# Patient Record
Sex: MALE | Race: WHITE | NOT HISPANIC OR LATINO | Employment: OTHER | URBAN - NONMETROPOLITAN AREA
[De-identification: names, ages, dates, MRNs, and addresses within clinical notes are randomized per-mention and may not be internally consistent; named-entity substitution may affect disease eponyms.]

---

## 2017-04-21 ENCOUNTER — LAB REQUISITION (OUTPATIENT)
Dept: LAB | Facility: HOSPITAL | Age: 77
End: 2017-04-21

## 2017-04-21 DIAGNOSIS — Z79.01 LONG TERM CURRENT USE OF ANTICOAGULANT: ICD-10-CM

## 2017-04-21 DIAGNOSIS — D64.9 ANEMIA: ICD-10-CM

## 2017-04-21 LAB
BASOPHILS # BLD AUTO: 0.07 10*3/MM3 (ref 0–0.3)
BASOPHILS NFR BLD AUTO: 0.8 % (ref 0–2)
DEPRECATED RDW RBC AUTO: 48.5 FL (ref 37–54)
EOSINOPHIL # BLD AUTO: 0.25 10*3/MM3 (ref 0–0.7)
EOSINOPHIL NFR BLD AUTO: 3 % (ref 0–7)
ERYTHROCYTE [DISTWIDTH] IN BLOOD BY AUTOMATED COUNT: 15 % (ref 11.5–14.5)
HCT VFR BLD AUTO: 30.8 % (ref 42–52)
HGB BLD-MCNC: 9.5 G/DL (ref 14–18)
IMM GRANULOCYTES # BLD: 0.03 10*3/MM3 (ref 0–0.03)
IMM GRANULOCYTES NFR BLD: 0.4 % (ref 0–0.5)
INR PPP: 1.91 (ref 0.8–1.1)
LYMPHOCYTES # BLD AUTO: 1.77 10*3/MM3 (ref 1–3)
LYMPHOCYTES NFR BLD AUTO: 21.4 % (ref 16–46)
MCH RBC QN AUTO: 27.5 PG (ref 27–33)
MCHC RBC AUTO-ENTMCNC: 30.8 G/DL (ref 33–37)
MCV RBC AUTO: 89.3 FL (ref 80–94)
MONOCYTES # BLD AUTO: 0.68 10*3/MM3 (ref 0.1–0.9)
MONOCYTES NFR BLD AUTO: 8.2 % (ref 0–12)
NEUTROPHILS # BLD AUTO: 5.49 10*3/MM3 (ref 1.4–6.5)
NEUTROPHILS NFR BLD AUTO: 66.2 % (ref 40–75)
PLATELET # BLD AUTO: 274 10*3/MM3 (ref 130–400)
PMV BLD AUTO: 10.4 FL (ref 6–10)
PROTHROMBIN TIME: 22.1 SECONDS (ref 9.8–11.9)
RBC # BLD AUTO: 3.45 10*6/MM3 (ref 4.7–6.1)
WBC NRBC COR # BLD: 8.29 10*3/MM3 (ref 4.5–12.5)

## 2017-04-21 PROCEDURE — 85610 PROTHROMBIN TIME: CPT | Performed by: INTERNAL MEDICINE

## 2017-04-21 PROCEDURE — 85025 COMPLETE CBC W/AUTO DIFF WBC: CPT | Performed by: INTERNAL MEDICINE

## 2017-05-01 ENCOUNTER — LAB REQUISITION (OUTPATIENT)
Dept: LAB | Facility: HOSPITAL | Age: 77
End: 2017-05-01

## 2017-05-01 DIAGNOSIS — S81.802A OPEN WOUND OF LEFT LOWER LEG: ICD-10-CM

## 2017-05-01 DIAGNOSIS — N39.0 URINARY TRACT INFECTION: ICD-10-CM

## 2017-05-01 DIAGNOSIS — Z79.01 LONG TERM CURRENT USE OF ANTICOAGULANT: ICD-10-CM

## 2017-05-01 LAB
BASOPHILS # BLD AUTO: 0.07 10*3/MM3 (ref 0–0.3)
BASOPHILS NFR BLD AUTO: 0.9 % (ref 0–2)
BILIRUB UR QL STRIP: NEGATIVE
CLARITY UR: CLEAR
COLOR UR: YELLOW
DEPRECATED RDW RBC AUTO: 51 FL (ref 37–54)
EOSINOPHIL # BLD AUTO: 0.47 10*3/MM3 (ref 0–0.7)
EOSINOPHIL NFR BLD AUTO: 6.3 % (ref 0–7)
ERYTHROCYTE [DISTWIDTH] IN BLOOD BY AUTOMATED COUNT: 15.8 % (ref 11.5–14.5)
GLUCOSE UR STRIP-MCNC: NEGATIVE MG/DL
HCT VFR BLD AUTO: 33.8 % (ref 42–52)
HGB BLD-MCNC: 10.1 G/DL (ref 14–18)
HGB UR QL STRIP.AUTO: NEGATIVE
IMM GRANULOCYTES # BLD: 0.01 10*3/MM3 (ref 0–0.03)
IMM GRANULOCYTES NFR BLD: 0.1 % (ref 0–0.5)
INR PPP: 6.12 (ref 0.8–1.1)
KETONES UR QL STRIP: NEGATIVE
LEUKOCYTE ESTERASE UR QL STRIP.AUTO: NEGATIVE
LYMPHOCYTES # BLD AUTO: 2.01 10*3/MM3 (ref 1–3)
LYMPHOCYTES NFR BLD AUTO: 26.8 % (ref 16–46)
MCH RBC QN AUTO: 27 PG (ref 27–33)
MCHC RBC AUTO-ENTMCNC: 29.9 G/DL (ref 33–37)
MCV RBC AUTO: 90.4 FL (ref 80–94)
MONOCYTES # BLD AUTO: 0.64 10*3/MM3 (ref 0.1–0.9)
MONOCYTES NFR BLD AUTO: 8.5 % (ref 0–12)
NEUTROPHILS # BLD AUTO: 4.31 10*3/MM3 (ref 1.4–6.5)
NEUTROPHILS NFR BLD AUTO: 57.4 % (ref 40–75)
NITRITE UR QL STRIP: NEGATIVE
PH UR STRIP.AUTO: <=5 [PH] (ref 5–8)
PLATELET # BLD AUTO: 263 10*3/MM3 (ref 130–400)
PMV BLD AUTO: 10.5 FL (ref 6–10)
PROT UR QL STRIP: NEGATIVE
PROTHROMBIN TIME: 76.3 SECONDS (ref 9.8–11.9)
RBC # BLD AUTO: 3.74 10*6/MM3 (ref 4.7–6.1)
SP GR UR STRIP: 1.02 (ref 1–1.03)
UROBILINOGEN UR QL STRIP: NORMAL
WBC NRBC COR # BLD: 7.51 10*3/MM3 (ref 4.5–12.5)

## 2017-05-01 PROCEDURE — 85610 PROTHROMBIN TIME: CPT | Performed by: INTERNAL MEDICINE

## 2017-05-01 PROCEDURE — 81003 URINALYSIS AUTO W/O SCOPE: CPT | Performed by: INTERNAL MEDICINE

## 2017-05-01 PROCEDURE — 85025 COMPLETE CBC W/AUTO DIFF WBC: CPT | Performed by: INTERNAL MEDICINE

## 2017-05-02 ENCOUNTER — CONSULT (OUTPATIENT)
Dept: GASTROENTEROLOGY | Facility: CLINIC | Age: 77
End: 2017-05-02

## 2017-05-02 VITALS
SYSTOLIC BLOOD PRESSURE: 131 MMHG | OXYGEN SATURATION: 97 % | HEIGHT: 69 IN | DIASTOLIC BLOOD PRESSURE: 62 MMHG | HEART RATE: 79 BPM

## 2017-05-02 DIAGNOSIS — K92.1 MELENA: ICD-10-CM

## 2017-05-02 DIAGNOSIS — R13.10 DYSPHAGIA, UNSPECIFIED TYPE: Primary | ICD-10-CM

## 2017-05-02 DIAGNOSIS — Z12.11 COLON CANCER SCREENING: ICD-10-CM

## 2017-05-02 DIAGNOSIS — K59.00 CONSTIPATION, UNSPECIFIED CONSTIPATION TYPE: ICD-10-CM

## 2017-05-02 PROCEDURE — 99204 OFFICE O/P NEW MOD 45 MIN: CPT | Performed by: INTERNAL MEDICINE

## 2017-05-02 RX ORDER — MONTELUKAST SODIUM 10 MG/1
10 TABLET ORAL NIGHTLY
Status: ON HOLD | COMMUNITY
End: 2018-06-01

## 2017-05-02 RX ORDER — NAPROXEN 250 MG/1
250 TABLET ORAL 2 TIMES DAILY PRN
Status: ON HOLD | COMMUNITY
End: 2018-06-01

## 2017-05-02 RX ORDER — WARFARIN SODIUM 1 MG/1
1 TABLET ORAL
Status: ON HOLD | COMMUNITY
End: 2018-06-01

## 2017-05-02 RX ORDER — DULOXETIN HYDROCHLORIDE 30 MG/1
30 CAPSULE, DELAYED RELEASE ORAL DAILY
COMMUNITY
End: 2017-05-11

## 2017-05-02 RX ORDER — WARFARIN SODIUM 4 MG/1
4 TABLET ORAL
Status: ON HOLD | COMMUNITY
End: 2018-06-01

## 2017-05-02 RX ORDER — AMITRIPTYLINE HYDROCHLORIDE 10 MG/1
10 TABLET, FILM COATED ORAL NIGHTLY
COMMUNITY
End: 2018-09-24 | Stop reason: ALTCHOICE

## 2017-05-02 RX ORDER — PRAVASTATIN SODIUM 10 MG
10 TABLET ORAL NIGHTLY
Status: ON HOLD | COMMUNITY
End: 2020-08-14

## 2017-05-02 RX ORDER — FINASTERIDE 5 MG/1
5 TABLET, FILM COATED ORAL DAILY
Status: ON HOLD | COMMUNITY
End: 2018-06-01

## 2017-05-02 RX ORDER — OXYCODONE AND ACETAMINOPHEN 10; 325 MG/1; MG/1
1 TABLET ORAL EVERY 6 HOURS PRN
Status: ON HOLD | COMMUNITY
End: 2018-06-01

## 2017-05-02 RX ORDER — CLONIDINE HYDROCHLORIDE 0.2 MG/1
0.2 TABLET ORAL 3 TIMES DAILY PRN
COMMUNITY
End: 2020-03-10

## 2017-05-02 RX ORDER — LISINOPRIL 20 MG/1
20 TABLET ORAL 2 TIMES DAILY
Status: ON HOLD | COMMUNITY
End: 2020-08-14

## 2017-05-08 ENCOUNTER — LAB REQUISITION (OUTPATIENT)
Dept: LAB | Facility: HOSPITAL | Age: 77
End: 2017-05-08

## 2017-05-08 DIAGNOSIS — Z79.01 LONG TERM CURRENT USE OF ANTICOAGULANT: ICD-10-CM

## 2017-05-08 LAB
INR PPP: 2.06 (ref 0.8–1.1)
PROTHROMBIN TIME: 23.9 SECONDS (ref 9.8–11.9)

## 2017-05-08 PROCEDURE — 85610 PROTHROMBIN TIME: CPT | Performed by: INTERNAL MEDICINE

## 2017-05-15 ENCOUNTER — TELEPHONE (OUTPATIENT)
Dept: GASTROENTEROLOGY | Facility: CLINIC | Age: 77
End: 2017-05-15

## 2017-05-15 DIAGNOSIS — Z12.11 COLON CANCER SCREENING: ICD-10-CM

## 2017-05-15 DIAGNOSIS — R13.10 DYSPHAGIA, UNSPECIFIED TYPE: Primary | ICD-10-CM

## 2017-05-17 ENCOUNTER — LAB REQUISITION (OUTPATIENT)
Dept: LAB | Facility: HOSPITAL | Age: 77
End: 2017-05-17

## 2017-05-17 DIAGNOSIS — Z79.01 LONG TERM CURRENT USE OF ANTICOAGULANT: ICD-10-CM

## 2017-05-17 LAB
INR PPP: 1.21 (ref 0.8–1.1)
PROTHROMBIN TIME: 13.6 SECONDS (ref 9.8–11.9)

## 2017-05-17 PROCEDURE — 85610 PROTHROMBIN TIME: CPT | Performed by: INTERNAL MEDICINE

## 2017-05-24 ENCOUNTER — LAB REQUISITION (OUTPATIENT)
Dept: LAB | Facility: HOSPITAL | Age: 77
End: 2017-05-24

## 2017-05-24 DIAGNOSIS — I10 ESSENTIAL (PRIMARY) HYPERTENSION: ICD-10-CM

## 2017-05-24 DIAGNOSIS — J44.9 CHRONIC OBSTRUCTIVE PULMONARY DISEASE (HCC): ICD-10-CM

## 2017-05-24 DIAGNOSIS — Z79.01 LONG TERM CURRENT USE OF ANTICOAGULANT: ICD-10-CM

## 2017-05-24 LAB
ALBUMIN SERPL-MCNC: 4 G/DL (ref 3.4–4.8)
ALBUMIN/GLOB SERPL: 1.2 G/DL (ref 1.5–2.5)
ALP SERPL-CCNC: 99 U/L (ref 40–129)
ALT SERPL W P-5'-P-CCNC: 6 U/L (ref 10–44)
ANION GAP SERPL CALCULATED.3IONS-SCNC: 5.4 MMOL/L (ref 3.6–11.2)
AST SERPL-CCNC: 16 U/L (ref 10–34)
BASOPHILS # BLD AUTO: 0.09 10*3/MM3 (ref 0–0.3)
BASOPHILS NFR BLD AUTO: 1.4 % (ref 0–2)
BILIRUB SERPL-MCNC: 0.3 MG/DL (ref 0.2–1.8)
BUN BLD-MCNC: 16 MG/DL (ref 7–21)
BUN/CREAT SERPL: 19.3 (ref 7–25)
CALCIUM SPEC-SCNC: 9.2 MG/DL (ref 7.7–10)
CHLORIDE SERPL-SCNC: 102 MMOL/L (ref 99–112)
CO2 SERPL-SCNC: 26.6 MMOL/L (ref 24.3–31.9)
CREAT BLD-MCNC: 0.83 MG/DL (ref 0.43–1.29)
DEPRECATED RDW RBC AUTO: 48 FL (ref 37–54)
EOSINOPHIL # BLD AUTO: 0.35 10*3/MM3 (ref 0–0.7)
EOSINOPHIL NFR BLD AUTO: 5.3 % (ref 0–7)
ERYTHROCYTE [DISTWIDTH] IN BLOOD BY AUTOMATED COUNT: 15.2 % (ref 11.5–14.5)
GFR SERPL CREATININE-BSD FRML MDRD: 90 ML/MIN/1.73
GLOBULIN UR ELPH-MCNC: 3.3 GM/DL
GLUCOSE BLD-MCNC: 105 MG/DL (ref 70–110)
HCT VFR BLD AUTO: 36.6 % (ref 42–52)
HGB BLD-MCNC: 11.6 G/DL (ref 14–18)
IMM GRANULOCYTES # BLD: 0.03 10*3/MM3 (ref 0–0.03)
IMM GRANULOCYTES NFR BLD: 0.5 % (ref 0–0.5)
INR PPP: 1.8 (ref 0.8–1.1)
LYMPHOCYTES # BLD AUTO: 1.92 10*3/MM3 (ref 1–3)
LYMPHOCYTES NFR BLD AUTO: 28.9 % (ref 16–46)
MCH RBC QN AUTO: 27.5 PG (ref 27–33)
MCHC RBC AUTO-ENTMCNC: 31.7 G/DL (ref 33–37)
MCV RBC AUTO: 86.7 FL (ref 80–94)
MONOCYTES # BLD AUTO: 0.6 10*3/MM3 (ref 0.1–0.9)
MONOCYTES NFR BLD AUTO: 9 % (ref 0–12)
NEUTROPHILS # BLD AUTO: 3.65 10*3/MM3 (ref 1.4–6.5)
NEUTROPHILS NFR BLD AUTO: 54.9 % (ref 40–75)
OSMOLALITY SERPL CALC.SUM OF ELEC: 269.8 MOSM/KG (ref 273–305)
PLATELET # BLD AUTO: 251 10*3/MM3 (ref 130–400)
PMV BLD AUTO: 10.8 FL (ref 6–10)
POTASSIUM BLD-SCNC: 4.4 MMOL/L (ref 3.5–5.3)
PROT SERPL-MCNC: 7.3 G/DL (ref 6–8)
PROTHROMBIN TIME: 20.8 SECONDS (ref 9.8–11.9)
RBC # BLD AUTO: 4.22 10*6/MM3 (ref 4.7–6.1)
SODIUM BLD-SCNC: 134 MMOL/L (ref 135–153)
WBC NRBC COR # BLD: 6.64 10*3/MM3 (ref 4.5–12.5)

## 2017-05-24 PROCEDURE — 85025 COMPLETE CBC W/AUTO DIFF WBC: CPT | Performed by: INTERNAL MEDICINE

## 2017-05-24 PROCEDURE — 80053 COMPREHEN METABOLIC PANEL: CPT | Performed by: INTERNAL MEDICINE

## 2017-05-24 PROCEDURE — 85610 PROTHROMBIN TIME: CPT | Performed by: INTERNAL MEDICINE

## 2017-06-01 ENCOUNTER — LAB REQUISITION (OUTPATIENT)
Dept: LAB | Facility: HOSPITAL | Age: 77
End: 2017-06-01

## 2017-06-01 DIAGNOSIS — Z79.01 LONG TERM CURRENT USE OF ANTICOAGULANT: ICD-10-CM

## 2017-06-01 LAB
INR PPP: 2.06 (ref 0.8–1.1)
PROTHROMBIN TIME: 24 SECONDS (ref 9.8–11.9)

## 2017-06-01 PROCEDURE — 85610 PROTHROMBIN TIME: CPT | Performed by: INTERNAL MEDICINE

## 2017-06-05 ENCOUNTER — LAB REQUISITION (OUTPATIENT)
Dept: LAB | Facility: HOSPITAL | Age: 77
End: 2017-06-05

## 2017-06-05 DIAGNOSIS — Z79.01 LONG TERM CURRENT USE OF ANTICOAGULANT: ICD-10-CM

## 2017-06-05 LAB
INR PPP: 2.23 (ref 0.8–1.1)
PROTHROMBIN TIME: 26.1 SECONDS (ref 9.8–11.9)

## 2017-06-05 PROCEDURE — 85610 PROTHROMBIN TIME: CPT | Performed by: INTERNAL MEDICINE

## 2017-06-14 ENCOUNTER — LAB REQUISITION (OUTPATIENT)
Dept: LAB | Facility: HOSPITAL | Age: 77
End: 2017-06-14

## 2017-06-14 DIAGNOSIS — Z51.81 ENCOUNTER FOR THERAPEUTIC DRUG LEVEL MONITORING: ICD-10-CM

## 2017-06-14 LAB
INR PPP: 1.46 (ref 0.8–1.1)
PROTHROMBIN TIME: 16.6 SECONDS (ref 9.8–11.9)

## 2017-06-14 PROCEDURE — 85610 PROTHROMBIN TIME: CPT | Performed by: INTERNAL MEDICINE

## 2017-06-15 ENCOUNTER — ANESTHESIA EVENT (OUTPATIENT)
Dept: PERIOP | Facility: HOSPITAL | Age: 77
End: 2017-06-15

## 2017-06-15 ENCOUNTER — ANESTHESIA (OUTPATIENT)
Dept: PERIOP | Facility: HOSPITAL | Age: 77
End: 2017-06-15

## 2017-06-15 ENCOUNTER — HOSPITAL ENCOUNTER (OUTPATIENT)
Facility: HOSPITAL | Age: 77
Setting detail: HOSPITAL OUTPATIENT SURGERY
Discharge: HOME | End: 2017-06-15
Attending: INTERNAL MEDICINE | Admitting: INTERNAL MEDICINE

## 2017-06-15 VITALS
SYSTOLIC BLOOD PRESSURE: 137 MMHG | HEART RATE: 61 BPM | HEIGHT: 68 IN | OXYGEN SATURATION: 96 % | RESPIRATION RATE: 20 BRPM | DIASTOLIC BLOOD PRESSURE: 59 MMHG | WEIGHT: 180 LBS | BODY MASS INDEX: 27.28 KG/M2 | TEMPERATURE: 98.2 F

## 2017-06-15 DIAGNOSIS — R13.10 DYSPHAGIA, UNSPECIFIED TYPE: ICD-10-CM

## 2017-06-15 DIAGNOSIS — Z12.11 COLON CANCER SCREENING: ICD-10-CM

## 2017-06-15 PROCEDURE — 43248 EGD GUIDE WIRE INSERTION: CPT | Performed by: INTERNAL MEDICINE

## 2017-06-15 PROCEDURE — 25010000002 MIDAZOLAM PER 1 MG: Performed by: NURSE ANESTHETIST, CERTIFIED REGISTERED

## 2017-06-15 PROCEDURE — 25010000002 PROPOFOL 10 MG/ML EMULSION: Performed by: NURSE ANESTHETIST, CERTIFIED REGISTERED

## 2017-06-15 PROCEDURE — 43239 EGD BIOPSY SINGLE/MULTIPLE: CPT | Performed by: INTERNAL MEDICINE

## 2017-06-15 PROCEDURE — 45385 COLONOSCOPY W/LESION REMOVAL: CPT | Performed by: INTERNAL MEDICINE

## 2017-06-15 PROCEDURE — 88342 IMHCHEM/IMCYTCHM 1ST ANTB: CPT | Performed by: INTERNAL MEDICINE

## 2017-06-15 PROCEDURE — 88305 TISSUE EXAM BY PATHOLOGIST: CPT | Performed by: INTERNAL MEDICINE

## 2017-06-15 PROCEDURE — 25010000002 FENTANYL CITRATE (PF) 100 MCG/2ML SOLUTION: Performed by: NURSE ANESTHETIST, CERTIFIED REGISTERED

## 2017-06-15 RX ORDER — IPRATROPIUM BROMIDE AND ALBUTEROL SULFATE 2.5; .5 MG/3ML; MG/3ML
3 SOLUTION RESPIRATORY (INHALATION) ONCE AS NEEDED
Status: DISCONTINUED | OUTPATIENT
Start: 2017-06-15 | End: 2017-06-15 | Stop reason: HOSPADM

## 2017-06-15 RX ORDER — MIDAZOLAM HYDROCHLORIDE 1 MG/ML
INJECTION INTRAMUSCULAR; INTRAVENOUS AS NEEDED
Status: DISCONTINUED | OUTPATIENT
Start: 2017-06-15 | End: 2017-06-15 | Stop reason: SURG

## 2017-06-15 RX ORDER — FENTANYL CITRATE 50 UG/ML
INJECTION, SOLUTION INTRAMUSCULAR; INTRAVENOUS AS NEEDED
Status: DISCONTINUED | OUTPATIENT
Start: 2017-06-15 | End: 2017-06-15 | Stop reason: SURG

## 2017-06-15 RX ORDER — SODIUM CHLORIDE 0.9 % (FLUSH) 0.9 %
1-10 SYRINGE (ML) INJECTION AS NEEDED
Status: DISCONTINUED | OUTPATIENT
Start: 2017-06-15 | End: 2017-06-15 | Stop reason: HOSPADM

## 2017-06-15 RX ORDER — FENTANYL CITRATE 50 UG/ML
50 INJECTION, SOLUTION INTRAMUSCULAR; INTRAVENOUS
Status: DISCONTINUED | OUTPATIENT
Start: 2017-06-15 | End: 2017-06-15 | Stop reason: HOSPADM

## 2017-06-15 RX ORDER — MEPERIDINE HYDROCHLORIDE 25 MG/ML
12.5 INJECTION INTRAMUSCULAR; INTRAVENOUS; SUBCUTANEOUS
Status: DISCONTINUED | OUTPATIENT
Start: 2017-06-15 | End: 2017-06-15 | Stop reason: HOSPADM

## 2017-06-15 RX ORDER — OXYCODONE HYDROCHLORIDE AND ACETAMINOPHEN 5; 325 MG/1; MG/1
1 TABLET ORAL ONCE AS NEEDED
Status: DISCONTINUED | OUTPATIENT
Start: 2017-06-15 | End: 2017-06-15 | Stop reason: HOSPADM

## 2017-06-15 RX ORDER — PROPOFOL 10 MG/ML
VIAL (ML) INTRAVENOUS AS NEEDED
Status: DISCONTINUED | OUTPATIENT
Start: 2017-06-15 | End: 2017-06-15 | Stop reason: SURG

## 2017-06-15 RX ORDER — LIDOCAINE HYDROCHLORIDE 20 MG/ML
INJECTION, SOLUTION INFILTRATION; PERINEURAL AS NEEDED
Status: DISCONTINUED | OUTPATIENT
Start: 2017-06-15 | End: 2017-06-15 | Stop reason: SURG

## 2017-06-15 RX ORDER — ONDANSETRON 2 MG/ML
4 INJECTION INTRAMUSCULAR; INTRAVENOUS ONCE AS NEEDED
Status: DISCONTINUED | OUTPATIENT
Start: 2017-06-15 | End: 2017-06-15 | Stop reason: HOSPADM

## 2017-06-15 RX ORDER — SODIUM CHLORIDE, SODIUM LACTATE, POTASSIUM CHLORIDE, CALCIUM CHLORIDE 600; 310; 30; 20 MG/100ML; MG/100ML; MG/100ML; MG/100ML
125 INJECTION, SOLUTION INTRAVENOUS CONTINUOUS
Status: DISCONTINUED | OUTPATIENT
Start: 2017-06-15 | End: 2017-06-15 | Stop reason: HOSPADM

## 2017-06-15 RX ADMIN — LIDOCAINE HYDROCHLORIDE 60 MG: 20 INJECTION, SOLUTION INFILTRATION; PERINEURAL at 08:46

## 2017-06-15 RX ADMIN — PROPOFOL 10 MG: 10 INJECTION, EMULSION INTRAVENOUS at 08:52

## 2017-06-15 RX ADMIN — FENTANYL CITRATE 25 MCG: 50 INJECTION INTRAMUSCULAR; INTRAVENOUS at 08:57

## 2017-06-15 RX ADMIN — FENTANYL CITRATE 25 MCG: 50 INJECTION INTRAMUSCULAR; INTRAVENOUS at 08:46

## 2017-06-15 RX ADMIN — PROPOFOL 10 MG: 10 INJECTION, EMULSION INTRAVENOUS at 09:07

## 2017-06-15 RX ADMIN — PROPOFOL 10 MG: 10 INJECTION, EMULSION INTRAVENOUS at 09:12

## 2017-06-15 RX ADMIN — MIDAZOLAM HYDROCHLORIDE 1 MG: 1 INJECTION, SOLUTION INTRAMUSCULAR; INTRAVENOUS at 08:46

## 2017-06-15 RX ADMIN — FENTANYL CITRATE 25 MCG: 50 INJECTION INTRAMUSCULAR; INTRAVENOUS at 09:02

## 2017-06-15 RX ADMIN — SODIUM CHLORIDE, POTASSIUM CHLORIDE, SODIUM LACTATE AND CALCIUM CHLORIDE: 600; 310; 30; 20 INJECTION, SOLUTION INTRAVENOUS at 08:41

## 2017-06-15 RX ADMIN — PROPOFOL 10 MG: 10 INJECTION, EMULSION INTRAVENOUS at 08:57

## 2017-06-15 RX ADMIN — PROPOFOL 20 MG: 10 INJECTION, EMULSION INTRAVENOUS at 08:46

## 2017-06-15 RX ADMIN — PROPOFOL 10 MG: 10 INJECTION, EMULSION INTRAVENOUS at 09:02

## 2017-06-15 RX ADMIN — MIDAZOLAM HYDROCHLORIDE 1 MG: 1 INJECTION, SOLUTION INTRAMUSCULAR; INTRAVENOUS at 08:41

## 2017-06-15 RX ADMIN — FENTANYL CITRATE 25 MCG: 50 INJECTION INTRAMUSCULAR; INTRAVENOUS at 08:52

## 2017-06-15 NOTE — PLAN OF CARE
Problem: Patient Care Overview (Adult)  Goal: Plan of Care Review  Outcome: Ongoing (interventions implemented as appropriate)    06/15/17 0832   Coping/Psychosocial Response Interventions   Plan Of Care Reviewed With patient   Patient Care Overview   Progress progress toward functional goals as expected

## 2017-06-15 NOTE — PLAN OF CARE
Problem: GI Endoscopy (Adult)  Goal: Signs and Symptoms of Listed Potential Problems Will be Absent or Manageable (GI Endoscopy)  Outcome: Ongoing (interventions implemented as appropriate)    06/15/17 0763   GI Endoscopy   Problems Assessed (GI Endoscopy) all   Problems Present (GI Endoscopy) none

## 2017-06-15 NOTE — ANESTHESIA POSTPROCEDURE EVALUATION
Patient: Zak Olmstead    Procedure Summary     Date Anesthesia Start Anesthesia Stop Room / Location    06/15/17 0841 0919 [facility] / [facility]       Procedure Diagnosis Surgeon Provider    ESOPHAGOGASTRODUODENOSCOPY WITH BIOPSY  CPTCODE:28872 (N/A Esophagus); COLONOSCOPY  CPTCODE:76798 (N/A ) Colon cancer screening; Dysphagia, unspecified type  (Colon cancer screening [Z12.11]; Dysphagia, unspecified type [R13.10]) MD Mauricio Mallory III, MD          Anesthesia Type: general  Last vitals  /59 (06/15/17 0950)    Temp 98.2 °F (36.8 °C) (06/15/17 0920)    Pulse 61 (06/15/17 0950)   Resp 20 (06/15/17 0950)    SpO2 96 % (06/15/17 0950)      Post Anesthesia Care and Evaluation    Patient location during evaluation: bedside  Patient participation: complete - patient participated  Level of consciousness: awake and alert  Pain score: 1  Pain management: adequate  Airway patency: patent  Anesthetic complications: No anesthetic complications  PONV Status: none  Cardiovascular status: acceptable  Respiratory status: acceptable  Hydration status: acceptable

## 2017-06-15 NOTE — OP NOTE
06/15/17    ESOPHAGOGASTRODUODENOSCOPY WITH BIOPSY and esophageal dilation, COLONOSCOPY with polypectomy  Procedure Note    Zak Olmstead  6/15/2017    Pre-op Diagnosis: Dysphagia, colon cancer screening, no prior colonoscopy      Post-op Diagnosis:   Small hiatal hernia  Internal hemorrhoids  Colonic polyps  Diverticulosis, sigmoid        Anesthesia: Per Anesthesia service, general anesthesia      Estimated Blood Loss: Negligible      Findings: EGD was performed with careful inspection of the esophagus, stomach and duodenum to the fourth portion.  Small hiatal hernia was noted.  There was no obvious active esophagitis.  No other abnormality was seen in the UGI tract.  Biopsies were taken randomly from the esophagus.  Biopsies were taken from the gastric antrum in order to check for H. pylori.  Utilizing a guidewire, the esophagus was dilated using a 48 [place] Savary dilator.  Re-examination of the esophagus, using the endoscope, revealed no significant injury to the esophagus.    Rectal examination revealed no mass.  Colonoscopy was performed to the cecum.  Bowel preparation was fair.  The scope was retroflexed within the rectum.  All areas were examined carefully.  Internal hemorrhoids were noted.  Diverticulosis was noted in the sigmoid colon without evidence of diverticulitis.  Benign-appearing sessile polyps were found and removed, one from the rectum and one from the sigmoid colon.  Both polyps were between 5-10 millimeters in size--both polyps were removed using the cold snare.  Visualization of the cecum was difficult, but no obvious abnormality was seen in the cecum.  No other abnormality was identified.    He tolerated the procedures well and there were no complications.  He left the OR in stable and satisfactory condition.      Complications: None      Recommendations:   Findings discussed with the patient  Await biopsy findings  Repeat screening/surveillance colonoscopy in about 3 years      Lincoln  Imer Bowens III, MD     Date: 6/15/2017  Time: 9:27 AM

## 2017-06-15 NOTE — H&P
"History and physical examination  SAGE 15 2017    HPI  77-year-old white male presents for EGD in order to investigate recurrent dysphagia.  He is also scheduled for screening colonoscopy.  He has not previously undergone colonoscopy.      Review of Systems   Negative and noncontributory.    ACTIVE PROBLEMS:   Specialty Problems     None          PAST MEDICAL HISTORY:  Past Medical History:   Diagnosis Date   • Heart murmur    • Hypertension    • Mononeuritis multiplex        SURGICAL HISTORY:  Past Surgical History:   Procedure Laterality Date   • ABDOMINAL AORTIC ANEURYSM REPAIR     • APPENDECTOMY     • BACK SURGERY     • BELOW KNEE LEG AMPUTATION Left    • ESOPHAGEAL DILATATION         FAMILY HISTORY:  Family History   Problem Relation Age of Onset   • Diabetes Other    • Heart disease Other        SOCIAL HISTORY:  Social History   Substance Use Topics   • Smoking status: Current Every Day Smoker     Packs/day: 1.00     Years: 60.00     Types: Cigarettes   • Smokeless tobacco: Not on file   • Alcohol use No       CURRENT MEDICATION:    Current Facility-Administered Medications:   •  lactated ringers infusion, 125 mL/hr, Intravenous, Continuous, Mauricio Morillo MD  •  sodium chloride 0.9 % flush 1-10 mL, 1-10 mL, Intravenous, PRN, Mauricio Morillo MD     I have reviewed his list of current medications.    ALLERGIES:  Review of patient's allergies indicates no known allergies.    VISIT VITALS:  /59 (BP Location: Left arm, Patient Position: Lying)  Pulse 59  Temp 97.5 °F (36.4 °C) (Tympanic)   Resp 20  Ht 68\" (172.7 cm)  Wt 180 lb (81.6 kg)  SpO2 96%  BMI 27.37 kg/m2    PHYSICAL EXAMINATION:  Physical Exam   Alert, oriented ×3  HEENT: Normal  Neck: No mass  Chest: Clear  Heart: Regular rhythm  Abdomen: Soft, nontender, active BS  Extremities: No edema  Neuro: No focal deficit    Assessment/Plan   Dysphagia  Colon cancer screening    REC  EGD and screening colonoscopy are planned.  He may benefit from " esophageal dilation.  The procedures, benefits, risks and alternatives have been explained to the patient.         Lincoln Bowens III, MD

## 2017-06-15 NOTE — ANESTHESIA PREPROCEDURE EVALUATION
Anesthesia Evaluation     no history of anesthetic complications:  NPO Solid Status: > 8 hours  NPO Liquid Status: > 8 hours     Airway   Mallampati: II  TM distance: >3 FB  Neck ROM: full  no difficulty expected  Dental    (+) poor dentition    Pulmonary - normal exam   Cardiovascular - normal exam    (+) hypertension, valvular problems/murmurs, PVD,       Neuro/Psych- negative ROS  GI/Hepatic/Renal/Endo      Musculoskeletal     Abdominal  - normal exam   Substance History      OB/GYN          Other                                      Anesthesia Plan    ASA 3     general     intravenous induction   Anesthetic plan and risks discussed with patient.

## 2017-06-15 NOTE — PLAN OF CARE
Problem: GI Endoscopy (Adult)  Goal: Signs and Symptoms of Listed Potential Problems Will be Absent or Manageable (GI Endoscopy)  Outcome: Ongoing (interventions implemented as appropriate)    06/15/17 0832   GI Endoscopy   Problems Assessed (GI Endoscopy) all   Problems Present (GI Endoscopy) none

## 2017-06-19 LAB
LAB AP CASE REPORT: NORMAL
Lab: NORMAL
PATH REPORT.FINAL DX SPEC: NORMAL

## 2017-07-31 ENCOUNTER — LAB REQUISITION (OUTPATIENT)
Dept: LAB | Facility: HOSPITAL | Age: 77
End: 2017-07-31

## 2017-07-31 DIAGNOSIS — D64.9 ANEMIA: ICD-10-CM

## 2017-07-31 DIAGNOSIS — I10 ESSENTIAL (PRIMARY) HYPERTENSION: ICD-10-CM

## 2017-07-31 LAB
ALBUMIN SERPL-MCNC: 3.8 G/DL (ref 3.4–4.8)
ALBUMIN/GLOB SERPL: 1.5 G/DL (ref 1.5–2.5)
ALP SERPL-CCNC: 85 U/L (ref 40–129)
ALT SERPL W P-5'-P-CCNC: 13 U/L (ref 10–44)
ANION GAP SERPL CALCULATED.3IONS-SCNC: 5.1 MMOL/L (ref 3.6–11.2)
AST SERPL-CCNC: 16 U/L (ref 10–34)
BASOPHILS # BLD AUTO: 0.08 10*3/MM3 (ref 0–0.3)
BASOPHILS NFR BLD AUTO: 0.9 % (ref 0–2)
BILIRUB SERPL-MCNC: 0.1 MG/DL (ref 0.2–1.8)
BUN BLD-MCNC: 16 MG/DL (ref 7–21)
BUN/CREAT SERPL: 14.4 (ref 7–25)
CALCIUM SPEC-SCNC: 8.4 MG/DL (ref 7.7–10)
CHLORIDE SERPL-SCNC: 103 MMOL/L (ref 99–112)
CO2 SERPL-SCNC: 27.9 MMOL/L (ref 24.3–31.9)
CREAT BLD-MCNC: 1.11 MG/DL (ref 0.43–1.29)
DEPRECATED RDW RBC AUTO: 47.5 FL (ref 37–54)
EOSINOPHIL # BLD AUTO: 0.32 10*3/MM3 (ref 0–0.7)
EOSINOPHIL NFR BLD AUTO: 3.8 % (ref 0–7)
ERYTHROCYTE [DISTWIDTH] IN BLOOD BY AUTOMATED COUNT: 15.4 % (ref 11.5–14.5)
GFR SERPL CREATININE-BSD FRML MDRD: 64 ML/MIN/1.73
GLOBULIN UR ELPH-MCNC: 2.6 GM/DL
GLUCOSE BLD-MCNC: 132 MG/DL (ref 70–110)
HCT VFR BLD AUTO: 37.4 % (ref 42–52)
HGB BLD-MCNC: 11.4 G/DL (ref 14–18)
IMM GRANULOCYTES # BLD: 0.01 10*3/MM3 (ref 0–0.03)
IMM GRANULOCYTES NFR BLD: 0.1 % (ref 0–0.5)
LYMPHOCYTES # BLD AUTO: 2.44 10*3/MM3 (ref 1–3)
LYMPHOCYTES NFR BLD AUTO: 28.7 % (ref 16–46)
MCH RBC QN AUTO: 25.9 PG (ref 27–33)
MCHC RBC AUTO-ENTMCNC: 30.5 G/DL (ref 33–37)
MCV RBC AUTO: 85 FL (ref 80–94)
MONOCYTES # BLD AUTO: 0.71 10*3/MM3 (ref 0.1–0.9)
MONOCYTES NFR BLD AUTO: 8.4 % (ref 0–12)
NEUTROPHILS # BLD AUTO: 4.94 10*3/MM3 (ref 1.4–6.5)
NEUTROPHILS NFR BLD AUTO: 58.1 % (ref 40–75)
OSMOLALITY SERPL CALC.SUM OF ELEC: 275 MOSM/KG (ref 273–305)
PLATELET # BLD AUTO: 231 10*3/MM3 (ref 130–400)
PMV BLD AUTO: 11.5 FL (ref 6–10)
POTASSIUM BLD-SCNC: 4.4 MMOL/L (ref 3.5–5.3)
PROT SERPL-MCNC: 6.4 G/DL (ref 6–8)
RBC # BLD AUTO: 4.4 10*6/MM3 (ref 4.7–6.1)
SODIUM BLD-SCNC: 136 MMOL/L (ref 135–153)
WBC NRBC COR # BLD: 8.5 10*3/MM3 (ref 4.5–12.5)

## 2017-07-31 PROCEDURE — 85025 COMPLETE CBC W/AUTO DIFF WBC: CPT | Performed by: INTERNAL MEDICINE

## 2017-07-31 PROCEDURE — 80053 COMPREHEN METABOLIC PANEL: CPT | Performed by: INTERNAL MEDICINE

## 2017-08-21 ENCOUNTER — LAB REQUISITION (OUTPATIENT)
Dept: LAB | Facility: HOSPITAL | Age: 77
End: 2017-08-21

## 2017-08-21 DIAGNOSIS — N39.0 URINARY TRACT INFECTION: ICD-10-CM

## 2017-08-21 LAB
BILIRUB UR QL STRIP: NEGATIVE
CLARITY UR: CLEAR
COLOR UR: ABNORMAL
GLUCOSE UR STRIP-MCNC: NEGATIVE MG/DL
HGB UR QL STRIP.AUTO: NEGATIVE
KETONES UR QL STRIP: ABNORMAL
LEUKOCYTE ESTERASE UR QL STRIP.AUTO: NEGATIVE
NITRITE UR QL STRIP: NEGATIVE
PH UR STRIP.AUTO: <=5 [PH] (ref 5–8)
PROT UR QL STRIP: NEGATIVE
SP GR UR STRIP: >=1.03 (ref 1–1.03)
UROBILINOGEN UR QL STRIP: ABNORMAL

## 2017-08-21 PROCEDURE — 81003 URINALYSIS AUTO W/O SCOPE: CPT | Performed by: INTERNAL MEDICINE

## 2017-09-22 ENCOUNTER — LAB REQUISITION (OUTPATIENT)
Dept: LAB | Facility: HOSPITAL | Age: 77
End: 2017-09-22

## 2017-09-22 DIAGNOSIS — E61.2 MAGNESIUM DEFICIENCY: ICD-10-CM

## 2017-09-22 DIAGNOSIS — E78.5 HYPERLIPIDEMIA: ICD-10-CM

## 2017-09-22 DIAGNOSIS — E03.9 HYPOTHYROIDISM: ICD-10-CM

## 2017-09-22 DIAGNOSIS — J44.9 CHRONIC OBSTRUCTIVE PULMONARY DISEASE (HCC): ICD-10-CM

## 2017-09-22 DIAGNOSIS — D64.9 ANEMIA: ICD-10-CM

## 2017-09-22 DIAGNOSIS — R33.9 RETENTION OF URINE: ICD-10-CM

## 2017-09-22 LAB
ALBUMIN SERPL-MCNC: 4.4 G/DL (ref 3.4–4.8)
ALBUMIN UR-MCNC: 6.3 MG/L
ALBUMIN/GLOB SERPL: 1.5 G/DL (ref 1.5–2.5)
ALP SERPL-CCNC: 84 U/L (ref 40–129)
ALT SERPL W P-5'-P-CCNC: 7 U/L (ref 10–44)
ANION GAP SERPL CALCULATED.3IONS-SCNC: 6.6 MMOL/L (ref 3.6–11.2)
AST SERPL-CCNC: 17 U/L (ref 10–34)
BASOPHILS # BLD AUTO: 0.1 10*3/MM3 (ref 0–0.3)
BASOPHILS NFR BLD AUTO: 1.2 % (ref 0–2)
BILIRUB SERPL-MCNC: 0.3 MG/DL (ref 0.2–1.8)
BILIRUB UR QL STRIP: NEGATIVE
BUN BLD-MCNC: 19 MG/DL (ref 7–21)
BUN/CREAT SERPL: 18.6 (ref 7–25)
CALCIUM SPEC-SCNC: 9.4 MG/DL (ref 7.7–10)
CHLORIDE SERPL-SCNC: 104 MMOL/L (ref 99–112)
CHOLEST SERPL-MCNC: 139 MG/DL (ref 0–200)
CLARITY UR: CLEAR
CO2 SERPL-SCNC: 25.4 MMOL/L (ref 24.3–31.9)
COLOR UR: YELLOW
CREAT BLD-MCNC: 1.02 MG/DL (ref 0.43–1.29)
DEPRECATED RDW RBC AUTO: 50.7 FL (ref 37–54)
EOSINOPHIL # BLD AUTO: 0.38 10*3/MM3 (ref 0–0.7)
EOSINOPHIL NFR BLD AUTO: 4.4 % (ref 0–7)
ERYTHROCYTE [DISTWIDTH] IN BLOOD BY AUTOMATED COUNT: 17.2 % (ref 11.5–14.5)
GFR SERPL CREATININE-BSD FRML MDRD: 71 ML/MIN/1.73
GLOBULIN UR ELPH-MCNC: 3 GM/DL
GLUCOSE BLD-MCNC: 109 MG/DL (ref 70–110)
GLUCOSE UR STRIP-MCNC: NEGATIVE MG/DL
HCT VFR BLD AUTO: 41.4 % (ref 42–52)
HDLC SERPL-MCNC: 31 MG/DL (ref 60–100)
HGB BLD-MCNC: 12.9 G/DL (ref 14–18)
HGB UR QL STRIP.AUTO: NEGATIVE
IMM GRANULOCYTES # BLD: 0.01 10*3/MM3 (ref 0–0.03)
IMM GRANULOCYTES NFR BLD: 0.1 % (ref 0–0.5)
KETONES UR QL STRIP: NEGATIVE
LDLC SERPL CALC-MCNC: 85 MG/DL (ref 0–100)
LDLC/HDLC SERPL: 2.75 {RATIO}
LEUKOCYTE ESTERASE UR QL STRIP.AUTO: NEGATIVE
LYMPHOCYTES # BLD AUTO: 2.92 10*3/MM3 (ref 1–3)
LYMPHOCYTES NFR BLD AUTO: 33.6 % (ref 16–46)
MAGNESIUM SERPL-MCNC: 2.2 MG/DL (ref 1.7–2.6)
MCH RBC QN AUTO: 25.3 PG (ref 27–33)
MCHC RBC AUTO-ENTMCNC: 31.2 G/DL (ref 33–37)
MCV RBC AUTO: 81.2 FL (ref 80–94)
MONOCYTES # BLD AUTO: 0.76 10*3/MM3 (ref 0.1–0.9)
MONOCYTES NFR BLD AUTO: 8.7 % (ref 0–12)
NEUTROPHILS # BLD AUTO: 4.52 10*3/MM3 (ref 1.4–6.5)
NEUTROPHILS NFR BLD AUTO: 52 % (ref 40–75)
NITRITE UR QL STRIP: NEGATIVE
OSMOLALITY SERPL CALC.SUM OF ELEC: 274.8 MOSM/KG (ref 273–305)
PH UR STRIP.AUTO: <=5 [PH] (ref 5–8)
PLATELET # BLD AUTO: 236 10*3/MM3 (ref 130–400)
PMV BLD AUTO: 10.6 FL (ref 6–10)
POTASSIUM BLD-SCNC: 4.5 MMOL/L (ref 3.5–5.3)
PROT SERPL-MCNC: 7.4 G/DL (ref 6–8)
PROT UR QL STRIP: NEGATIVE
PSA SERPL-MCNC: 1.4 NG/ML (ref 0–4)
RBC # BLD AUTO: 5.1 10*6/MM3 (ref 4.7–6.1)
SODIUM BLD-SCNC: 136 MMOL/L (ref 135–153)
SP GR UR STRIP: 1.02 (ref 1–1.03)
TRIGL SERPL-MCNC: 114 MG/DL (ref 0–150)
TSH SERPL DL<=0.05 MIU/L-ACNC: 0.89 MIU/ML (ref 0.55–4.78)
UROBILINOGEN UR QL STRIP: NORMAL
VLDLC SERPL-MCNC: 22.8 MG/DL
WBC NRBC COR # BLD: 8.69 10*3/MM3 (ref 4.5–12.5)

## 2017-09-22 PROCEDURE — 80053 COMPREHEN METABOLIC PANEL: CPT | Performed by: INTERNAL MEDICINE

## 2017-09-22 PROCEDURE — 82043 UR ALBUMIN QUANTITATIVE: CPT | Performed by: INTERNAL MEDICINE

## 2017-09-22 PROCEDURE — 80061 LIPID PANEL: CPT | Performed by: INTERNAL MEDICINE

## 2017-09-22 PROCEDURE — 81003 URINALYSIS AUTO W/O SCOPE: CPT | Performed by: INTERNAL MEDICINE

## 2017-09-22 PROCEDURE — 84153 ASSAY OF PSA TOTAL: CPT | Performed by: INTERNAL MEDICINE

## 2017-09-22 PROCEDURE — 83735 ASSAY OF MAGNESIUM: CPT | Performed by: INTERNAL MEDICINE

## 2017-09-22 PROCEDURE — 85025 COMPLETE CBC W/AUTO DIFF WBC: CPT | Performed by: INTERNAL MEDICINE

## 2017-09-22 PROCEDURE — 84443 ASSAY THYROID STIM HORMONE: CPT | Performed by: INTERNAL MEDICINE

## 2017-11-22 ENCOUNTER — LAB REQUISITION (OUTPATIENT)
Dept: LAB | Facility: HOSPITAL | Age: 77
End: 2017-11-22

## 2017-11-22 DIAGNOSIS — I10 ESSENTIAL (PRIMARY) HYPERTENSION: ICD-10-CM

## 2017-11-22 LAB
BASOPHILS # BLD AUTO: 0.11 10*3/MM3 (ref 0–0.3)
BASOPHILS NFR BLD AUTO: 1.3 % (ref 0–2)
DEPRECATED RDW RBC AUTO: 50.8 FL (ref 37–54)
EOSINOPHIL # BLD AUTO: 0.4 10*3/MM3 (ref 0–0.7)
EOSINOPHIL NFR BLD AUTO: 4.6 % (ref 0–7)
ERYTHROCYTE [DISTWIDTH] IN BLOOD BY AUTOMATED COUNT: 16.7 % (ref 11.5–14.5)
HCT VFR BLD AUTO: 40.3 % (ref 42–52)
HGB BLD-MCNC: 12.7 G/DL (ref 14–18)
IMM GRANULOCYTES # BLD: 0.03 10*3/MM3 (ref 0–0.03)
IMM GRANULOCYTES NFR BLD: 0.3 % (ref 0–0.5)
LYMPHOCYTES # BLD AUTO: 2.41 10*3/MM3 (ref 1–3)
LYMPHOCYTES NFR BLD AUTO: 28 % (ref 16–46)
MCH RBC QN AUTO: 26.1 PG (ref 27–33)
MCHC RBC AUTO-ENTMCNC: 31.5 G/DL (ref 33–37)
MCV RBC AUTO: 82.9 FL (ref 80–94)
MONOCYTES # BLD AUTO: 0.68 10*3/MM3 (ref 0.1–0.9)
MONOCYTES NFR BLD AUTO: 7.9 % (ref 0–12)
NEUTROPHILS # BLD AUTO: 4.98 10*3/MM3 (ref 1.4–6.5)
NEUTROPHILS NFR BLD AUTO: 57.9 % (ref 40–75)
PLATELET # BLD AUTO: 202 10*3/MM3 (ref 130–400)
PMV BLD AUTO: 10.4 FL (ref 6–10)
RBC # BLD AUTO: 4.86 10*6/MM3 (ref 4.7–6.1)
WBC NRBC COR # BLD: 8.61 10*3/MM3 (ref 4.5–12.5)

## 2017-11-22 PROCEDURE — 85025 COMPLETE CBC W/AUTO DIFF WBC: CPT | Performed by: INTERNAL MEDICINE

## 2018-02-22 ENCOUNTER — LAB REQUISITION (OUTPATIENT)
Dept: LAB | Facility: HOSPITAL | Age: 78
End: 2018-02-22

## 2018-02-22 DIAGNOSIS — Z51.81 ENCOUNTER FOR THERAPEUTIC DRUG LEVEL MONITORING: ICD-10-CM

## 2018-02-22 DIAGNOSIS — R32 URINARY INCONTINENCE: ICD-10-CM

## 2018-02-22 DIAGNOSIS — Z79.01 LONG TERM CURRENT USE OF ANTICOAGULANT: ICD-10-CM

## 2018-02-22 DIAGNOSIS — I10 ESSENTIAL (PRIMARY) HYPERTENSION: ICD-10-CM

## 2018-02-22 DIAGNOSIS — J44.9 CHRONIC OBSTRUCTIVE PULMONARY DISEASE (HCC): ICD-10-CM

## 2018-02-22 LAB
ALBUMIN SERPL-MCNC: 4.1 G/DL (ref 3.4–4.8)
ALBUMIN/GLOB SERPL: 1.4 G/DL (ref 1.5–2.5)
ALP SERPL-CCNC: 88 U/L (ref 40–129)
ALT SERPL W P-5'-P-CCNC: 9 U/L (ref 10–44)
ANION GAP SERPL CALCULATED.3IONS-SCNC: 5 MMOL/L (ref 3.6–11.2)
AST SERPL-CCNC: 14 U/L (ref 10–34)
BILIRUB SERPL-MCNC: 0.4 MG/DL (ref 0.2–1.8)
BILIRUB UR QL STRIP: NEGATIVE
BNP SERPL-MCNC: 60 PG/ML (ref 0–100)
BUN BLD-MCNC: 18 MG/DL (ref 7–21)
BUN/CREAT SERPL: 17.8 (ref 7–25)
CALCIUM SPEC-SCNC: 9 MG/DL (ref 7.7–10)
CHLORIDE SERPL-SCNC: 105 MMOL/L (ref 99–112)
CLARITY UR: CLEAR
CO2 SERPL-SCNC: 27 MMOL/L (ref 24.3–31.9)
COLOR UR: YELLOW
CREAT BLD-MCNC: 1.01 MG/DL (ref 0.43–1.29)
DEPRECATED RDW RBC AUTO: 48.8 FL (ref 37–54)
ERYTHROCYTE [DISTWIDTH] IN BLOOD BY AUTOMATED COUNT: 16.5 % (ref 11.5–14.5)
GFR SERPL CREATININE-BSD FRML MDRD: 72 ML/MIN/1.73
GLOBULIN UR ELPH-MCNC: 2.9 GM/DL
GLUCOSE BLD-MCNC: 119 MG/DL (ref 70–110)
GLUCOSE UR STRIP-MCNC: NEGATIVE MG/DL
HCT VFR BLD AUTO: 42.6 % (ref 42–52)
HGB BLD-MCNC: 13.7 G/DL (ref 14–18)
HGB UR QL STRIP.AUTO: NEGATIVE
INR PPP: 1.36 (ref 0.9–1.1)
KETONES UR QL STRIP: NEGATIVE
LEUKOCYTE ESTERASE UR QL STRIP.AUTO: NEGATIVE
MCH RBC QN AUTO: 26.5 PG (ref 27–33)
MCHC RBC AUTO-ENTMCNC: 32.2 G/DL (ref 33–37)
MCV RBC AUTO: 82.4 FL (ref 80–94)
NITRITE UR QL STRIP: NEGATIVE
OSMOLALITY SERPL CALC.SUM OF ELEC: 276.9 MOSM/KG (ref 273–305)
PH UR STRIP.AUTO: 5.5 [PH] (ref 5–8)
PLATELET # BLD AUTO: 220 10*3/MM3 (ref 130–400)
PMV BLD AUTO: 10.7 FL (ref 6–10)
POTASSIUM BLD-SCNC: 4.2 MMOL/L (ref 3.5–5.3)
PROT SERPL-MCNC: 7 G/DL (ref 6–8)
PROT UR QL STRIP: NEGATIVE
PROTHROMBIN TIME: 16.9 SECONDS (ref 11–15.4)
RBC # BLD AUTO: 5.17 10*6/MM3 (ref 4.7–6.1)
SODIUM BLD-SCNC: 137 MMOL/L (ref 135–153)
SP GR UR STRIP: 1.02 (ref 1–1.03)
UROBILINOGEN UR QL STRIP: NORMAL
WBC NRBC COR # BLD: 9.57 10*3/MM3 (ref 4.5–12.5)

## 2018-02-22 PROCEDURE — G0480 DRUG TEST DEF 1-7 CLASSES: HCPCS | Performed by: INTERNAL MEDICINE

## 2018-02-22 PROCEDURE — 83880 ASSAY OF NATRIURETIC PEPTIDE: CPT | Performed by: INTERNAL MEDICINE

## 2018-02-22 PROCEDURE — 85027 COMPLETE CBC AUTOMATED: CPT | Performed by: INTERNAL MEDICINE

## 2018-02-22 PROCEDURE — 80053 COMPREHEN METABOLIC PANEL: CPT | Performed by: INTERNAL MEDICINE

## 2018-02-22 PROCEDURE — 81003 URINALYSIS AUTO W/O SCOPE: CPT | Performed by: INTERNAL MEDICINE

## 2018-02-22 PROCEDURE — 85610 PROTHROMBIN TIME: CPT | Performed by: INTERNAL MEDICINE

## 2018-02-23 LAB
AMITRIP SERPL-MCNC: NORMAL NG/ML
AMITRIP+NOR SERPL-MCNC: NORMAL NG/ML (ref 80–200)
NORTRIP SERPL-MCNC: NORMAL NG/ML

## 2018-03-21 ENCOUNTER — LAB REQUISITION (OUTPATIENT)
Dept: LAB | Facility: HOSPITAL | Age: 78
End: 2018-03-21

## 2018-03-21 DIAGNOSIS — I10 ESSENTIAL (PRIMARY) HYPERTENSION: ICD-10-CM

## 2018-03-21 DIAGNOSIS — N39.0 URINARY TRACT INFECTION: ICD-10-CM

## 2018-03-21 LAB
BILIRUB UR QL STRIP: NEGATIVE
CLARITY UR: CLEAR
COLOR UR: YELLOW
GLUCOSE UR STRIP-MCNC: NEGATIVE MG/DL
HGB UR QL STRIP.AUTO: NEGATIVE
KETONES UR QL STRIP: ABNORMAL
LEUKOCYTE ESTERASE UR QL STRIP.AUTO: NEGATIVE
NITRITE UR QL STRIP: NEGATIVE
PH UR STRIP.AUTO: 6 [PH] (ref 5–8)
PROT UR QL STRIP: NEGATIVE
SP GR UR STRIP: 1.02 (ref 1–1.03)
UROBILINOGEN UR QL STRIP: ABNORMAL

## 2018-03-21 PROCEDURE — 81003 URINALYSIS AUTO W/O SCOPE: CPT | Performed by: PHYSICAL MEDICINE & REHABILITATION

## 2018-05-03 ENCOUNTER — LAB REQUISITION (OUTPATIENT)
Dept: LAB | Facility: HOSPITAL | Age: 78
End: 2018-05-03

## 2018-05-03 DIAGNOSIS — E03.9 HYPOTHYROIDISM: ICD-10-CM

## 2018-05-03 LAB — TSH SERPL DL<=0.05 MIU/L-ACNC: 0.29 MIU/ML (ref 0.55–4.78)

## 2018-05-03 PROCEDURE — 84443 ASSAY THYROID STIM HORMONE: CPT | Performed by: OBSTETRICS & GYNECOLOGY

## 2018-05-23 ENCOUNTER — LAB REQUISITION (OUTPATIENT)
Dept: LAB | Facility: HOSPITAL | Age: 78
End: 2018-05-23

## 2018-05-23 DIAGNOSIS — E03.9 HYPOTHYROIDISM: ICD-10-CM

## 2018-05-23 LAB — TSH SERPL DL<=0.05 MIU/L-ACNC: 0.42 MIU/ML (ref 0.55–4.78)

## 2018-05-23 PROCEDURE — 84443 ASSAY THYROID STIM HORMONE: CPT | Performed by: PHYSICAL MEDICINE & REHABILITATION

## 2018-05-31 ENCOUNTER — APPOINTMENT (OUTPATIENT)
Dept: GENERAL RADIOLOGY | Facility: HOSPITAL | Age: 78
End: 2018-05-31

## 2018-05-31 ENCOUNTER — APPOINTMENT (OUTPATIENT)
Dept: CT IMAGING | Facility: HOSPITAL | Age: 78
End: 2018-05-31

## 2018-05-31 ENCOUNTER — HOSPITAL ENCOUNTER (INPATIENT)
Facility: HOSPITAL | Age: 78
LOS: 1 days | Discharge: HOME HEALTH | End: 2018-06-02
Attending: EMERGENCY MEDICINE | Admitting: INTERNAL MEDICINE

## 2018-05-31 DIAGNOSIS — I21.4 NSTEMI (NON-ST ELEVATED MYOCARDIAL INFARCTION) (HCC): Primary | ICD-10-CM

## 2018-05-31 DIAGNOSIS — I48.91 ATRIAL FIBRILLATION WITH RAPID VENTRICULAR RESPONSE (HCC): ICD-10-CM

## 2018-05-31 LAB
ALBUMIN SERPL-MCNC: 4.3 G/DL (ref 3.4–4.8)
ALBUMIN/GLOB SERPL: 1.3 G/DL (ref 1.5–2.5)
ALP SERPL-CCNC: 78 U/L (ref 40–129)
ALT SERPL W P-5'-P-CCNC: 10 U/L (ref 10–44)
ANION GAP SERPL CALCULATED.3IONS-SCNC: 7.9 MMOL/L (ref 3.6–11.2)
APTT PPP: 45.5 SECONDS (ref 23.8–36.1)
AST SERPL-CCNC: 23 U/L (ref 10–34)
BACTERIA UR QL AUTO: ABNORMAL /HPF
BASOPHILS # BLD AUTO: 0.08 10*3/MM3 (ref 0–0.3)
BASOPHILS NFR BLD AUTO: 0.6 % (ref 0–2)
BILIRUB SERPL-MCNC: 0.3 MG/DL (ref 0.2–1.8)
BILIRUB UR QL STRIP: ABNORMAL
BUN BLD-MCNC: 18 MG/DL (ref 7–21)
BUN/CREAT SERPL: 14.9 (ref 7–25)
CALCIUM SPEC-SCNC: 9 MG/DL (ref 7.7–10)
CHLORIDE SERPL-SCNC: 106 MMOL/L (ref 99–112)
CK MB SERPL-CCNC: 5.86 NG/ML (ref 0–5)
CK MB SERPL-RTO: 6.7 % (ref 0–3)
CK SERPL-CCNC: 87 U/L (ref 24–204)
CLARITY UR: CLEAR
CO2 SERPL-SCNC: 23.1 MMOL/L (ref 24.3–31.9)
COLOR UR: ABNORMAL
CREAT BLD-MCNC: 1.21 MG/DL (ref 0.43–1.29)
D DIMER PPP FEU-MCNC: 3.13 MCGFEU/ML (ref 0–0.5)
DEPRECATED RDW RBC AUTO: 50.2 FL (ref 37–54)
EOSINOPHIL # BLD AUTO: 0.14 10*3/MM3 (ref 0–0.7)
EOSINOPHIL NFR BLD AUTO: 1.1 % (ref 0–7)
ERYTHROCYTE [DISTWIDTH] IN BLOOD BY AUTOMATED COUNT: 17.3 % (ref 11.5–14.5)
GFR SERPL CREATININE-BSD FRML MDRD: 58 ML/MIN/1.73
GLOBULIN UR ELPH-MCNC: 3.2 GM/DL
GLUCOSE BLD-MCNC: 117 MG/DL (ref 70–110)
GLUCOSE UR STRIP-MCNC: NEGATIVE MG/DL
HCT VFR BLD AUTO: 49.3 % (ref 42–52)
HGB BLD-MCNC: 15.9 G/DL (ref 14–18)
HGB UR QL STRIP.AUTO: NEGATIVE
HYALINE CASTS UR QL AUTO: ABNORMAL /LPF
IMM GRANULOCYTES # BLD: 0.03 10*3/MM3 (ref 0–0.03)
IMM GRANULOCYTES NFR BLD: 0.2 % (ref 0–0.5)
INR PPP: 1.8 (ref 0.9–1.1)
KETONES UR QL STRIP: ABNORMAL
LEUKOCYTE ESTERASE UR QL STRIP.AUTO: NEGATIVE
LYMPHOCYTES # BLD AUTO: 3.22 10*3/MM3 (ref 1–3)
LYMPHOCYTES NFR BLD AUTO: 25.8 % (ref 16–46)
MCH RBC QN AUTO: 26.1 PG (ref 27–33)
MCHC RBC AUTO-ENTMCNC: 32.3 G/DL (ref 33–37)
MCV RBC AUTO: 81 FL (ref 80–94)
MONOCYTES # BLD AUTO: 1.1 10*3/MM3 (ref 0.1–0.9)
MONOCYTES NFR BLD AUTO: 8.8 % (ref 0–12)
MUCOUS THREADS URNS QL MICRO: ABNORMAL /HPF
NEUTROPHILS # BLD AUTO: 7.93 10*3/MM3 (ref 1.4–6.5)
NEUTROPHILS NFR BLD AUTO: 63.5 % (ref 40–75)
NITRITE UR QL STRIP: NEGATIVE
OSMOLALITY SERPL CALC.SUM OF ELEC: 276.7 MOSM/KG (ref 273–305)
PH UR STRIP.AUTO: <=5 [PH] (ref 5–8)
PLATELET # BLD AUTO: 254 10*3/MM3 (ref 130–400)
PMV BLD AUTO: 11.4 FL (ref 6–10)
POTASSIUM BLD-SCNC: 4.2 MMOL/L (ref 3.5–5.3)
PROT SERPL-MCNC: 7.5 G/DL (ref 6–8)
PROT UR QL STRIP: ABNORMAL
PROTHROMBIN TIME: 21.2 SECONDS (ref 11–15.4)
RBC # BLD AUTO: 6.09 10*6/MM3 (ref 4.7–6.1)
RBC # UR: ABNORMAL /HPF
REF LAB TEST METHOD: ABNORMAL
SODIUM BLD-SCNC: 137 MMOL/L (ref 135–153)
SP GR UR STRIP: 1.03 (ref 1–1.03)
SQUAMOUS #/AREA URNS HPF: ABNORMAL /HPF
TROPONIN I SERPL-MCNC: 0.67 NG/ML
UROBILINOGEN UR QL STRIP: ABNORMAL
WBC NRBC COR # BLD: 12.5 10*3/MM3 (ref 4.5–12.5)
WBC UR QL AUTO: ABNORMAL /HPF

## 2018-05-31 PROCEDURE — 93010 ELECTROCARDIOGRAM REPORT: CPT | Performed by: INTERNAL MEDICINE

## 2018-05-31 PROCEDURE — 99285 EMERGENCY DEPT VISIT HI MDM: CPT

## 2018-05-31 PROCEDURE — 85730 THROMBOPLASTIN TIME PARTIAL: CPT | Performed by: PHYSICIAN ASSISTANT

## 2018-05-31 PROCEDURE — 81001 URINALYSIS AUTO W/SCOPE: CPT | Performed by: PHYSICIAN ASSISTANT

## 2018-05-31 PROCEDURE — 93005 ELECTROCARDIOGRAM TRACING: CPT | Performed by: EMERGENCY MEDICINE

## 2018-05-31 PROCEDURE — 71275 CT ANGIOGRAPHY CHEST: CPT | Performed by: RADIOLOGY

## 2018-05-31 PROCEDURE — 71045 X-RAY EXAM CHEST 1 VIEW: CPT

## 2018-05-31 PROCEDURE — 82553 CREATINE MB FRACTION: CPT | Performed by: PHYSICIAN ASSISTANT

## 2018-05-31 PROCEDURE — 85025 COMPLETE CBC W/AUTO DIFF WBC: CPT | Performed by: PHYSICIAN ASSISTANT

## 2018-05-31 PROCEDURE — 36415 COLL VENOUS BLD VENIPUNCTURE: CPT

## 2018-05-31 PROCEDURE — 71275 CT ANGIOGRAPHY CHEST: CPT

## 2018-05-31 PROCEDURE — 85610 PROTHROMBIN TIME: CPT | Performed by: PHYSICIAN ASSISTANT

## 2018-05-31 PROCEDURE — 84484 ASSAY OF TROPONIN QUANT: CPT | Performed by: PHYSICIAN ASSISTANT

## 2018-05-31 PROCEDURE — 82550 ASSAY OF CK (CPK): CPT | Performed by: PHYSICIAN ASSISTANT

## 2018-05-31 PROCEDURE — 0 IOPAMIDOL PER 1 ML: Performed by: EMERGENCY MEDICINE

## 2018-05-31 PROCEDURE — 93005 ELECTROCARDIOGRAM TRACING: CPT | Performed by: PHYSICIAN ASSISTANT

## 2018-05-31 PROCEDURE — 80053 COMPREHEN METABOLIC PANEL: CPT | Performed by: PHYSICIAN ASSISTANT

## 2018-05-31 PROCEDURE — 85379 FIBRIN DEGRADATION QUANT: CPT | Performed by: PHYSICIAN ASSISTANT

## 2018-05-31 PROCEDURE — 71045 X-RAY EXAM CHEST 1 VIEW: CPT | Performed by: RADIOLOGY

## 2018-05-31 RX ORDER — ASPIRIN 81 MG/1
324 TABLET, CHEWABLE ORAL ONCE
Status: DISCONTINUED | OUTPATIENT
Start: 2018-05-31 | End: 2018-05-31

## 2018-05-31 RX ORDER — SODIUM CHLORIDE 0.9 % (FLUSH) 0.9 %
10 SYRINGE (ML) INJECTION AS NEEDED
Status: DISCONTINUED | OUTPATIENT
Start: 2018-05-31 | End: 2018-06-02 | Stop reason: HOSPADM

## 2018-05-31 RX ADMIN — SODIUM CHLORIDE 1000 ML: 9 INJECTION, SOLUTION INTRAVENOUS at 21:57

## 2018-05-31 RX ADMIN — DILTIAZEM HYDROCHLORIDE 5 MG/HR: 100 INJECTION, POWDER, LYOPHILIZED, FOR SOLUTION INTRAVENOUS at 21:59

## 2018-05-31 RX ADMIN — IOPAMIDOL 90 ML: 755 INJECTION, SOLUTION INTRAVENOUS at 23:43

## 2018-06-01 ENCOUNTER — APPOINTMENT (OUTPATIENT)
Dept: CARDIOLOGY | Facility: HOSPITAL | Age: 78
End: 2018-06-01
Attending: HOSPITALIST

## 2018-06-01 PROBLEM — I48.91 ATRIAL FIBRILLATION WITH RAPID VENTRICULAR RESPONSE (HCC): Status: ACTIVE | Noted: 2018-06-01

## 2018-06-01 PROBLEM — I21.4 NSTEMI (NON-ST ELEVATED MYOCARDIAL INFARCTION) (HCC): Status: ACTIVE | Noted: 2018-05-31

## 2018-06-01 LAB
ALBUMIN SERPL-MCNC: 3.9 G/DL (ref 3.4–4.8)
ALBUMIN/GLOB SERPL: 1.3 G/DL (ref 1.5–2.5)
ALP SERPL-CCNC: 69 U/L (ref 40–129)
ALT SERPL W P-5'-P-CCNC: 11 U/L (ref 10–44)
ANION GAP SERPL CALCULATED.3IONS-SCNC: 7.8 MMOL/L (ref 3.6–11.2)
APTT PPP: 46.1 SECONDS (ref 23.8–36.1)
APTT PPP: 85.2 SECONDS (ref 23.8–36.1)
AST SERPL-CCNC: 29 U/L (ref 10–34)
BASOPHILS # BLD AUTO: 0.08 10*3/MM3 (ref 0–0.3)
BASOPHILS NFR BLD AUTO: 0.7 % (ref 0–2)
BH CV ECHO MEAS - % IVS THICK: 24.1 %
BH CV ECHO MEAS - % LVPW THICK: 41.9 %
BH CV ECHO MEAS - ACS: 1.3 CM
BH CV ECHO MEAS - AI DEC SLOPE: 83.9 CM/SEC^2
BH CV ECHO MEAS - AI MAX PG: 52.9 MMHG
BH CV ECHO MEAS - AI MAX VEL: 363.8 CM/SEC
BH CV ECHO MEAS - AI P1/2T: 1270 MSEC
BH CV ECHO MEAS - AO MAX PG (FULL): 31.3 MMHG
BH CV ECHO MEAS - AO MAX PG: 35.7 MMHG
BH CV ECHO MEAS - AO MEAN PG (FULL): 24.5 MMHG
BH CV ECHO MEAS - AO MEAN PG: 26.4 MMHG
BH CV ECHO MEAS - AO ROOT AREA (BSA CORRECTED): 1.8
BH CV ECHO MEAS - AO ROOT AREA: 9 CM^2
BH CV ECHO MEAS - AO ROOT DIAM: 3.4 CM
BH CV ECHO MEAS - AO V2 MAX: 298.6 CM/SEC
BH CV ECHO MEAS - AO V2 MEAN: 248.4 CM/SEC
BH CV ECHO MEAS - AO V2 VTI: 77 CM
BH CV ECHO MEAS - AVA(I,A): 1 CM^2
BH CV ECHO MEAS - AVA(I,D): 1 CM^2
BH CV ECHO MEAS - AVA(V,A): 0.84 CM^2
BH CV ECHO MEAS - AVA(V,D): 0.84 CM^2
BH CV ECHO MEAS - BSA(HAYCOCK): 1.9 M^2
BH CV ECHO MEAS - BSA: 1.9 M^2
BH CV ECHO MEAS - BZI_BMI: 25.5 KILOGRAMS/M^2
BH CV ECHO MEAS - BZI_METRIC_HEIGHT: 172.7 CM
BH CV ECHO MEAS - BZI_METRIC_WEIGHT: 76.2 KG
BH CV ECHO MEAS - CONTRAST EF 4CH: 62.2 ML/M^2
BH CV ECHO MEAS - EDV(CUBED): 179.5 ML
BH CV ECHO MEAS - EDV(MOD-SP4): 74 ML
BH CV ECHO MEAS - EDV(TEICH): 156.3 ML
BH CV ECHO MEAS - EF(CUBED): 61.4 %
BH CV ECHO MEAS - EF(MOD-SP4): 62.2 %
BH CV ECHO MEAS - EF(TEICH): 52.3 %
BH CV ECHO MEAS - ESV(CUBED): 69.3 ML
BH CV ECHO MEAS - ESV(MOD-SP4): 28 ML
BH CV ECHO MEAS - ESV(TEICH): 74.5 ML
BH CV ECHO MEAS - FS: 27.2 %
BH CV ECHO MEAS - IVS/LVPW: 0.94
BH CV ECHO MEAS - IVSD: 1.1 CM
BH CV ECHO MEAS - IVSS: 1.3 CM
BH CV ECHO MEAS - LV DIASTOLIC VOL/BSA (35-75): 39 ML/M^2
BH CV ECHO MEAS - LV MASS(C)D: 249.3 GRAMS
BH CV ECHO MEAS - LV MASS(C)DI: 131.4 GRAMS/M^2
BH CV ECHO MEAS - LV MASS(C)S: 230 GRAMS
BH CV ECHO MEAS - LV MASS(C)SI: 121.2 GRAMS/M^2
BH CV ECHO MEAS - LV MAX PG: 4.4 MMHG
BH CV ECHO MEAS - LV MEAN PG: 1.8 MMHG
BH CV ECHO MEAS - LV SYSTOLIC VOL/BSA (12-30): 14.8 ML/M^2
BH CV ECHO MEAS - LV V1 MAX: 104.9 CM/SEC
BH CV ECHO MEAS - LV V1 MEAN: 61 CM/SEC
BH CV ECHO MEAS - LV V1 VTI: 32.2 CM
BH CV ECHO MEAS - LVIDD: 5.6 CM
BH CV ECHO MEAS - LVIDS: 4.1 CM
BH CV ECHO MEAS - LVLD AP4: 8.3 CM
BH CV ECHO MEAS - LVLS AP4: 7.2 CM
BH CV ECHO MEAS - LVOT AREA (M): 2.3 CM^2
BH CV ECHO MEAS - LVOT AREA: 2.4 CM^2
BH CV ECHO MEAS - LVOT DIAM: 1.7 CM
BH CV ECHO MEAS - LVPWD: 1.1 CM
BH CV ECHO MEAS - LVPWS: 1.6 CM
BH CV ECHO MEAS - MV A MAX VEL: 116 CM/SEC
BH CV ECHO MEAS - MV E MAX VEL: 88.2 CM/SEC
BH CV ECHO MEAS - MV E/A: 0.76
BH CV ECHO MEAS - PA ACC SLOPE: 1542 CM/SEC^2
BH CV ECHO MEAS - PA ACC TIME: 0.1 SEC
BH CV ECHO MEAS - PA PR(ACCEL): 34.6 MMHG
BH CV ECHO MEAS - RAP SYSTOLE: 10 MMHG
BH CV ECHO MEAS - RVDD: 1.5 CM
BH CV ECHO MEAS - RVSP: 31.2 MMHG
BH CV ECHO MEAS - SI(AO): 366.3 ML/M^2
BH CV ECHO MEAS - SI(CUBED): 58.1 ML/M^2
BH CV ECHO MEAS - SI(LVOT): 40.6 ML/M^2
BH CV ECHO MEAS - SI(MOD-SP4): 24.2 ML/M^2
BH CV ECHO MEAS - SI(TEICH): 43.1 ML/M^2
BH CV ECHO MEAS - SV(AO): 695.2 ML
BH CV ECHO MEAS - SV(CUBED): 110.2 ML
BH CV ECHO MEAS - SV(LVOT): 77.1 ML
BH CV ECHO MEAS - SV(MOD-SP4): 46 ML
BH CV ECHO MEAS - SV(TEICH): 81.7 ML
BH CV ECHO MEAS - TR MAX VEL: 230.4 CM/SEC
BILIRUB SERPL-MCNC: 0.3 MG/DL (ref 0.2–1.8)
BUN BLD-MCNC: 17 MG/DL (ref 7–21)
BUN/CREAT SERPL: 15.9 (ref 7–25)
CALCIUM SPEC-SCNC: 8.4 MG/DL (ref 7.7–10)
CHLORIDE SERPL-SCNC: 107 MMOL/L (ref 99–112)
CHOLEST SERPL-MCNC: 132 MG/DL (ref 0–200)
CHOLEST SERPL-MCNC: 133 MG/DL (ref 0–200)
CK MB SERPL-CCNC: 12.15 NG/ML (ref 0–5)
CK MB SERPL-CCNC: 19.92 NG/ML (ref 0–5)
CK MB SERPL-CCNC: 20.45 NG/ML (ref 0–5)
CK MB SERPL-RTO: 9.2 % (ref 0–3)
CK MB SERPL-RTO: 9.7 % (ref 0–3)
CK MB SERPL-RTO: 9.9 % (ref 0–3)
CK SERPL-CCNC: 125 U/L (ref 24–204)
CK SERPL-CCNC: 206 U/L (ref 24–204)
CK SERPL-CCNC: 216 U/L (ref 24–204)
CO2 SERPL-SCNC: 23.2 MMOL/L (ref 24.3–31.9)
CREAT BLD-MCNC: 1.07 MG/DL (ref 0.43–1.29)
DEPRECATED RDW RBC AUTO: 51.2 FL (ref 37–54)
EOSINOPHIL # BLD AUTO: 0.2 10*3/MM3 (ref 0–0.7)
EOSINOPHIL NFR BLD AUTO: 1.8 % (ref 0–7)
ERYTHROCYTE [DISTWIDTH] IN BLOOD BY AUTOMATED COUNT: 16.9 % (ref 11.5–14.5)
GFR SERPL CREATININE-BSD FRML MDRD: 67 ML/MIN/1.73
GLOBULIN UR ELPH-MCNC: 2.9 GM/DL
GLUCOSE BLD-MCNC: 98 MG/DL (ref 70–110)
HBA1C MFR BLD: 6.1 % (ref 4.5–5.7)
HCT VFR BLD AUTO: 45.9 % (ref 42–52)
HDLC SERPL-MCNC: 32 MG/DL (ref 60–100)
HDLC SERPL-MCNC: 35 MG/DL (ref 60–100)
HGB BLD-MCNC: 14.6 G/DL (ref 14–18)
IMM GRANULOCYTES # BLD: 0.01 10*3/MM3 (ref 0–0.03)
IMM GRANULOCYTES NFR BLD: 0.1 % (ref 0–0.5)
INR PPP: 2.06 (ref 0.9–1.1)
INR PPP: 2.09 (ref 0.9–1.1)
LDLC SERPL CALC-MCNC: 79 MG/DL (ref 0–100)
LDLC SERPL CALC-MCNC: 84 MG/DL (ref 0–100)
LDLC/HDLC SERPL: 2.26 {RATIO}
LDLC/HDLC SERPL: 2.63 {RATIO}
LYMPHOCYTES # BLD AUTO: 3.87 10*3/MM3 (ref 1–3)
LYMPHOCYTES NFR BLD AUTO: 34.5 % (ref 16–46)
MAGNESIUM SERPL-MCNC: 2.2 MG/DL (ref 1.7–2.6)
MAXIMAL PREDICTED HEART RATE: 142 BPM
MCH RBC QN AUTO: 26.1 PG (ref 27–33)
MCHC RBC AUTO-ENTMCNC: 31.8 G/DL (ref 33–37)
MCV RBC AUTO: 82.1 FL (ref 80–94)
MONOCYTES # BLD AUTO: 1 10*3/MM3 (ref 0.1–0.9)
MONOCYTES NFR BLD AUTO: 8.9 % (ref 0–12)
MYOGLOBIN SERPL-MCNC: 104 NG/ML (ref 0–109)
MYOGLOBIN SERPL-MCNC: 163 NG/ML (ref 0–109)
NEUTROPHILS # BLD AUTO: 6.06 10*3/MM3 (ref 1.4–6.5)
NEUTROPHILS NFR BLD AUTO: 54 % (ref 40–75)
OSMOLALITY SERPL CALC.SUM OF ELEC: 277.2 MOSM/KG (ref 273–305)
PLATELET # BLD AUTO: 193 10*3/MM3 (ref 130–400)
PMV BLD AUTO: 11 FL (ref 6–10)
POTASSIUM BLD-SCNC: 4.1 MMOL/L (ref 3.5–5.3)
PROT SERPL-MCNC: 6.8 G/DL (ref 6–8)
PROTHROMBIN TIME: 23.5 SECONDS (ref 11–15.4)
PROTHROMBIN TIME: 23.7 SECONDS (ref 11–15.4)
RBC # BLD AUTO: 5.59 10*6/MM3 (ref 4.7–6.1)
SODIUM BLD-SCNC: 138 MMOL/L (ref 135–153)
STRESS TARGET HR: 121 BPM
TRIGL SERPL-MCNC: 79 MG/DL (ref 0–150)
TRIGL SERPL-MCNC: 95 MG/DL (ref 0–150)
TROPONIN I SERPL-MCNC: 2.29 NG/ML
TROPONIN I SERPL-MCNC: 6.26 NG/ML
TROPONIN I SERPL-MCNC: 6.42 NG/ML
VLDLC SERPL-MCNC: 15.8 MG/DL
VLDLC SERPL-MCNC: 19 MG/DL
WBC NRBC COR # BLD: 11.22 10*3/MM3 (ref 4.5–12.5)

## 2018-06-01 PROCEDURE — 25010000002 HYDRALAZINE PER 20 MG: Performed by: INTERNAL MEDICINE

## 2018-06-01 PROCEDURE — 83036 HEMOGLOBIN GLYCOSYLATED A1C: CPT | Performed by: HOSPITALIST

## 2018-06-01 PROCEDURE — 80053 COMPREHEN METABOLIC PANEL: CPT | Performed by: HOSPITALIST

## 2018-06-01 PROCEDURE — 85730 THROMBOPLASTIN TIME PARTIAL: CPT | Performed by: HOSPITALIST

## 2018-06-01 PROCEDURE — 85610 PROTHROMBIN TIME: CPT | Performed by: HOSPITALIST

## 2018-06-01 PROCEDURE — 25010000002 HEPARIN (PORCINE) PER 1000 UNITS: Performed by: HOSPITALIST

## 2018-06-01 PROCEDURE — 93454 CORONARY ARTERY ANGIO S&I: CPT | Performed by: INTERNAL MEDICINE

## 2018-06-01 PROCEDURE — 75710 ARTERY X-RAYS ARM/LEG: CPT | Performed by: INTERNAL MEDICINE

## 2018-06-01 PROCEDURE — 80061 LIPID PANEL: CPT | Performed by: HOSPITALIST

## 2018-06-01 PROCEDURE — B2111ZZ FLUOROSCOPY OF MULTIPLE CORONARY ARTERIES USING LOW OSMOLAR CONTRAST: ICD-10-PCS | Performed by: INTERNAL MEDICINE

## 2018-06-01 PROCEDURE — 85610 PROTHROMBIN TIME: CPT | Performed by: INTERNAL MEDICINE

## 2018-06-01 PROCEDURE — C1894 INTRO/SHEATH, NON-LASER: HCPCS | Performed by: INTERNAL MEDICINE

## 2018-06-01 PROCEDURE — 93306 TTE W/DOPPLER COMPLETE: CPT | Performed by: INTERNAL MEDICINE

## 2018-06-01 PROCEDURE — 75716 ARTERY X-RAYS ARMS/LEGS: CPT | Performed by: INTERNAL MEDICINE

## 2018-06-01 PROCEDURE — 93005 ELECTROCARDIOGRAM TRACING: CPT | Performed by: HOSPITALIST

## 2018-06-01 PROCEDURE — 82550 ASSAY OF CK (CPK): CPT | Performed by: PHYSICIAN ASSISTANT

## 2018-06-01 PROCEDURE — 93306 TTE W/DOPPLER COMPLETE: CPT

## 2018-06-01 PROCEDURE — B4101ZZ FLUOROSCOPY OF ABDOMINAL AORTA USING LOW OSMOLAR CONTRAST: ICD-10-PCS | Performed by: INTERNAL MEDICINE

## 2018-06-01 PROCEDURE — 25010000002 MIDAZOLAM PER 1 MG: Performed by: INTERNAL MEDICINE

## 2018-06-01 PROCEDURE — 94799 UNLISTED PULMONARY SVC/PX: CPT

## 2018-06-01 PROCEDURE — B41J1ZZ FLUOROSCOPY OF OTHER LOWER ARTERIES USING LOW OSMOLAR CONTRAST: ICD-10-PCS | Performed by: INTERNAL MEDICINE

## 2018-06-01 PROCEDURE — 84484 ASSAY OF TROPONIN QUANT: CPT | Performed by: HOSPITALIST

## 2018-06-01 PROCEDURE — 99222 1ST HOSP IP/OBS MODERATE 55: CPT | Performed by: NURSE PRACTITIONER

## 2018-06-01 PROCEDURE — 85025 COMPLETE CBC W/AUTO DIFF WBC: CPT | Performed by: HOSPITALIST

## 2018-06-01 PROCEDURE — 25010000002 FENTANYL CITRATE (PF) 100 MCG/2ML SOLUTION: Performed by: INTERNAL MEDICINE

## 2018-06-01 PROCEDURE — 80061 LIPID PANEL: CPT | Performed by: PHYSICIAN ASSISTANT

## 2018-06-01 PROCEDURE — 0 IOPAMIDOL PER 1 ML: Performed by: INTERNAL MEDICINE

## 2018-06-01 PROCEDURE — 82550 ASSAY OF CK (CPK): CPT | Performed by: HOSPITALIST

## 2018-06-01 PROCEDURE — 93010 ELECTROCARDIOGRAM REPORT: CPT | Performed by: INTERNAL MEDICINE

## 2018-06-01 PROCEDURE — 84484 ASSAY OF TROPONIN QUANT: CPT | Performed by: PHYSICIAN ASSISTANT

## 2018-06-01 PROCEDURE — 82553 CREATINE MB FRACTION: CPT | Performed by: HOSPITALIST

## 2018-06-01 PROCEDURE — C1769 GUIDE WIRE: HCPCS | Performed by: INTERNAL MEDICINE

## 2018-06-01 PROCEDURE — 83874 ASSAY OF MYOGLOBIN: CPT | Performed by: HOSPITALIST

## 2018-06-01 PROCEDURE — 82553 CREATINE MB FRACTION: CPT | Performed by: PHYSICIAN ASSISTANT

## 2018-06-01 PROCEDURE — 83735 ASSAY OF MAGNESIUM: CPT | Performed by: INTERNAL MEDICINE

## 2018-06-01 PROCEDURE — 25010000002 MORPHINE PER 10 MG: Performed by: INTERNAL MEDICINE

## 2018-06-01 PROCEDURE — 99223 1ST HOSP IP/OBS HIGH 75: CPT | Performed by: HOSPITALIST

## 2018-06-01 RX ORDER — LIDOCAINE HYDROCHLORIDE 20 MG/ML
INJECTION, SOLUTION INFILTRATION; PERINEURAL AS NEEDED
Status: DISCONTINUED | OUTPATIENT
Start: 2018-06-01 | End: 2018-06-01 | Stop reason: HOSPADM

## 2018-06-01 RX ORDER — AMLODIPINE BESYLATE 5 MG/1
5 TABLET ORAL 2 TIMES DAILY
Status: CANCELLED | OUTPATIENT
Start: 2018-06-01

## 2018-06-01 RX ORDER — ASPIRIN 325 MG
325 TABLET ORAL ONCE
Status: COMPLETED | OUTPATIENT
Start: 2018-06-01 | End: 2018-06-01

## 2018-06-01 RX ORDER — NITROGLYCERIN 0.4 MG/1
0.4 TABLET SUBLINGUAL
Status: DISCONTINUED | OUTPATIENT
Start: 2018-06-01 | End: 2018-06-02 | Stop reason: HOSPADM

## 2018-06-01 RX ORDER — OXYCODONE HYDROCHLORIDE 5 MG/1
10 TABLET ORAL EVERY 6 HOURS PRN
Status: DISCONTINUED | OUTPATIENT
Start: 2018-06-01 | End: 2018-06-01 | Stop reason: SDUPTHER

## 2018-06-01 RX ORDER — ONDANSETRON 2 MG/ML
4 INJECTION INTRAMUSCULAR; INTRAVENOUS EVERY 6 HOURS PRN
Status: DISCONTINUED | OUTPATIENT
Start: 2018-06-01 | End: 2018-06-02 | Stop reason: HOSPADM

## 2018-06-01 RX ORDER — POLYETHYLENE GLYCOL 3350 17 G/17G
17 POWDER, FOR SOLUTION ORAL NIGHTLY
Status: ON HOLD | COMMUNITY
End: 2020-08-14

## 2018-06-01 RX ORDER — HEPARIN SODIUM 5000 [USP'U]/ML
60 INJECTION, SOLUTION INTRAVENOUS; SUBCUTANEOUS AS NEEDED
Status: DISCONTINUED | OUTPATIENT
Start: 2018-06-01 | End: 2018-06-02

## 2018-06-01 RX ORDER — NAPROXEN 500 MG/1
500 TABLET ORAL 2 TIMES DAILY PRN
COMMUNITY
End: 2018-06-02 | Stop reason: HOSPADM

## 2018-06-01 RX ORDER — ALBUTEROL SULFATE 2.5 MG/3ML
2.5 SOLUTION RESPIRATORY (INHALATION) EVERY 6 HOURS PRN
Status: DISCONTINUED | OUTPATIENT
Start: 2018-06-01 | End: 2018-06-02 | Stop reason: HOSPADM

## 2018-06-01 RX ORDER — AMITRIPTYLINE HYDROCHLORIDE 10 MG/1
10 TABLET, FILM COATED ORAL NIGHTLY
Status: DISCONTINUED | OUTPATIENT
Start: 2018-06-01 | End: 2018-06-02 | Stop reason: HOSPADM

## 2018-06-01 RX ORDER — AMLODIPINE BESYLATE 5 MG/1
5 TABLET ORAL 2 TIMES DAILY
Status: DISCONTINUED | OUTPATIENT
Start: 2018-06-01 | End: 2018-06-02 | Stop reason: HOSPADM

## 2018-06-01 RX ORDER — CASTOR OIL AND BALSAM, PERU 788; 87 MG/G; MG/G
OINTMENT TOPICAL EVERY 12 HOURS SCHEDULED
Status: DISCONTINUED | OUTPATIENT
Start: 2018-06-01 | End: 2018-06-02 | Stop reason: HOSPADM

## 2018-06-01 RX ORDER — OXYCODONE HYDROCHLORIDE 5 MG/1
10 TABLET ORAL EVERY 6 HOURS SCHEDULED
Status: DISCONTINUED | OUTPATIENT
Start: 2018-06-01 | End: 2018-06-01

## 2018-06-01 RX ORDER — OXYCODONE HYDROCHLORIDE 10 MG/1
10 TABLET ORAL 4 TIMES DAILY
Status: ON HOLD | COMMUNITY
End: 2020-08-24 | Stop reason: SDUPTHER

## 2018-06-01 RX ORDER — SODIUM CHLORIDE 0.9 % (FLUSH) 0.9 %
1-10 SYRINGE (ML) INJECTION AS NEEDED
Status: DISCONTINUED | OUTPATIENT
Start: 2018-06-01 | End: 2018-06-02 | Stop reason: HOSPADM

## 2018-06-01 RX ORDER — SODIUM CHLORIDE 9 MG/ML
INJECTION, SOLUTION INTRAVENOUS CONTINUOUS PRN
Status: COMPLETED | OUTPATIENT
Start: 2018-06-01 | End: 2018-06-01

## 2018-06-01 RX ORDER — SODIUM CHLORIDE 0.9 % (FLUSH) 0.9 %
5 SYRINGE (ML) INJECTION AS NEEDED
Status: DISCONTINUED | OUTPATIENT
Start: 2018-06-01 | End: 2018-06-02 | Stop reason: HOSPADM

## 2018-06-01 RX ORDER — ALBUTEROL SULFATE 2.5 MG/3ML
2.5 SOLUTION RESPIRATORY (INHALATION) EVERY 6 HOURS PRN
Status: ON HOLD | COMMUNITY
End: 2020-08-14

## 2018-06-01 RX ORDER — MIDAZOLAM HYDROCHLORIDE 1 MG/ML
INJECTION INTRAMUSCULAR; INTRAVENOUS AS NEEDED
Status: DISCONTINUED | OUTPATIENT
Start: 2018-06-01 | End: 2018-06-01 | Stop reason: HOSPADM

## 2018-06-01 RX ORDER — WARFARIN SODIUM 1 MG/1
3.5 TABLET ORAL NIGHTLY
COMMUNITY
End: 2020-02-23 | Stop reason: HOSPADM

## 2018-06-01 RX ORDER — ONDANSETRON 4 MG/1
4 TABLET, FILM COATED ORAL EVERY 6 HOURS PRN
Status: DISCONTINUED | OUTPATIENT
Start: 2018-06-01 | End: 2018-06-02 | Stop reason: HOSPADM

## 2018-06-01 RX ORDER — HYDRALAZINE HYDROCHLORIDE 20 MG/ML
INJECTION INTRAMUSCULAR; INTRAVENOUS AS NEEDED
Status: DISCONTINUED | OUTPATIENT
Start: 2018-06-01 | End: 2018-06-01 | Stop reason: HOSPADM

## 2018-06-01 RX ORDER — HYDRALAZINE HYDROCHLORIDE 10 MG/1
10 TABLET, FILM COATED ORAL 2 TIMES DAILY PRN
Status: CANCELLED | OUTPATIENT
Start: 2018-06-01

## 2018-06-01 RX ORDER — LISINOPRIL 10 MG/1
20 TABLET ORAL 2 TIMES DAILY
Status: DISCONTINUED | OUTPATIENT
Start: 2018-06-01 | End: 2018-06-02 | Stop reason: HOSPADM

## 2018-06-01 RX ORDER — NITROGLYCERIN 0.4 MG/1
TABLET SUBLINGUAL AS NEEDED
Status: DISCONTINUED | OUTPATIENT
Start: 2018-06-01 | End: 2018-06-01 | Stop reason: HOSPADM

## 2018-06-01 RX ORDER — SODIUM CHLORIDE 9 MG/ML
100 INJECTION, SOLUTION INTRAVENOUS ONCE
Status: COMPLETED | OUTPATIENT
Start: 2018-06-01 | End: 2018-06-01

## 2018-06-01 RX ORDER — WARFARIN SODIUM 1 MG/1
1 TABLET ORAL ONCE
Status: CANCELLED | OUTPATIENT
Start: 2018-06-01 | End: 2018-06-01

## 2018-06-01 RX ORDER — MORPHINE SULFATE 2 MG/ML
2 INJECTION, SOLUTION INTRAMUSCULAR; INTRAVENOUS ONCE
Status: COMPLETED | OUTPATIENT
Start: 2018-06-01 | End: 2018-06-01

## 2018-06-01 RX ORDER — HYDRALAZINE HYDROCHLORIDE 20 MG/ML
10 INJECTION INTRAMUSCULAR; INTRAVENOUS EVERY 6 HOURS PRN
Status: DISCONTINUED | OUTPATIENT
Start: 2018-06-01 | End: 2018-06-02 | Stop reason: HOSPADM

## 2018-06-01 RX ORDER — ALBUTEROL SULFATE 2.5 MG/3ML
2.5 SOLUTION RESPIRATORY (INHALATION) EVERY 6 HOURS PRN
Status: CANCELLED | OUTPATIENT
Start: 2018-06-01

## 2018-06-01 RX ORDER — ATORVASTATIN CALCIUM 10 MG/1
10 TABLET, FILM COATED ORAL NIGHTLY
Status: DISCONTINUED | OUTPATIENT
Start: 2018-06-01 | End: 2018-06-02 | Stop reason: HOSPADM

## 2018-06-01 RX ORDER — AMLODIPINE BESYLATE 5 MG/1
5 TABLET ORAL DAILY PRN
COMMUNITY
End: 2020-03-10

## 2018-06-01 RX ORDER — ATORVASTATIN CALCIUM 10 MG/1
10 TABLET, FILM COATED ORAL DAILY
Status: CANCELLED | OUTPATIENT
Start: 2018-06-01

## 2018-06-01 RX ORDER — CLOPIDOGREL BISULFATE 75 MG/1
75 TABLET ORAL DAILY
Status: DISCONTINUED | OUTPATIENT
Start: 2018-06-01 | End: 2018-06-02 | Stop reason: HOSPADM

## 2018-06-01 RX ORDER — LOSARTAN POTASSIUM 50 MG/1
50 TABLET ORAL DAILY
Status: DISCONTINUED | OUTPATIENT
Start: 2018-06-01 | End: 2018-06-02 | Stop reason: HOSPADM

## 2018-06-01 RX ORDER — ONDANSETRON 4 MG/1
4 TABLET, ORALLY DISINTEGRATING ORAL EVERY 6 HOURS PRN
Status: DISCONTINUED | OUTPATIENT
Start: 2018-06-01 | End: 2018-06-02 | Stop reason: HOSPADM

## 2018-06-01 RX ORDER — AMLODIPINE BESYLATE 5 MG/1
5 TABLET ORAL DAILY
Status: DISCONTINUED | OUTPATIENT
Start: 2018-06-01 | End: 2018-06-01 | Stop reason: SDUPTHER

## 2018-06-01 RX ORDER — SODIUM CHLORIDE 9 MG/ML
250 INJECTION, SOLUTION INTRAVENOUS ONCE AS NEEDED
Status: DISCONTINUED | OUTPATIENT
Start: 2018-06-01 | End: 2018-06-02 | Stop reason: HOSPADM

## 2018-06-01 RX ORDER — HEPARIN SODIUM 5000 [USP'U]/ML
60 INJECTION, SOLUTION INTRAVENOUS; SUBCUTANEOUS ONCE
Status: COMPLETED | OUTPATIENT
Start: 2018-06-01 | End: 2018-06-01

## 2018-06-01 RX ORDER — LOSARTAN POTASSIUM 50 MG/1
50 TABLET ORAL DAILY
Status: DISCONTINUED | OUTPATIENT
Start: 2018-06-01 | End: 2018-06-01

## 2018-06-01 RX ORDER — AMITRIPTYLINE HYDROCHLORIDE 10 MG/1
10 TABLET, FILM COATED ORAL NIGHTLY
Status: CANCELLED | OUTPATIENT
Start: 2018-06-01

## 2018-06-01 RX ORDER — FENTANYL CITRATE 50 UG/ML
INJECTION, SOLUTION INTRAMUSCULAR; INTRAVENOUS AS NEEDED
Status: DISCONTINUED | OUTPATIENT
Start: 2018-06-01 | End: 2018-06-01 | Stop reason: HOSPADM

## 2018-06-01 RX ORDER — LISINOPRIL 10 MG/1
20 TABLET ORAL 2 TIMES DAILY
Status: CANCELLED | OUTPATIENT
Start: 2018-06-01

## 2018-06-01 RX ORDER — HEPARIN SODIUM 5000 [USP'U]/ML
30 INJECTION, SOLUTION INTRAVENOUS; SUBCUTANEOUS AS NEEDED
Status: DISCONTINUED | OUTPATIENT
Start: 2018-06-01 | End: 2018-06-02

## 2018-06-01 RX ORDER — OXYCODONE HYDROCHLORIDE 5 MG/1
10 TABLET ORAL 4 TIMES DAILY
Status: CANCELLED | OUTPATIENT
Start: 2018-06-01

## 2018-06-01 RX ORDER — ASPIRIN 81 MG/1
81 TABLET ORAL DAILY
Status: DISCONTINUED | OUTPATIENT
Start: 2018-06-01 | End: 2018-06-02 | Stop reason: HOSPADM

## 2018-06-01 RX ORDER — HYDROCODONE BITARTRATE AND ACETAMINOPHEN 5; 325 MG/1; MG/1
1 TABLET ORAL EVERY 4 HOURS PRN
Status: DISCONTINUED | OUTPATIENT
Start: 2018-06-01 | End: 2018-06-02 | Stop reason: HOSPADM

## 2018-06-01 RX ORDER — POLYETHYLENE GLYCOL 3350 17 G/17G
17 POWDER, FOR SOLUTION ORAL DAILY
Status: DISCONTINUED | OUTPATIENT
Start: 2018-06-01 | End: 2018-06-02 | Stop reason: HOSPADM

## 2018-06-01 RX ORDER — METOPROLOL TARTRATE 50 MG/1
50 TABLET, FILM COATED ORAL EVERY 12 HOURS SCHEDULED
Status: DISCONTINUED | OUTPATIENT
Start: 2018-06-01 | End: 2018-06-02 | Stop reason: HOSPADM

## 2018-06-01 RX ORDER — SODIUM CHLORIDE 0.9 % (FLUSH) 0.9 %
3 SYRINGE (ML) INJECTION EVERY 8 HOURS
Status: DISCONTINUED | OUTPATIENT
Start: 2018-06-01 | End: 2018-06-02 | Stop reason: HOSPADM

## 2018-06-01 RX ORDER — CLONIDINE HYDROCHLORIDE 0.2 MG/1
0.2 TABLET ORAL 3 TIMES DAILY
Status: CANCELLED | OUTPATIENT
Start: 2018-06-01

## 2018-06-01 RX ORDER — HYDRALAZINE HYDROCHLORIDE 10 MG/1
10 TABLET, FILM COATED ORAL 2 TIMES DAILY PRN
COMMUNITY
End: 2018-09-24 | Stop reason: ALTCHOICE

## 2018-06-01 RX ORDER — WARFARIN SODIUM 6 MG/1
6 TABLET ORAL
Status: ON HOLD | COMMUNITY
End: 2020-02-22

## 2018-06-01 RX ORDER — NAPROXEN 250 MG/1
500 TABLET ORAL 2 TIMES DAILY PRN
Status: CANCELLED | OUTPATIENT
Start: 2018-06-01

## 2018-06-01 RX ORDER — WARFARIN SODIUM 3 MG/1
6 TABLET ORAL
Status: CANCELLED | OUTPATIENT
Start: 2018-06-01

## 2018-06-01 RX ORDER — CLONIDINE HYDROCHLORIDE 0.1 MG/1
TABLET ORAL
Status: COMPLETED
Start: 2018-06-01 | End: 2018-06-01

## 2018-06-01 RX ORDER — POLYETHYLENE GLYCOL 3350 17 G/17G
17 POWDER, FOR SOLUTION ORAL DAILY
Status: DISCONTINUED | OUTPATIENT
Start: 2018-06-01 | End: 2018-06-01

## 2018-06-01 RX ORDER — CLONIDINE HYDROCHLORIDE 0.1 MG/1
0.2 TABLET ORAL EVERY 8 HOURS SCHEDULED
Status: DISCONTINUED | OUTPATIENT
Start: 2018-06-01 | End: 2018-06-02 | Stop reason: HOSPADM

## 2018-06-01 RX ORDER — OXYCODONE HYDROCHLORIDE 5 MG/1
10 TABLET ORAL EVERY 6 HOURS PRN
Status: DISCONTINUED | OUTPATIENT
Start: 2018-06-01 | End: 2018-06-02 | Stop reason: HOSPADM

## 2018-06-01 RX ADMIN — CASTOR OIL AND BALSAM, PERU: 788; 87 OINTMENT TOPICAL at 21:10

## 2018-06-01 RX ADMIN — CLONIDINE HYDROCHLORIDE 0.2 MG: 0.1 TABLET ORAL at 21:09

## 2018-06-01 RX ADMIN — POLYETHYLENE GLYCOL (3350) 17 G: 17 POWDER, FOR SOLUTION ORAL at 21:09

## 2018-06-01 RX ADMIN — ASPIRIN 81 MG: 81 TABLET ORAL at 10:31

## 2018-06-01 RX ADMIN — MORPHINE SULFATE 2 MG: 2 INJECTION, SOLUTION INTRAMUSCULAR; INTRAVENOUS at 18:13

## 2018-06-01 RX ADMIN — Medication 3 ML: at 10:31

## 2018-06-01 RX ADMIN — HEPARIN SODIUM 4400 UNITS: 5000 INJECTION, SOLUTION INTRAVENOUS; SUBCUTANEOUS at 04:59

## 2018-06-01 RX ADMIN — CLOPIDOGREL 75 MG: 75 TABLET, FILM COATED ORAL at 10:31

## 2018-06-01 RX ADMIN — OXYCODONE HYDROCHLORIDE 10 MG: 5 TABLET ORAL at 14:58

## 2018-06-01 RX ADMIN — METOPROLOL TARTRATE 50 MG: 50 TABLET, FILM COATED ORAL at 21:08

## 2018-06-01 RX ADMIN — ASPIRIN 325 MG: 325 TABLET ORAL at 04:59

## 2018-06-01 RX ADMIN — SODIUM CHLORIDE 100 ML/HR: 9 INJECTION, SOLUTION INTRAVENOUS at 18:12

## 2018-06-01 RX ADMIN — CASTOR OIL AND BALSAM, PERU: 788; 87 OINTMENT TOPICAL at 14:58

## 2018-06-01 RX ADMIN — ATORVASTATIN CALCIUM 10 MG: 10 TABLET, FILM COATED ORAL at 21:10

## 2018-06-01 RX ADMIN — AMLODIPINE BESYLATE 5 MG: 5 TABLET ORAL at 21:08

## 2018-06-01 RX ADMIN — OXYCODONE HYDROCHLORIDE 10 MG: 5 TABLET ORAL at 21:10

## 2018-06-01 RX ADMIN — LISINOPRIL 20 MG: 10 TABLET ORAL at 21:08

## 2018-06-01 RX ADMIN — LOSARTAN POTASSIUM 50 MG: 50 TABLET, FILM COATED ORAL at 21:10

## 2018-06-01 RX ADMIN — AMITRIPTYLINE HYDROCHLORIDE 10 MG: 10 TABLET, FILM COATED ORAL at 21:10

## 2018-06-01 RX ADMIN — HEPARIN SODIUM 12 UNITS/KG/HR: 10000 INJECTION, SOLUTION INTRAVENOUS at 04:58

## 2018-06-01 NOTE — PLAN OF CARE
Problem: Fall Risk (Adult)  Goal: Identify Related Risk Factors and Signs and Symptoms  Outcome: Ongoing (interventions implemented as appropriate)    Goal: Absence of Fall  Outcome: Ongoing (interventions implemented as appropriate)      Problem: Wound (Includes Pressure Injury) (Adult)  Goal: Signs and Symptoms of Listed Potential Problems Will be Absent, Minimized or Managed (Wound)  Outcome: Ongoing (interventions implemented as appropriate)      Problem: Cardiac Output Decreased (Adult)  Goal: Identify Related Risk Factors and Signs and Symptoms  Outcome: Ongoing (interventions implemented as appropriate)    Goal: Effective Tissue Perfusion  Outcome: Ongoing (interventions implemented as appropriate)

## 2018-06-01 NOTE — ED NOTES
Explained CT of chest with contrast with the patient related to elevated D-Dimer. States that he has had contrast dye in the past with no issues. Patient is agreeable to have exam done after all risks and benefits were explained. Contrast consents were signed and placed on the chart     Essence Watters RN  05/31/18 1320

## 2018-06-01 NOTE — PLAN OF CARE
Problem: Cardiac Output Decreased (Adult)  Goal: Effective Tissue Perfusion  Outcome: Ongoing (interventions implemented as appropriate)

## 2018-06-01 NOTE — PROGRESS NOTES
Pt seen and examined this AM with EMELYN Sanabria. I have reviewed Dr. Shaver' H&P and discussed pt's care with him personally. Pt admitted to the PCU overnight with NSTEMI and Afib with RVR. Pt quickly converted back to sinus rhythm. Chronically anticoagulated with Coumadin but pt nor his wife are sure as to why he is anticoagulated. Currently on heparin gtt in the setting of NSTEMI. Pt was given ASA and Plavix. Echo ordered. Cardiology consulted. Pt is currently CP free. Await further input from cardiology regarding possibly going to the cath lab today. Monitor on telemetry.       Ken Leon DO  06/01/18  3:47 PM

## 2018-06-01 NOTE — CONSULTS
Inpatient Cardiology Consult  Consult performed by: TOBY HOPPER  Consult ordered by: VIRGINIE HASSAN        Date of Admit: 5/31/2018  Date of Consult: 06/01/18  No ref. provider found  Zak Olmstead  1940    Consulting Physician: Dr Derik Vega    Cardiology consultation    Reason for consultation:  Chest pain, elevated troponin    Assessment:  1. NSTEMI  2. Hypertension  3. Hyperlipidemia  4. Tobacco use      Recommendations:  1. Risks and benefits of cardiac catheterization were discussed with the patient.  He agreed to proceed with catheterization.  2. Patient will be placed on guideline directed medical therapy and risk factor modification      History of Present Illness    Subjective     Chief Complaint   Patient presents with   • Chest Pain       Zak Olmstead is a 78 y.o. 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  He presented to the ED in the evening of 5/31/2018 with complaints of chest pain which he described as a sensation located primarily in the left side of his chest and radiation to his left shoulder.  Pain was associated with significant diaphoresis and generalized weakness.  He also reported palpitations and a fast heart rate.  He did take nitroglycerin after about 30 minutes of pain.  The pain resolved after the nitroglycerin but he was tachycardic per family members with heart rates in the 150s and greater.  In the ED he was found to be in atrial fibrillation with RVR.  Cardiac enzymes were positive.  Cardizem drip was started and he converted to sinus rhythm in the 70s.  EKG then found some nonspecific ST changes and no evidence of STEMI.    He reports that he has been chest pain-free since being admitted to the hospital.  He denies past medical history of  myocardial infarction, previous cardiac catheterization.  He does report that in 2008 when he was experiencing leg pain he underwent a CT of the abdomen and was found to have a abdominal aortic aneurysm.  He underwent repair of the aneurysm and at the same time he had a stent placed in his right leg.      Cardiac risk factors:hypercholesterolemia, hypertension, peripheral vascular disease and smoking      Past Medical History:   Diagnosis Date   • Heart murmur    • Hypertension    • Mononeuritis multiplex      Past Surgical History:   Procedure Laterality Date   • ABDOMINAL AORTIC ANEURYSM REPAIR     • APPENDECTOMY     • BACK SURGERY     • BELOW KNEE LEG AMPUTATION Left    • COLONOSCOPY N/A 6/15/2017    Procedure: COLONOSCOPY  CPTCODE:41947;  Surgeon: Lincoln Bowens III, MD;  Location: [facility];  Service:    • ENDOSCOPY N/A 6/15/2017    Procedure: ESOPHAGOGASTRODUODENOSCOPY WITH BIOPSY  CPTCODE:85551;  Surgeon: Lincoln Bowens III, MD;  Location: [facility];  Service:    • ESOPHAGEAL DILATATION       Family History   Problem Relation Age of Onset   • Diabetes Other    • Heart disease Other      Social History   Substance Use Topics   • Smoking status: Current Every Day Smoker     Packs/day: 1.00     Years: 60.00     Types: Cigarettes   • Smokeless tobacco: Not on file   • Alcohol use No     Prescriptions Prior to Admission   Medication Sig Dispense Refill Last Dose   • albuterol (PROVENTIL) (2.5 MG/3ML) 0.083% nebulizer solution Take 2.5 mg by nebulization Every 6 (Six) Hours As Needed for Wheezing.   Past Month at Unknown time   • amLODIPine (NORVASC) 5 MG tablet Take 5 mg by mouth 2 (Two) Times a Day.   5/31/2018 at Unknown time   • CloNIDine (CATAPRES) 0.2 MG tablet Take 0.2 mg by mouth 3 (Three) Times a Day.   5/31/2018 at Unknown time   • hydrALAZINE (APRESOLINE) 10 MG tablet Take 10 mg by mouth 2 (Two) Times a Day As Needed.   Past Month at Unknown time   • lisinopril (PRINIVIL,ZESTRIL) 20 MG tablet  Take 20 mg by mouth 2 (Two) Times a Day.   5/31/2018 at Unknown time   • naproxen (NAPROSYN) 500 MG tablet Take 500 mg by mouth 2 (Two) Times a Day As Needed for Mild Pain .   5/31/2018 at Unknown time   • oxyCODONE (ROXICODONE) 10 MG tablet Take 10 mg by mouth 4 (Four) Times a Day.   5/31/2018 at Unknown time   • polyethylene glycol (MIRALAX) packet Take 17 g by mouth Daily.   5/31/2018 at Unknown time   • pravastatin (PRAVACHOL) 10 MG tablet Take 10 mg by mouth Daily.   5/31/2018 at Unknown time   • warfarin (COUMADIN) 1 MG tablet Take 1 mg by mouth 1 (One) Time. monday 5/28/2018   • warfarin (COUMADIN) 6 MG tablet Take 6 mg by mouth Daily.   5/31/2018 at Unknown time   • amitriptyline (ELAVIL) 10 MG tablet Take 10 mg by mouth Every Night.   5/30/2018     Allergies:  Patient has no known allergies.    Review of Systems   Constitutional: Negative for fatigue.   Respiratory: Negative for shortness of breath.    Cardiovascular: Negative for chest pain, palpitations and leg swelling.   Gastrointestinal: Negative for blood in stool.   Musculoskeletal: Positive for back pain.   Neurological: Negative for dizziness, syncope, weakness and light-headedness.   Hematological: Does not bruise/bleed easily.         Objective      Vital Signs  Temp:  [96.9 °F (36.1 °C)-97.9 °F (36.6 °C)] 97.8 °F (36.6 °C)  Heart Rate:  [] 66  Resp:  [18] 18  BP: ()/(28-98) 156/66  Body mass index is 25.54 kg/m².  No intake or output data in the 24 hours ending 06/01/18 1419    Physical Exam   Constitutional: He appears well-developed and well-nourished.   HENT:   Head: Normocephalic and atraumatic.   Eyes: Pupils are equal, round, and reactive to light.   Neck: No JVD present.   Cardiovascular: Normal rate and regular rhythm.  Exam reveals no gallop and no friction rub.    Murmur (2/6  RUSB) heard.  Pulses:       Dorsalis pedis pulses are 2+ on the right side.        Posterior tibial pulses are 2+ on the right side.   L BKA  amputation   Pulmonary/Chest: Effort normal and breath sounds normal. No respiratory distress. He has no wheezes. He has no rales.   Abdominal: Soft. He exhibits no mass. There is no tenderness. No hernia.   Skin: Skin is warm and dry.   Psychiatric: He has a normal mood and affect.   Vitals reviewed.      Results Review:   I reviewed the patient's new clinical results.    Results from last 7 days  Lab Units 06/01/18  1141 06/01/18  0424 06/01/18  0024 05/31/18  2204   CK TOTAL U/L 216* 206* 125 87   TROPONIN I ng/mL 6.258* 6.423* 2.285* 0.665*   CKMB ng/mL 19.92* 20.45* 12.15* 5.86*   MYOGLOBIN ng/mL 104.0 163.0*  --   --        Results from last 7 days  Lab Units 06/01/18  0424 05/31/18  2204   WBC 10*3/mm3 11.22 12.50   HEMOGLOBIN g/dL 14.6 15.9   PLATELETS 10*3/mm3 193 254       Results from last 7 days  Lab Units 06/01/18  0424 05/31/18  2204   SODIUM mmol/L 138 137   POTASSIUM mmol/L 4.1 4.2   CHLORIDE mmol/L 107 106   CO2 mmol/L 23.2* 23.1*   BUN mg/dL 17 18   CREATININE mg/dL 1.07 1.21   CALCIUM mg/dL 8.4 9.0   GLUCOSE mg/dL 98 117*   ALT (SGPT) U/L 11 10   AST (SGOT) U/L 29 23     Lab Results   Component Value Date    INR 2.09 (H) 06/01/2018    INR 1.80 (H) 05/31/2018    INR 1.36 (H) 02/22/2018    INR 1.46 (H) 06/14/2017    INR 2.23 (H) 06/05/2017    INR 2.06 (H) 06/01/2017    INR 1.80 (H) 05/24/2017     Lab Results   Component Value Date    MG 2.2 06/01/2018    MG 2.2 09/22/2017     Lab Results   Component Value Date    TSH 0.418 (L) 05/23/2018    PSA 1.400 09/22/2017    TRIG 95 06/01/2018    HDL 35 (L) 06/01/2018    LDL 79 06/01/2018      Lab Results   Component Value Date    BNP 60.0 02/22/2018        EKG:     Imaging Results (last 72 hours)     Procedure Component Value Units Date/Time    XR Chest 1 View [[id number]] Collected:  06/01/18 0743     Updated:  06/01/18 0807    Narrative:       XR CHEST 1 VIEW-     HISTORY:  Chest pain.          COMPARISON: None available.      TECHNIQUE: Single frontal view  of the chest.     FINDINGS:     There is no focal alveolar infiltrate or effusion.  The cardiac silhouette is normal. The pulmonary vasculature is  unremarkable.  There is no evidence of an acute osseous abnormality.   There are no suspicious-appearing parenchymal soft tissue nodules.            Impression:       No evidence of active or acute cardiopulmonary disease on today's chest  radiograph.         This report was finalized on 6/1/2018 8:05 AM by Dr. Edmond Mike MD.       CT Chest Pulmonary Embolism With Contrast [[id number]] Collected:  06/01/18 0743     Updated:  06/01/18 0755    Narrative:       CT CHEST PULMONARY EMBOLISM WITH CONTRAST-     CLINICAL INDICATION: Pulmonary embolism.          COMPARISON: 05/31/2018.      PROCEDURE: Thin cut axial images were acquired through the pulmonary  vessels during the rapid infusion of IV contrast.     Additional 3-D reformatted images obtained via post-processing for  improved diagnostic accuracy and procedural planning.     Radiation dose reduction techniques were utilized per ALARA protocol.  Automated exposure control was initiated through either or [employer] or  D[employer] software packages by  protocol.           FINDINGS: Today's study demonstrates opacification of the central  pulmonary vessels.  There are no filling defects.   There is no truncation.     No evidence of a pulmonary embolus.     Otherwise there are no parenchymal soft tissue nodules or masses.     There is no mediastinal lymph node enlargement.     No pericardial or pleural effusion.     Upper abdomen shows cholelithiasis.       Impression:       1. No evidence of a pulmonary embolus.  2. Cholelithiasis.     This report was finalized on 6/1/2018 7:53 AM by Dr. Edmond Mike MD.                Thank you very much for asking us to be involved in this patient's care.  We will follow along with you.    Elly Aguayo, ALICIA   06/01/18  2:19 PM

## 2018-06-01 NOTE — ED PROVIDER NOTES
Subjective     Chest Pain   Pain location:  Substernal area  Pain quality: aching    Pain radiates to:  L arm  Pain severity:  No pain  Onset quality:  Sudden  Duration:  5 hours  Timing:  Intermittent  Progression:  Waxing and waning  Chronicity:  New  Context: at rest    Context: not breathing, not drug use, not eating, not intercourse, not lifting, not movement, not raising an arm, not stress and not trauma    Relieved by:  Nitroglycerin (Took 1 nitro at home)  Worsened by:  Nothing  Associated symptoms: diaphoresis and palpitations    Associated symptoms: no abdominal pain, no AICD problem, no altered mental status, no anorexia, no anxiety, no back pain, no claudication, no cough, no dizziness, no dysphagia, no fatigue, no fever, no headache, no heartburn, no lower extremity edema, no nausea, no near-syncope, no numbness, no orthopnea, no PND, no shortness of breath, no syncope, no vomiting and no weakness    Risk factors: coronary artery disease, high cholesterol, hypertension, male sex and smoking    Risk factors: no aortic disease, no birth control, no diabetes mellitus, no Mary-Danlos syndrome, no immobilization, no Marfan's syndrome, not obese, not pregnant, no prior DVT/PE and no surgery    Risk factors comment:  Smokes 1 ppd; on coumadin and has been 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 Negative for activity change, appetite change, chills, fatigue, fever and unexpected weight change.   HENT: Negative.  Negative for trouble swallowing.    Respiratory: Negative.  Negative for cough and shortness of breath.    Cardiovascular: Positive for chest pain and palpitations. Negative for orthopnea, claudication, leg swelling, syncope, PND and near-syncope.   Gastrointestinal: Negative.  Negative for abdominal pain, anorexia, heartburn, nausea and vomiting.   Endocrine: Negative.    Genitourinary: Negative.  Negative for  dysuria.   Musculoskeletal: Negative for back pain.   Skin: Negative.    Neurological: Negative.  Negative for dizziness, weakness, numbness and headaches.   Psychiatric/Behavioral: Negative.    All other systems reviewed and are negative.      Past Medical History:   Diagnosis Date   • Heart murmur    • Hypertension    • Mononeuritis multiplex        No Known Allergies    Past Surgical History:   Procedure Laterality Date   • ABDOMINAL AORTIC ANEURYSM REPAIR     • APPENDECTOMY     • BACK SURGERY     • BELOW KNEE LEG AMPUTATION Left    • COLONOSCOPY N/A 6/15/2017    Procedure: COLONOSCOPY  CPTCODE:23695;  Surgeon: Lincoln Bowens III, MD;  Location: [facility];  Service:    • ENDOSCOPY N/A 6/15/2017    Procedure: ESOPHAGOGASTRODUODENOSCOPY WITH BIOPSY  CPTCODE:07679;  Surgeon: Lincoln Bowens III, MD;  Location: [facility];  Service:    • ESOPHAGEAL DILATATION         Family History   Problem Relation Age of Onset   • Diabetes Other    • Heart disease Other        Social History     Social History   • Marital status:      Social History Main Topics   • Smoking status: Current Every Day Smoker     Packs/day: 1.00     Years: 60.00     Types: Cigarettes   • Alcohol use No   • Drug use: No   • Sexual activity: Defer     Other Topics Concern   • Not on file           Objective   Physical Exam   Constitutional: He is oriented to person, place, and time. He appears well-developed and well-nourished. No distress.   HENT:   Head: Normocephalic and atraumatic.   Right Ear: External ear normal.   Left Ear: External ear normal.   Nose: Nose normal.   Eyes: Conjunctivae and EOM are normal. Pupils are equal, round, and reactive to light.   Neck: Normal range of motion. Neck supple. No JVD present. No tracheal deviation present.   Cardiovascular: Normal heart sounds.  Exam reveals no gallop and no friction rub.    No murmur heard.  Irregular rate and rhythm     Pulmonary/Chest: Effort normal and breath sounds normal.  No respiratory distress. He has no wheezes. He has no rales. He exhibits no tenderness.   Abdominal: Soft. Bowel sounds are normal. He exhibits no distension and no mass. There is no tenderness. There is no rebound and no guarding. No hernia.   Musculoskeletal: Normal range of motion. He exhibits no edema or deformity.   Neurological: He is alert and oriented to person, place, and time. No cranial nerve deficit.   Skin: Skin is warm and dry. No rash noted. He is not diaphoretic. No erythema. No pallor.   Psychiatric: He has a normal mood and affect. His behavior is normal. Thought content normal.   Nursing note and vitals reviewed.      Procedures           ED Course  ED Course as of Jun 01 0722   Thu May 31, 2018   2148 A fibb with RVR with rate of 160; non-specific ST or T wave changes per Dr. Rico.  ECG 12 Lead [MM]   2243 Normal sinus rhythm with PACs, non-specific ST and T wave changes per Dr. Rico.  ECG 12 Lead [MM]   2246 No acute findings per Dr. Rico.  XR Chest 1 View [MM]   Fri Jun 01, 2018   0050 No evidence of PE; bilateral lower lobe bronchial wall thickening and bronchiolar mucous plugging per [facility] report.  CT Chest Pulmonary Embolism With Contrast [MM]   0155 Spoke with Dr. Vega. Patient doesn't need further anti-coagulation. He has asked me to contact hospitalist for admission and he will consult.   [MM]   0218 Spoke with Dr. Shaver and he has accepted the patient for admission to PCU.  [MM]      ED Course User Index  [MM] PETROS Rai                HEART Score (for prediction of 6-week risk of major adverse cardiac event) reviewed and/or performed as part of the patient evaluation and treatment planning process.  The result associated with this review/performance is: 6       MDM  Number of Diagnoses or Management Options  Atrial fibrillation with rapid ventricular response:   NSTEMI (non-ST elevated myocardial infarction):      Amount and/or Complexity of Data Reviewed  Clinical  lab tests: ordered and reviewed  Tests in the radiology section of CPT®: ordered and reviewed  Discuss the patient with other providers: yes          Final diagnoses:   NSTEMI (non-ST elevated myocardial infarction)   Atrial fibrillation with rapid ventricular response            PETROS Rai  06/01/18 0220       PETROS Rai  06/01/18 0722

## 2018-06-01 NOTE — ED NOTES
Called the EKG phone for cardiology. Dr. Vega answered the phone. TEENA MORRELL is on the line with him at this time.      Heather Symes  06/01/18 0150

## 2018-06-01 NOTE — ED NOTES
NSR noted on the monitor at this time with a rate of 73 bpm. Provider made aware and new orders are noted     Essence Watters RN  05/31/18 [id number]

## 2018-06-01 NOTE — ED NOTES
Patient left ED at this time with RN and ED via stretcher at this time to IP bed assignment on PCU. Patient leaves with [employer]s monitor on, O2 at 2 liters per nasal canula. Both IV sites are saline locked upon transport. VS stable. Patient continues to deny chest pain. PIOTR Watters RN  06/01/18 [id number]

## 2018-06-01 NOTE — PLAN OF CARE
Problem: Fall Risk (Adult)  Goal: Absence of Fall  Outcome: Ongoing (interventions implemented as appropriate)      Problem: Skin Injury Risk (Adult)  Goal: Identify Related Risk Factors and Signs and Symptoms  Outcome: Ongoing (interventions implemented as appropriate)

## 2018-06-01 NOTE — ED NOTES
Called Dr. Silvia MORRELL. Had to leave a message for him to return our call.      Heather Symes  06/01/18 0120

## 2018-06-01 NOTE — PAYOR COMM NOTE
"[facility]  NPI:[id number]    Utilization Review  Contact: Tere Cantrell RN  Phone: [phone]  Fax:[phone]    INITIATE INPATIENT AUTHORIZATION      Zak Olmstead  (78 y.o. Male)     Date of Birth Social Security Number Address Home Phone MRN    1940  [address] [facility] [phone] [id number]    Confucianist Marital Status          None        Admission Date Admission Type Admitting Provider Attending Provider Department, Room/Bed    18 Emergency Olivier Shaver MD Troxell, Christopher A, DO [facility] PROGRESS CARE, P207/1P    Discharge Date Discharge Disposition Discharge Destination                       Attending Provider:  Ken Leon DO    Allergies:  No Known Allergies    Isolation:  None   Infection:  None   Code Status:  FULL    Ht:  172.7 cm (68\")   Wt:  76.2 kg (168 lb)    Admission Cmt:  None   Principal Problem:  None                Active Insurance as of 2018     Primary Coverage     Payor Plan Insurance Group Employer/Plan Group    HUMANA MEDICARE REPLACEMENT HUMANA MEDICARE REPL [id number]     Payor Plan Address Payor Plan Phone Number Effective From Effective To    [address] [address] [phone] 2018     [facility] [id number]       Subscriber Name Subscriber Birth Date Member ID       ZAK OLMSTEAD 1940 [id number]                 Emergency Contacts      (Rel.) Home Phone Work Phone Mobile Phone    Mirna Olmstead (Spouse) [phone] -- [phone]    Low Olmstead (Son) -- -- [phone]               History & Physical      Olivier Shaver MD at 2018  4:57 AM          Hospitalist History and Physical        Patient Identification  Name: Zak Olmstead  Age/Sex: 78 y.o. male  :  1940        MRN: [id number]  Visit Number: [id number]  PCP: ALICIA Alfredo        Chief complaint chest pain    History of Present Illness:  Patient is a 78 y.o. male who " presents with complaints of chest pain which started earlier this evening. He describes the pain as pressure in sensation, located primarily on the left side of his chest with radiation to his left shoulder. The pain was associated with significant diaphoresis and generalized weakness. He experienced palpitations at this time as well, and family reported his heart rate was in the 150s at home. His symptoms lasted about 30 minutes before he took a nitroglycerin; 5 minutes, later his symptoms had completely resolved but he remained tachycardic. He presented to the ED where he was found to be in atrial fibrillation with RVR. Cardiac enzymes were positive, indicating he had suffered an NSTEMI. EKG confirmed afib with heart rate of 160. He was started on a cardizem drip and shortly thereafter he apparently converted to sinus rhythm. He is now off the cardizem drip completely. Repeat EKG showed sinus rhythm with rate in the 70s, with nonspecific ST changes but no evidence of STEMI. His troponin has continued to rise since arrival. He has been admitted to the PCU for further management of NSTEMI.     Review of Systems  Review of Systems   Constitutional: Positive for diaphoresis and fatigue. Negative for activity change, appetite change, chills, fever and unexpected weight change.   HENT: Negative for congestion, postnasal drip, rhinorrhea, sinus pain, sinus pressure and sore throat.    Eyes: Negative for photophobia, pain, discharge, redness, itching and visual disturbance.   Respiratory: Negative for shortness of breath and wheezing.    Cardiovascular: Positive for chest pain and palpitations. Negative for leg swelling.   Gastrointestinal: Negative for abdominal distention, abdominal pain, constipation and diarrhea.   Endocrine: Negative for cold intolerance, heat intolerance, polydipsia, polyphagia and polyuria.   Genitourinary: Negative for difficulty urinating, dysuria, flank pain and hematuria.   Musculoskeletal:  Positive for arthralgias and back pain. Negative for joint swelling, myalgias, neck pain and neck stiffness.   Skin: Positive for wound (small ulcer right inner buttock). Negative for color change, pallor and rash.   Allergic/Immunologic: Negative for environmental allergies, food allergies and immunocompromised state.   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 Negative for dizziness, tremors, seizures, syncope, light-headedness, numbness and headaches.   Hematological: Negative for adenopathy. Does not bruise/bleed easily.   Psychiatric/Behavioral: Negative for agitation, behavioral problems and confusion.       History  Past Medical History:   Diagnosis Date   • Heart murmur    • Hypertension    • Mononeuritis multiplex      Past Surgical History:   Procedure Laterality Date   • ABDOMINAL AORTIC ANEURYSM REPAIR     • APPENDECTOMY     • BACK SURGERY     • BELOW KNEE LEG AMPUTATION left leg Left    • COLONOSCOPY N/A 6/15/2017    Procedure: COLONOSCOPY  CPTCODE:78840;  Surgeon: Lincoln Bowens III, MD;  Location: [facility];  Service:    • ENDOSCOPY N/A 6/15/2017    Procedure: ESOPHAGOGASTRODUODENOSCOPY WITH BIOPSY  CPTCODE:24592;  Surgeon: Lincoln Bowens III, MD;  Location: [facility];  Service:    • ESOPHAGEAL DILATATION       Family History   Problem Relation Age of Onset   • Diabetes Other    • Heart disease Other      Social History   Substance Use Topics   • Smoking status: Current Every Day Smoker     Packs/day: 1.00     Years: 60.00     Types: Cigarettes   • Smokeless tobacco: Not on file   • Alcohol use No     Prescriptions Prior to Admission   Medication Sig Dispense Refill Last Dose   • amitriptyline (ELAVIL) 10 MG tablet Take 10 mg by mouth Every Night.   6/14/2017 at Unknown time   • CloNIDine (CATAPRES) 0.2 MG tablet Take 0.2 mg by mouth 2 (Two) Times a Day.   6/15/2017 at 0600   • finasteride (PROSCAR) 5 MG tablet Take 5 mg by mouth Daily.    6/14/2017 at Unknown time   • lisinopril (PRINIVIL,ZESTRIL) 20 MG tablet Take 20 mg by mouth Daily.   6/15/2017 at 0600   • montelukast (SINGULAIR) 10 MG tablet Take 10 mg by mouth Every Night.   6/14/2017 at Unknown time   • naproxen (NAPROSYN) 250 MG tablet Take 250 mg by mouth 2 (Two) Times a Day As Needed for Mild Pain (1-3).   6/14/2017 at Unknown time   • oxyCODONE-acetaminophen (PERCOCET)  MG per tablet Take 1 tablet by mouth Every 6 (Six) Hours As Needed for Moderate Pain (4-6).   6/14/2017 at Unknown time   • polyethylene glycol (GoLYTELY) 236 G solution Starting at 6 pm on day prior to procedure, drink 8 ounces every 15 minutes until all gone or stools are clear. May add flavor packet. 4000 mL 0 6/14/2017 at Unknown time   • polyethylene glycol (GoLYTELY) 236 G solution Starting at 6 pm on day prior to procedure, drink 8 ounces every 15 minutes until all gone or stools are clear. May add flavor packet. 4000 mL 0 6/14/2017 at Unknown time   • pravastatin (PRAVACHOL) 10 MG tablet Take 10 mg by mouth Daily.   6/14/2017 at Unknown time   • warfarin (COUMADIN) 1 MG tablet Take 1 mg by mouth Daily.   06/12/2017   • warfarin (COUMADIN) 4 MG tablet Take 4 mg by mouth Daily.   06/12/2017     Allergies:  Patient has no known allergies.    Objective     Vital Signs  Temp:  [96.9 °F (36.1 °C)-97.9 °F (36.6 °C)] 97.9 °F (36.6 °C)  Heart Rate:  [] 79  Resp:  [18] 18  BP: ()/(28-94) 148/66  Body mass index is 25.54 kg/m².    Physical Exam:  Physical Exam   Constitutional: He is oriented to person, place, and time. He appears well-developed and well-nourished. No distress.   HENT:   Head: Normocephalic and atraumatic.   Mouth/Throat: Oropharynx is clear and moist.   Eyes: Conjunctivae and EOM are normal. Pupils are equal, round, and reactive to light.   Neck: Normal range of motion. Neck supple. No JVD present.   Cardiovascular: Normal rate, regular rhythm and intact distal pulses.    Murmur (grade  III/VI, best heard right 2nd intercostal space) heard.  Pulmonary/Chest: Effort normal and breath sounds normal. No respiratory distress. He has no wheezes. He exhibits no tenderness.   Abdominal: Soft. Bowel sounds are normal. He exhibits no distension. There is no tenderness.   Musculoskeletal: Normal range of motion. He exhibits deformity (s/p left below the knee amputation). He exhibits no edema or tenderness.   Lymphadenopathy:     He has no cervical adenopathy.   Neurological: He is alert and oriented to person, place, and time.   No gross focal deficits but has decreased strength in lower extremities   Skin: Skin is warm and dry. Capillary refill takes less than 2 seconds. No erythema. No pallor.   Tiny ulceration right inner buttock, no drainage   Psychiatric: He has a normal mood and affect. His behavior is normal. Judgment and thought content normal.         Results Review:       Lab Results:    Results from last 7 days  Lab Units 06/01/18  0424 05/31/18  2204   WBC 10*3/mm3 11.22 12.50   HEMOGLOBIN g/dL 14.6 15.9   PLATELETS 10*3/mm3 193 254           Results from last 7 days  Lab Units 05/31/18  2204   SODIUM mmol/L 137   POTASSIUM mmol/L 4.2   CHLORIDE mmol/L 106   CO2 mmol/L 23.1*   BUN mg/dL 18   CREATININE mg/dL 1.21   CALCIUM mg/dL 9.0   GLUCOSE mg/dL 117*         No results found for: HGBA1C    Results from last 7 days  Lab Units 05/31/18  2204   BILIRUBIN mg/dL 0.3   ALK PHOS U/L 78   AST (SGOT) U/L 23   ALT (SGPT) U/L 10       Results from last 7 days  Lab Units 06/01/18  0024 05/31/18 2204   CK TOTAL U/L 125 87   TROPONIN I ng/mL 2.285* 0.665*   CK MB INDEX % 9.7* 6.7*           Results from last 7 days  Lab Units 06/01/18  0424 05/31/18 2204   INR  2.09* 1.80*           I have reviewed the patient's laboratory results.    Imaging:  Imaging Results (last 72 hours)     Procedure Component Value Units Date/Time    CT Chest Pulmonary Embolism With Contrast [[id number]] Updated:  05/31/18 8307     XR Chest 1 View [[id number]] Updated:  05/31/18 2220      CT PE Protocol: no evidence of PE    I have personally reviewed the patient's radiologic imaging.        EKG (most current at 5 am): Sinus rhythm with occasional PVCs. Possible inferior infarct (Q waves leads III, AVf) present on prior EKG earlier tonight. No overt ST changes to suggest acute ischemia appreciated.    I have personally reviewed the patient's EKG.        Assessment/Plan     Active Problems:  - NSTEMI: admitted to the PCU. Cardiology notified of patient's admission and will see him in consultation later this morning. IV heparin drip initiated as INR was slightly subtherapeutic on arrival anyhow. Ordered for ASA and plavix, statin. Patient has been pain free since receiving nitroglycerin at home. Continue to trend cardiac enzymes. ECHO ordered. NPO for now in case cardiology wishes to take patient for heart cath this morning.  - Atrial fibrillation with rapid ventricular response: likely in response to patient's NSTEMI above. Short-lived after cardizem drip initiated; now in sinus rhythm and cardizem drip turned off. [id number] score of at least 3. Already on warfarin chronically, though not exactly sure why based on history (patient states he has been on it every since his BKA several years ago).  - Hypertension: review home meds once reconciled by pharmacy. In the mean time, IV hydralazine available PRN.  - Chronic pain: resume home oxycodone (confirmed by PEYMAN).     DVT Prophylaxis: heparin drip    Estimated Length of Stay >2 midnights    I discussed the patient's findings, assessment and plan with the patient, his wife, and his RN Eva in the PCU.    * Patient is high risk due to NSTEMI, new onset afib with RVR    Olivier Shaver MD  06/01/18  4:57 AM      Electronically signed by Olivier Shaver MD at 6/1/2018  5:23 AM          Emergency Department Notes      Angely Melendrez at 5/31/2018  9:33 PM        EKG Preformed @ 3016  given to Dr. Jacky Melendrez  05/31/18 2134      Electronically signed by Angely Melendrez at 5/31/2018  9:34 PM     PETROS Rai at 5/31/2018  9:51 PM     Attestation signed by Elan Kelsey MD at 6/1/2018  7:55 AM          For this patient encounter, I reviewed the NP or PA documentation, treatment plan, and medical decision making. Elan Kelsey MD 6/1/2018 7:55 AM                  Subjective     Chest Pain   Pain location:  Substernal area  Pain quality: aching    Pain radiates to:  L arm  Pain severity:  No pain  Onset quality:  Sudden  Duration:  5 hours  Timing:  Intermittent  Progression:  Waxing and waning  Chronicity:  New  Context: at rest    Context: not breathing, not drug use, not eating, not intercourse, not lifting, not movement, not raising an arm, not stress and not trauma    Relieved by:  Nitroglycerin (Took 1 nitro at home)  Worsened by:  Nothing  Associated symptoms: diaphoresis and palpitations    Associated symptoms: no abdominal pain, no AICD problem, no altered mental status, no anorexia, no anxiety, no back pain, no claudication, no cough, no dizziness, no dysphagia, no fatigue, no fever, no headache, no heartburn, no lower extremity edema, no nausea, no near-syncope, no numbness, no orthopnea, no PND, no shortness of breath, no syncope, no vomiting and no weakness    Risk factors: coronary artery disease, high cholesterol, hypertension, male sex and smoking    Risk factors: no aortic disease, no birth control, no diabetes mellitus, no Mary-Danlos syndrome, no immobilization, no Marfan's syndrome, not obese, not pregnant, no prior DVT/PE and no surgery    Risk factors comment:  Smokes 1 ppd; on coumadin and has been 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 Negative for activity change, appetite change, chills, fatigue, fever and unexpected weight change.    HENT: Negative.  Negative for trouble swallowing.    Respiratory: Negative.  Negative for cough and shortness of breath.    Cardiovascular: Positive for chest pain and palpitations. Negative for orthopnea, claudication, leg swelling, syncope, PND and near-syncope.   Gastrointestinal: Negative.  Negative for abdominal pain, anorexia, heartburn, nausea and vomiting.   Endocrine: Negative.    Genitourinary: Negative.  Negative for dysuria.   Musculoskeletal: Negative for back pain.   Skin: Negative.    Neurological: Negative.  Negative for dizziness, weakness, numbness and headaches.   Psychiatric/Behavioral: Negative.    All other systems reviewed and are negative.      Past Medical History:   Diagnosis Date   • Heart murmur    • Hypertension    • Mononeuritis multiplex        No Known Allergies    Past Surgical History:   Procedure Laterality Date   • ABDOMINAL AORTIC ANEURYSM REPAIR     • APPENDECTOMY     • BACK SURGERY     • BELOW KNEE LEG AMPUTATION Left    • COLONOSCOPY N/A 6/15/2017    Procedure: COLONOSCOPY  CPTCODE:80938;  Surgeon: Lincoln Bowens III, MD;  Location: [facility];  Service:    • ENDOSCOPY N/A 6/15/2017    Procedure: ESOPHAGOGASTRODUODENOSCOPY WITH BIOPSY  CPTCODE:32834;  Surgeon: Lincoln Bowens III, MD;  Location: [facility];  Service:    • ESOPHAGEAL DILATATION         Family History   Problem Relation Age of Onset   • Diabetes Other    • Heart disease Other        Social History     Social History   • Marital status:      Social History Main Topics   • Smoking status: Current Every Day Smoker     Packs/day: 1.00     Years: 60.00     Types: Cigarettes   • Alcohol use No   • Drug use: No   • Sexual activity: Defer     Other Topics Concern   • Not on file           Objective   Physical Exam   Constitutional: He is oriented to person, place, and time. He appears well-developed and well-nourished. No distress.   HENT:   Head: Normocephalic and atraumatic.   Right Ear: External ear  normal.   Left Ear: External ear normal.   Nose: Nose normal.   Eyes: Conjunctivae and EOM are normal. Pupils are equal, round, and reactive to light.   Neck: Normal range of motion. Neck supple. No JVD present. No tracheal deviation present.   Cardiovascular: Normal heart sounds.  Exam reveals no gallop and no friction rub.    No murmur heard.  Irregular rate and rhythm     Pulmonary/Chest: Effort normal and breath sounds normal. No respiratory distress. He has no wheezes. He has no rales. He exhibits no tenderness.   Abdominal: Soft. Bowel sounds are normal. He exhibits no distension and no mass. There is no tenderness. There is no rebound and no guarding. No hernia.   Musculoskeletal: Normal range of motion. He exhibits no edema or deformity.   Neurological: He is alert and oriented to person, place, and time. No cranial nerve deficit.   Skin: Skin is warm and dry. No rash noted. He is not diaphoretic. No erythema. No pallor.   Psychiatric: He has a normal mood and affect. His behavior is normal. Thought content normal.   Nursing note and vitals reviewed.      Procedures          ED Course  ED Course as of Jun 01 0722   Thu May 31, 2018   2148 A fibb with RVR with rate of 160; non-specific ST or T wave changes per Dr. Rico.  ECG 12 Lead [MM]   2243 Normal sinus rhythm with PACs, non-specific ST and T wave changes per Dr. Rico.  ECG 12 Lead [MM]   2246 No acute findings per Dr. Rico.  XR Chest 1 View [MM]   Fri Jun 01, 2018   0050 No evidence of PE; bilateral lower lobe bronchial wall thickening and bronchiolar mucous plugging per [facility] report.  CT Chest Pulmonary Embolism With Contrast [MM]   0155 Spoke with Dr. Vega. Patient doesn't need further anti-coagulation. He has asked me to contact hospitalist for admission and he will consult.   [MM]   0218 Spoke with Dr. Shaver and he has accepted the patient for admission to PCU.  [MM]      ED Course User Index  [MM] PETROS Rai                HEART  Score (for prediction of 6-week risk of major adverse cardiac event) reviewed and/or performed as part of the patient evaluation and treatment planning process.  The result associated with this review/performance is: 6       MDM  Number of Diagnoses or Management Options  Atrial fibrillation with rapid ventricular response:   NSTEMI (non-ST elevated myocardial infarction):      Amount and/or Complexity of Data Reviewed  Clinical lab tests: ordered and reviewed  Tests in the radiology section of CPT®:  ordered and reviewed  Discuss the patient with other providers: yes          Final diagnoses:   NSTEMI (non-ST elevated myocardial infarction)   Atrial fibrillation with rapid ventricular response            PETROS Rai  06/01/18 0220       PETROS Rai  06/01/18 0722      Electronically signed by Elan Kelsey MD at 6/1/2018  7:55 AM     Essence Watters RN at 5/31/2018 10:38 PM        NSR noted on the monitor at this time with a rate of 73 bpm. Provider made aware and new orders are noted     Essence Watters RN  05/31/18 5144      Electronically signed by Essence Watters RN at 5/31/2018 10:54 PM     Essence Watters RN at 5/31/2018 10:51 PM        Explained CT of chest with contrast with the patient related to elevated D-Dimer. States that he has had contrast dye in the past with no issues. Patient is agreeable to have exam done after all risks and benefits were explained. Contrast consents were signed and placed on the chart     Essence Watters RN  05/31/18 5137      Electronically signed by Essence Watters RN at 5/31/2018 10:52 PM     Heather Symes at 6/1/2018  1:19 AM        Called Dr. Silvia MORRELL. Had to leave a message for him to return our call.      Heather Symes  06/01/18 0120      Electronically signed by Heather Symes at 6/1/2018  1:20 AM     Heather Symes at 6/1/2018  1:47 AM         Attempted to call Dr. Vega, no answer. Left voice mail.      Heather Symes  06/01/18 0148      Electronically signed by Heather Symes at 6/1/2018  1:48 AM     Heather Symes at 6/1/2018  1:49 AM        Called the EKG phone for cardiology. Dr. Vega answered the phone. TEENA MORRELL is on the line with him at this time.      Heather Symes  06/01/18 0150      Electronically signed by Heather Symes at 6/1/2018  1:50 AM     Essence Watters RN at 6/1/2018  2:58 AM        Patient left ED at this time with RN and ED via stretcher at this time to IP bed assignment on PCU. Patient leaves with Zolls monitor on, O2 at 2 liters per nasal canula. Both IV sites are saline locked upon transport. VS stable. Patient continues to deny chest pain. NADN.      Essence Watters RN  06/01/18 0452      Electronically signed by Essence Watters RN at 6/1/2018  4:52 AM       Hospital Medications (all)       Dose Frequency Start End    aspirin EC tablet 81 mg 81 mg Daily 6/1/2018     Sig - Route: Take 1 tablet by mouth Daily. - Oral    aspirin tablet 325 mg 325 mg Once 6/1/2018 6/1/2018    Sig - Route: Take 1 tablet by mouth 1 (One) Time. - Oral    atorvastatin (LIPITOR) tablet 10 mg 10 mg Nightly 6/1/2018     Sig - Route: Take 1 tablet by mouth Every Night. - Oral    castor oil-balsam peru (VENELEX) ointment  Every 12 Hours Scheduled 6/1/2018     Sig - Route: Apply  topically Every 12 (Twelve) Hours. - Topical    clopidogrel (PLAVIX) tablet 75 mg 75 mg Daily 6/1/2018     Sig - Route: Take 1 tablet by mouth Daily. - Oral    diltiaZEM (CARDIZEM) 100 mg/100 mL (1 mg/mL) NS infusion (ADV) 5-15 mg/hr Continuous 5/31/2018     Sig - Route: Infuse 5-15 mg/hr into a venous catheter Continuous. - Intravenous    Cosign for Ordering: Accepted by Elan Kelsey MD on 6/1/2018 12:17 AM    diltiazem (CARDIZEM) bolus from bag 1 mg/mL 10 mg 10 mg Once 5/31/2018 5/31/2018    Sig - Route: Infuse 10 mg into a  venous catheter 1 (One) Time. - Intravenous    Cosign for Ordering: Accepted by Elan Kelsey MD on 6/1/2018 12:17 AM    diltiazem (CARDIZEM) bolus from bag 1 mg/mL 15 mg 15 mg Once 5/31/2018 5/31/2018    Sig - Route: Infuse 15 mg into a venous catheter 1 (One) Time. - Intravenous    Cosign for Ordering: Accepted by Elan Kelsey MD on 6/1/2018 12:17 AM    heparin (porcine) 5000 UNIT/ML injection 2,200 Units 30 Units/kg × 72.6 kg As Needed 6/1/2018     Sig - Route: Infuse 0.44 mL into a venous catheter As Needed (Per Heparin Nomogram For PTT 45-55). - Intravenous    heparin (porcine) 5000 UNIT/ML injection 4,400 Units 60 Units/kg × 72.6 kg Once 6/1/2018 6/1/2018    Sig - Route: Infuse 0.88 mL into a venous catheter 1 (One) Time. - Intravenous    heparin (porcine) 5000 UNIT/ML injection 4,400 Units 60 Units/kg × 72.6 kg As Needed 6/1/2018     Sig - Route: Infuse 0.88 mL into a venous catheter As Needed (Per Heparin Nomogram For PTT Less Than 45). - Intravenous    heparin 89141 units/250 mL (100 units/mL) in 0.45 % NaCl infusion 12 Units/kg/hr × 72.6 kg Titrated 6/1/2018     Sig - Route: Infuse 871 Units/hr into a venous catheter Dose Adjusted By Provider As Needed. - Intravenous    hydrALAZINE (APRESOLINE) injection 10 mg 10 mg Every 6 Hours PRN 6/1/2018     Sig - Route: Infuse 0.5 mL into a venous catheter Every 6 (Six) Hours As Needed for High Blood Pressure. - Intravenous    iopamidol (ISOVUE-370) 76 % injection 100 mL 100 mL Once in Imaging 5/31/2018 5/31/2018    Sig - Route: Infuse 100 mL into a venous catheter Once. - Intravenous    nitroglycerin (NITROSTAT) SL tablet 0.4 mg 0.4 mg Every 5 Minutes PRN 6/1/2018     Sig - Route: Place 1 tablet under the tongue Every 5 (Five) Minutes As Needed for Chest Pain (if systolic BP greater than 100 mm/Hg.). - Sublingual    oxyCODONE (ROXICODONE) immediate release tablet 10 mg 10 mg Every 6 Hours PRN 6/1/2018 6/11/2018    Sig - Route: Take 2 tablets by  "mouth Every 6 (Six) Hours As Needed for Moderate Pain  (Hold for oversedation, SBP<100). - Oral    sodium chloride 0.9 % bolus 1,000 mL 1,000 mL Once 5/31/2018 5/31/2018    Sig - Route: Infuse 1,000 mL into a venous catheter 1 (One) Time. - Intravenous    Cosign for Ordering: Accepted by Elan Kelsey MD on 6/1/2018 12:17 AM    sodium chloride 0.9 % flush 1-10 mL 1-10 mL As Needed 6/1/2018     Sig - Route: Infuse 1-10 mL into a venous catheter As Needed for Line Care. - Intravenous    sodium chloride 0.9 % flush 10 mL 10 mL As Needed 5/31/2018     Sig - Route: Infuse 10 mL into a venous catheter As Needed for Line Care. - Intravenous    Cosign for Ordering: Accepted by Elan Kelsey MD on 6/1/2018 12:17 AM    Linked Group 1:  \"And\" Linked Group Details        sodium chloride 0.9 % flush 3 mL 3 mL Every 8 Hours 6/1/2018     Sig - Route: Infuse 3 mL into a venous catheter Every 8 (Eight) Hours. - Intravenous    sodium chloride 0.9 % flush 5 mL 5 mL As Needed 6/1/2018     Sig - Route: Infuse 5 mL into a venous catheter As Needed for Line Care (After Medication Administration or Blood Draw). - Intravenous    aspirin chewable tablet 324 mg (Discontinued) 324 mg Once 5/31/2018 5/31/2018    Sig - Route: Chew 4 tablets 1 (One) Time. - Oral    Cosign for Ordering: Accepted by Elan Kelsey MD on 6/1/2018 12:17 AM    Pharmacy to dose warfarin (Discontinued)  Continuous PRN 6/1/2018 6/1/2018    Sig - Route: Continuous As Needed for Consult. - Does not apply          Physician Progress Notes (all)     No notes of this type exist for this encounter.        Consult Notes (all)     No notes of this type exist for this encounter.        "

## 2018-06-01 NOTE — PROGRESS NOTES
Discharge Planning Assessment   Alli     Patient Name: Zak Olmstead  MRN: [id number]  Today's Date: 6/1/2018    Admit Date: 5/31/2018          Discharge Needs Assessment     Row Name 06/01/18 1113       Living Environment    Current Living Arrangements home/apartment/condo    Family Caregiver if Needed spouse   Pt lives at home with his spouse and plans to return home at discharge.     Quality of Family Relationships helpful;involved;supportive       Transition Planning    Patient/Family Anticipates Transition to home with family    Transportation Anticipated family or friend will provide   Pt's family to provide transportation for pt at discharge.        Discharge Needs Assessment    Equipment Currently Used at Home bath bench;wheelchair;oxygen;walker, rolling   DME provider [facility].     Equipment Needed After Discharge none    Offered/Gave Vendor List no            Discharge Plan     Row Name 06/01/18 1119       Plan    Plan Pt lives at home with spouse Pat and plans to return home at discharge. Pt utilizes [facility]. HH to be contacted at discharge. Pt does not have a POA or living will. Pt utilizes [facility] with no issus at this time. Pt's PCP is [facility]. SS will follow and assist as needed.     Patient/Family in Agreement with Plan yes     Demographic Summary     Row Name 06/01/18 1113       General Information    Arrived From home    Reason for Consult discharge planning    Preferred Language English     Used During This Interaction no        Lise Schwartz

## 2018-06-01 NOTE — H&P
Hospitalist History and Physical        Patient Identification  Name: Zak Olmstead  Age/Sex: 78 y.o. male  :  1940        MRN: [id number]  Visit Number: [id number]  PCP: ALICIA Alfredo        Chief complaint chest pain    History of Present Illness:  Patient is a 78 y.o. male who presents with complaints of chest pain which started earlier this evening. He describes the pain as pressure in sensation, located primarily on the left side of his chest with radiation to his left shoulder. The pain was associated with significant diaphoresis and generalized weakness. He experienced palpitations at this time as well, and family reported his heart rate was in the 150s at home. His symptoms lasted about 30 minutes before he took a nitroglycerin; 5 minutes, later his symptoms had completely resolved but he remained tachycardic. He presented to the ED where he was found to be in atrial fibrillation with RVR. Cardiac enzymes were positive, indicating he had suffered an NSTEMI. EKG confirmed afib with heart rate of 160. He was started on a cardizem drip and shortly thereafter he apparently converted to sinus rhythm. He is now off the cardizem drip completely. Repeat EKG showed sinus rhythm with rate in the 70s, with nonspecific ST changes but no evidence of STEMI. His troponin has continued to rise since arrival. He has been admitted to the PCU for further management of NSTEMI.     Review of Systems  Review of Systems   Constitutional: Positive for diaphoresis and fatigue. Negative for activity change, appetite change, chills, fever and unexpected weight change.   HENT: Negative for congestion, postnasal drip, rhinorrhea, sinus pain, sinus pressure and sore throat.    Eyes: Negative for photophobia, pain, discharge, redness, itching and visual disturbance.   Respiratory: Negative for shortness of breath and wheezing.    Cardiovascular: Positive for chest pain and palpitations. Negative for leg swelling.    Gastrointestinal: Negative for abdominal distention, abdominal pain, constipation and diarrhea.   Endocrine: Negative for cold intolerance, heat intolerance, polydipsia, polyphagia and polyuria.   Genitourinary: Negative for difficulty urinating, dysuria, flank pain and hematuria.   Musculoskeletal: Positive for arthralgias and back pain. Negative for joint swelling, myalgias, neck pain and neck stiffness.   Skin: Positive for wound (small ulcer right inner buttock). Negative for color change, pallor and rash.   Allergic/Immunologic: Negative for environmental allergies, food allergies and immunocompromised state.   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 Negative for dizziness, tremors, seizures, syncope, light-headedness, numbness and headaches.   Hematological: Negative for adenopathy. Does not bruise/bleed easily.   Psychiatric/Behavioral: Negative for agitation, behavioral problems and confusion.       History  Past Medical History:   Diagnosis Date   • Heart murmur    • Hypertension    • Mononeuritis multiplex      Past Surgical History:   Procedure Laterality Date   • ABDOMINAL AORTIC ANEURYSM REPAIR     • APPENDECTOMY     • BACK SURGERY     • BELOW KNEE LEG AMPUTATION left leg Left    • COLONOSCOPY N/A 6/15/2017    Procedure: COLONOSCOPY  CPTCODE:02469;  Surgeon: Lincoln Bowens III, MD;  Location: [facility];  Service:    • ENDOSCOPY N/A 6/15/2017    Procedure: ESOPHAGOGASTRODUODENOSCOPY WITH BIOPSY  CPTCODE:04948;  Surgeon: Lincoln Bowens III, MD;  Location: [facility];  Service:    • ESOPHAGEAL DILATATION       Family History   Problem Relation Age of Onset   • Diabetes Other    • Heart disease Other      Social History   Substance Use Topics   • Smoking status: Current Every Day Smoker     Packs/day: 1.00     Years: 60.00     Types: Cigarettes   • Smokeless tobacco: Not on file   • Alcohol use No     Prescriptions Prior to Admission    Medication Sig Dispense Refill Last Dose   • amitriptyline (ELAVIL) 10 MG tablet Take 10 mg by mouth Every Night.   6/14/2017 at Unknown time   • CloNIDine (CATAPRES) 0.2 MG tablet Take 0.2 mg by mouth 2 (Two) Times a Day.   6/15/2017 at 0600   • finasteride (PROSCAR) 5 MG tablet Take 5 mg by mouth Daily.   6/14/2017 at Unknown time   • lisinopril (PRINIVIL,ZESTRIL) 20 MG tablet Take 20 mg by mouth Daily.   6/15/2017 at 0600   • montelukast (SINGULAIR) 10 MG tablet Take 10 mg by mouth Every Night.   6/14/2017 at Unknown time   • naproxen (NAPROSYN) 250 MG tablet Take 250 mg by mouth 2 (Two) Times a Day As Needed for Mild Pain (1-3).   6/14/2017 at Unknown time   • oxyCODONE-acetaminophen (PERCOCET)  MG per tablet Take 1 tablet by mouth Every 6 (Six) Hours As Needed for Moderate Pain (4-6).   6/14/2017 at Unknown time   • polyethylene glycol (GoLYTELY) 236 G solution Starting at 6 pm on day prior to procedure, drink 8 ounces every 15 minutes until all gone or stools are clear. May add flavor packet. 4000 mL 0 6/14/2017 at Unknown time   • polyethylene glycol (GoLYTELY) 236 G solution Starting at 6 pm on day prior to procedure, drink 8 ounces every 15 minutes until all gone or stools are clear. May add flavor packet. 4000 mL 0 6/14/2017 at Unknown time   • pravastatin (PRAVACHOL) 10 MG tablet Take 10 mg by mouth Daily.   6/14/2017 at Unknown time   • warfarin (COUMADIN) 1 MG tablet Take 1 mg by mouth Daily.   06/12/2017   • warfarin (COUMADIN) 4 MG tablet Take 4 mg by mouth Daily.   06/12/2017     Allergies:  Patient has no known allergies.    Objective     Vital Signs  Temp:  [96.9 °F (36.1 °C)-97.9 °F (36.6 °C)] 97.9 °F (36.6 °C)  Heart Rate:  [] 79  Resp:  [18] 18  BP: ()/(28-94) 148/66  Body mass index is 25.54 kg/m².    Physical Exam:  Physical Exam   Constitutional: He is oriented to person, place, and time. He appears well-developed and well-nourished. No distress.   HENT:   Head:  Normocephalic and atraumatic.   Mouth/Throat: Oropharynx is clear and moist.   Eyes: Conjunctivae and EOM are normal. Pupils are equal, round, and reactive to light.   Neck: Normal range of motion. Neck supple. No JVD present.   Cardiovascular: Normal rate, regular rhythm and intact distal pulses.    Murmur (grade III/VI, best heard right 2nd intercostal space) heard.  Pulmonary/Chest: Effort normal and breath sounds normal. No respiratory distress. He has no wheezes. He exhibits no tenderness.   Abdominal: Soft. Bowel sounds are normal. He exhibits no distension. There is no tenderness.   Musculoskeletal: Normal range of motion. He exhibits deformity (s/p left below the knee amputation). He exhibits no edema or tenderness.   Lymphadenopathy:     He has no cervical adenopathy.   Neurological: He is alert and oriented to person, place, and time.   No gross focal deficits but has decreased strength in lower extremities   Skin: Skin is warm and dry. Capillary refill takes less than 2 seconds. No erythema. No pallor.   Tiny ulceration right inner buttock, no drainage   Psychiatric: He has a normal mood and affect. His behavior is normal. Judgment and thought content normal.         Results Review:       Lab Results:    Results from last 7 days  Lab Units 06/01/18  0424 05/31/18  2204   WBC 10*3/mm3 11.22 12.50   HEMOGLOBIN g/dL 14.6 15.9   PLATELETS 10*3/mm3 193 254           Results from last 7 days  Lab Units 05/31/18  2204   SODIUM mmol/L 137   POTASSIUM mmol/L 4.2   CHLORIDE mmol/L 106   CO2 mmol/L 23.1*   BUN mg/dL 18   CREATININE mg/dL 1.21   CALCIUM mg/dL 9.0   GLUCOSE mg/dL 117*         No results found for: HGBA1C    Results from last 7 days  Lab Units 05/31/18  2204   BILIRUBIN mg/dL 0.3   ALK PHOS U/L 78   AST (SGOT) U/L 23   ALT (SGPT) U/L 10       Results from last 7 days  Lab Units 06/01/18  0024 05/31/18  2204   CK TOTAL U/L 125 87   TROPONIN I ng/mL 2.285* 0.665*   CK MB INDEX % 9.7* 6.7*            Results from last 7 days  Lab Units 06/01/18  0424 05/31/18  2204   INR  2.09* 1.80*           I have reviewed the patient's laboratory results.    Imaging:  Imaging Results (last 72 hours)     Procedure Component Value Units Date/Time    CT Chest Pulmonary Embolism With Contrast [[id number]] Updated:  05/31/18 2342    XR Chest 1 View [[id number]] Updated:  05/31/18 2220      CT PE Protocol: no evidence of PE    I have personally reviewed the patient's radiologic imaging.        EKG (most current at 5 am): Sinus rhythm with occasional PVCs. Possible inferior infarct (Q waves leads III, AVf) present on prior EKG earlier tonight. No overt ST changes to suggest acute ischemia appreciated.    I have personally reviewed the patient's EKG.        Assessment/Plan     Active Problems:  - NSTEMI: admitted to the PCU. Cardiology notified of patient's admission and will see him in consultation later this morning. IV heparin drip initiated as INR was slightly subtherapeutic on arrival anyhow. Ordered for ASA and plavix, statin. Patient has been pain free since receiving nitroglycerin at home. Continue to trend cardiac enzymes. ECHO ordered. NPO for now in case cardiology wishes to take patient for heart cath this morning.  - Atrial fibrillation with rapid ventricular response: likely in response to patient's NSTEMI above. Short-lived after cardizem drip initiated; now in sinus rhythm and cardizem drip turned off. [id number] score of at least 3. Already on warfarin chronically, though not exactly sure why based on history (patient states he has been on it every since his BKA several years ago).  - Hypertension: review home meds once reconciled by pharmacy. In the mean time, IV hydralazine available PRN.  - Chronic pain: resume home oxycodone (confirmed by PEYMAN).     DVT Prophylaxis: heparin drip    Estimated Length of Stay >2 midnights    I discussed the patient's findings, assessment and plan with the patient, his wife,  and his RN Eva in the PCU.    * Patient is high risk due to NSTEMI, new onset afib with RVR    Olivier Shaver MD  06/01/18  4:57 AM

## 2018-06-01 NOTE — NURSING NOTE
Patient has a small full thickness ulceration to the right gluteal. Wife states this has been an ongoing issue for several months and she has been providing care for it at home.  She states that the area was larger and is looking better.  Will treat with venelex.     06/01/18 1119   Wound 06/01/18 0308 Right     Date first assessed/Time first assessed: 06/01/18 0308   Present On Admission : yes;picture taken  Side: Right  Orientation: (c)   Location: (c)   Stage, Pressure Injury: Stage 3   Base dry;other (see comments)  (red)   Red (%), Wound Tissue Color 100   Periwound intact;pink;blanchable   Periwound Temperature warm   Periwound Skin Turgor soft   Edges rolled/closed   Wound Length (cm) 1 cm   Wound Width (cm) 0.5 cm   Wound Depth (cm) 0.2 cm   Drainage Amount none

## 2018-06-02 VITALS
HEIGHT: 68 IN | TEMPERATURE: 97.5 F | WEIGHT: 167.4 LBS | OXYGEN SATURATION: 88 % | DIASTOLIC BLOOD PRESSURE: 72 MMHG | BODY MASS INDEX: 25.37 KG/M2 | RESPIRATION RATE: 13 BRPM | HEART RATE: 75 BPM | SYSTOLIC BLOOD PRESSURE: 157 MMHG

## 2018-06-02 LAB
ALBUMIN SERPL-MCNC: 3.5 G/DL (ref 3.4–4.8)
ALBUMIN/GLOB SERPL: 1.4 G/DL (ref 1.5–2.5)
ALP SERPL-CCNC: 58 U/L (ref 40–129)
ALT SERPL W P-5'-P-CCNC: 14 U/L (ref 10–44)
ANION GAP SERPL CALCULATED.3IONS-SCNC: 6.8 MMOL/L (ref 3.6–11.2)
AST SERPL-CCNC: 31 U/L (ref 10–34)
BASOPHILS # BLD AUTO: 0.1 10*3/MM3 (ref 0–0.3)
BASOPHILS NFR BLD AUTO: 1.1 % (ref 0–2)
BILIRUB SERPL-MCNC: 0.4 MG/DL (ref 0.2–1.8)
BUN BLD-MCNC: 13 MG/DL (ref 7–21)
BUN/CREAT SERPL: 15.7 (ref 7–25)
CALCIUM SPEC-SCNC: 8 MG/DL (ref 7.7–10)
CHLORIDE SERPL-SCNC: 109 MMOL/L (ref 99–112)
CO2 SERPL-SCNC: 22.2 MMOL/L (ref 24.3–31.9)
CREAT BLD-MCNC: 0.83 MG/DL (ref 0.43–1.29)
DEPRECATED RDW RBC AUTO: 49.1 FL (ref 37–54)
EOSINOPHIL # BLD AUTO: 0.15 10*3/MM3 (ref 0–0.7)
EOSINOPHIL NFR BLD AUTO: 1.6 % (ref 0–7)
ERYTHROCYTE [DISTWIDTH] IN BLOOD BY AUTOMATED COUNT: 16.7 % (ref 11.5–14.5)
GFR SERPL CREATININE-BSD FRML MDRD: 90 ML/MIN/1.73
GLOBULIN UR ELPH-MCNC: 2.5 GM/DL
GLUCOSE BLD-MCNC: 74 MG/DL (ref 70–110)
HCT VFR BLD AUTO: 41.2 % (ref 42–52)
HGB BLD-MCNC: 13.1 G/DL (ref 14–18)
IMM GRANULOCYTES # BLD: 0.02 10*3/MM3 (ref 0–0.03)
IMM GRANULOCYTES NFR BLD: 0.2 % (ref 0–0.5)
INR PPP: 2.44 (ref 0.9–1.1)
LYMPHOCYTES # BLD AUTO: 2.87 10*3/MM3 (ref 1–3)
LYMPHOCYTES NFR BLD AUTO: 31.3 % (ref 16–46)
MCH RBC QN AUTO: 25.9 PG (ref 27–33)
MCHC RBC AUTO-ENTMCNC: 31.8 G/DL (ref 33–37)
MCV RBC AUTO: 81.4 FL (ref 80–94)
MONOCYTES # BLD AUTO: 0.85 10*3/MM3 (ref 0.1–0.9)
MONOCYTES NFR BLD AUTO: 9.3 % (ref 0–12)
NEUTROPHILS # BLD AUTO: 5.19 10*3/MM3 (ref 1.4–6.5)
NEUTROPHILS NFR BLD AUTO: 56.5 % (ref 40–75)
OSMOLALITY SERPL CALC.SUM OF ELEC: 274.4 MOSM/KG (ref 273–305)
PLATELET # BLD AUTO: 171 10*3/MM3 (ref 130–400)
PMV BLD AUTO: 10.5 FL (ref 6–10)
POTASSIUM BLD-SCNC: 4.1 MMOL/L (ref 3.5–5.3)
PROT SERPL-MCNC: 6 G/DL (ref 6–8)
PROTHROMBIN TIME: 26.8 SECONDS (ref 11–15.4)
RBC # BLD AUTO: 5.06 10*6/MM3 (ref 4.7–6.1)
SODIUM BLD-SCNC: 138 MMOL/L (ref 135–153)
T4 FREE SERPL-MCNC: 1.39 NG/DL (ref 0.89–1.76)
TSH SERPL DL<=0.05 MIU/L-ACNC: 0.1 MIU/ML (ref 0.55–4.78)
WBC NRBC COR # BLD: 9.18 10*3/MM3 (ref 4.5–12.5)

## 2018-06-02 PROCEDURE — 85025 COMPLETE CBC W/AUTO DIFF WBC: CPT | Performed by: INTERNAL MEDICINE

## 2018-06-02 PROCEDURE — 84439 ASSAY OF FREE THYROXINE: CPT | Performed by: INTERNAL MEDICINE

## 2018-06-02 PROCEDURE — 94799 UNLISTED PULMONARY SVC/PX: CPT

## 2018-06-02 PROCEDURE — 84443 ASSAY THYROID STIM HORMONE: CPT | Performed by: INTERNAL MEDICINE

## 2018-06-02 PROCEDURE — 85610 PROTHROMBIN TIME: CPT | Performed by: HOSPITALIST

## 2018-06-02 PROCEDURE — 80053 COMPREHEN METABOLIC PANEL: CPT | Performed by: INTERNAL MEDICINE

## 2018-06-02 PROCEDURE — 99239 HOSP IP/OBS DSCHRG MGMT >30: CPT | Performed by: INTERNAL MEDICINE

## 2018-06-02 RX ORDER — METOPROLOL TARTRATE 50 MG/1
50 TABLET, FILM COATED ORAL EVERY 12 HOURS SCHEDULED
Qty: 60 TABLET | Refills: 0 | Status: SHIPPED | OUTPATIENT
Start: 2018-06-02 | End: 2018-06-14 | Stop reason: SDUPTHER

## 2018-06-02 RX ORDER — ASPIRIN 81 MG/1
81 TABLET ORAL DAILY
Qty: 30 TABLET | Refills: 0 | Status: SHIPPED | OUTPATIENT
Start: 2018-06-03 | End: 2018-06-14 | Stop reason: SINTOL

## 2018-06-02 RX ORDER — CLOPIDOGREL BISULFATE 75 MG/1
75 TABLET ORAL DAILY
Qty: 30 TABLET | Refills: 0 | Status: SHIPPED | OUTPATIENT
Start: 2018-06-03 | End: 2018-06-14 | Stop reason: SDUPTHER

## 2018-06-02 RX ADMIN — LISINOPRIL 20 MG: 10 TABLET ORAL at 09:38

## 2018-06-02 RX ADMIN — CLONIDINE HYDROCHLORIDE 0.2 MG: 0.1 TABLET ORAL at 06:35

## 2018-06-02 RX ADMIN — CLOPIDOGREL 75 MG: 75 TABLET, FILM COATED ORAL at 09:38

## 2018-06-02 RX ADMIN — METOPROLOL TARTRATE 50 MG: 50 TABLET, FILM COATED ORAL at 09:37

## 2018-06-02 RX ADMIN — Medication 3 ML: at 09:39

## 2018-06-02 RX ADMIN — POLYETHYLENE GLYCOL (3350) 17 G: 17 POWDER, FOR SOLUTION ORAL at 09:37

## 2018-06-02 RX ADMIN — LOSARTAN POTASSIUM 50 MG: 50 TABLET, FILM COATED ORAL at 09:38

## 2018-06-02 RX ADMIN — AMLODIPINE BESYLATE 5 MG: 5 TABLET ORAL at 09:38

## 2018-06-02 RX ADMIN — CASTOR OIL AND BALSAM, PERU: 788; 87 OINTMENT TOPICAL at 09:39

## 2018-06-02 RX ADMIN — ASPIRIN 81 MG: 81 TABLET ORAL at 09:38

## 2018-06-02 NOTE — PLAN OF CARE
Problem: Wound (Includes Pressure Injury) (Adult)  Goal: Signs and Symptoms of Listed Potential Problems Will be Absent, Minimized or Managed (Wound)  Outcome: Ongoing (interventions implemented as appropriate)      Problem: Patient Care Overview  Goal: Plan of Care Review  Outcome: Ongoing (interventions implemented as appropriate)

## 2018-06-02 NOTE — DISCHARGE INSTRUCTIONS
Atrial Fibrillation  Atrial fibrillation is a type of heartbeat that is irregular or fast (rapid). If you have this condition, your heart keeps quivering in a weird (chaotic) way. This condition can make it so your heart cannot pump blood normally. Having this condition gives a person more risk for stroke, heart failure, and other heart problems. There are different types of atrial fibrillation. Talk with your doctor to learn about the type that you have.  Follow these instructions at home:  · Take over-the-counter and prescription medicines only as told by your doctor.  · If your doctor prescribed a blood-thinning medicine, take it exactly as told. Taking too much of it can cause bleeding. If you do not take enough of it, you will not have the protection that you need against stroke and other problems.  · Do not use any tobacco products. These include cigarettes, chewing tobacco, and e-cigarettes. If you need help quitting, ask your doctor.  · If you have apnea (obstructive sleep apnea), manage it as told by your doctor.  · Do not drink alcohol.  · Do not drink beverages that have caffeine. These include coffee, soda, and tea.  · Maintain a healthy weight. Do not use diet pills unless your doctor says they are safe for you. Diet pills may make heart problems worse.  · Follow diet instructions as told by your doctor.  · Exercise regularly as told by your doctor.  · Keep all follow-up visits as told by your doctor. This is important.  Contact a doctor if:  · You notice a change in the speed, rhythm, or strength of your heartbeat.  · You are taking a blood-thinning medicine and you notice more bruising.  · You get tired more easily when you move or exercise.  Get help right away if:  · You have pain in your chest or your belly (abdomen).  · You have sweating or weakness.  · You feel sick to your stomach (nauseous).  · You notice blood in your throw up (vomit), poop (stool), or pee (urine).  · You are short of  breath.  · You suddenly have swollen feet and ankles.  · You feel dizzy.  · Your suddenly get weak or numb in your face, arms, or legs, especially if it happens on one side of your body.  · You have trouble talking, trouble understanding, or both.  · Your face or your eyelid droops on one side.  These symptoms may be an emergency. Do not wait to see if the symptoms will go away. Get medical help right away. Call your local emergency services (911 in the [place] Do not drive yourself to the hospital.  This information is not intended to replace advice given to you by your health care provider. Make sure you discuss any questions you have with your health care provider.  Document Released: 09/26/2009 Document Revised: 05/25/2017 Document Reviewed: 04/13/2016  Elsevier Interactive Patient Education © 2017 [employer]

## 2018-06-02 NOTE — DISCHARGE SUMMARY
Date of Admission: 5/31/2018    Date of Discharge:  6/2/2018    PCP: ALICIA Alfredo    Admission Diagnosis:   Please see admission H&P    Discharge Diagnosis:   NSTEMI  Afib with RVR  Peripheral vascular disease  Diastolic dysfunction  Moderate to severe aortic stenosis   Essential HTN  Hyperlipidemia  Mononeuritis multiplex  Chronic pain    Procedures Performed:  6/1/18  1. Coronary Angiogram  2. Abdominal Aortogram  3. Unilateral Left Lower extremity angiography    Final impression:    mid RCA with heavy collateral filling which appears adequate  Mild disease of the left coronary system  Severe peripheral vascular disease with patent femorofemoral bypass  Patent endograft in the abdominal aorta with single limb into the left common iliac.  Right common iliac arteries is totally occluded from the ostium to the femoral.     Consults:   Consults     Date and Time Order Name Status Description    6/1/2018 0347 Inpatient Cardiology Consult Completed     6/1/2018 0219 Hospitalist (on-call MD unless specified)              History of Present Illness:  Zak Olmstead is a 78 y.o. male who presented to [facility] ED with CC of chest pain. Please see admission H&P for complete details.     In the ED, workup revealed Afib with RVR as well as troponin elevation consistent with a NSTEMI. He was started on a Cardizem gtt and converted back to sinus rhythm shortly thereafter. The Cardizem gtt was later D/C'd as he maintained sinus rhythm in the 70's. CT chest PE protocol was negative for PE.        Hospital Course  Zak Olmstead was admitted to the PCU for further evaluation and treatment. He was started on ASA, Plavix and a statin. He is chronically anticoagulated with Coumadin for an unclear reason and his INR was slightly subtherapeutic. A heparin gtt was added at the recommendation of cardiology as they were consulted for further input.     Serial CE's were followed and continued to trend up with troponin peaking at 6.4  before beginning to trend downward. TSH was low with normal free T4. Echo revealed an EF of 66-70%, grade I diastolic dysfunction, mild AR, moderate to severe AS and mild MR. His CP resolved upon admission and he felt much improved after he converted back to sinus rhythm. He was evaluated by cardiology and taken to the cath lab on 6/2/18 where he underwent coronary angiogram, abdominal aortogram and unilateral left lower extremity angiography. 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 Cardiology recommended to to continue medical management. His heparin gtt was D/C'd.     Mr. Olmstead was seen and examined with EMELYN Sanabria on 6/2/18. He remained hemodynamically stable with stable routine AM labs. His INR was therapeutic. He was maintaining sinus rhythm on telemetry. He felt back to his baseline with no reported CP and was eager to go home. He was deemed medically stable for discharge after discussion with cardiology. He was given prescriptions for any newly prescribed medications. He was instructed to follow up with his PCP through [facility] within 1 week and cardiology in 2 weeks.       Condition on Discharge:  Stable    Vital Signs  Vitals:    06/02/18 0902   BP: 132/69   Pulse: 67   Resp: 13   Temp:    SpO2: (!) 88%       Physical Exam:  General:    Awake, alert, in no acute distress   Heart:      Normal S1 and S2. Regular rate and rhythm. Grade III/VI KARIS   Lungs:     Respirations regular, even and unlabored. Lungs clear to auscultation B/L. No wheezes, rales or rhonchi.   Abdomen:   Soft and nontender. No guarding, rebound tenderness or  organomegaly noted. Bowel sounds present  x 4.   Extremities:  Left BKA. No edema appreciated.      Discharge Disposition:   home      Discharge Medications:   Zak Olmstead   Home Medication Instructions GAVINO:[id number]    Printed on:06/02/18 1024   Medication Information                      albuterol (PROVENTIL) (2.5 MG/3ML) 0.083% nebulizer solution  Take 2.5 mg by nebulization Every 6 (Six) Hours As Needed for Wheezing.             amitriptyline (ELAVIL) 10 MG tablet  Take 10 mg by mouth Every Night.             amLODIPine (NORVASC) 5 MG tablet  Take 5 mg by mouth 2 (Two) Times a Day.             aspirin 81 MG EC tablet  Take 1 tablet by mouth Daily.             CloNIDine (CATAPRES) 0.2 MG tablet  Take 0.2 mg by mouth 3 (Three) Times a Day.             clopidogrel (PLAVIX) 75 MG tablet  Take 1 tablet by mouth Daily.             hydrALAZINE (APRESOLINE) 10 MG tablet  Take 10 mg by mouth 2 (Two) Times a Day As Needed.             lisinopril (PRINIVIL,ZESTRIL) 20 MG tablet  Take 20 mg by mouth 2 (Two) Times a Day.             metoprolol tartrate (LOPRESSOR) 50 MG tablet  Take 1 tablet by mouth Every 12 (Twelve) Hours.             oxyCODONE (ROXICODONE) 10 MG tablet  Take 10 mg by mouth 4 (Four) Times a Day.             polyethylene glycol (MIRALAX) packet  Take 17 g by mouth Daily.             pravastatin (PRAVACHOL) 10 MG tablet  Take 10 mg by mouth Daily.             warfarin (COUMADIN) 1 MG tablet  Take 1 mg by mouth 1 (One) Time. monday             warfarin (COUMADIN) 6 MG tablet  Take 6 mg by mouth Daily.                   Discharge Diet:   Dietary Orders     Start     Ordered    06/01/18 1721  Diet Regular; Cardiac  Diet Effective Now     Question Answer Comment   Diet Texture / Consistency Regular    Common Modifiers Cardiac        06/01/18 1720          Activity at Discharge:  activity as tolerated    Follow-up Appointments:  Additional Instructions for the Follow-ups that You Need to Schedule     Discharge Follow-up with PCP    As directed       Follow Up Details:  Follow up with ALICIA Alfredo with [facility] within 1 week.         Discharge Follow-up with Specified Provider: Follow up with Dr. Vega in 2 weeks.    As directed      To:  Follow up with Dr. Vega in 2 weeks.         Protime-INR     Jun 04, 2018 (Approximate)      Is Patient on anti-coag:  Yes           Follow-up Information     ALICIA Alfredo .    Specialty:  Family Medicine  Why:  Follow up with ALICIA Alfredo with [facility] within 1 week.  Contact information:  [address] [address] [phone]                       Test Results Pending at Discharge:  None     Ken Leon DO  06/02/18  10:25 AM      Time: Greater than 30 minutes spent on this discharge.

## 2018-06-03 NOTE — PAYOR COMM NOTE
"[facility]   [facility]  PHONE  [phone]  FAX  [phone]    PATIENT D/C 6/2/18    Meir Olmstead  (78 y.o. Male)     Date of Birth Social Security Number Address Home Phone MRN    1940  [address] [place] [address] [phone] [id number]    Mu-ism Marital Status          None        Admission Date Admission Type Admitting Provider Attending Provider Department, Room/Bed    5/31/18 Emergency Olivier Shaver MD  [facility] PROGRESS CARE, P207/1P    Discharge Date Discharge Disposition Discharge Destination        6/2/2018 Home or Self Care              Attending Provider:  (none)   Allergies:  No Known Allergies    Isolation:  None   Infection:  None   Code Status:  Prior    Ht:  172.7 cm (68\")   Wt:  75.9 kg (167 lb 6.4 oz)    Admission Cmt:  None   Principal Problem:  None                Active Insurance as of 5/31/2018     Primary Coverage     Payor Plan Insurance Group Employer/Plan Group    HU[employer] MEDICARE REPLACEMENT HU[employer]NA MEDICARE REPL [id number]     Payor Plan Address Payor Plan Phone Number Effective From Effective To    [address] [address] [phone] 1/1/2018     [facility] [id number]       Subscriber Name Subscriber Birth Date Member ID       MEIR OLMSTEAD 1940 [id number]                 Emergency Contacts      (Rel.) Home Phone Work Phone Mobile Phone    Mirna Olmstead (Spouse) [phone] -- [phone]    Low Olmstead (Son) -- -- [phone]              "

## 2018-06-04 NOTE — DISCHARGE PLACEMENT REQUEST
"Meir Olmstead  (78 y.o. Male)     Date of Birth Social Security Number Address Home Phone MRN    1940  [address] [facility] [address] [phone] [id number]    Zoroastrian Marital Status          None        Admission Date Admission Type Admitting Provider Attending Provider Department, Room/Bed    18 Emergency Olivier Shaver MD  [facility] PROGRESS CARE, P207/1P    Discharge Date Discharge Disposition Discharge Destination        2018 Home or Self Care              Attending Provider:  (none)   Allergies:  No Known Allergies    Isolation:  None   Infection:  None   Code Status:  Prior    Ht:  172.7 cm (68\")   Wt:  75.9 kg (167 lb 6.4 oz)    Admission Cmt:  None   Principal Problem:  None                Active Insurance as of 2018     Primary Coverage     Payor Plan Insurance Group Employer/Plan Group    HUMANA MEDICARE REPLACEMENT HUMANA MEDICARE REPL [id number]     Payor Plan Address Payor Plan Phone Number Effective From Effective To    [address] [address] [phone] 2018     [facility] [id number]       Subscriber Name Subscriber Birth Date Member ID       MEIR OLMSTEAD 1940 [id number]                 Emergency Contacts      (Rel.) Home Phone Work Phone Mobile Phone    Mirna Olmstead (Spouse) [phone] -- [phone]    Low Olmstead (Son) -- -- [phone]            Emergency Contact Information     Name Relation Home Work Mobile    Mirna Olmstead Spouse [phone]    Low Olmstead Son   [phone]          Insurance Information                HUMANA MEDICARE REPLACEMENT/HUMANA MEDICARE REPL Phone: [phone]    Subscriber: Meir Olmstead Subscriber#: [id number]    Group#: [id number] Precert#:              History & Physical      Olivier Shaver MD at 2018  4:57 AM          Hospitalist History and Physical        Patient Identification  Name: Meir Olmstead  Age/Sex: 78 y.o. male  :  1940      "   MRN: [id number]  Visit Number: [id number]  PCP: ALICIA Alfredo        Chief complaint chest pain    History of Present Illness:  Patient is a 78 y.o. male who presents with complaints of chest pain which started earlier this evening. He describes the pain as pressure in sensation, located primarily on the left side of his chest with radiation to his left shoulder. The pain was associated with significant diaphoresis and generalized weakness. He experienced palpitations at this time as well, and family reported his heart rate was in the 150s at home. His symptoms lasted about 30 minutes before he took a nitroglycerin; 5 minutes, later his symptoms had completely resolved but he remained tachycardic. He presented to the ED where he was found to be in atrial fibrillation with RVR. Cardiac enzymes were positive, indicating he had suffered an NSTEMI. EKG confirmed afib with heart rate of 160. He was started on a cardizem drip and shortly thereafter he apparently converted to sinus rhythm. He is now off the cardizem drip completely. Repeat EKG showed sinus rhythm with rate in the 70s, with nonspecific ST changes but no evidence of STEMI. His troponin has continued to rise since arrival. He has been admitted to the PCU for further management of NSTEMI.     Review of Systems  Review of Systems   Constitutional: Positive for diaphoresis and fatigue. Negative for activity change, appetite change, chills, fever and unexpected weight change.   HENT: Negative for congestion, postnasal drip, rhinorrhea, sinus pain, sinus pressure and sore throat.    Eyes: Negative for photophobia, pain, discharge, redness, itching and visual disturbance.   Respiratory: Negative for shortness of breath and wheezing.    Cardiovascular: Positive for chest pain and palpitations. Negative for leg swelling.   Gastrointestinal: Negative for abdominal distention, abdominal pain, constipation and diarrhea.   Endocrine: Negative for cold  intolerance, heat intolerance, polydipsia, polyphagia and polyuria.   Genitourinary: Negative for difficulty urinating, dysuria, flank pain and hematuria.   Musculoskeletal: Positive for arthralgias and back pain. Negative for joint swelling, myalgias, neck pain and neck stiffness.   Skin: Positive for wound (small ulcer right inner buttock). Negative for color change, pallor and rash.   Allergic/Immunologic: Negative for environmental allergies, food allergies and immunocompromised state.   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 Negative for dizziness, tremors, seizures, syncope, light-headedness, numbness and headaches.   Hematological: Negative for adenopathy. Does not bruise/bleed easily.   Psychiatric/Behavioral: Negative for agitation, behavioral problems and confusion.       History  Past Medical History:   Diagnosis Date   • Heart murmur    • Hypertension    • Mononeuritis multiplex      Past Surgical History:   Procedure Laterality Date   • ABDOMINAL AORTIC ANEURYSM REPAIR     • APPENDECTOMY     • BACK SURGERY     • BELOW KNEE LEG AMPUTATION left leg Left    • COLONOSCOPY N/A 6/15/2017    Procedure: COLONOSCOPY  CPTCODE:73459;  Surgeon: Lincoln Bowens III, MD;  Location: [facility];  Service:    • ENDOSCOPY N/A 6/15/2017    Procedure: ESOPHAGOGASTRODUODENOSCOPY WITH BIOPSY  CPTCODE:41214;  Surgeon: Lincoln Bowens III, MD;  Location: [facility];  Service:    • ESOPHAGEAL DILATATION       Family History   Problem Relation Age of Onset   • Diabetes Other    • Heart disease Other      Social History   Substance Use Topics   • Smoking status: Current Every Day Smoker     Packs/day: 1.00     Years: 60.00     Types: Cigarettes   • Smokeless tobacco: Not on file   • Alcohol use No     Prescriptions Prior to Admission   Medication Sig Dispense Refill Last Dose   • amitriptyline (ELAVIL) 10 MG tablet Take 10 mg by mouth Every Night.   6/14/2017 at  Unknown time   • CloNIDine (CATAPRES) 0.2 MG tablet Take 0.2 mg by mouth 2 (Two) Times a Day.   6/15/2017 at 0600   • finasteride (PROSCAR) 5 MG tablet Take 5 mg by mouth Daily.   6/14/2017 at Unknown time   • lisinopril (PRINIVIL,ZESTRIL) 20 MG tablet Take 20 mg by mouth Daily.   6/15/2017 at 0600   • montelukast (SINGULAIR) 10 MG tablet Take 10 mg by mouth Every Night.   6/14/2017 at Unknown time   • naproxen (NAPROSYN) 250 MG tablet Take 250 mg by mouth 2 (Two) Times a Day As Needed for Mild Pain (1-3).   6/14/2017 at Unknown time   • oxyCODONE-acetaminophen (PERCOCET)  MG per tablet Take 1 tablet by mouth Every 6 (Six) Hours As Needed for Moderate Pain (4-6).   6/14/2017 at Unknown time   • polyethylene glycol (GoLYTELY) 236 G solution Starting at 6 pm on day prior to procedure, drink 8 ounces every 15 minutes until all gone or stools are clear. May add flavor packet. 4000 mL 0 6/14/2017 at Unknown time   • polyethylene glycol (GoLYTELY) 236 G solution Starting at 6 pm on day prior to procedure, drink 8 ounces every 15 minutes until all gone or stools are clear. May add flavor packet. 4000 mL 0 6/14/2017 at Unknown time   • pravastatin (PRAVACHOL) 10 MG tablet Take 10 mg by mouth Daily.   6/14/2017 at Unknown time   • warfarin (COUMADIN) 1 MG tablet Take 1 mg by mouth Daily.   06/12/2017   • warfarin (COUMADIN) 4 MG tablet Take 4 mg by mouth Daily.   06/12/2017     Allergies:  Patient has no known allergies.    Objective     Vital Signs  Temp:  [96.9 °F (36.1 °C)-97.9 °F (36.6 °C)] 97.9 °F (36.6 °C)  Heart Rate:  [] 79  Resp:  [18] 18  BP: ()/(28-94) 148/66  Body mass index is 25.54 kg/m².    Physical Exam:  Physical Exam   Constitutional: He is oriented to person, place, and time. He appears well-developed and well-nourished. No distress.   HENT:   Head: Normocephalic and atraumatic.   Mouth/Throat: Oropharynx is clear and moist.   Eyes: Conjunctivae and EOM are normal. Pupils are equal,  round, and reactive to light.   Neck: Normal range of motion. Neck supple. No JVD present.   Cardiovascular: Normal rate, regular rhythm and intact distal pulses.    Murmur (grade III/VI, best heard right 2nd intercostal space) heard.  Pulmonary/Chest: Effort normal and breath sounds normal. No respiratory distress. He has no wheezes. He exhibits no tenderness.   Abdominal: Soft. Bowel sounds are normal. He exhibits no distension. There is no tenderness.   Musculoskeletal: Normal range of motion. He exhibits deformity (s/p left below the knee amputation). He exhibits no edema or tenderness.   Lymphadenopathy:     He has no cervical adenopathy.   Neurological: He is alert and oriented to person, place, and time.   No gross focal deficits but has decreased strength in lower extremities   Skin: Skin is warm and dry. Capillary refill takes less than 2 seconds. No erythema. No pallor.   Tiny ulceration right inner buttock, no drainage   Psychiatric: He has a normal mood and affect. His behavior is normal. Judgment and thought content normal.         Results Review:       Lab Results:    Results from last 7 days  Lab Units 06/01/18 0424 05/31/18 2204   WBC 10*3/mm3 11.22 12.50   HEMOGLOBIN g/dL 14.6 15.9   PLATELETS 10*3/mm3 193 254           Results from last 7 days  Lab Units 05/31/18 2204   SODIUM mmol/L 137   POTASSIUM mmol/L 4.2   CHLORIDE mmol/L 106   CO2 mmol/L 23.1*   BUN mg/dL 18   CREATININE mg/dL 1.21   CALCIUM mg/dL 9.0   GLUCOSE mg/dL 117*         No results found for: HGBA1C    Results from last 7 days  Lab Units 05/31/18  2204   BILIRUBIN mg/dL 0.3   ALK PHOS U/L 78   AST (SGOT) U/L 23   ALT (SGPT) U/L 10       Results from last 7 days  Lab Units 06/01/18  0024 05/31/18 2204   CK TOTAL U/L 125 87   TROPONIN I ng/mL 2.285* 0.665*   CK MB INDEX % 9.7* 6.7*           Results from last 7 days  Lab Units 06/01/18  0424 05/31/18 2204   INR  2.09* 1.80*           I have reviewed the patient's laboratory  results.    Imaging:  Imaging Results (last 72 hours)     Procedure Component Value Units Date/Time    CT Chest Pulmonary Embolism With Contrast [[id number]] Updated:  05/31/18 2342    XR Chest 1 View [[id number]] Updated:  05/31/18 2220      CT PE Protocol: no evidence of PE    I have personally reviewed the patient's radiologic imaging.        EKG (most current at 5 am): Sinus rhythm with occasional PVCs. Possible inferior infarct (Q waves leads III, AVf) present on prior EKG earlier tonight. No overt ST changes to suggest acute ischemia appreciated.    I have personally reviewed the patient's EKG.        Assessment/Plan     Active Problems:  - NSTEMI: admitted to the PCU. Cardiology notified of patient's admission and will see him in consultation later this morning. IV heparin drip initiated as INR was slightly subtherapeutic on arrival anyhow. Ordered for ASA and plavix, statin. Patient has been pain free since receiving nitroglycerin at home. Continue to trend cardiac enzymes. ECHO ordered. NPO for now in case cardiology wishes to take patient for heart cath this morning.  - Atrial fibrillation with rapid ventricular response: likely in response to patient's NSTEMI above. Short-lived after cardizem drip initiated; now in sinus rhythm and cardizem drip turned off. [id number] score of at least 3. Already on warfarin chronically, though not exactly sure why based on history (patient states he has been on it every since his BKA several years ago).  - Hypertension: review home meds once reconciled by pharmacy. In the mean time, IV hydralazine available PRN.  - Chronic pain: resume home oxycodone (confirmed by PEYMAN).     DVT Prophylaxis: heparin drip    Estimated Length of Stay >2 midnights    I discussed the patient's findings, assessment and plan with the patient, his wife, and his RN Eva in the PCU.    * Patient is high risk due to NSTEMI, new onset afib with RVR    Olivier Shaver MD  06/01/18  4:57  AM      Electronically signed by Olivier Shaver MD at 6/1/2018  5:23 AM             ICU Vital Signs    No documentation.       Intake & Output (last day)     None            Lab Results (last 24 hours)     Procedure Component Value Units Date/Time    TSH [[id number]]  (Abnormal) Collected:  06/02/18 0232    Specimen:  Blood Updated:  06/02/18 0319     TSH 0.099 (L) mIU/mL     T4, Free [[id number]]  (Normal) Collected:  06/02/18 0232    Specimen:  Blood Updated:  06/02/18 0319     Free T4 1.39 ng/dL     Comprehensive Metabolic Panel [[id number]]  (Abnormal) Collected:  06/02/18 0232    Specimen:  Blood Updated:  06/02/18 0311     Glucose 74 mg/dL      BUN 13 mg/dL      Creatinine 0.83 mg/dL      Sodium 138 mmol/L      Potassium 4.1 mmol/L      Chloride 109 mmol/L      CO2 22.2 (L) mmol/L      Calcium 8.0 mg/dL      Total Protein 6.0 g/dL      Albumin 3.50 g/dL      ALT (SGPT) 14 U/L      AST (SGOT) 31 U/L      Alkaline Phosphatase 58 U/L      Comment: Note New Reference Ranges        Total Bilirubin 0.4 mg/dL      eGFR Non African Amer 90 mL/min/1.73      Globulin 2.5 gm/dL      A/G Ratio 1.4 (L) g/dL      BUN/Creatinine Ratio 15.7     Anion Gap 6.8 mmol/L     Narrative:       The MDRD GFR formula is only valid for adults with stable renal function between ages 18 and 70.    Osmolality, Calculated [[id number]]  (Normal) Collected:  06/02/18 0232    Specimen:  Blood Updated:  06/02/18 0311     Osmolality Calc 274.4 mOsm/kg     Protime-INR [[id number]]  (Abnormal) Collected:  06/02/18 0232    Specimen:  Blood Updated:  06/02/18 0252     Protime 26.8 (H) Seconds      Comment: Note new Reference Range        INR 2.44 (H)    Narrative:       Suggested INR therapeutic range for stable oral anticoagulant therapy:    Low Intensity therapy:   1.5-2.0  Moderate Intensity therapy:   2.0-3.0  High Intensity therapy:   2.5-4.0    CBC & Differential [[id number]] Collected:  06/02/18 0232    Specimen:  Blood Updated:  06/02/18  0245    Narrative:       The following orders were created for panel order CBC & Differential.  Procedure                               Abnormality         Status                     ---------                               -----------         ------                     CBC Auto Differential[[id number]]        Abnormal            Final result                 Please view results for these tests on the individual orders.    CBC Auto Differential [[id number]]  (Abnormal) Collected:  06/02/18 0232    Specimen:  Blood Updated:  06/02/18 0245     WBC 9.18 10*3/mm3      RBC 5.06 10*6/mm3      Hemoglobin 13.1 (L) g/dL      Hematocrit 41.2 (L) %      MCV 81.4 fL      MCH 25.9 (L) pg      MCHC 31.8 (L) g/dL      RDW 16.7 (H) %      RDW-SD 49.1 fl      MPV 10.5 (H) fL      Platelets 171 10*3/mm3      Neutrophil % 56.5 %      Lymphocyte % 31.3 %      Monocyte % 9.3 %      Eosinophil % 1.6 %      Basophil % 1.1 %      Immature Grans % 0.2 %      Neutrophils, Absolute 5.19 10*3/mm3      Lymphocytes, Absolute 2.87 10*3/mm3      Monocytes, Absolute 0.85 10*3/mm3      Eosinophils, Absolute 0.15 10*3/mm3      Basophils, Absolute 0.10 10*3/mm3      Immature Grans, Absolute 0.02 10*3/mm3     Protime-INR [[id number]]  (Abnormal) Collected:  06/01/18 1449    Specimen:  Blood Updated:  06/01/18 1511     Protime 23.5 (H) Seconds      Comment: Note new Reference Range        INR 2.06 (H)    Narrative:       Suggested INR therapeutic range for stable oral anticoagulant therapy:    Low Intensity therapy:   1.5-2.0  Moderate Intensity therapy:   2.0-3.0  High Intensity therapy:   2.5-4.0    CK-MB Index [[id number]]  (Abnormal) Collected:  06/01/18 1141    Specimen:  Blood Updated:  06/01/18 1245     CK-MB Index 9.2 (H) %     Troponin [[id number]]  (Abnormal) Collected:  06/01/18 1141    Specimen:  Blood Updated:  06/01/18 1245     Troponin I 6.258 (C) ng/mL     Narrative:       Ultra Troponin I Reference Range:         <=0.039 ng/mL: Negative     0.04-0.779 ng/mL: Indeterminate Range. Suspicious of MI.  Clinical correlation required.       >=0.78  ng/mL: Consistent with myocardial injury.  Clinical correlation required.    CK-MB [[id number]]  (Abnormal) Collected:  06/01/18 1141    Specimen:  Blood Updated:  06/01/18 1244     CKMB 19.92 (C) ng/mL     Magnesium [[id number]]  (Normal) Collected:  06/01/18 1141    Specimen:  Blood Updated:  06/01/18 1244     Magnesium 2.2 mg/dL     CK [[id number]]  (Abnormal) Collected:  06/01/18 1141    Specimen:  Blood Updated:  06/01/18 1242     Creatine Kinase 216 (H) U/L     Myoglobin, Serum [[id number]]  (Normal) Collected:  06/01/18 1141    Specimen:  Blood Updated:  06/01/18 1240     Myoglobin 104.0 ng/mL     aPTT [[id number]]  (Abnormal) Collected:  06/01/18 1141    Specimen:  Blood Updated:  06/01/18 1230     PTT 85.2 (H) seconds      Comment: Note new Reference Range       Narrative:       PTT Heparin Therapeutic Range:  59 - 95 seconds        Imaging Results (last 24 hours)     ** No results found for the last 24 hours. **        Operative/Procedure Notes (most recent note)     No notes of this type exist for this encounter.           Physician Progress Notes (most recent note)      Ken Leon DO at 6/1/2018  3:47 PM        Pt seen and examined this AM with EMELYN Sanabria. I have reviewed Dr. Shaver' H&P and discussed pt's care with him personally. Pt admitted to the PCU overnight with NSTEMI and Afib with RVR. Pt quickly converted back to sinus rhythm. Chronically anticoagulated with Coumadin but pt nor his wife are sure as to why he is anticoagulated. Currently on heparin gtt in the setting of NSTEMI. Pt was given ASA and Plavix. Echo ordered. Cardiology consulted. Pt is currently CP free. Await further input from cardiology regarding possibly going to the cath lab today. Monitor on telemetry.       Ken Leon DO  06/01/18  3:47 PM                Electronically signed by Ken Leon DO at  6/1/2018  3:59 PM          Consult Notes (most recent note)      ALICIA Yarbrough at 6/1/2018  2:18 PM      Consult Orders:    1. Inpatient Cardiology Consult [[id number]] ordered by Olivier Shaver MD at 06/01/18 0227                Inpatient Cardiology Consult  Consult performed by: TOBY HOPPER  Consult ordered by: OLIVIER SHAVER        Date of Admit: 5/31/2018  Date of Consult: 06/01/18  No ref. provider found  Zak Olmstead  1940    Consulting Physician: Dr Derik Vega    Cardiology consultation    Reason for consultation:  Chest pain, elevated troponin    Assessment:  1. NSTEMI  2. Hypertension  3. Hyperlipidemia  4. Tobacco use      Recommendations:  1. Risks and benefits of cardiac catheterization were discussed with the patient.  He agreed to proceed with catheterization.  2. Patient will be placed on guideline directed medical therapy and risk factor modification      History of Present Illness    Subjective     Chief Complaint   Patient presents with   • Chest Pain       Zak Olmstead is a 78 y.o. 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  He presented to the ED in the evening of 5/31/2018 with complaints of chest pain which he described as a sensation located primarily in the left side of his chest and radiation to his left shoulder.  Pain was associated with significant diaphoresis and generalized weakness.  He also reported palpitations and a fast heart rate.  He did take nitroglycerin after about 30 minutes of pain.  The pain resolved after the nitroglycerin but he was tachycardic per family members with heart rates in the 150s and greater.  In the ED he was found to be in atrial fibrillation with RVR.  Cardiac enzymes were positive.  Cardizem drip was  started and he converted to sinus rhythm in the 70s.  EKG then found some nonspecific ST changes and no evidence of STEMI.    He reports that he has been chest pain-free since being admitted to the hospital.  He denies past medical history of myocardial infarction, previous cardiac catheterization.  He does report that in 2008 when he was experiencing leg pain he underwent a CT of the abdomen and was found to have a abdominal aortic aneurysm.  He underwent repair of the aneurysm and at the same time he had a stent placed in his right leg.      Cardiac risk factors:hypercholesterolemia, hypertension, peripheral vascular disease and smoking      Past Medical History:   Diagnosis Date   • Heart murmur    • Hypertension    • Mononeuritis multiplex      Past Surgical History:   Procedure Laterality Date   • ABDOMINAL AORTIC ANEURYSM REPAIR     • APPENDECTOMY     • BACK SURGERY     • BELOW KNEE LEG AMPUTATION Left    • COLONOSCOPY N/A 6/15/2017    Procedure: COLONOSCOPY  CPTCODE:68251;  Surgeon: Lincoln Bowens III, MD;  Location: [facility];  Service:    • ENDOSCOPY N/A 6/15/2017    Procedure: ESOPHAGOGASTRODUODENOSCOPY WITH BIOPSY  CPTCODE:31743;  Surgeon: Lincoln Bowens III, MD;  Location: [facility];  Service:    • ESOPHAGEAL DILATATION       Family History   Problem Relation Age of Onset   • Diabetes Other    • Heart disease Other      Social History   Substance Use Topics   • Smoking status: Current Every Day Smoker     Packs/day: 1.00     Years: 60.00     Types: Cigarettes   • Smokeless tobacco: Not on file   • Alcohol use No     Prescriptions Prior to Admission   Medication Sig Dispense Refill Last Dose   • albuterol (PROVENTIL) (2.5 MG/3ML) 0.083% nebulizer solution Take 2.5 mg by nebulization Every 6 (Six) Hours As Needed for Wheezing.   Past Month at Unknown time   • amLODIPine (NORVASC) 5 MG tablet Take 5 mg by mouth 2 (Two) Times a Day.   5/31/2018 at Unknown time   • CloNIDine (CATAPRES) 0.2 MG tablet  Take 0.2 mg by mouth 3 (Three) Times a Day.   5/31/2018 at Unknown time   • hydrALAZINE (APRESOLINE) 10 MG tablet Take 10 mg by mouth 2 (Two) Times a Day As Needed.   Past Month at Unknown time   • lisinopril (PRINIVIL,ZESTRIL) 20 MG tablet Take 20 mg by mouth 2 (Two) Times a Day.   5/31/2018 at Unknown time   • naproxen (NAPROSYN) 500 MG tablet Take 500 mg by mouth 2 (Two) Times a Day As Needed for Mild Pain .   5/31/2018 at Unknown time   • oxyCODONE (ROXICODONE) 10 MG tablet Take 10 mg by mouth 4 (Four) Times a Day.   5/31/2018 at Unknown time   • polyethylene glycol (MIRALAX) packet Take 17 g by mouth Daily.   5/31/2018 at Unknown time   • pravastatin (PRAVACHOL) 10 MG tablet Take 10 mg by mouth Daily.   5/31/2018 at Unknown time   • warfarin (COUMADIN) 1 MG tablet Take 1 mg by mouth 1 (One) Time. monday 5/28/2018   • warfarin (COUMADIN) 6 MG tablet Take 6 mg by mouth Daily.   5/31/2018 at Unknown time   • amitriptyline (ELAVIL) 10 MG tablet Take 10 mg by mouth Every Night.   5/30/2018     Allergies:  Patient has no known allergies.    Review of Systems   Constitutional: Negative for fatigue.   Respiratory: Negative for shortness of breath.    Cardiovascular: Negative for chest pain, palpitations and leg swelling.   Gastrointestinal: Negative for blood in stool.   Musculoskeletal: Positive for back pain.   Neurological: Negative for dizziness, syncope, weakness and light-headedness.   Hematological: Does not bruise/bleed easily.         Objective      Vital Signs  Temp:  [96.9 °F (36.1 °C)-97.9 °F (36.6 °C)] 97.8 °F (36.6 °C)  Heart Rate:  [] 66  Resp:  [18] 18  BP: ()/(28-98) 156/66  Body mass index is 25.54 kg/m².  No intake or output data in the 24 hours ending 06/01/18 1419    Physical Exam   Constitutional: He appears well-developed and well-nourished.   HENT:   Head: Normocephalic and atraumatic.   Eyes: Pupils are equal, round, and reactive to light.   Neck: No JVD present.   Cardiovascular:  Normal rate and regular rhythm.  Exam reveals no gallop and no friction rub.    Murmur (2/6  RUSB) heard.  Pulses:       Dorsalis pedis pulses are 2+ on the right side.        Posterior tibial pulses are 2+ on the right side.   L BKA amputation   Pulmonary/Chest: Effort normal and breath sounds normal. No respiratory distress. He has no wheezes. He has no rales.   Abdominal: Soft. He exhibits no mass. There is no tenderness. No hernia.   Skin: Skin is warm and dry.   Psychiatric: He has a normal mood and affect.   Vitals reviewed.      Results Review:   I reviewed the patient's new clinical results.    Results from last 7 days  Lab Units 06/01/18  1141 06/01/18  0424 06/01/18  0024 05/31/18  2204   CK TOTAL U/L 216* 206* 125 87   TROPONIN I ng/mL 6.258* 6.423* 2.285* 0.665*   CKMB ng/mL 19.92* 20.45* 12.15* 5.86*   MYOGLOBIN ng/mL 104.0 163.0*  --   --        Results from last 7 days  Lab Units 06/01/18  0424 05/31/18  2204   WBC 10*3/mm3 11.22 12.50   HEMOGLOBIN g/dL 14.6 15.9   PLATELETS 10*3/mm3 193 254       Results from last 7 days  Lab Units 06/01/18  0424 05/31/18  2204   SODIUM mmol/L 138 137   POTASSIUM mmol/L 4.1 4.2   CHLORIDE mmol/L 107 106   CO2 mmol/L 23.2* 23.1*   BUN mg/dL 17 18   CREATININE mg/dL 1.07 1.21   CALCIUM mg/dL 8.4 9.0   GLUCOSE mg/dL 98 117*   ALT (SGPT) U/L 11 10   AST (SGOT) U/L 29 23     Lab Results   Component Value Date    INR 2.09 (H) 06/01/2018    INR 1.80 (H) 05/31/2018    INR 1.36 (H) 02/22/2018    INR 1.46 (H) 06/14/2017    INR 2.23 (H) 06/05/2017    INR 2.06 (H) 06/01/2017    INR 1.80 (H) 05/24/2017     Lab Results   Component Value Date    MG 2.2 06/01/2018    MG 2.2 09/22/2017     Lab Results   Component Value Date    TSH 0.418 (L) 05/23/2018    PSA 1.400 09/22/2017    TRIG 95 06/01/2018    HDL 35 (L) 06/01/2018    LDL 79 06/01/2018      Lab Results   Component Value Date    BNP 60.0 02/22/2018        EKG:     Imaging Results (last 72 hours)     Procedure Component Value  Units Date/Time    XR Chest 1 View [[id number]] Collected:  06/01/18 0743     Updated:  06/01/18 0807    Narrative:       XR CHEST 1 VIEW-     HISTORY:  Chest pain.          COMPARISON: None available.      TECHNIQUE: Single frontal view of the chest.     FINDINGS:     There is no focal alveolar infiltrate or effusion.  The cardiac silhouette is normal. The pulmonary vasculature is  unremarkable.  There is no evidence of an acute osseous abnormality.   There are no suspicious-appearing parenchymal soft tissue nodules.            Impression:       No evidence of active or acute cardiopulmonary disease on today's chest  radiograph.         This report was finalized on 6/1/2018 8:05 AM by Dr. Edmond Mike MD.       CT Chest Pulmonary Embolism With Contrast [[id number]] Collected:  06/01/18 0743     Updated:  06/01/18 0755    Narrative:       CT CHEST PULMONARY EMBOLISM WITH CONTRAST-     CLINICAL INDICATION: Pulmonary embolism.          COMPARISON: 05/31/2018.      PROCEDURE: Thin cut axial images were acquired through the pulmonary  vessels during the rapid infusion of IV contrast.     Additional 3-D reformatted images obtained via post-processing for  improved diagnostic accuracy and procedural planning.     Radiation dose reduction techniques were utilized per ALARA protocol.  Automated exposure control was initiated through either or CareDose or  Dose[employer]ig[employer] software packages by  protocol.           FINDINGS: Today's study demonstrates opacification of the central  pulmonary vessels.  There are no filling defects.   There is no truncation.     No evidence of a pulmonary embolus.     Otherwise there are no parenchymal soft tissue nodules or masses.     There is no mediastinal lymph node enlargement.     No pericardial or pleural effusion.     Upper abdomen shows cholelithiasis.       Impression:       1. No evidence of a pulmonary embolus.  2. Cholelithiasis.     This report was finalized on 6/1/2018 7:53  AM by Dr. Edmond Mike MD.                Thank you very much for asking us to be involved in this patient's care.  We will follow along with you.    ALICIA Allen   06/01/18  2:19 PM        Electronically signed by ALICIA Yarbrough at 6/1/2018  3:10 PM         Discharge Summary     No notes of this type exist for this encounter.        Discharge Order     Start     Ordered    06/02/18 1022  Discharge patient  Once     Expected Discharge Date:  06/02/18    Discharge Disposition:  Home or Self Care    Physician of Record:  ISAAK TONG [[id number]]    Physician of Record selection review needed?:  No       Question Answer Comment   Physician of Record ISAAK TONG    Physician of Record selection review needed? No        06/02/18 1024

## 2018-06-14 ENCOUNTER — OFFICE VISIT (OUTPATIENT)
Dept: CARDIOLOGY | Facility: CLINIC | Age: 78
End: 2018-06-14

## 2018-06-14 VITALS
SYSTOLIC BLOOD PRESSURE: 116 MMHG | HEIGHT: 68 IN | HEART RATE: 51 BPM | WEIGHT: 167 LBS | DIASTOLIC BLOOD PRESSURE: 48 MMHG | OXYGEN SATURATION: 94 % | BODY MASS INDEX: 25.31 KG/M2

## 2018-06-14 DIAGNOSIS — I48.91 ATRIAL FIBRILLATION WITH RAPID VENTRICULAR RESPONSE (HCC): ICD-10-CM

## 2018-06-14 DIAGNOSIS — I21.4 NSTEMI (NON-ST ELEVATED MYOCARDIAL INFARCTION) (HCC): Primary | ICD-10-CM

## 2018-06-14 DIAGNOSIS — I73.9 PERIPHERAL VASCULAR DISEASE (HCC): ICD-10-CM

## 2018-06-14 DIAGNOSIS — Z72.0 TOBACCO USE: ICD-10-CM

## 2018-06-14 PROCEDURE — 99214 OFFICE O/P EST MOD 30 MIN: CPT | Performed by: NURSE PRACTITIONER

## 2018-06-14 RX ORDER — METOPROLOL TARTRATE 50 MG/1
50 TABLET, FILM COATED ORAL EVERY 12 HOURS SCHEDULED
Qty: 60 TABLET | Refills: 11 | Status: SHIPPED | OUTPATIENT
Start: 2018-06-14 | End: 2018-09-24 | Stop reason: ALTCHOICE

## 2018-06-14 RX ORDER — METOPROLOL TARTRATE 50 MG/1
50 TABLET, FILM COATED ORAL EVERY 12 HOURS SCHEDULED
Qty: 60 TABLET | Refills: 0 | Status: SHIPPED | OUTPATIENT
Start: 2018-06-14 | End: 2018-09-24 | Stop reason: ALTCHOICE

## 2018-06-14 RX ORDER — CLOPIDOGREL BISULFATE 75 MG/1
75 TABLET ORAL DAILY
Qty: 30 TABLET | Refills: 11 | Status: SHIPPED | OUTPATIENT
Start: 2018-06-14 | End: 2018-09-24

## 2018-06-14 RX ORDER — CLOPIDOGREL BISULFATE 75 MG/1
75 TABLET ORAL DAILY
Qty: 30 TABLET | Refills: 0 | Status: SHIPPED | OUTPATIENT
Start: 2018-06-14 | End: 2018-09-24

## 2018-06-14 NOTE — PROGRESS NOTES
Subjective     Chief Complaint: Peripheral Vascular Disease and Atrial Fibrillation    History of Present Illness   Zak Olmstead is a 78 y.o. 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  On 5/31/2018 he presented to the ED with complaints of chest pain.  He was diagnosed with an NSTEMI and underwent cardiac catheterization where he was found to have a chronic total occlusion of the mid RCA with heavy collateral filling, mild disease of the left coronary system and severe peripheral vascular disease with patent them then bypassed.  The catheterization concluded that there were no good targets for intervention.  He was placed on medical therapy.    He is accompanied by his wife and great-grandson today's exam.  He denies chest pain, chest discomfort, and palpitations.  He does report a cough which is productive and chronic for him.  He has been doing fine with his medications.  He does report that he has noticed increase in bruising.  He does deny bright red blood per rectum or black tarry stools.    He requests release to participate in home physical therapy.    Unfortunately he has continued to smoke.      Current Outpatient Prescriptions:   •  albuterol (PROVENTIL) (2.5 MG/3ML) 0.083% nebulizer solution, Take 2.5 mg by nebulization Every 6 (Six) Hours As Needed for Wheezing., Disp: , Rfl:   •  amitriptyline (ELAVIL) 10 MG tablet, Take 10 mg by mouth Every Night., Disp: , Rfl:   •  amLODIPine (NORVASC) 5 MG tablet, Take 5 mg by mouth 2 (Two) Times a Day., Disp: , Rfl:   •  CloNIDine (CATAPRES) 0.2 MG tablet, Take 0.2 mg by mouth 3 (Three) Times a Day., Disp: , Rfl:   •  clopidogrel (PLAVIX) 75 MG tablet, Take 1 tablet by mouth Daily., Disp: 30 tablet, Rfl:  "0  •  hydrALAZINE (APRESOLINE) 10 MG tablet, Take 10 mg by mouth 2 (Two) Times a Day As Needed., Disp: , Rfl:   •  lisinopril (PRINIVIL,ZESTRIL) 20 MG tablet, Take 20 mg by mouth 2 (Two) Times a Day., Disp: , Rfl:   •  metoprolol tartrate (LOPRESSOR) 50 MG tablet, Take 1 tablet by mouth Every 12 (Twelve) Hours., Disp: 60 tablet, Rfl: 0  •  oxyCODONE (ROXICODONE) 10 MG tablet, Take 10 mg by mouth 4 (Four) Times a Day., Disp: , Rfl:   •  polyethylene glycol (MIRALAX) packet, Take 17 g by mouth Daily., Disp: , Rfl:   •  pravastatin (PRAVACHOL) 10 MG tablet, Take 10 mg by mouth Daily., Disp: , Rfl:   •  warfarin (COUMADIN) 1 MG tablet, Take 1 mg by mouth 1 (One) Time. monday, Disp: , Rfl:   •  warfarin (COUMADIN) 6 MG tablet, Take 6 mg by mouth Daily., Disp: , Rfl:   •  clopidogrel (PLAVIX) 75 MG tablet, Take 1 tablet by mouth Daily., Disp: 30 tablet, Rfl: 11  •  clopidogrel (PLAVIX) 75 MG tablet, Take 1 tablet by mouth Daily., Disp: 30 tablet, Rfl: 11  •  metoprolol tartrate (LOPRESSOR) 50 MG tablet, Take 1 tablet by mouth Every 12 (Twelve) Hours., Disp: 60 tablet, Rfl: 11  •  metoprolol tartrate (LOPRESSOR) 50 MG tablet, Take 1 tablet by mouth Every 12 (Twelve) Hours., Disp: 60 tablet, Rfl: 11     The following portions of the patient's history were reviewed and updated as appropriate: allergies, current medications, past family history, past medical history, past social history, past surgical history and problem list.    Review of Systems   Constitution: Negative for weakness.   Cardiovascular: Negative for chest pain, irregular heartbeat, near-syncope, palpitations and syncope.   Respiratory: Positive for cough (productive cough, chronic). Negative for shortness of breath.    Hematologic/Lymphatic: Bruises/bleeds easily (bruising).   Gastrointestinal: Positive for hematochezia.         Objective     /48 (BP Location: Left arm, Patient Position: Sitting)   Pulse 51   Ht 172.7 cm (68\")   Wt 75.8 kg (167 lb)  "  SpO2 94%   BMI 25.39 kg/m²     Physical Exam   Constitutional: He appears well-developed and well-nourished.   HENT:   Head: Normocephalic and atraumatic.   Eyes: Pupils are equal, round, and reactive to light.   Neck: No JVD present.   Cardiovascular: Normal rate, regular rhythm and intact distal pulses.  Exam reveals no gallop and no friction rub.    Murmur heard.   Systolic murmur is present with a grade of 2/6   Pulses:       Dorsalis pedis pulses are 0 on the right side.        Posterior tibial pulses are 0 on the right side.   Left lower extremity prosthesis       Pulmonary/Chest: Effort normal and breath sounds normal. No respiratory distress. He has no wheezes. He has no rales.   Abdominal: Soft. He exhibits no mass. There is no tenderness. No hernia.   Skin: Skin is warm and dry.   Psychiatric: He has a normal mood and affect.   Vitals reviewed.      Procedures    6/1/2018  Transthoracic Echocardiogram    Interpretation Summary     · Normal left ventricular cavity size and wall thickness noted. All left ventricular wall segments contract normally.  · Estimated EF appears to be in the range of 66 - 70%.  · Left ventricular diastolic dysfunction (grade I) consistent with impaired relaxation.  · The aortic valve is abnormal in structure. There is moderate calcification of the aortic valve mainly affecting the non, left and right coronary cusp(s).Mild aortic valve regurgitation is present. Moderate to severe aortic valve stenosis is present.  · Ao max PG 35.7 mmHg Ao mean PG 26.4 mmHg Ao V2 VTI 77 cm WENDY(I,D) 1 cm^2  · The mitral valve is abnormal in structure. Mild MAC is present. Mild mitral valve regurgitation is present. No significant mitral valve stenosis is present.  · The tricuspid valve is grossly normal.  · There is no evidence of pericardial effusion         6/1/2018  Cardiac Catheterization    FINDINGS:     1. CORONARY ANGIOGRAPHY: Right dominant coronary artery system.     The left main coronary  artery bifurcated into the LAD and left circumflex coronary.  The LAD coursed in the anterior interventricular groove, gave rise to diagonal branches and reached the apex.     Left circumflex coursed in the left atrial ventricular groove and gives rise to several marginal branches.     The right coronary artery course in the right atrial ventricular groove I gave rise to several acute marginal branches.     Left main: 50% ostial stenosis is noted.  There is calcification.     LAD: Mild luminal irregularities are present   Ramus intermedius: Mild luminal disease   Left circumflex:  Mild plaque is noted   RCA: There is chronic total occlusion of the RCA in the mid segment and there is presence of bridging collaterals as well as left to right collaterals visualizing the distal RCA   PDA: No disease        Abdominal angiogram: Catheter were placed in the abdominal aorta above the endograft and manual injection was performed which revealed patency of the graft however the images suboptimal for full definition.     Left lower extremity angiogram: This is performed lab sheet and it revealed patency of the femorofemoral bypass however there is flow into the profunda which is supplying collateral filling to the distal vessel.  Left SFA is totally occluded at the ostium 100% with no sign of reconstitution up until the level of knee.  Below the knee imaging was not performed as contrast was not reaching that segment for proper imaging.     Final impression:    mid RCA with heavy collateral filling which appears adequate  Mild disease of the left coronary system  Severe peripheral vascular disease with patent femorofemoral bypass  Patent endograft in the abdominal aorta with single limb into the left common iliac.  Right common iliac arteries is totally occluded from the ostium to the femoral.              RECOMMENDATIONS:   Continue medical management  No good targets for intervention.     EBL: 50 ML  No specimens were  removed.  Plan:   Dual antiplatelet therapy.   Patient to be placed on beta blockers, lipid therapy, aspirin, and ACE inhibitor.      Derik Vega MD  06/02/18  12:12 PM    Assessment/Plan       Zak was seen today for peripheral vascular disease and atrial fibrillation.    Diagnoses and all orders for this visit:    Assessment:  1. Coronary artery disease with chronic total occlusion of the RCA with collaterals and mild disease in the left coronary system per cardiac catheterization of 6/1/2018  2. Peripheral vascular disease, status post femorofemoral and below the knee amputation of the left lower extremity for gangrenous foot.  3. Chronic atrial fibrillation, rate controlled, chronic anticoagulation on Coumadin.  INR followed per PCP      Plan:  1. I have discontinued patient's aspirin 81 mg.  He is to continue Plavix and Coumadin.  He will continue current line directed medical therapy for coronary artery disease including Plavix, metoprolol, lisinopril and pravastatin.  2. I counseled the patient for more than 3 minutes regarding tobacco use and encouraged him to quit.  Educational materials were given.  He refused medications.  3. Return to clinic in 4 weeks, sooner for any worsening or concerning symptoms.      Return in about 4 weeks (around 7/12/2018).      ALICIA Elam

## 2018-06-14 NOTE — PATIENT INSTRUCTIONS
Steps to Quit Smoking  Smoking tobacco can be harmful to your health and can affect almost every organ in your body. Smoking puts you, and those around you, at risk for developing many serious chronic diseases. Quitting smoking is difficult, but it is one of the best things that you can do for your health. It is never too late to quit.  What are the benefits of quitting smoking?  When you quit smoking, you lower your risk of developing serious diseases and conditions, such as:  · Lung cancer or lung disease, such as COPD.  · Heart disease.  · Stroke.  · Heart attack.  · Infertility.  · Osteoporosis and bone fractures.  Additionally, symptoms such as coughing, wheezing, and shortness of breath may get better when you quit. You may also find that you get sick less often because your body is stronger at fighting off colds and infections. If you are pregnant, quitting smoking can help to reduce your chances of having a baby of low birth weight.  How do I get ready to quit?  When you decide to quit smoking, create a plan to make sure that you are successful. Before you quit:  · Pick a date to quit. Set a date within the next two weeks to give you time to prepare.  · Write down the reasons why you are quitting. Keep this list in places where you will see it often, such as on your bathroom mirror or in your car or wallet.  · Identify the people, places, things, and activities that make you want to smoke (triggers) and avoid them. Make sure to take these actions:  ¨ Throw away all cigarettes at home, at work, and in your car.  ¨ Throw away smoking accessories, such as ashtrays and lighters.  ¨ Clean your car and make sure to empty the ashtray.  ¨ Clean your home, including curtains and carpets.  · Tell your family, friends, and coworkers that you are quitting. Support from your loved ones can make quitting easier.  · Talk with your health care provider about your options for quitting smoking.  · Find out what treatment  options are covered by your health insurance.  What strategies can I use to quit smoking?  Talk with your healthcare provider about different strategies to quit smoking. Some strategies include:  · Quitting smoking altogether instead of gradually lessening how much you smoke over a period of time. Research shows that quitting “cold turkey” is more successful than gradually quitting.  · Attending in-person counseling to help you build problem-solving skills. You are more likely to have success in quitting if you attend several counseling sessions. Even short sessions of 10 minutes can be effective.  · Finding resources and support systems that can help you to quit smoking and remain smoke-free after you quit. These resources are most helpful when you use them often. They can include:  ¨ Online chats with a counselor.  ¨ Telephone quitlines.  ¨ Printed self-help materials.  ¨ Support groups or group counseling.  ¨ Text messaging programs.  ¨ Mobile phone applications.  · Taking medicines to help you quit smoking. (If you are pregnant or breastfeeding, talk with your health care provider first.) Some medicines contain nicotine and some do not. Both types of medicines help with cravings, but the medicines that include nicotine help to relieve withdrawal symptoms. Your health care provider may recommend:  ¨ Nicotine patches, gum, or lozenges.  ¨ Nicotine inhalers or sprays.  ¨ Non-nicotine medicine that is taken by mouth.  Talk with your health care provider about combining strategies, such as taking medicines while you are also receiving in-person counseling. Using these two strategies together makes you more likely to succeed in quitting than if you used either strategy on its own.  If you are pregnant or breastfeeding, talk with your health care provider about finding counseling or other support strategies to quit smoking. Do not take medicine to help you quit smoking unless told to do so by your health care  provider.  What things can I do to make it easier to quit?  Quitting smoking might feel overwhelming at first, but there is a lot that you can do to make it easier. Take these important actions:  · Reach out to your family and friends and ask that they support and encourage you during this time. Call telephone quitlines, reach out to support groups, or work with a counselor for support.  · Ask people who smoke to avoid smoking around you.  · Avoid places that trigger you to smoke, such as bars, parties, or smoke-break areas at work.  · Spend time around people who do not smoke.  · Lessen stress in your life, because stress can be a smoking trigger for some people. To lessen stress, try:  ¨ Exercising regularly.  ¨ Deep-breathing exercises.  ¨ Yoga.  ¨ Meditating.  ¨ Performing a body scan. This involves closing your eyes, scanning your body from head to toe, and noticing which parts of your body are particularly tense. Purposefully relax the muscles in those areas.  · Download or purchase mobile phone or tablet apps (applications) that can help you stick to your quit plan by providing reminders, tips, and encouragement. There are many free apps, such as QuitGuide from the CDC (Centers for Disease Control and Prevention). You can find other support for quitting smoking (smoking cessation) through smokefree.gov and other websites.  How will I feel when I quit smoking?  Within the first 24 hours of quitting smoking, you may start to feel some withdrawal symptoms. These symptoms are usually most noticeable 2-3 days after quitting, but they usually do not last beyond 2-3 weeks. Changes or symptoms that you might experience include:  · Mood swings.  · Restlessness, anxiety, or irritation.  · Difficulty concentrating.  · Dizziness.  · Strong cravings for sugary foods in addition to nicotine.  · Mild weight gain.  · Constipation.  · Nausea.  · Coughing or a sore throat.  · Changes in how your medicines work in your  body.  · A depressed mood.  · Difficulty sleeping (insomnia).  After the first 2-3 weeks of quitting, you may start to notice more positive results, such as:  · Improved sense of smell and taste.  · Decreased coughing and sore throat.  · Slower heart rate.  · Lower blood pressure.  · Clearer skin.  · The ability to breathe more easily.  · Fewer sick days.  Quitting smoking is very challenging for most people. Do not get discouraged if you are not successful the first time. Some people need to make many attempts to quit before they achieve long-term success. Do your best to stick to your quit plan, and talk with your health care provider if you have any questions or concerns.  This information is not intended to replace advice given to you by your health care provider. Make sure you discuss any questions you have with your health care provider.  Document Released: 12/12/2002 Document Revised: 08/15/2017 Document Reviewed: 05/03/2016  [employer] Interactive Patient Education © 2017 [employer]

## 2018-09-24 ENCOUNTER — OFFICE VISIT (OUTPATIENT)
Dept: CARDIOLOGY | Facility: CLINIC | Age: 78
End: 2018-09-24

## 2018-09-24 VITALS
WEIGHT: 155 LBS | HEIGHT: 68 IN | HEART RATE: 71 BPM | DIASTOLIC BLOOD PRESSURE: 59 MMHG | OXYGEN SATURATION: 98 % | SYSTOLIC BLOOD PRESSURE: 117 MMHG | BODY MASS INDEX: 23.49 KG/M2

## 2018-09-24 DIAGNOSIS — I21.4 NSTEMI (NON-ST ELEVATED MYOCARDIAL INFARCTION) (HCC): ICD-10-CM

## 2018-09-24 DIAGNOSIS — I73.9 PERIPHERAL VASCULAR DISEASE (HCC): ICD-10-CM

## 2018-09-24 DIAGNOSIS — I48.91 ATRIAL FIBRILLATION WITH RAPID VENTRICULAR RESPONSE (HCC): Primary | ICD-10-CM

## 2018-09-24 PROBLEM — I34.81 MITRAL ANNULAR CALCIFICATION: Status: ACTIVE | Noted: 2018-09-24

## 2018-09-24 PROBLEM — I35.0 NONRHEUMATIC AORTIC VALVE STENOSIS: Status: ACTIVE | Noted: 2018-09-24

## 2018-09-24 PROCEDURE — 99213 OFFICE O/P EST LOW 20 MIN: CPT | Performed by: NURSE PRACTITIONER

## 2018-09-24 RX ORDER — NAPROXEN 500 MG/1
500 TABLET ORAL DAILY PRN
COMMUNITY
Start: 2018-09-07 | End: 2020-08-24 | Stop reason: HOSPADM

## 2018-09-24 RX ORDER — CLOPIDOGREL BISULFATE 75 MG/1
75 TABLET ORAL DAILY
Qty: 30 TABLET | Refills: 11 | Status: SHIPPED | OUTPATIENT
Start: 2018-09-24 | End: 2020-02-12 | Stop reason: ALTCHOICE

## 2018-09-24 RX ORDER — LANOLIN ALCOHOL/MO/W.PET/CERES
1000 CREAM (GRAM) TOPICAL DAILY
Status: ON HOLD | COMMUNITY
Start: 2018-08-27 | End: 2020-08-14

## 2018-09-24 RX ORDER — OMEGA-3S/DHA/EPA/FISH OIL/D3 300MG-1000
400 CAPSULE ORAL DAILY
Status: ON HOLD | COMMUNITY
End: 2020-08-14

## 2018-09-24 RX ORDER — FINASTERIDE 5 MG/1
5 TABLET, FILM COATED ORAL DAILY
COMMUNITY
End: 2020-02-12

## 2018-09-24 NOTE — PROGRESS NOTES
Subjective     Chief Complaint: Atrial Fibrillation; Peripheral Vascular Disease; and NSTEMI    History of Present Illness   Zak Olmstead is a 78 y.o. 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  He is here today for regular follow-up.    At today's visit Mr Olmstead reports that approximately one week ago he woke up in the middle of the night with right upper quadrant/chest pain.  He states that his PCP, Nisha VARGAS with M.D. to you ordered an ultrasound of his gallbladder.  Otherwise he does deny chest pain, palpitations and lower extremity swelling.  He denies syncope or near syncopal event.  He does report some dizziness and lightheadedness at times when he turns his head quickly.  He does report a cough which is chronic to him and his at his baseline.  He does have fatigue which again is his baseline.    At his last visit I did discontinue his ASA and instructed him to take Plavix.  His wife tells me that there were no refills on his Plavix so he has not taken any.      He has continued to smoke.        Current Outpatient Prescriptions:   •  albuterol (PROVENTIL) (2.5 MG/3ML) 0.083% nebulizer solution, Take 2.5 mg by nebulization Every 6 (Six) Hours As Needed for Wheezing., Disp: , Rfl:   •  amLODIPine (NORVASC) 5 MG tablet, Take 5 mg by mouth 2 (Two) Times a Day., Disp: , Rfl:   •  cholecalciferol (VITAMIN D3) 400 units tablet, Take 400 Units by mouth Daily., Disp: , Rfl:   •  CloNIDine (CATAPRES) 0.2 MG tablet, Take 0.2 mg by mouth 3 (Three) Times a Day., Disp: , Rfl:   •  finasteride (PROSCAR) 5 MG tablet, Take 5 mg by mouth Daily., Disp: , Rfl:   •  lisinopril (PRINIVIL,ZESTRIL) 20 MG tablet, Take 20 mg by mouth 2 (Two) Times a Day., Disp: , Rfl:   •   "naproxen (NAPROSYN) 500 MG tablet, , Disp: , Rfl:   •  oxyCODONE (ROXICODONE) 10 MG tablet, Take 10 mg by mouth 4 (Four) Times a Day., Disp: , Rfl:   •  polyethylene glycol (MIRALAX) packet, Take 17 g by mouth Daily., Disp: , Rfl:   •  pravastatin (PRAVACHOL) 10 MG tablet, Take 10 mg by mouth Daily., Disp: , Rfl:   •  vitamin B-12 (CYANOCOBALAMIN) 1000 MCG tablet, , Disp: , Rfl:   •  warfarin (COUMADIN) 1 MG tablet, Take 1 mg by mouth 1 (One) Time. monday, Disp: , Rfl:   •  warfarin (COUMADIN) 6 MG tablet, Take 6 mg by mouth Daily., Disp: , Rfl:   •  clopidogrel (PLAVIX) 75 MG tablet, Take 1 tablet by mouth Daily., Disp: 30 tablet, Rfl: 11     The following portions of the patient's history were reviewed and updated as appropriate: allergies, current medications, past family history, past medical history, past social history, past surgical history and problem list.    Review of Systems   Constitution: Negative for weakness.   Cardiovascular: Negative for chest pain, irregular heartbeat, near-syncope, palpitations and syncope.   Respiratory: Negative for shortness of breath.          Objective     /59 (BP Location: Left arm)   Pulse 71   Ht 172.7 cm (68\")   Wt 70.3 kg (155 lb)   SpO2 98%   BMI 23.57 kg/m²     Physical Exam   Constitutional: He appears well-developed and well-nourished.   HENT:   Head: Normocephalic and atraumatic.   Eyes: Pupils are equal, round, and reactive to light.   Neck: No JVD present.   Cardiovascular: Normal rate, regular rhythm and intact distal pulses.  Exam reveals no gallop and no friction rub.    Murmur heard.   Systolic murmur is present with a grade of 2/6   Pulmonary/Chest: Effort normal and breath sounds normal. No respiratory distress. He has no wheezes. He has no rales.   Abdominal: Soft. He exhibits no mass. There is no tenderness. No hernia.   Skin: Skin is warm and dry.   Psychiatric: He has a normal mood and affect.   Vitals " reviewed.      Procedures      Assessment/Plan       Zak was seen today for atrial fibrillation, peripheral vascular disease and nstemi.    Diagnoses and all orders for this visit:    Assessment:  1. Coronary artery disease with chronic total occlusion of the RCA with collaterals and mild disease of the left coronary system per cardiac catheterization of 6/1/2018  2. Peripheral vascular disease, status post femoral-femorals bypass and below the knee amputation of the left lower extremity for gangrenous foot, stent placement in the right leg  3. Chronic atrial fibrillation, rate controlled, chronic anticoagulation on Coumadin.  INR followed per PCP  4. Abdominal aortic aneurysm with repair in 2008  5. Aortic valve stenosis, moderate to severe per echocardiogram of 6/1/2018.  6. Mitral annular calcification  7. Hypertension  8. Hyperlipidemia  9. Tobacco use      Plan:  1. I have emphasized the importance of taking plavix as directed for his CAD/PVD.  2. Pt to continue warfarin, INR followed by PCP.  3. Continue pravastatin.  4. I did discuss his echocardiogram with him and spoke with him regarding the MAC and the aortic stenosis.  He declined referral.  5. Tobacco cessation counseling:  Less than 2 minutes  6. Return to clinic in 3 months with Dr Grant, sooner for any worsening or concerning symptoms.        Return in about 3 months (around 12/24/2018).    ALICIA Elam

## 2019-02-27 ENCOUNTER — TELEPHONE (OUTPATIENT)
Dept: CARDIOLOGY | Facility: CLINIC | Age: 79
End: 2019-02-27

## 2019-02-27 NOTE — TELEPHONE ENCOUNTER
CALLED AND SPOKE WITH MEIR, HE DOES NOT WANT TO RESCHEDULE, WILL CALL US IF HE FEELS HE NEEDS A CARDIOLOGIST.

## 2019-08-14 ENCOUNTER — HOSPITAL ENCOUNTER (OUTPATIENT)
Dept: RESPIRATORY THERAPY | Facility: HOSPITAL | Age: 79
Discharge: HOME | End: 2019-08-14
Admitting: PHYSICAL MEDICINE & REHABILITATION

## 2019-08-14 ENCOUNTER — TRANSCRIBE ORDERS (OUTPATIENT)
Dept: ADMINISTRATIVE | Facility: HOSPITAL | Age: 79
End: 2019-08-14

## 2019-08-14 DIAGNOSIS — G89.4 CHRONIC PAIN SYNDROME: ICD-10-CM

## 2019-08-14 DIAGNOSIS — Z79.891 ENCOUNTER FOR LONG-TERM METHADONE USE: Primary | ICD-10-CM

## 2019-08-14 DIAGNOSIS — Z79.891 ENCOUNTER FOR LONG-TERM METHADONE USE: ICD-10-CM

## 2019-08-14 PROCEDURE — 93010 ELECTROCARDIOGRAM REPORT: CPT | Performed by: INTERNAL MEDICINE

## 2019-08-14 PROCEDURE — 93005 ELECTROCARDIOGRAM TRACING: CPT | Performed by: PHYSICAL MEDICINE & REHABILITATION

## 2019-11-01 ENCOUNTER — OFFICE VISIT (OUTPATIENT)
Dept: GASTROENTEROLOGY | Facility: CLINIC | Age: 79
End: 2019-11-01

## 2019-11-01 VITALS
BODY MASS INDEX: 23.49 KG/M2 | HEART RATE: 53 BPM | DIASTOLIC BLOOD PRESSURE: 73 MMHG | HEIGHT: 68 IN | SYSTOLIC BLOOD PRESSURE: 101 MMHG | WEIGHT: 155 LBS

## 2019-11-01 DIAGNOSIS — K21.9 GASTROESOPHAGEAL REFLUX DISEASE, ESOPHAGITIS PRESENCE NOT SPECIFIED: Primary | ICD-10-CM

## 2019-11-01 DIAGNOSIS — R13.19 ESOPHAGEAL DYSPHAGIA: ICD-10-CM

## 2019-11-01 PROCEDURE — 99214 OFFICE O/P EST MOD 30 MIN: CPT | Performed by: PHYSICIAN ASSISTANT

## 2019-11-01 RX ORDER — DEXLANSOPRAZOLE 60 MG/1
60 CAPSULE, DELAYED RELEASE ORAL DAILY
Qty: 30 CAPSULE | Refills: 5 | Status: SHIPPED | OUTPATIENT
Start: 2019-11-01 | End: 2020-02-12

## 2019-11-01 NOTE — PROGRESS NOTES
: 1940    Chief Complaint   Patient presents with   • Difficulty Swallowing       Zak Olmstead is a 79 y.o. male who presents to the office today as an established patient (last seen 2017) for evaluation of Difficulty Swallowing.    History of Present Illness:  He reports that over the past several months his dysphagia has become bothersome to him. It occurs with both solids and liquids. It has been happening daily, he usually has regurgitation or is able to get it to go down. He has discomfort and points to mid-neck. He feels that it is getting stuck in that area. He has not lost any weight with this yet. He does have constipation but feels that he is able to control it with his current medication regimen, OTC medications plus Miralax. He denies any rectal bleeding. Denies nausea or overt vomiting. He does not usually have heartburn, not taking an anti-acid. Last EGD in 2017 by Dr. Bowens showed small hiatal hernia and he had esophageal dilatation at that time. He takes several blood thinners, has not had previous trouble with anesthesia.    Review of Systems   Constitutional: Positive for fatigue. Negative for chills and fever.   HENT: Positive for trouble swallowing.    Eyes: Negative.    Respiratory: Negative for cough, choking, chest tightness and shortness of breath.    Cardiovascular: Negative for chest pain.   Gastrointestinal: Positive for constipation. Negative for abdominal distention, abdominal pain, anal bleeding, blood in stool, diarrhea, nausea, rectal pain and vomiting.   Endocrine: Negative.    Genitourinary: Negative for difficulty urinating.   Musculoskeletal: Positive for back pain and neck pain.   Skin: Negative.    Allergic/Immunologic: Negative for environmental allergies and food allergies.   Neurological: Positive for light-headedness. Negative for dizziness and headaches.   Hematological: Bruises/bleeds easily.   Psychiatric/Behavioral: Negative.        Past Medical History:    Diagnosis Date   • Heart murmur    • Hypertension    • Mononeuritis multiplex        Past Surgical History:   Procedure Laterality Date   • ABDOMINAL AORTIC ANEURYSM REPAIR     • APPENDECTOMY     • BACK SURGERY     • BELOW KNEE LEG AMPUTATION Left    • CARDIAC CATHETERIZATION N/A 6/1/2018    Procedure: Left Heart Cath;  Surgeon: Derik Vega MD;  Location: [facility] CATH INVASIVE LOCATION;  Service: Cardiovascular   • COLONOSCOPY N/A 6/15/2017    Procedure: COLONOSCOPY  CPTCODE:87596;  Surgeon: Lincoln Bowens III, MD;  Location: [facility] OR;  Service:    • ENDOSCOPY N/A 6/15/2017    Procedure: ESOPHAGOGASTRODUODENOSCOPY WITH BIOPSY  CPTCODE:90430;  Surgeon: Lincoln Bowens III, MD;  Location: [facility] OR;  Service:    • ESOPHAGEAL DILATATION     • INTERVENTIONAL RADIOLOGY PROCEDURE Left 6/1/2018    Procedure: Abdominal Aortagram with Runoff;  Surgeon: Derik Vega MD;  Location: [facility] CATH INVASIVE LOCATION;  Service: Cardiovascular       Family History   Problem Relation Age of Onset   • Diabetes Other    • Heart disease Other        Social History     Socioeconomic History   • Marital status:      Spouse name: Not on file   • Number of children: Not on file   • Years of education: Not on file   • Highest education level: Not on file   Tobacco Use   • Smoking status: Current Every Day Smoker     Packs/day: 1.00     Years: 60.00     Pack years: 60.00     Types: Cigarettes   • Smokeless tobacco: Never Used   Substance and Sexual Activity   • Alcohol use: No   • Drug use: No   • Sexual activity: Defer       Current Outpatient Medications:   •  albuterol (PROVENTIL) (2.5 MG/3ML) 0.083% nebulizer solution, Take 2.5 mg by nebulization Every 6 (Six) Hours As Needed for Wheezing., Disp: , Rfl:   •  amLODIPine (NORVASC) 5 MG tablet, Take 5 mg by mouth 2 (Two) Times a Day., Disp: , Rfl:   •  cholecalciferol (VITAMIN D3) 400 units tablet, Take 400 Units by mouth Daily., Disp: , Rfl:   •  CloNIDine (CATAPRES)  "0.2 MG tablet, Take 0.2 mg by mouth 3 (Three) Times a Day., Disp: , Rfl:   •  clopidogrel (PLAVIX) 75 MG tablet, Take 1 tablet by mouth Daily., Disp: 30 tablet, Rfl: 11  •  finasteride (PROSCAR) 5 MG tablet, Take 5 mg by mouth Daily., Disp: , Rfl:   •  lisinopril (PRINIVIL,ZESTRIL) 20 MG tablet, Take 20 mg by mouth 2 (Two) Times a Day., Disp: , Rfl:   •  naproxen (NAPROSYN) 500 MG tablet, , Disp: , Rfl:   •  oxyCODONE (ROXICODONE) 10 MG tablet, Take 10 mg by mouth 4 (Four) Times a Day., Disp: , Rfl:   •  polyethylene glycol (MIRALAX) packet, Take 17 g by mouth Daily., Disp: , Rfl:   •  pravastatin (PRAVACHOL) 10 MG tablet, Take 10 mg by mouth Daily., Disp: , Rfl:   •  vitamin B-12 (CYANOCOBALAMIN) 1000 MCG tablet, , Disp: , Rfl:   •  warfarin (COUMADIN) 1 MG tablet, Take 1 mg by mouth 1 (One) Time. monday, Disp: , Rfl:   •  warfarin (COUMADIN) 6 MG tablet, Take 6 mg by mouth Daily., Disp: , Rfl:     Allergies:   Patient has no known allergies.    Vitals:  /73 (BP Location: Right arm, Patient Position: Sitting, Cuff Size: Adult)   Pulse 53   Ht 172.7 cm (68\")   Wt 70.3 kg (155 lb)   BMI 23.57 kg/m²     Physical Exam   Constitutional: He is oriented to person, place, and time. He appears well-developed and well-nourished. No distress.   HENT:   Head: Normocephalic and atraumatic.   Nose: Nose normal.   Mouth/Throat: Oropharynx is clear and moist.   Eyes: Conjunctivae are normal. Right eye exhibits no discharge. Left eye exhibits no discharge. No scleral icterus.   Neck: Normal range of motion. No JVD present.   Cardiovascular: Normal rate and regular rhythm. Exam reveals no gallop and no friction rub.   Murmur heard.  Pulmonary/Chest: Effort normal and breath sounds normal. No respiratory distress. He has no wheezes. He has no rales. He exhibits no tenderness.   Abdominal: Soft. Bowel sounds are normal. He exhibits no mass. Distention: epigastric, mild. There is tenderness.   Musculoskeletal: Normal range of " motion. He exhibits deformity (has prosthetic leg, left (BKA)). He exhibits no edema.   Neurological: He is alert and oriented to person, place, and time. Coordination normal.   Skin: Skin is warm and dry. No rash noted. He is not diaphoretic. No erythema.   Psychiatric: He has a normal mood and affect. His behavior is normal. Judgment and thought content normal.   Vitals reviewed.    Assessment:  1. Gastroesophageal reflux disease, esophagitis presence not specified    2. Esophageal dysphagia      Plan:  I have asked him to have PPI trial with Dexilant 60 mg once daily (middle of the day) for treatment of suspected contributing GERD. He is high risk for anesthesia at his age and would need to stop blood thinners for dilatation which would not be preferred. He will call with any concerns.    New Medications Ordered This Visit   Medications   • dexlansoprazole (DEXILANT) 60 MG capsule     Sig: Take 1 capsule by mouth Daily.     Dispense:  30 capsule     Refill:  5           Return in about 3 weeks (around 11/22/2019) for recheck dysphagia.      Electronically signed 11/1/2019 4:45 PM  Josee Arcos PA-C, [facility]

## 2019-12-07 NOTE — PLAN OF CARE
Problem: Fall Risk (Adult)  Goal: Absence of Fall  Outcome: Ongoing (interventions implemented as appropriate)      Problem: Wound (Includes Pressure Injury) (Adult)  Goal: Signs and Symptoms of Listed Potential Problems Will be Absent, Minimized or Managed (Wound)  Outcome: Ongoing (interventions implemented as appropriate)      Problem: Cardiac Output Decreased (Adult)  Goal: Effective Tissue Perfusion  Outcome: Ongoing (interventions implemented as appropriate)      Problem: Skin Injury Risk (Adult)  Goal: Skin Health and Integrity  Outcome: Ongoing (interventions implemented as appropriate)      Problem: Patient Care Overview  Goal: Plan of Care Review  Outcome: Ongoing (interventions implemented as appropriate)         No

## 2020-02-03 DIAGNOSIS — R06.02 SHORTNESS OF BREATH: Primary | ICD-10-CM

## 2020-02-12 ENCOUNTER — OFFICE VISIT (OUTPATIENT)
Dept: UROLOGY | Facility: CLINIC | Age: 80
End: 2020-02-12

## 2020-02-12 VITALS
WEIGHT: 150 LBS | DIASTOLIC BLOOD PRESSURE: 60 MMHG | BODY MASS INDEX: 22.73 KG/M2 | SYSTOLIC BLOOD PRESSURE: 118 MMHG | HEIGHT: 68 IN

## 2020-02-12 DIAGNOSIS — N52.9 ERECTILE DYSFUNCTION, UNSPECIFIED ERECTILE DYSFUNCTION TYPE: ICD-10-CM

## 2020-02-12 DIAGNOSIS — R33.9 INCOMPLETE BLADDER EMPTYING: ICD-10-CM

## 2020-02-12 DIAGNOSIS — N39.0 URINARY TRACT INFECTION WITH HEMATURIA, SITE UNSPECIFIED: ICD-10-CM

## 2020-02-12 DIAGNOSIS — N13.8 BPH WITH OBSTRUCTION/LOWER URINARY TRACT SYMPTOMS: Primary | ICD-10-CM

## 2020-02-12 DIAGNOSIS — R35.0 FREQUENCY OF URINATION: ICD-10-CM

## 2020-02-12 DIAGNOSIS — R31.9 URINARY TRACT INFECTION WITH HEMATURIA, SITE UNSPECIFIED: ICD-10-CM

## 2020-02-12 DIAGNOSIS — N40.1 BPH WITH OBSTRUCTION/LOWER URINARY TRACT SYMPTOMS: Primary | ICD-10-CM

## 2020-02-12 DIAGNOSIS — R30.0 DYSURIA: ICD-10-CM

## 2020-02-12 LAB
BILIRUB BLD-MCNC: NEGATIVE MG/DL
CLARITY, POC: CLEAR
COLOR UR: YELLOW
GLUCOSE UR STRIP-MCNC: NEGATIVE MG/DL
KETONES UR QL: NEGATIVE
LEUKOCYTE EST, POC: NEGATIVE
NITRITE UR-MCNC: NEGATIVE MG/ML
PH UR: 6 [PH] (ref 5–8)
PROT UR STRIP-MCNC: NEGATIVE MG/DL
RBC # UR STRIP: ABNORMAL /UL
SP GR UR: 1.03 (ref 1–1.03)
UROBILINOGEN UR QL: NORMAL

## 2020-02-12 PROCEDURE — 36415 COLL VENOUS BLD VENIPUNCTURE: CPT | Performed by: NURSE PRACTITIONER

## 2020-02-12 PROCEDURE — 51798 US URINE CAPACITY MEASURE: CPT | Performed by: NURSE PRACTITIONER

## 2020-02-12 PROCEDURE — 81003 URINALYSIS AUTO W/O SCOPE: CPT | Performed by: NURSE PRACTITIONER

## 2020-02-12 PROCEDURE — 99204 OFFICE O/P NEW MOD 45 MIN: CPT | Performed by: NURSE PRACTITIONER

## 2020-02-12 RX ORDER — FINASTERIDE 5 MG/1
5 TABLET, FILM COATED ORAL DAILY
Qty: 30 TABLET | Refills: 5 | Status: ON HOLD | OUTPATIENT
Start: 2020-02-12 | End: 2020-02-22

## 2020-02-12 RX ORDER — SILDENAFIL CITRATE 20 MG/1
20 TABLET ORAL DAILY
Qty: 24 TABLET | Refills: 6 | Status: ON HOLD | OUTPATIENT
Start: 2020-02-12 | End: 2020-02-22

## 2020-02-12 RX ORDER — PHENAZOPYRIDINE HYDROCHLORIDE 200 MG/1
200 TABLET, FILM COATED ORAL 3 TIMES DAILY PRN
Qty: 20 TABLET | Refills: 0 | Status: ON HOLD | OUTPATIENT
Start: 2020-02-12 | End: 2020-02-22

## 2020-02-12 NOTE — PROGRESS NOTES
Chief Complaint:          Chief Complaint   Patient presents with   • Urinary Incontinence     BPH  HPI:   79 y.o. male.    Very pleasant 79-year-old male presents with his wife Pat today.  Patient has numerous complaints of urinary incontinence, burning on urination, frequency, urgency, and nocturia 3-4 times.  He states he only dribbles urine sometimes, he states he does not empty, and has a very weak stream.  Most of the time he has trouble initiating a stream, he denies any episodes of gross hematuria.    Upon exam, he has significant back pain which he reports as chronic in origin.  He does not have  a history of kidney stones, he denies abdominal pain, pelvic pain, or pressure.  Patient also reports issues with Erectile Dysfunction, and would like to try some medications.  He states he is not been sexually active for over 2 years now because of his heart, but feels strong enough to resume. He does not have Erections he states.    His urine dipstick is completely negative for any infection, he has 1+ microscopic hematuria.  His IPSS score is 24, his post void residual is 0.  He reports a significant history of BPH for which he has been recently started on Flomax.  He does not have any PSA on file, but denies any family history of prostate cancer or breast cancer.     Patient is wheelchair confined secondary to mobility issues.  Has a significant history of mononeuritis multiplex, with a left prosthetic leg.  He has a history of hypertension, Atrial Fib, heart murmur, AAA, and denies having any recent surgeries or currently taking any Nitrates that might interfere with his Viagra use.      Past Medical History:        Past Medical History:   Diagnosis Date   • Heart murmur    • Hypertension    • Mononeuritis multiplex      The following portions of the patient's history were reviewed and updated as appropriate: allergies, current medications, past family history, past medical history, past social history, past  surgical history and problem list.    Current Meds:     Current Outpatient Medications   Medication Sig Dispense Refill   • albuterol (PROVENTIL) (2.5 MG/3ML) 0.083% nebulizer solution Take 2.5 mg by nebulization Every 6 (Six) Hours As Needed for Wheezing.     • amLODIPine (NORVASC) 5 MG tablet Take 5 mg by mouth 2 (Two) Times a Day.     • cholecalciferol (VITAMIN D3) 400 units tablet Take 400 Units by mouth Daily.     • CloNIDine (CATAPRES) 0.2 MG tablet Take 0.2 mg by mouth 3 (Three) Times a Day.     • lisinopril (PRINIVIL,ZESTRIL) 20 MG tablet Take 20 mg by mouth 2 (Two) Times a Day.     • naproxen (NAPROSYN) 500 MG tablet      • oxyCODONE (ROXICODONE) 10 MG tablet Take 10 mg by mouth 4 (Four) Times a Day.     • polyethylene glycol (MIRALAX) packet Take 17 g by mouth Daily.     • pravastatin (PRAVACHOL) 10 MG tablet Take 10 mg by mouth Daily.     • vitamin B-12 (CYANOCOBALAMIN) 1000 MCG tablet      • warfarin (COUMADIN) 1 MG tablet Take 1 mg by mouth 1 (One) Time. monday     • warfarin (COUMADIN) 6 MG tablet Take 6 mg by mouth Daily.     • finasteride (PROSCAR) 5 MG tablet Take 1 tablet by mouth Daily. 30 tablet 5   • phenazopyridine (PYRIDIUM) 200 MG tablet Take 1 tablet by mouth 3 (Three) Times a Day As Needed for Bladder Spasms. 20 tablet 0   • sildenafil (REVATIO) 20 MG tablet Take 1 tablet by mouth Daily for 48 doses. May take up to 5 by mouth one hour prior to intercourse on an empty stomach 24 tablet 6     No current facility-administered medications for this visit.         Allergies:      No Known Allergies     Past Surgical History:     Past Surgical History:   Procedure Laterality Date   • ABDOMINAL AORTIC ANEURYSM REPAIR     • APPENDECTOMY     • BACK SURGERY     • BELOW KNEE LEG AMPUTATION Left    • CARDIAC CATHETERIZATION N/A 6/1/2018    Procedure: Left Heart Cath;  Surgeon: Derik Vega MD;  Location: [facility] CATH INVASIVE LOC[facility];  Service: Cardiovascular   • COLONOSCOPY N/A 6/15/2017     Procedure: COLONOSCOPY  CPTCODE:55039;  Surgeon: Lincoln Bowens III, MD;  Location: [facility] OR;  Service:    • ENDOSCOPY N/A 6/15/2017    Procedure: ESOPHAGOGASTRODUODENOSCOPY WITH BIOPSY  CPTCODE:04707;  Surgeon: Lincoln Bowens III, MD;  Location: [facility] OR;  Service:    • ESOPHAGEAL DILATATION     • INTERVENTIONAL RADIOLOGY PROCEDURE Left 6/1/2018    Procedure: Abdominal Aortagram with Runoff;  Surgeon: Derik Vega MD;  Location: [facility] CATH INVASIVE LOCATION;  Service: Cardiovascular         Social History:     Social History     Socioeconomic History   • Marital status:      Spouse name: Not on file   • Number of children: Not on file   • Years of education: Not on file   • Highest education level: Not on file   Tobacco Use   • Smoking status: Current Every Day Smoker     Packs/day: 0.50     Years: 60.00     Pack years: 30.00     Types: Cigarettes   • Smokeless tobacco: Never Used   Substance and Sexual Activity   • Alcohol use: No   • Drug use: No   • Sexual activity: Not Currently       Family History:     Family History   Problem Relation Age of Onset   • Diabetes Other    • Heart disease Other        Review of Systems:    Review of Systems   Constitutional: Positive for activity change and fatigue. Negative for appetite change, chills and fever.   HENT: Negative for congestion and sinus pressure.    Eyes: Negative for blurred vision and double vision.   Respiratory: Negative for shortness of breath and wheezing.    Gastrointestinal: Negative for abdominal pain, constipation, diarrhea, nausea and vomiting.   Genitourinary: Positive for decreased urine volume, frequency, nocturia, erectile dysfunction, urgency and urinary incontinence. Negative for difficulty urinating, discharge, dysuria, flank pain, genital sores, hematuria, penile pain, penile swelling, scrotal swelling and testicular pain.   Musculoskeletal: Positive for back pain and gait problem.        Patient is wheelchair confined,  with left  prosthetic leg   Skin: Positive for dry skin. Negative for color change, pallor, rash and bruise.   Neurological: Positive for weakness. Negative for dizziness and confusion.   Psychiatric/Behavioral: Positive for stress. Negative for agitation, behavioral problems and decreased concentration.      IPSS Questionnaire (AUA-7):  Over the past month…    1)  How often have you had a sensation of not emptying your bladder completely after you finish urinating?  5 - Almost always   2)  How often have you had to urinate again less than two hours after you finished urinating? 0 - Not at all   3)  How often have you found you stopped and started again several times when you urinated?  3 - About half the time   4) How difficult have you found it to postpone urination?  5 - Almost always   5) How often have you had a weak urinary stream?  5 - Almost always   6) How often have you had to push or strain to begin urination?  2 - Less than half the time   7) How many times did you most typically get up to urinate from the time you went to bed until the time you got up in the morning?  4 - 4 times   Total score: 24  0-7 mildly symptomatic    8-19 moderately symptomatic    20-35 severely symptomatic                                              24       Physical Exam:     Physical Exam   Constitutional: He is oriented to person, place, and time. He appears well-developed and well-nourished. No distress.   HENT:   Head: Normocephalic and atraumatic.   Right Ear: External ear normal.   Left Ear: External ear normal.   Eyes: Pupils are equal, round, and reactive to light. Conjunctivae and EOM are normal. Right eye exhibits no discharge. Left eye exhibits no discharge.   Neck: Normal range of motion. Neck supple. No tracheal deviation present. No thyromegaly present.   Cardiovascular: Normal rate and regular rhythm. Exam reveals no friction rub.   No murmur heard.  Pulmonary/Chest: Effort normal and breath sounds normal. No  stridor. No respiratory distress.   Abdominal: Soft. Bowel sounds are normal. He exhibits mass. He exhibits no distension. There is no tenderness. There is no rebound and no guarding. No hernia.   Genitourinary: Rectum normal, testes normal and penis normal. Rectal exam shows guaiac negative stool. Uncircumcised. No penile tenderness. No discharge found.   Musculoskeletal: Normal range of motion. He exhibits no edema, tenderness or deformity.   Left prosthetic leg secondary to mononeuritis multiplex  Patient is wheelchair confined   Neurological: He is alert and oriented to person, place, and time. No cranial nerve deficit or sensory deficit. Coordination normal.   Skin: Skin is warm and dry. Capillary refill takes less than 2 seconds. No rash noted. No erythema. No pallor.   Psychiatric: He has a normal mood and affect. His behavior is normal. Judgment and thought content normal.       Procedure:     No notes on file      Assessment:     Encounter Diagnoses   Name Primary?   • BPH with obstruction/lower urinary tract symptoms Yes   • Dysuria    • Erectile dysfunction, unspecified erectile dysfunction type    • Frequency of urination    • Urinary tract infection with hematuria, site unspecified    • Incomplete bladder emptying      Orders Placed This Encounter   Procedures   • Urine Culture - Urine, Urine, Clean Catch   • PSA DIAGNOSTIC   • Testosterone   • Basic Metabolic Panel   • Bladder Scan   • POC Urinalysis Dipstick, Automated       Plan:   Urinary incontinence/BPH with lower urinary tract symptoms/ED: Patient presents with numerous issues. He has concerns of worsening urinary incontinence.  He has difficulty initiating his stream, he states it burns at times, then urgency at times, hesitancy, dribbles, with a very weak stream.  His IPSS score is 24, he is urine dipstick is negative for any infection.     He is very distressed by his symptoms, which he reports have become very bothersome to him.  We discussed  the pathophysiology of BPH and obstruction.  We discussed the static and dynamic effect effects of BPH as well as using 5 alpha reductase inhibitors versus alpha blockade.  We discussed the indications for transurethral surgery as well.  And/ or other therapeutic options available including all of the newer techniques.       PSA testing: We discussed rechecking a PSA blood test that stands for prostate specific antigen.  I discussed the pathophysiology of PSA testing indicating its use in the diagnosis and management of prostate cancer.  I discussed the normal range being 0-4 but more appropriately being much closer to 0-2 in a normal male.  I discussed the fact that after certain age we don't recommend PSA testing especially in view of numerous comorbidities.  That this will not be a useful test.  I discussed many of the things that can artificially raised PSA including a recent infection, urinary tract infection, recent sexual intercourse.      Erectile Dysfunction: We discussed the anatomy and physiology of the penis and the endothelium.  We discussed the various forms of erectile dysfunction including peripheral vascular occlusive disease, Postoperative, secondary to radiation treatments of the prostate, and arterial inflow. We discussed the various treatment options available including oral medication and its various forms.  We discussed the use of both generic and non-generic Viagra.  We discussed Cialis and a longer half-life of 17 hours as well as the other 2 medications. Patient would like to try.  He states he has been prescribed Viagra in the past.    Discussed sending his urine out for culture, will call with results    We discussed restarting finasteride at this time due to his urinary symptoms and his history of BPH.    We will refill his alpha blockade-Flomax,     Start him on sildenafil 20 mg daily, with a maximum dose of 100 mg a day 1 hour prior to intercourse.    Also starting him on some Pyridium  for bladder spasms and painful urination    check his PSA, testosterone levels and BMP    We will see him back in Two Weeks for follow up.      Patient's Body mass index is 22.81 kg/m². BMI is within normal parameters. No follow-up required..      Smoking Cessation Counseling:  Current every day smoker. less than 3 minutes spent counseling. Will try to cut down.  I provided patient with tobacco cessation educational material printed in the patient's After Visit Summary.     Counseling was given to patient and family for the following topics diagnostic results including: BPH with lower urinary tract symptoms, erectile dysfunction, instructions for management as follows: Continue Flomax daily, finasteride 5 mg daily, Pyridium 200 mg as needed sildenafil 20 mg daily maximum 100 mg a day , risk factor reductions including: Smoking cessation, avoiding bladder irritants such as caffeine products, alcohol, spicy foods, constipation issues, and prognosis and treatment as follows: Obtain baseline labs such as PSA, testosterone, BMP,. The interim medical history and current results were reviewed.  A treatment plan with follow-up was made for frequency of urination, dysuria, ED,  BPH with obstruction/lower urinary tract symptoms [N40.1, N13.8].         This document has been electronically signed by Griselda Cheng-Akwa, APRN February 12, 2020 9:19 PM

## 2020-02-13 LAB
BUN SERPL-MCNC: 22 MG/DL (ref 8–23)
BUN/CREAT SERPL: 22.4 (ref 7–25)
CALCIUM SERPL-MCNC: 8.8 MG/DL (ref 8.6–10.5)
CHLORIDE SERPL-SCNC: 100 MMOL/L (ref 98–107)
CO2 SERPL-SCNC: 23.3 MMOL/L (ref 22–29)
CREAT SERPL-MCNC: 0.98 MG/DL (ref 0.76–1.27)
GLUCOSE SERPL-MCNC: 109 MG/DL (ref 65–99)
POTASSIUM SERPL-SCNC: 4.8 MMOL/L (ref 3.5–5.2)
PSA SERPL-MCNC: 2.95 NG/ML (ref 0–4)
SODIUM SERPL-SCNC: 136 MMOL/L (ref 136–145)
TESTOST SERPL-MCNC: 302 NG/DL (ref 264–916)

## 2020-02-14 LAB
BACTERIA UR CULT: NORMAL
BACTERIA UR CULT: NORMAL

## 2020-02-21 ENCOUNTER — APPOINTMENT (OUTPATIENT)
Dept: GENERAL RADIOLOGY | Facility: HOSPITAL | Age: 80
End: 2020-02-21

## 2020-02-21 ENCOUNTER — HOSPITAL ENCOUNTER (INPATIENT)
Facility: HOSPITAL | Age: 80
LOS: 2 days | Discharge: HOME | End: 2020-02-23
Attending: FAMILY MEDICINE | Admitting: INTERNAL MEDICINE

## 2020-02-21 ENCOUNTER — APPOINTMENT (OUTPATIENT)
Dept: CT IMAGING | Facility: HOSPITAL | Age: 80
End: 2020-02-21

## 2020-02-21 DIAGNOSIS — I48.91 ATRIAL FIBRILLATION WITH RAPID VENTRICULAR RESPONSE (HCC): Primary | ICD-10-CM

## 2020-02-21 LAB
A-A DO2: 22.1 MMHG (ref 0–300)
ALBUMIN SERPL-MCNC: 3.31 G/DL (ref 3.5–5.2)
ALBUMIN/GLOB SERPL: 0.9 G/DL
ALP SERPL-CCNC: 91 U/L (ref 39–117)
ALT SERPL W P-5'-P-CCNC: 13 U/L (ref 1–41)
ANION GAP SERPL CALCULATED.3IONS-SCNC: 11.4 MMOL/L (ref 5–15)
APTT PPP: 54.3 SECONDS (ref 23.8–36.1)
ARTERIAL PATENCY WRIST A: ABNORMAL
AST SERPL-CCNC: 17 U/L (ref 1–40)
ATMOSPHERIC PRESS: 739 MMHG
BACTERIA UR QL AUTO: ABNORMAL /HPF
BASE EXCESS BLDA CALC-SCNC: 0.8 MMOL/L (ref 0–2)
BASOPHILS # BLD AUTO: 0.08 10*3/MM3 (ref 0–0.2)
BASOPHILS NFR BLD AUTO: 1 % (ref 0–1.5)
BDY SITE: ABNORMAL
BILIRUB SERPL-MCNC: 0.4 MG/DL (ref 0.2–1.2)
BILIRUB UR QL STRIP: ABNORMAL
BODY TEMPERATURE: 0 C
BUN BLD-MCNC: 27 MG/DL (ref 8–23)
BUN/CREAT SERPL: 22.5 (ref 7–25)
CALCIUM SPEC-SCNC: 9.1 MG/DL (ref 8.6–10.5)
CHLORIDE SERPL-SCNC: 105 MMOL/L (ref 98–107)
CLARITY UR: CLEAR
CO2 BLDA-SCNC: 26.4 MMOL/L (ref 22–33)
CO2 SERPL-SCNC: 24.6 MMOL/L (ref 22–29)
COHGB MFR BLD: 1.2 % (ref 0–5)
COLOR UR: ABNORMAL
CREAT BLD-MCNC: 1.2 MG/DL (ref 0.76–1.27)
D-LACTATE SERPL-SCNC: 1.1 MMOL/L (ref 0.5–2)
DEPRECATED RDW RBC AUTO: 45.4 FL (ref 37–54)
EOSINOPHIL # BLD AUTO: 0.2 10*3/MM3 (ref 0–0.4)
EOSINOPHIL NFR BLD AUTO: 2.5 % (ref 0.3–6.2)
ERYTHROCYTE [DISTWIDTH] IN BLOOD BY AUTOMATED COUNT: 15 % (ref 12.3–15.4)
GFR SERPL CREATININE-BSD FRML MDRD: 58 ML/MIN/1.73
GLOBULIN UR ELPH-MCNC: 3.5 GM/DL
GLUCOSE BLD-MCNC: 118 MG/DL (ref 65–99)
GLUCOSE UR STRIP-MCNC: NEGATIVE MG/DL
HCO3 BLDA-SCNC: 25.2 MMOL/L (ref 20–26)
HCT VFR BLD AUTO: 41.2 % (ref 37.5–51)
HCT VFR BLD CALC: 36.7 % (ref 38–51)
HGB BLD-MCNC: 12.1 G/DL (ref 13–17.7)
HGB BLDA-MCNC: 12 G/DL (ref 14–18)
HGB UR QL STRIP.AUTO: NEGATIVE
HOROWITZ INDEX BLD+IHG-RTO: 21 %
HYALINE CASTS UR QL AUTO: ABNORMAL /LPF
IMM GRANULOCYTES # BLD AUTO: 0.02 10*3/MM3 (ref 0–0.05)
IMM GRANULOCYTES NFR BLD AUTO: 0.3 % (ref 0–0.5)
INR PPP: 3.04 (ref 0.9–1.1)
KETONES UR QL STRIP: NEGATIVE
LEUKOCYTE ESTERASE UR QL STRIP.AUTO: ABNORMAL
LYMPHOCYTES # BLD AUTO: 2.44 10*3/MM3 (ref 0.7–3.1)
LYMPHOCYTES NFR BLD AUTO: 30.7 % (ref 19.6–45.3)
Lab: ABNORMAL
MCH RBC QN AUTO: 24.2 PG (ref 26.6–33)
MCHC RBC AUTO-ENTMCNC: 29.4 G/DL (ref 31.5–35.7)
MCV RBC AUTO: 82.6 FL (ref 79–97)
METHGB BLD QL: 0.4 % (ref 0–3)
MODALITY: ABNORMAL
MONOCYTES # BLD AUTO: 0.72 10*3/MM3 (ref 0.1–0.9)
MONOCYTES NFR BLD AUTO: 9 % (ref 5–12)
NEUTROPHILS # BLD AUTO: 4.5 10*3/MM3 (ref 1.7–7)
NEUTROPHILS NFR BLD AUTO: 56.5 % (ref 42.7–76)
NITRITE UR QL STRIP: POSITIVE
NOTE: ABNORMAL
NRBC BLD AUTO-RTO: 0 /100 WBC (ref 0–0.2)
NT-PROBNP SERPL-MCNC: ABNORMAL PG/ML (ref 5–1800)
OXYHGB MFR BLDV: 94 % (ref 94–99)
PCO2 BLDA: 38.8 MM HG (ref 35–45)
PCO2 TEMP ADJ BLD: ABNORMAL MM[HG]
PH BLDA: 7.42 PH UNITS (ref 7.35–7.45)
PH UR STRIP.AUTO: <=5 [PH] (ref 5–8)
PH, TEMP CORRECTED: ABNORMAL
PLATELET # BLD AUTO: 249 10*3/MM3 (ref 140–450)
PMV BLD AUTO: 10.9 FL (ref 6–12)
PO2 BLDA: 79.5 MM HG (ref 83–108)
PO2 TEMP ADJ BLD: ABNORMAL MM[HG]
POTASSIUM BLD-SCNC: 4.9 MMOL/L (ref 3.5–5.2)
PROT SERPL-MCNC: 6.8 G/DL (ref 6–8.5)
PROT UR QL STRIP: ABNORMAL
PROTHROMBIN TIME: 32.9 SECONDS (ref 11–15.4)
RBC # BLD AUTO: 4.99 10*6/MM3 (ref 4.14–5.8)
RBC # UR: ABNORMAL /HPF
REF LAB TEST METHOD: ABNORMAL
SAO2 % BLDCOA: 95.5 % (ref 94–99)
SODIUM BLD-SCNC: 141 MMOL/L (ref 136–145)
SP GR UR STRIP: 1.02 (ref 1–1.03)
SQUAMOUS #/AREA URNS HPF: ABNORMAL /HPF
TROPONIN T SERPL-MCNC: 0.08 NG/ML (ref 0–0.03)
TROPONIN T SERPL-MCNC: 0.09 NG/ML (ref 0–0.03)
UROBILINOGEN UR QL STRIP: ABNORMAL
VENTILATOR MODE: ABNORMAL
WBC NRBC COR # BLD: 7.96 10*3/MM3 (ref 3.4–10.8)
WBC UR QL AUTO: ABNORMAL /HPF

## 2020-02-21 PROCEDURE — 93005 ELECTROCARDIOGRAM TRACING: CPT | Performed by: FAMILY MEDICINE

## 2020-02-21 PROCEDURE — 93010 ELECTROCARDIOGRAM REPORT: CPT | Performed by: INTERNAL MEDICINE

## 2020-02-21 PROCEDURE — 36600 WITHDRAWAL OF ARTERIAL BLOOD: CPT

## 2020-02-21 PROCEDURE — 83735 ASSAY OF MAGNESIUM: CPT | Performed by: HOSPITALIST

## 2020-02-21 PROCEDURE — 85730 THROMBOPLASTIN TIME PARTIAL: CPT | Performed by: FAMILY MEDICINE

## 2020-02-21 PROCEDURE — 71045 X-RAY EXAM CHEST 1 VIEW: CPT | Performed by: RADIOLOGY

## 2020-02-21 PROCEDURE — 80053 COMPREHEN METABOLIC PANEL: CPT | Performed by: FAMILY MEDICINE

## 2020-02-21 PROCEDURE — 36415 COLL VENOUS BLD VENIPUNCTURE: CPT

## 2020-02-21 PROCEDURE — 82375 ASSAY CARBOXYHB QUANT: CPT

## 2020-02-21 PROCEDURE — 84443 ASSAY THYROID STIM HORMONE: CPT | Performed by: HOSPITALIST

## 2020-02-21 PROCEDURE — 85610 PROTHROMBIN TIME: CPT | Performed by: FAMILY MEDICINE

## 2020-02-21 PROCEDURE — 87040 BLOOD CULTURE FOR BACTERIA: CPT | Performed by: FAMILY MEDICINE

## 2020-02-21 PROCEDURE — 71045 X-RAY EXAM CHEST 1 VIEW: CPT

## 2020-02-21 PROCEDURE — 85025 COMPLETE CBC W/AUTO DIFF WBC: CPT | Performed by: FAMILY MEDICINE

## 2020-02-21 PROCEDURE — 81001 URINALYSIS AUTO W/SCOPE: CPT | Performed by: FAMILY MEDICINE

## 2020-02-21 PROCEDURE — 83605 ASSAY OF LACTIC ACID: CPT | Performed by: FAMILY MEDICINE

## 2020-02-21 PROCEDURE — 87086 URINE CULTURE/COLONY COUNT: CPT | Performed by: FAMILY MEDICINE

## 2020-02-21 PROCEDURE — 99284 EMERGENCY DEPT VISIT MOD MDM: CPT

## 2020-02-21 PROCEDURE — 82805 BLOOD GASES W/O2 SATURATION: CPT

## 2020-02-21 PROCEDURE — 83880 ASSAY OF NATRIURETIC PEPTIDE: CPT | Performed by: FAMILY MEDICINE

## 2020-02-21 PROCEDURE — 71275 CT ANGIOGRAPHY CHEST: CPT

## 2020-02-21 PROCEDURE — 83050 HGB METHEMOGLOBIN QUAN: CPT

## 2020-02-21 PROCEDURE — 84484 ASSAY OF TROPONIN QUANT: CPT | Performed by: FAMILY MEDICINE

## 2020-02-21 PROCEDURE — 0 IOPAMIDOL PER 1 ML: Performed by: FAMILY MEDICINE

## 2020-02-21 RX ORDER — MAGNESIUM SULFATE HEPTAHYDRATE 40 MG/ML
4 INJECTION, SOLUTION INTRAVENOUS AS NEEDED
Status: DISCONTINUED | OUTPATIENT
Start: 2020-02-21 | End: 2020-02-23 | Stop reason: HOSPADM

## 2020-02-21 RX ORDER — MAGNESIUM SULFATE 1 G/100ML
1 INJECTION INTRAVENOUS AS NEEDED
Status: DISCONTINUED | OUTPATIENT
Start: 2020-02-21 | End: 2020-02-23 | Stop reason: HOSPADM

## 2020-02-21 RX ORDER — MAGNESIUM SULFATE HEPTAHYDRATE 40 MG/ML
2 INJECTION, SOLUTION INTRAVENOUS AS NEEDED
Status: DISCONTINUED | OUTPATIENT
Start: 2020-02-21 | End: 2020-02-23 | Stop reason: HOSPADM

## 2020-02-21 RX ORDER — SODIUM CHLORIDE 0.9 % (FLUSH) 0.9 %
10 SYRINGE (ML) INJECTION AS NEEDED
Status: DISCONTINUED | OUTPATIENT
Start: 2020-02-21 | End: 2020-02-23 | Stop reason: HOSPADM

## 2020-02-21 RX ORDER — DILTIAZEM HYDROCHLORIDE 5 MG/ML
10 INJECTION INTRAVENOUS ONCE
Status: COMPLETED | OUTPATIENT
Start: 2020-02-21 | End: 2020-02-21

## 2020-02-21 RX ADMIN — DILTIAZEM HYDROCHLORIDE 10 MG: 5 INJECTION INTRAVENOUS at 21:01

## 2020-02-21 RX ADMIN — DILTIAZEM HYDROCHLORIDE 5 MG/HR: 100 INJECTION, POWDER, LYOPHILIZED, FOR SOLUTION INTRAVENOUS at 23:21

## 2020-02-21 RX ADMIN — IOPAMIDOL 70 ML: 755 INJECTION, SOLUTION INTRAVENOUS at 23:46

## 2020-02-22 ENCOUNTER — APPOINTMENT (OUTPATIENT)
Dept: CARDIOLOGY | Facility: HOSPITAL | Age: 80
End: 2020-02-22

## 2020-02-22 ENCOUNTER — APPOINTMENT (OUTPATIENT)
Dept: ULTRASOUND IMAGING | Facility: HOSPITAL | Age: 80
End: 2020-02-22

## 2020-02-22 LAB
ALBUMIN SERPL-MCNC: 3.3 G/DL (ref 3.5–5.2)
ALBUMIN/GLOB SERPL: 0.9 G/DL
ALP SERPL-CCNC: 88 U/L (ref 39–117)
ALT SERPL W P-5'-P-CCNC: 14 U/L (ref 1–41)
ANION GAP SERPL CALCULATED.3IONS-SCNC: 11.7 MMOL/L (ref 5–15)
ANISOCYTOSIS BLD QL: NORMAL
AST SERPL-CCNC: 17 U/L (ref 1–40)
BASOPHILS # BLD AUTO: 0.07 10*3/MM3 (ref 0–0.2)
BASOPHILS NFR BLD AUTO: 0.9 % (ref 0–1.5)
BILIRUB SERPL-MCNC: 0.5 MG/DL (ref 0.2–1.2)
BUN BLD-MCNC: 27 MG/DL (ref 8–23)
BUN/CREAT SERPL: 24.5 (ref 7–25)
CALCIUM SPEC-SCNC: 9.2 MG/DL (ref 8.6–10.5)
CHLORIDE SERPL-SCNC: 102 MMOL/L (ref 98–107)
CHOLEST SERPL-MCNC: 99 MG/DL (ref 0–200)
CO2 SERPL-SCNC: 23.3 MMOL/L (ref 22–29)
CREAT BLD-MCNC: 1.1 MG/DL (ref 0.76–1.27)
CRP SERPL-MCNC: 2.25 MG/DL (ref 0–0.5)
DEPRECATED RDW RBC AUTO: 46.4 FL (ref 37–54)
EOSINOPHIL # BLD AUTO: 0.26 10*3/MM3 (ref 0–0.4)
EOSINOPHIL NFR BLD AUTO: 3.4 % (ref 0.3–6.2)
ERYTHROCYTE [DISTWIDTH] IN BLOOD BY AUTOMATED COUNT: 15.1 % (ref 12.3–15.4)
GFR SERPL CREATININE-BSD FRML MDRD: 65 ML/MIN/1.73
GLOBULIN UR ELPH-MCNC: 3.7 GM/DL
GLUCOSE BLD-MCNC: 102 MG/DL (ref 65–99)
HBA1C MFR BLD: 6.1 % (ref 4.8–5.6)
HCT VFR BLD AUTO: 43.4 % (ref 37.5–51)
HDLC SERPL-MCNC: 26 MG/DL (ref 40–60)
HGB BLD-MCNC: 12.5 G/DL (ref 13–17.7)
HYPOCHROMIA BLD QL: NORMAL
IMM GRANULOCYTES # BLD AUTO: 0.03 10*3/MM3 (ref 0–0.05)
IMM GRANULOCYTES NFR BLD AUTO: 0.4 % (ref 0–0.5)
INR PPP: 3.12 (ref 0.9–1.1)
LDLC SERPL CALC-MCNC: 53 MG/DL (ref 0–100)
LDLC/HDLC SERPL: 2.05 {RATIO}
LYMPHOCYTES # BLD AUTO: 2.51 10*3/MM3 (ref 0.7–3.1)
LYMPHOCYTES NFR BLD AUTO: 32.4 % (ref 19.6–45.3)
MAGNESIUM SERPL-MCNC: 1.9 MG/DL (ref 1.6–2.4)
MCH RBC QN AUTO: 24.3 PG (ref 26.6–33)
MCHC RBC AUTO-ENTMCNC: 28.8 G/DL (ref 31.5–35.7)
MCV RBC AUTO: 84.3 FL (ref 79–97)
MONOCYTES # BLD AUTO: 0.63 10*3/MM3 (ref 0.1–0.9)
MONOCYTES NFR BLD AUTO: 8.1 % (ref 5–12)
NEUTROPHILS # BLD AUTO: 4.25 10*3/MM3 (ref 1.7–7)
NEUTROPHILS NFR BLD AUTO: 54.8 % (ref 42.7–76)
NRBC BLD AUTO-RTO: 0 /100 WBC (ref 0–0.2)
PLAT MORPH BLD: NORMAL
PLATELET # BLD AUTO: 226 10*3/MM3 (ref 140–450)
PMV BLD AUTO: 10.4 FL (ref 6–12)
POTASSIUM BLD-SCNC: 4.6 MMOL/L (ref 3.5–5.2)
POTASSIUM BLD-SCNC: 5.3 MMOL/L (ref 3.5–5.2)
PROT SERPL-MCNC: 7 G/DL (ref 6–8.5)
PROTHROMBIN TIME: 33.6 SECONDS (ref 11–15.4)
RBC # BLD AUTO: 5.15 10*6/MM3 (ref 4.14–5.8)
SODIUM BLD-SCNC: 137 MMOL/L (ref 136–145)
T3FREE SERPL-MCNC: 5.81 PG/ML (ref 2–4.4)
T4 FREE SERPL-MCNC: 2.4 NG/DL (ref 0.93–1.7)
TRIGL SERPL-MCNC: 98 MG/DL (ref 0–150)
TROPONIN T SERPL-MCNC: 0.06 NG/ML (ref 0–0.03)
TROPONIN T SERPL-MCNC: 0.07 NG/ML (ref 0–0.03)
TSH SERPL DL<=0.05 MIU/L-ACNC: <0.005 UIU/ML (ref 0.27–4.2)
VLDLC SERPL-MCNC: 19.6 MG/DL
WBC NRBC COR # BLD: 7.75 10*3/MM3 (ref 3.4–10.8)

## 2020-02-22 PROCEDURE — 84484 ASSAY OF TROPONIN QUANT: CPT | Performed by: HOSPITALIST

## 2020-02-22 PROCEDURE — 25010000002 MAGNESIUM SULFATE 2 GM/50ML SOLUTION: Performed by: HOSPITALIST

## 2020-02-22 PROCEDURE — 85610 PROTHROMBIN TIME: CPT | Performed by: HOSPITALIST

## 2020-02-22 PROCEDURE — 99222 1ST HOSP IP/OBS MODERATE 55: CPT | Performed by: PHYSICIAN ASSISTANT

## 2020-02-22 PROCEDURE — 93306 TTE W/DOPPLER COMPLETE: CPT

## 2020-02-22 PROCEDURE — 99223 1ST HOSP IP/OBS HIGH 75: CPT | Performed by: HOSPITALIST

## 2020-02-22 PROCEDURE — 80061 LIPID PANEL: CPT | Performed by: NURSE PRACTITIONER

## 2020-02-22 PROCEDURE — 84481 FREE ASSAY (FT-3): CPT | Performed by: HOSPITALIST

## 2020-02-22 PROCEDURE — 92610 EVALUATE SWALLOWING FUNCTION: CPT | Performed by: SPEECH-LANGUAGE PATHOLOGIST

## 2020-02-22 PROCEDURE — 93306 TTE W/DOPPLER COMPLETE: CPT | Performed by: INTERNAL MEDICINE

## 2020-02-22 PROCEDURE — 84132 ASSAY OF SERUM POTASSIUM: CPT | Performed by: INTERNAL MEDICINE

## 2020-02-22 PROCEDURE — 85025 COMPLETE CBC W/AUTO DIFF WBC: CPT | Performed by: HOSPITALIST

## 2020-02-22 PROCEDURE — 85007 BL SMEAR W/DIFF WBC COUNT: CPT | Performed by: HOSPITALIST

## 2020-02-22 PROCEDURE — 83036 HEMOGLOBIN GLYCOSYLATED A1C: CPT | Performed by: HOSPITALIST

## 2020-02-22 PROCEDURE — 86140 C-REACTIVE PROTEIN: CPT | Performed by: HOSPITALIST

## 2020-02-22 PROCEDURE — 76536 US EXAM OF HEAD AND NECK: CPT | Performed by: RADIOLOGY

## 2020-02-22 PROCEDURE — 84439 ASSAY OF FREE THYROXINE: CPT | Performed by: HOSPITALIST

## 2020-02-22 PROCEDURE — 76536 US EXAM OF HEAD AND NECK: CPT

## 2020-02-22 PROCEDURE — 80053 COMPREHEN METABOLIC PANEL: CPT | Performed by: HOSPITALIST

## 2020-02-22 RX ORDER — SODIUM CHLORIDE 0.9 % (FLUSH) 0.9 %
10 SYRINGE (ML) INJECTION EVERY 12 HOURS SCHEDULED
Status: DISCONTINUED | OUTPATIENT
Start: 2020-02-22 | End: 2020-02-23 | Stop reason: HOSPADM

## 2020-02-22 RX ORDER — TAMSULOSIN HYDROCHLORIDE 0.4 MG/1
0.4 CAPSULE ORAL DAILY
Status: DISCONTINUED | OUTPATIENT
Start: 2020-02-22 | End: 2020-02-23 | Stop reason: HOSPADM

## 2020-02-22 RX ORDER — NAPROXEN 250 MG/1
500 TABLET ORAL DAILY PRN
Status: CANCELLED | OUTPATIENT
Start: 2020-02-22

## 2020-02-22 RX ORDER — OXYCODONE HYDROCHLORIDE 5 MG/1
5 TABLET ORAL EVERY 6 HOURS PRN
Status: DISCONTINUED | OUTPATIENT
Start: 2020-02-22 | End: 2020-02-22

## 2020-02-22 RX ORDER — LANOLIN ALCOHOL/MO/W.PET/CERES
1000 CREAM (GRAM) TOPICAL DAILY
Status: DISCONTINUED | OUTPATIENT
Start: 2020-02-22 | End: 2020-02-23 | Stop reason: HOSPADM

## 2020-02-22 RX ORDER — MAGNESIUM SULFATE HEPTAHYDRATE 40 MG/ML
2 INJECTION, SOLUTION INTRAVENOUS ONCE
Status: COMPLETED | OUTPATIENT
Start: 2020-02-22 | End: 2020-02-22

## 2020-02-22 RX ORDER — AMLODIPINE BESYLATE 5 MG/1
5 TABLET ORAL DAILY PRN
Status: CANCELLED | OUTPATIENT
Start: 2020-02-22

## 2020-02-22 RX ORDER — ROSUVASTATIN CALCIUM 10 MG/1
10 TABLET, COATED ORAL NIGHTLY
Status: DISCONTINUED | OUTPATIENT
Start: 2020-02-22 | End: 2020-02-23 | Stop reason: HOSPADM

## 2020-02-22 RX ORDER — METOPROLOL SUCCINATE 25 MG/1
25 TABLET, EXTENDED RELEASE ORAL
Status: DISCONTINUED | OUTPATIENT
Start: 2020-02-22 | End: 2020-02-23 | Stop reason: HOSPADM

## 2020-02-22 RX ORDER — WARFARIN SODIUM 3 MG/1
3 TABLET ORAL
Status: COMPLETED | OUTPATIENT
Start: 2020-02-22 | End: 2020-02-22

## 2020-02-22 RX ORDER — LISINOPRIL 10 MG/1
20 TABLET ORAL 2 TIMES DAILY
Status: CANCELLED | OUTPATIENT
Start: 2020-02-22

## 2020-02-22 RX ORDER — CLONIDINE HYDROCHLORIDE 0.1 MG/1
0.2 TABLET ORAL 3 TIMES DAILY PRN
Status: CANCELLED | OUTPATIENT
Start: 2020-02-22

## 2020-02-22 RX ORDER — PRAVASTATIN SODIUM 10 MG
10 TABLET ORAL NIGHTLY
Status: DISCONTINUED | OUTPATIENT
Start: 2020-02-22 | End: 2020-02-22 | Stop reason: CLARIF

## 2020-02-22 RX ORDER — POLYETHYLENE GLYCOL 3350 17 G/17G
17 POWDER, FOR SOLUTION ORAL NIGHTLY
Status: DISCONTINUED | OUTPATIENT
Start: 2020-02-22 | End: 2020-02-23 | Stop reason: HOSPADM

## 2020-02-22 RX ORDER — OXYCODONE HYDROCHLORIDE 5 MG/1
10 TABLET ORAL EVERY 6 HOURS PRN
Status: DISCONTINUED | OUTPATIENT
Start: 2020-02-22 | End: 2020-02-23 | Stop reason: HOSPADM

## 2020-02-22 RX ORDER — AMLODIPINE BESYLATE 5 MG/1
5 TABLET ORAL
Status: DISCONTINUED | OUTPATIENT
Start: 2020-02-22 | End: 2020-02-23 | Stop reason: HOSPADM

## 2020-02-22 RX ORDER — OMEGA-3S/DHA/EPA/FISH OIL/D3 300MG-1000
400 CAPSULE ORAL DAILY
Status: DISCONTINUED | OUTPATIENT
Start: 2020-02-22 | End: 2020-02-23 | Stop reason: HOSPADM

## 2020-02-22 RX ORDER — SODIUM CHLORIDE 0.9 % (FLUSH) 0.9 %
10 SYRINGE (ML) INJECTION AS NEEDED
Status: DISCONTINUED | OUTPATIENT
Start: 2020-02-22 | End: 2020-02-23 | Stop reason: HOSPADM

## 2020-02-22 RX ORDER — FINASTERIDE 5 MG/1
5 TABLET, FILM COATED ORAL DAILY
Status: DISCONTINUED | OUTPATIENT
Start: 2020-02-22 | End: 2020-02-23 | Stop reason: HOSPADM

## 2020-02-22 RX ORDER — METHIMAZOLE 10 MG/1
5 TABLET ORAL EVERY 8 HOURS SCHEDULED
Status: DISCONTINUED | OUTPATIENT
Start: 2020-02-22 | End: 2020-02-23 | Stop reason: HOSPADM

## 2020-02-22 RX ORDER — ALBUTEROL SULFATE 2.5 MG/3ML
2.5 SOLUTION RESPIRATORY (INHALATION) EVERY 6 HOURS PRN
Status: CANCELLED | OUTPATIENT
Start: 2020-02-22

## 2020-02-22 RX ADMIN — WARFARIN 3 MG: 3 TABLET ORAL at 17:08

## 2020-02-22 RX ADMIN — POLYETHYLENE GLYCOL (3350) 17 G: 17 POWDER, FOR SOLUTION ORAL at 20:18

## 2020-02-22 RX ADMIN — TAMSULOSIN HYDROCHLORIDE 0.4 MG: 0.4 CAPSULE ORAL at 09:23

## 2020-02-22 RX ADMIN — MAGNESIUM SULFATE HEPTAHYDRATE 2 G: 40 INJECTION, SOLUTION INTRAVENOUS at 02:51

## 2020-02-22 RX ADMIN — SODIUM CHLORIDE, PRESERVATIVE FREE 10 ML: 5 INJECTION INTRAVENOUS at 09:23

## 2020-02-22 RX ADMIN — FINASTERIDE 5 MG: 5 TABLET, FILM COATED ORAL at 09:23

## 2020-02-22 RX ADMIN — METHIMAZOLE 5 MG: 10 TABLET ORAL at 15:33

## 2020-02-22 RX ADMIN — Medication 1000 MCG: at 09:23

## 2020-02-22 RX ADMIN — METOPROLOL SUCCINATE 25 MG: 25 TABLET, EXTENDED RELEASE ORAL at 11:13

## 2020-02-22 RX ADMIN — DILTIAZEM HYDROCHLORIDE 10 MG/HR: 100 INJECTION, POWDER, LYOPHILIZED, FOR SOLUTION INTRAVENOUS at 17:09

## 2020-02-22 RX ADMIN — OXYCODONE HYDROCHLORIDE 10 MG: 5 TABLET ORAL at 20:18

## 2020-02-22 RX ADMIN — NICOTINE 1 PATCH: 7 PATCH, EXTENDED RELEASE TRANSDERMAL at 09:23

## 2020-02-22 RX ADMIN — METHIMAZOLE 5 MG: 10 TABLET ORAL at 20:18

## 2020-02-22 RX ADMIN — OXYCODONE HYDROCHLORIDE 5 MG: 5 TABLET ORAL at 14:32

## 2020-02-22 RX ADMIN — SODIUM CHLORIDE, PRESERVATIVE FREE 10 ML: 5 INJECTION INTRAVENOUS at 02:51

## 2020-02-22 RX ADMIN — AMLODIPINE BESYLATE 5 MG: 5 TABLET ORAL at 09:23

## 2020-02-22 RX ADMIN — ROSUVASTATIN CALCIUM 10 MG: 10 TABLET, FILM COATED ORAL at 20:19

## 2020-02-22 RX ADMIN — CHOLECALCIFEROL TAB 10 MCG (400 UNIT) 400 UNITS: 10 TAB at 09:23

## 2020-02-23 VITALS
WEIGHT: 156.9 LBS | OXYGEN SATURATION: 98 % | DIASTOLIC BLOOD PRESSURE: 76 MMHG | SYSTOLIC BLOOD PRESSURE: 126 MMHG | HEART RATE: 65 BPM | HEIGHT: 68 IN | RESPIRATION RATE: 18 BRPM | BODY MASS INDEX: 23.78 KG/M2 | TEMPERATURE: 98.5 F

## 2020-02-23 LAB
ANION GAP SERPL CALCULATED.3IONS-SCNC: 13.6 MMOL/L (ref 5–15)
BACTERIA SPEC AEROBE CULT: NORMAL
BH CV ECHO MEAS - AI DEC SLOPE: 285 CM/SEC^2
BH CV ECHO MEAS - AI MAX PG: 67.6 MMHG
BH CV ECHO MEAS - AI MAX VEL: 411 CM/SEC
BH CV ECHO MEAS - AI P1/2T: 422.4 MSEC
BH CV ECHO MEAS - AO MAX PG (FULL): 34.6 MMHG
BH CV ECHO MEAS - AO MAX PG: 37.5 MMHG
BH CV ECHO MEAS - AO MEAN PG (FULL): 21 MMHG
BH CV ECHO MEAS - AO MEAN PG: 23 MMHG
BH CV ECHO MEAS - AO V2 MAX: 305.3 CM/SEC
BH CV ECHO MEAS - AO V2 MEAN: 224 CM/SEC
BH CV ECHO MEAS - AO V2 VTI: 64.3 CM
BH CV ECHO MEAS - AVA(I,A): 0.9 CM^2
BH CV ECHO MEAS - AVA(I,D): 0.9 CM^2
BH CV ECHO MEAS - AVA(V,A): 0.78 CM^2
BH CV ECHO MEAS - AVA(V,D): 0.78 CM^2
BH CV ECHO MEAS - BSA(HAYCOCK): 1.8 M^2
BH CV ECHO MEAS - BSA: 1.8 M^2
BH CV ECHO MEAS - BZI_BMI: 24.4 KILOGRAMS/M^2
BH CV ECHO MEAS - BZI_METRIC_HEIGHT: 170.2 CM
BH CV ECHO MEAS - BZI_METRIC_WEIGHT: 70.8 KG
BH CV ECHO MEAS - EDV(CUBED): 33.4 ML
BH CV ECHO MEAS - EDV(MOD-SP4): 36.8 ML
BH CV ECHO MEAS - EDV(TEICH): 41.6 ML
BH CV ECHO MEAS - EF(CUBED): 54.9 %
BH CV ECHO MEAS - EF(MOD-SP4): 67.1 %
BH CV ECHO MEAS - EF(TEICH): 47.9 %
BH CV ECHO MEAS - ESV(CUBED): 15.1 ML
BH CV ECHO MEAS - ESV(MOD-SP4): 12.1 ML
BH CV ECHO MEAS - ESV(TEICH): 21.7 ML
BH CV ECHO MEAS - FS: 23.3 %
BH CV ECHO MEAS - IVS/LVPW: 0.94
BH CV ECHO MEAS - IVSD: 1.2 CM
BH CV ECHO MEAS - LV DIASTOLIC VOL/BSA (35-75): 20.2 ML/M^2
BH CV ECHO MEAS - LV MASS(C)D: 131.8 GRAMS
BH CV ECHO MEAS - LV MASS(C)DI: 72.5 GRAMS/M^2
BH CV ECHO MEAS - LV MAX PG: 2.8 MMHG
BH CV ECHO MEAS - LV MEAN PG: 2 MMHG
BH CV ECHO MEAS - LV SYSTOLIC VOL/BSA (12-30): 6.7 ML/M^2
BH CV ECHO MEAS - LV V1 MAX: 84.2 CM/SEC
BH CV ECHO MEAS - LV V1 MEAN: 61.5 CM/SEC
BH CV ECHO MEAS - LV V1 VTI: 20.5 CM
BH CV ECHO MEAS - LVIDD: 3.2 CM
BH CV ECHO MEAS - LVIDS: 2.5 CM
BH CV ECHO MEAS - LVLD AP4: 7.4 CM
BH CV ECHO MEAS - LVLS AP4: 6.6 CM
BH CV ECHO MEAS - LVOT AREA (M): 2.8 CM^2
BH CV ECHO MEAS - LVOT AREA: 2.8 CM^2
BH CV ECHO MEAS - LVOT DIAM: 1.9 CM
BH CV ECHO MEAS - LVPWD: 1.3 CM
BH CV ECHO MEAS - MV E MAX VEL: 133 CM/SEC
BH CV ECHO MEAS - PA ACC TIME: 0.07 SEC
BH CV ECHO MEAS - PA PR(ACCEL): 47.5 MMHG
BH CV ECHO MEAS - RAP SYSTOLE: 10 MMHG
BH CV ECHO MEAS - RVSP: 56.7 MMHG
BH CV ECHO MEAS - SI(CUBED): 10.1 ML/M^2
BH CV ECHO MEAS - SI(LVOT): 31.9 ML/M^2
BH CV ECHO MEAS - SI(MOD-SP4): 13.6 ML/M^2
BH CV ECHO MEAS - SI(TEICH): 11 ML/M^2
BH CV ECHO MEAS - SV(CUBED): 18.3 ML
BH CV ECHO MEAS - SV(LVOT): 58.1 ML
BH CV ECHO MEAS - SV(MOD-SP4): 24.7 ML
BH CV ECHO MEAS - SV(TEICH): 19.9 ML
BH CV ECHO MEAS - TR MAX VEL: 341.5 CM/SEC
BUN BLD-MCNC: 24 MG/DL (ref 8–23)
BUN/CREAT SERPL: 21.8 (ref 7–25)
CALCIUM SPEC-SCNC: 9.3 MG/DL (ref 8.6–10.5)
CHLORIDE SERPL-SCNC: 102 MMOL/L (ref 98–107)
CO2 SERPL-SCNC: 23.4 MMOL/L (ref 22–29)
CREAT BLD-MCNC: 1.1 MG/DL (ref 0.76–1.27)
DEPRECATED RDW RBC AUTO: 45.7 FL (ref 37–54)
ERYTHROCYTE [DISTWIDTH] IN BLOOD BY AUTOMATED COUNT: 15 % (ref 12.3–15.4)
GFR SERPL CREATININE-BSD FRML MDRD: 65 ML/MIN/1.73
GLUCOSE BLD-MCNC: 99 MG/DL (ref 65–99)
HCT VFR BLD AUTO: 39.2 % (ref 37.5–51)
HGB BLD-MCNC: 11.5 G/DL (ref 13–17.7)
INR PPP: 3.37 (ref 0.9–1.1)
LV EF 2D ECHO EST: 70 %
MCH RBC QN AUTO: 24.1 PG (ref 26.6–33)
MCHC RBC AUTO-ENTMCNC: 29.3 G/DL (ref 31.5–35.7)
MCV RBC AUTO: 82.2 FL (ref 79–97)
PLATELET # BLD AUTO: 234 10*3/MM3 (ref 140–450)
PMV BLD AUTO: 10.7 FL (ref 6–12)
POTASSIUM BLD-SCNC: 4.9 MMOL/L (ref 3.5–5.2)
PROTHROMBIN TIME: 35.7 SECONDS (ref 11–15.4)
RBC # BLD AUTO: 4.77 10*6/MM3 (ref 4.14–5.8)
SODIUM BLD-SCNC: 139 MMOL/L (ref 136–145)
WBC NRBC COR # BLD: 6.73 10*3/MM3 (ref 3.4–10.8)

## 2020-02-23 PROCEDURE — 85610 PROTHROMBIN TIME: CPT | Performed by: HOSPITALIST

## 2020-02-23 PROCEDURE — 94799 UNLISTED PULMONARY SVC/PX: CPT

## 2020-02-23 PROCEDURE — 99239 HOSP IP/OBS DSCHRG MGMT >30: CPT | Performed by: INTERNAL MEDICINE

## 2020-02-23 PROCEDURE — 80048 BASIC METABOLIC PNL TOTAL CA: CPT | Performed by: INTERNAL MEDICINE

## 2020-02-23 PROCEDURE — 85027 COMPLETE CBC AUTOMATED: CPT | Performed by: INTERNAL MEDICINE

## 2020-02-23 PROCEDURE — 99232 SBSQ HOSP IP/OBS MODERATE 35: CPT | Performed by: PHYSICIAN ASSISTANT

## 2020-02-23 RX ORDER — METHIMAZOLE 5 MG/1
5 TABLET ORAL 3 TIMES DAILY
Qty: 90 TABLET | Refills: 1 | Status: SHIPPED | OUTPATIENT
Start: 2020-02-23 | End: 2020-03-12

## 2020-02-23 RX ORDER — METOPROLOL SUCCINATE 25 MG/1
25 TABLET, EXTENDED RELEASE ORAL
Qty: 30 TABLET | Refills: 1 | Status: SHIPPED | OUTPATIENT
Start: 2020-02-24 | End: 2020-04-22 | Stop reason: SDUPTHER

## 2020-02-23 RX ADMIN — METHIMAZOLE 5 MG: 10 TABLET ORAL at 03:54

## 2020-02-23 RX ADMIN — Medication 1000 MCG: at 08:02

## 2020-02-23 RX ADMIN — SODIUM CHLORIDE, PRESERVATIVE FREE 10 ML: 5 INJECTION INTRAVENOUS at 08:03

## 2020-02-23 RX ADMIN — DILTIAZEM HYDROCHLORIDE 10 MG/HR: 100 INJECTION, POWDER, LYOPHILIZED, FOR SOLUTION INTRAVENOUS at 03:54

## 2020-02-23 RX ADMIN — FINASTERIDE 5 MG: 5 TABLET, FILM COATED ORAL at 08:02

## 2020-02-23 RX ADMIN — METOPROLOL SUCCINATE 25 MG: 25 TABLET, EXTENDED RELEASE ORAL at 08:02

## 2020-02-23 RX ADMIN — AMLODIPINE BESYLATE 5 MG: 5 TABLET ORAL at 08:02

## 2020-02-23 RX ADMIN — OXYCODONE HYDROCHLORIDE 10 MG: 5 TABLET ORAL at 08:15

## 2020-02-23 RX ADMIN — NICOTINE 1 PATCH: 7 PATCH, EXTENDED RELEASE TRANSDERMAL at 08:02

## 2020-02-23 RX ADMIN — TAMSULOSIN HYDROCHLORIDE 0.4 MG: 0.4 CAPSULE ORAL at 08:02

## 2020-02-23 RX ADMIN — CHOLECALCIFEROL TAB 10 MCG (400 UNIT) 400 UNITS: 10 TAB at 08:02

## 2020-02-23 RX ADMIN — METHIMAZOLE 5 MG: 10 TABLET ORAL at 14:53

## 2020-02-24 ENCOUNTER — READMISSION MANAGEMENT (OUTPATIENT)
Dept: CALL CENTER | Facility: HOSPITAL | Age: 80
End: 2020-02-24

## 2020-02-24 NOTE — OUTREACH NOTE
Prep Survey      Responses   Facility patient discharged from?  [facility]   Is patient eligible?  Yes   Discharge diagnosis  AFIB/RVR,  hyperthyroidism,  HTN,  HLD   Does the patient have one of the following disease processes/diagnoses(primary or secondary)?  Other   Does the patient have Home health ordered?  No   Is there a DME ordered?  No   Comments regarding appointments  see AVS - to be notified   Medication alerts for this patient  STOP coumadin   Prep survey completed?  Yes          Santa Snow RN

## 2020-02-25 ENCOUNTER — READMISSION MANAGEMENT (OUTPATIENT)
Dept: CALL CENTER | Facility: HOSPITAL | Age: 80
End: 2020-02-25

## 2020-02-25 NOTE — OUTREACH NOTE
Medical Week 1 Survey      Responses   Facility patient discharged from?  [facility]   Does the patient have one of the following disease processes/diagnoses(primary or secondary)?  Other   Is there a successful TCM telephone encounter documented?  No   Week 1 attempt successful?  No   Unsuccessful attempts  Attempt 1          Yevgeniy Canas RN

## 2020-02-26 ENCOUNTER — OFFICE VISIT (OUTPATIENT)
Dept: PULMONOLOGY | Facility: CLINIC | Age: 80
End: 2020-02-26

## 2020-02-26 VITALS
SYSTOLIC BLOOD PRESSURE: 147 MMHG | BODY MASS INDEX: 22.73 KG/M2 | OXYGEN SATURATION: 99 % | WEIGHT: 150 LBS | HEART RATE: 110 BPM | TEMPERATURE: 98 F | DIASTOLIC BLOOD PRESSURE: 83 MMHG | HEIGHT: 68 IN

## 2020-02-26 DIAGNOSIS — J44.9 CHRONIC OBSTRUCTIVE PULMONARY DISEASE, UNSPECIFIED COPD TYPE (HCC): Primary | ICD-10-CM

## 2020-02-26 LAB
BACTERIA SPEC AEROBE CULT: NORMAL
BACTERIA SPEC AEROBE CULT: NORMAL

## 2020-02-26 PROCEDURE — 99202 OFFICE O/P NEW SF 15 MIN: CPT | Performed by: INTERNAL MEDICINE

## 2020-02-26 PROCEDURE — 94010 BREATHING CAPACITY TEST: CPT | Performed by: INTERNAL MEDICINE

## 2020-02-26 NOTE — PROGRESS NOTES
Subjective   Chief Complaint   Patient presents with   • COPD       Zak Olmstead is a 79 y.o. male     History of Present Ejpjcoz-3-wvik-old male referred for evaluation of COPD he came to the office with his wife into the emergency room February 28 because of tachycardia had a chest x-ray that was clear CT that ruled out PE.  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 and then back operations twice            Review of Systems continued cigarette cough and some rattling in his chest and some shortness of breath no chest pain palpitation or edema    Family History   Problem Relation Age of Onset   • Diabetes Other    • Heart disease Other        Past Medical History:   Diagnosis Date   • AAA (abdominal aortic aneurysm) (CMS/HCC)     s/p repair   • BPH (benign prostatic hyperplasia)    • COPD (chronic obstructive pulmonary disease) (CMS/[facility])    • Heart murmur    • Hypertension    • Mononeuritis multiplex    • NSTEMI (non-ST elevated myocardial infarction) (CMS/[facility]) 2018   • PVD (peripheral vascular disease) (CMS/[facility])        Past Surgical History:   Procedure Laterality Date   • ABDOMINAL AORTIC ANEURYSM REPAIR     • APPENDECTOMY     • BACK SURGERY     • BELOW KNEE LEG AMPUTATION Left    • CARDIAC CATHETERIZATION N/A 6/1/2018    Procedure: Left Heart Cath;  Surgeon: Derik Vega MD;  Location: [facility] CATH INVASIVE LOCATION;  Service: Cardiovascular   • COLONOSCOPY N/A 6/15/2017    Procedure: COLONOSCOPY  CPTCODE:81956;  Surgeon: Lincoln Bowens III, MD;  Location: [facility] OR;  Service:    • ENDOSCOPY N/A 6/15/2017    Procedure: ESOPHAGOGASTRODUODENOSCOPY WITH BIOPSY  CPTCODE:35236;  Surgeon: Lincoln Bowens III, MD;  Location: [facility] OR;  Service:    • ESOPHAGEAL DILATATION     • INTERVENTIONAL RADIOLOGY PROCEDURE Left 6/1/2018     Procedure: Abdominal Aortagram with Runoff;  Surgeon: Derik Vega MD;  Location: [facility] INVASIVE LOCATION;  Service: Cardiovascular       Social History     Socioeconomic History   • Marital status:      Spouse name: Not on file   • Number of children: Not on file   • Years of education: Not on file   • Highest education level: Not on file   Tobacco Use   • Smoking status: Current Every Day Smoker     Packs/day: 0.50     Years: 60.00     Pack years: 30.00     Types: Cigarettes   • Smokeless tobacco: Never Used   Substance and Sexual Activity   • Alcohol use: No   • Drug use: No   • Sexual activity: Not Currently        Physical Exam: Chronically ill in no acute distress scattered expiratory rhonchi heart regular no clubbing cyanosis or edema in the wheelchair and tablet below-knee amputation on the left side    PFT: FVC 46 FEV1 22% indicating severe obstructive impairment    Imaging: X-ray clear no cardiomegaly on x-ray of February 21 in the ER CT PE negative for PE    Other Labs:       ASSESSMENT COPD severe        Recommendations strongly urged him to quit smoking suggested NicoDerm patches or even chewing tobacco.  Wrote a prescription for Trelegy to use it once a day theophylline 600 mg once a day and use nebulizer at least twice a day:     Follow up: Return in 3 weeks with PFT and further adjustment of medications explained to him that if he continues to smoke medications will not help him and he should get a flu shot this year and every year

## 2020-02-27 ENCOUNTER — READMISSION MANAGEMENT (OUTPATIENT)
Dept: CALL CENTER | Facility: HOSPITAL | Age: 80
End: 2020-02-27

## 2020-02-27 NOTE — OUTREACH NOTE
Medical Week 1 Survey      Responses   Facility patient discharged from?  [facility]   Does the patient have one of the following disease processes/diagnoses(primary or secondary)?  Other   Is there a successful TCM telephone encounter documented?  No   Week 1 attempt successful?  Yes   Call start time  1329   Call end time  1341   Discharge diagnosis  AFIB/RVR,  hyperthyroidism,  HTN,  HLD   Is patient permission given to speak with other caregiver?  Yes   List who call center can speak with  Pat Ishan-spouse   Person spoke with today (if not patient) and relationship  Pat Olmstead-spouse and patient   Meds reviewed with patient/caregiver?  Yes   Is the patient having any side effects they believe may be caused by any medication additions or changes?  No   Does the patient have all medications ordered at discharge?  Yes   Is the patient taking all medications as directed (includes completed medication regime)?  Yes   Medication comments  PCP wrote for Eliquis 2/26/20. She want PT/INR level < 2 before starts it. Pat is aware of when to start Eliquis.   Comments regarding appointments  PT/INR recheck on 3/2/20   Does the patient have a primary care provider?   Yes   Does the patient have an appointment with their PCP within 7 days of discharge?  Yes   Comments regarding PCP  PCP appt 2/26/20 in the home   Has the patient kept scheduled appointments due by today?  N/A   Psychosocial issues?  No   Did the patient receive a copy of their discharge instructions?  Yes   Nursing interventions  Reviewed instructions with patient, Educated on MyChart   What is the patient's perception of their health status since discharge?  Same   Is the patient/caregiver able to teach back signs and symptoms related to disease process for when to call PCP?  Yes   Is the patient/caregiver able to teach back signs and symptoms related to disease process for when to call 911?  Yes   Is the patient/caregiver able to teach back the hierarchy of who  to call/visit for symptoms/problems? PCP, Specialist, Home health nurse, Urgent Care, ED, 911  Yes   If the patient is a current smoker, are they able to teach back resources for cessation?  Smoking cessation medications, [id number] [Smoker. Patient and wife are trying to stop.]   Week 1 call completed?  Yes          Shanna Martínez RN

## 2020-03-06 ENCOUNTER — READMISSION MANAGEMENT (OUTPATIENT)
Dept: CALL CENTER | Facility: HOSPITAL | Age: 80
End: 2020-03-06

## 2020-03-06 NOTE — OUTREACH NOTE
Medical Week 2 Survey      Responses   [facility] patient discharged from?  [facility]   Does the patient have one of the following disease processes/diagnoses(primary or secondary)?  Other   Week 2 attempt successful?  Yes   Call start time  1507   Discharge diagnosis  AFIB/RVR,  hyperthyroidism,  HTN,  HLD   Call end time  1509   Meds reviewed with patient/caregiver?  Yes   Is the patient taking all medications as directed (includes completed medication regime)?  Yes   Psychosocial issues?  No   Comments  Pt reports HR has been up to 120, MD aware. Denies CP/SOA   What is the patient's perception of their health status since discharge?  Improving   Week 2 Call Completed?  Yes          Donna Martell RN

## 2020-03-10 ENCOUNTER — OFFICE VISIT (OUTPATIENT)
Dept: CARDIOLOGY | Facility: CLINIC | Age: 80
End: 2020-03-10

## 2020-03-10 VITALS
OXYGEN SATURATION: 93 % | WEIGHT: 150 LBS | DIASTOLIC BLOOD PRESSURE: 82 MMHG | BODY MASS INDEX: 22.73 KG/M2 | HEIGHT: 68 IN | SYSTOLIC BLOOD PRESSURE: 110 MMHG | HEART RATE: 101 BPM

## 2020-03-10 DIAGNOSIS — I48.91 ATRIAL FIBRILLATION WITH RAPID VENTRICULAR RESPONSE (HCC): ICD-10-CM

## 2020-03-10 DIAGNOSIS — I21.4 NSTEMI (NON-ST ELEVATED MYOCARDIAL INFARCTION) (HCC): ICD-10-CM

## 2020-03-10 DIAGNOSIS — Z72.0 TOBACCO USE: ICD-10-CM

## 2020-03-10 DIAGNOSIS — I73.9 PERIPHERAL VASCULAR DISEASE (HCC): Primary | ICD-10-CM

## 2020-03-10 PROCEDURE — 93000 ELECTROCARDIOGRAM COMPLETE: CPT | Performed by: INTERNAL MEDICINE

## 2020-03-10 PROCEDURE — 99214 OFFICE O/P EST MOD 30 MIN: CPT | Performed by: INTERNAL MEDICINE

## 2020-03-10 RX ORDER — DILTIAZEM HYDROCHLORIDE 240 MG/1
240 CAPSULE, COATED, EXTENDED RELEASE ORAL DAILY
COMMUNITY
End: 2020-03-10 | Stop reason: SDUPTHER

## 2020-03-10 RX ORDER — APIXABAN 2.5 MG/1
TABLET, FILM COATED ORAL
COMMUNITY
Start: 2020-03-09 | End: 2020-03-10

## 2020-03-10 RX ORDER — DILTIAZEM HYDROCHLORIDE 240 MG/1
240 CAPSULE, COATED, EXTENDED RELEASE ORAL DAILY
Qty: 30 CAPSULE | Refills: 5 | Status: ON HOLD | OUTPATIENT
Start: 2020-03-10 | End: 2020-08-24 | Stop reason: SDUPTHER

## 2020-03-11 ENCOUNTER — OFFICE VISIT (OUTPATIENT)
Dept: ENDOCRINOLOGY | Facility: CLINIC | Age: 80
End: 2020-03-11

## 2020-03-11 VITALS
BODY MASS INDEX: 23.79 KG/M2 | SYSTOLIC BLOOD PRESSURE: 108 MMHG | DIASTOLIC BLOOD PRESSURE: 64 MMHG | WEIGHT: 157 LBS | OXYGEN SATURATION: 96 % | HEART RATE: 96 BPM | HEIGHT: 68 IN

## 2020-03-11 DIAGNOSIS — E05.90 HYPERTHYROIDISM: Primary | ICD-10-CM

## 2020-03-11 PROCEDURE — 99204 OFFICE O/P NEW MOD 45 MIN: CPT | Performed by: INTERNAL MEDICINE

## 2020-03-11 NOTE — PROGRESS NOTES
Chief Complaint   Patient presents with   • Establish Care   • Hyperthyroidism     referred by Dr. DINORA Monroy        New patient who is being seen in consultation regarding hyperthyroidism at the request of Freddie Monroy*     HPI   Zak Olmstead is a 79 y.o. male who presents for evaluation of hyperthyroidism.    Patient presents today for evaluation of hyperthyroidism was recently diagnosed in February 2020 during hospital admission for atrial fibrillation with RVR.  Patient denies known history of thyroid dysfunction prior to this. Patient is currently taking methimazole 5 mg 3 times daily which was started during hospitalization approximately 2 and half weeks ago.    Patient denies palpiltations, anxiety.   Patient denies changes in bowel habits.   Patient reports cold intolerance.  Patient denies recent changes in weight.  Patient did have significant weight loss following his left lower extremity amputation several years ago.  Weight recently stable.  Patient reports tremor.  Patient reports decreased energy level.    Patient denies history of previous head/neck radiation.  Patient reports history of recent iodine exposure. CT PE in February 2020  Patient denies taking OTC supplements such as biotin.  Patient has a granddaughter with thyroid cancer of unknown type, found incidentally during thyroidectomy for goiter.    Patient reports atrial fibrillation with RVR was a new diagnosis during recent hospitalization, he has never taken amiodarone.    Past Medical History:   Diagnosis Date   • AAA (abdominal aortic aneurysm) (CMS/[facility])     s/p repair   • BPH (benign prostatic hyperplasia)    • COPD (chronic obstructive pulmonary disease) (CMS/[facility])    • Heart murmur    • Hypertension    • Mononeuritis multiplex    • NSTEMI (non-ST elevated myocardial infarction) (CMS/[facility]) 2018   • PVD (peripheral vascular disease) (CMS/[facility])      Past Surgical History:   Procedure Laterality Date   • ABDOMINAL AORTIC  ANEURYSM REPAIR     • APPENDECTOMY     • BACK SURGERY     • BELOW KNEE LEG AMPUTATION Left    • CARDIAC CATHETERIZATION N/A 6/1/2018    Procedure: Left Heart Cath;  Surgeon: Derik Vega MD;  Location: [facility] CATH INVASIVE LOCATION;  Service: Cardiovascular   • COLONOSCOPY N/A 6/15/2017    Procedure: COLONOSCOPY  CPTCODE:09077;  Surgeon: Lincoln Bowens III, MD;  Location: [facility] OR;  Service:    • ENDOSCOPY N/A 6/15/2017    Procedure: ESOPHAGOGASTRODUODENOSCOPY WITH BIOPSY  CPTCODE:56286;  Surgeon: Lincoln Bowens III, MD;  Location: [facility] OR;  Service:    • ESOPHAGEAL DILATATION     • INTERVENTIONAL RADIOLOGY PROCEDURE Left 6/1/2018    Procedure: Abdominal Aortagram with Runoff;  Surgeon: Derik Vega MD;  Location: [facility] CATH INVASIVE LOCATION;  Service: Cardiovascular      Family History   Problem Relation Age of Onset   • Heart disease Mother    • Heart failure Father    • Stroke Brother    • Heart attack Brother       Social History     Socioeconomic History   • Marital status:      Spouse name: Not on file   • Number of children: Not on file   • Years of education: Not on file   • Highest education level: Not on file   Tobacco Use   • Smoking status: Current Every Day Smoker     Packs/day: 0.50     Years: 60.00     Pack years: 30.00     Types: Cigarettes   • Smokeless tobacco: Never Used   Substance and Sexual Activity   • Alcohol use: No   • Drug use: No   • Sexual activity: Not Currently      No Known Allergies   Current Outpatient Medications on File Prior to Visit   Medication Sig Dispense Refill   • albuterol (PROVENTIL) (2.5 MG/3ML) 0.083% nebulizer solution Take 2.5 mg by nebulization Every 6 (Six) Hours As Needed for Wheezing.     • apixaban (ELIQUIS) 5 MG tablet tablet Take 1 tablet by mouth Every 12 (Twelve) Hours. 60 tablet 5   • cholecalciferol (VITAMIN D3) 400 units tablet Take 400 Units by mouth Daily.     • dilTIAZem CD (CARDIZEM CD) 240 MG 24 hr capsule Take 1 capsule by  "mouth Daily. 30 capsule 5   • lisinopril (PRINIVIL,ZESTRIL) 20 MG tablet Take 20 mg by mouth 2 (Two) Times a Day.     • methIMAzole (TAPAZOLE) 5 MG tablet Take 1 tablet by mouth 3 (Three) Times a Day. 90 tablet 1   • metoprolol succinate XL (TOPROL-XL) 25 MG 24 hr tablet Take 1 tablet by mouth Daily. 30 tablet 1   • naproxen (NAPROSYN) 500 MG tablet Take 500 mg by mouth Daily As Needed for Mild Pain .     • oxyCODONE (ROXICODONE) 10 MG tablet Take 10 mg by mouth 4 (Four) Times a Day.     • polyethylene glycol (MIRALAX) packet Take 17 g by mouth Every Night.     • pravastatin (PRAVACHOL) 10 MG tablet Take 10 mg by mouth Every Night.     • vitamin B-12 (CYANOCOBALAMIN) 1000 MCG tablet Take 1,000 mcg by mouth Daily.       No current facility-administered medications on file prior to visit.         Review of Systems   Constitutional: Positive for fatigue. Negative for unexpected weight gain.   HENT: Negative for trouble swallowing and voice change.    Respiratory: Positive for cough. Negative for shortness of breath.    Cardiovascular: Negative for chest pain and palpitations.   Gastrointestinal: Negative for abdominal pain and constipation.   Endocrine: Positive for cold intolerance. Negative for heat intolerance.   Musculoskeletal: Negative for arthralgias and myalgias.   Skin: Negative for dry skin and rash.   Neurological: Positive for tremors. Negative for headache.   Psychiatric/Behavioral: Negative for depressed mood. The patient is not nervous/anxious.         Vitals:    03/11/20 1113   BP: 108/64   Pulse: 96   SpO2: 96%   Weight: 71.2 kg (157 lb)   Height: 172.7 cm (68\")   Body mass index is 23.87 kg/m².     Physical Exam   Constitutional: Vital signs are normal. He appears well-developed. He is cooperative.   Chronically ill-appearing, seated in wheelchair   HENT:   Head: Normocephalic and atraumatic.   Mouth/Throat: Oropharynx is clear and moist and mucous membranes are normal.   Eyes: Pupils are equal, " round, and reactive to light. Conjunctivae and EOM are normal.   Neck: Trachea normal. No thyromegaly present.   No palpable thyroid nodule   Cardiovascular: Normal rate. An irregularly irregular rhythm present.   No murmur heard.  Pulmonary/Chest: Effort normal and breath sounds normal. No respiratory distress.   Abdominal: Soft. Bowel sounds are normal. He exhibits no distension. There is no hepatosplenomegaly. There is no tenderness.   Lymphadenopathy:        Head (right side): No submandibular adenopathy present.        Head (left side): No submandibular adenopathy present.     He has no cervical adenopathy.   Neurological: He is alert. He has normal strength and normal reflexes.   Skin: Skin is warm, dry and intact. No rash noted.   Psychiatric: He has a normal mood and affect. His behavior is normal.        Labs/Imaging  Results for MEIR PRECIADO (MRN [id number]) as of 3/11/2020 12:05   Ref. Range 2/22/2020 01:25   Free T4 Latest Ref Range: 0.93 - 1.70 ng/dL 2.40 (H)   T3, Free Latest Ref Range: 2.00 - 4.40 pg/mL 5.81 (H)       ULTRASOUND THYROID-      CLINICAL INDICATION: Primary hyperthyroidism.     COMPARISON: None available.     FINDINGS: Sonographic imaging of the thyroid shows the right lobe of the  thyroid measuring 5.22 x 2.86 cm. Small hypoechoic probably colloid cyst  measuring 6 mm is in the right lobe of the thyroid. No other mass is  seen in the right lobe     Left lobe measures 4.79 x 2.34 cm. No solid mass. No significant  enlargement.     IMPRESSION:  Small hypoechoic nodule in the right lobe of the thyroid.      This report was finalized on 2/23/2020 9:46 AM by Dr. Edmond Mike MD.    Assessment and Plan    Meir was seen today for establish care and hyperthyroidism.    Diagnoses and all orders for this visit:    Hyperthyroidism  -Diagnosed in February 2020 patient presented with atrial fibrillation with RVR  -Patient asymptomatic  -Patient taking metoprolol and diltiazem for heart rate  control, followed by cardiology  -Currently taking methimazole 5 mg 3 times daily, discussed with patient that he could take 50 mg once daily, if desired.  -We will repeat thyroid function test including free T3 and free T4 today and adjust methimazole dosing as clinically indicated.  -Clinical history not suggestive of thyroiditis.  Discussed that we are unable to currently obtain uptake and scan given CT PE performed approximately 3 weeks ago.  We will send thyroid antibodies today.  Patient does have a strong family history of thyroid disease.  -- Potential risks of untreated hyperthyroidism were discussed with patient.  -The risks and benefits of methimazole and when to seek care were reviewed with the patient. Potential risks include, but are not limited to, hepatic dysfunction, agranulocytosis, rash, vasculitis, iatrogenic hypothyroidism.  -     T4, Free  -     T3, Free  -     Thyroid Peroxidase Antibody  -     Thyrotropin Receptor Antibody  -     Thyroid Stimulating Immunoglobulin         Return in about 3 months (around 6/11/2020) for Next scheduled follow up. The patient was instructed to contact the clinic with any interval questions or concerns.    Monika Trihn MD     Please note that portions of this document were completed using a voice recognition program. Efforts were made to edit the dictations, but occasionally words are mis-transcribed.

## 2020-03-12 LAB
T3FREE SERPL-MCNC: 6.6 PG/ML (ref 2–4.4)
T4 FREE SERPL-MCNC: 3.19 NG/DL (ref 0.93–1.7)
THYROPEROXIDASE AB SERPL-ACNC: 9 IU/ML (ref 0–34)
TSH RECEP AB SER-ACNC: 3.03 IU/L (ref 0–1.75)
TSI SER-ACNC: 0.7 IU/L (ref 0–0.55)

## 2020-03-12 RX ORDER — METHIMAZOLE 10 MG/1
20 TABLET ORAL DAILY
Qty: 60 TABLET | Refills: 2 | Status: SHIPPED | OUTPATIENT
Start: 2020-03-12 | End: 2020-04-06

## 2020-03-13 ENCOUNTER — TELEPHONE (OUTPATIENT)
Dept: ENDOCRINOLOGY | Facility: CLINIC | Age: 80
End: 2020-03-13

## 2020-03-13 ENCOUNTER — READMISSION MANAGEMENT (OUTPATIENT)
Dept: CALL CENTER | Facility: HOSPITAL | Age: 80
End: 2020-03-13

## 2020-03-13 NOTE — TELEPHONE ENCOUNTER
Informed Pat, patient's wife of patient's lab results.    Pat requested that we mailed the lab orders for the patient, in order to have these labs done where they live.    Mailing orders.    Pat states they will try to have the labs done with [facility] call, out of [place].  They come to the patient's house for home care.

## 2020-03-13 NOTE — TELEPHONE ENCOUNTER
----- Message from Monika Trinh MD sent at 3/12/2020  4:44 PM EDT -----  Please advise patient that I have reviewed his recent labs.  His free thyroid hormones are further elevated from previous check, likely due to the contrast he received during hospitalization.  His thyroid antibodies were positive, indicating a likely autoimmune etiology of his hypothyroidism.  For now, I would like him to increase methimazole dose to 20 mg once daily.  I will send in an updated prescription to the pharmacy.  I would like him to repeat labs locally in approximately 3 weeks.  I will place an order in the chart for this to be completed.

## 2020-03-13 NOTE — OUTREACH NOTE
Medical Week 3 Survey      Responses   [facility] patient discharged from?  [facility]   Does the patient have one of the following disease processes/diagnoses(primary or secondary)?  Other   Week 3 attempt successful?  Yes   Call start time  1514   Call end time  1521   Discharge diagnosis  AFIB/RVR,  hyperthyroidism,  HTN,  HLD   Person spoke with today (if not patient) and relationship  Mirna Ishan-spouse and patient   Meds reviewed with patient/caregiver?  Yes   Is the patient taking all medications as directed (includes completed medication regime)?  Yes   Medication comments  ENdocrinology increased Methimazole 20mg daily   Has the patient kept scheduled appointments due by today?  Yes   Psychosocial issues?  No   Comments  Pt c/o fatigue/weakness. Pt HR been up to 150 with /68, pt denies CP, dizzines but having some SOA MD aware per wife.    What is the patient's perception of their health status since discharge?  Same   Is the patient/caregiver able to teach back the hierarchy of who to call/visit for symptoms/problems? PCP, Specialist, Home health nurse, Urgent Care, ED, 911  Yes   Week 3 Call Completed?  Yes          Donna Martell RN

## 2020-03-19 ENCOUNTER — HOSPITAL ENCOUNTER (OUTPATIENT)
Dept: ULTRASOUND IMAGING | Facility: HOSPITAL | Age: 80
Discharge: HOME | End: 2020-03-19
Admitting: INTERNAL MEDICINE

## 2020-03-19 ENCOUNTER — HOSPITAL ENCOUNTER (OUTPATIENT)
Dept: ULTRASOUND IMAGING | Facility: HOSPITAL | Age: 80
Discharge: HOME | End: 2020-03-19

## 2020-03-19 DIAGNOSIS — I48.91 ATRIAL FIBRILLATION WITH RAPID VENTRICULAR RESPONSE (HCC): ICD-10-CM

## 2020-03-19 DIAGNOSIS — I73.9 PERIPHERAL VASCULAR DISEASE (HCC): ICD-10-CM

## 2020-03-19 DIAGNOSIS — Z72.0 TOBACCO USE: ICD-10-CM

## 2020-03-19 DIAGNOSIS — I21.4 NSTEMI (NON-ST ELEVATED MYOCARDIAL INFARCTION) (HCC): ICD-10-CM

## 2020-03-19 PROCEDURE — 93926 LOWER EXTREMITY STUDY: CPT

## 2020-03-19 PROCEDURE — 93926 LOWER EXTREMITY STUDY: CPT | Performed by: RADIOLOGY

## 2020-03-19 PROCEDURE — 93923 UPR/LXTR ART STDY 3+ LVLS: CPT

## 2020-03-19 PROCEDURE — 93923 UPR/LXTR ART STDY 3+ LVLS: CPT | Performed by: RADIOLOGY

## 2020-03-20 ENCOUNTER — READMISSION MANAGEMENT (OUTPATIENT)
Dept: CALL CENTER | Facility: HOSPITAL | Age: 80
End: 2020-03-20

## 2020-03-20 NOTE — OUTREACH NOTE
Medical Week 4 Survey      Responses   [facility] patient discharged from?  [facility]   Does the patient have one of the following disease processes/diagnoses(primary or secondary)?  Other   Week 4 attempt successful?  No          Shanna Martínez RN

## 2020-04-03 ENCOUNTER — LAB (OUTPATIENT)
Dept: LAB | Facility: HOSPITAL | Age: 80
End: 2020-04-03

## 2020-04-03 DIAGNOSIS — E05.90 HYPERTHYROIDISM: ICD-10-CM

## 2020-04-03 PROCEDURE — 84481 FREE ASSAY (FT-3): CPT

## 2020-04-03 PROCEDURE — 84439 ASSAY OF FREE THYROXINE: CPT

## 2020-04-04 LAB
T3FREE SERPL-MCNC: 2.7 PG/ML (ref 2–4.4)
T4 FREE SERPL-MCNC: 1.34 NG/DL (ref 0.82–1.77)

## 2020-04-06 ENCOUNTER — TELEPHONE (OUTPATIENT)
Dept: ENDOCRINOLOGY | Facility: CLINIC | Age: 80
End: 2020-04-06

## 2020-04-06 RX ORDER — METHIMAZOLE 10 MG/1
15 TABLET ORAL DAILY
Qty: 45 TABLET | Refills: 2 | Status: SHIPPED | OUTPATIENT
Start: 2020-04-06 | End: 2020-05-27 | Stop reason: SDUPTHER

## 2020-04-06 NOTE — TELEPHONE ENCOUNTER
----- Message from Monika Trinh MD sent at 4/6/2020  9:58 AM EDT -----  Please contact patient and schedule follow up in ~6 weeks. Okay for telehealth.

## 2020-04-06 NOTE — TELEPHONE ENCOUNTER
Informed patient of lab results, pt voiced understanding.    Offer to schedule patient's f/u appointment, pt states he will call us back to schedule.

## 2020-04-22 RX ORDER — METOPROLOL SUCCINATE 25 MG/1
25 TABLET, EXTENDED RELEASE ORAL
Qty: 30 TABLET | Refills: 3 | Status: SHIPPED | OUTPATIENT
Start: 2020-04-22 | End: 2020-08-24 | Stop reason: HOSPADM

## 2020-05-27 DIAGNOSIS — E05.90 HYPERTHYROIDISM: ICD-10-CM

## 2020-05-27 RX ORDER — METHIMAZOLE 10 MG/1
15 TABLET ORAL DAILY
Qty: 135 TABLET | Refills: 1 | Status: ON HOLD | OUTPATIENT
Start: 2020-05-27 | End: 2020-08-14

## 2020-06-02 DIAGNOSIS — N40.1 BPH WITH OBSTRUCTION/LOWER URINARY TRACT SYMPTOMS: Primary | ICD-10-CM

## 2020-06-02 DIAGNOSIS — N13.8 BPH WITH OBSTRUCTION/LOWER URINARY TRACT SYMPTOMS: Primary | ICD-10-CM

## 2020-06-02 RX ORDER — FINASTERIDE 5 MG/1
5 TABLET, FILM COATED ORAL DAILY
Qty: 90 TABLET | Refills: 3 | Status: SHIPPED | OUTPATIENT
Start: 2020-06-02

## 2020-06-02 NOTE — TELEPHONE ENCOUNTER
Called pt to verify which pharmacy he is using, got a refill request from [facility], he is using [facility], explained that he is due for an appt soon, he stated he would call when he needed us.

## 2020-06-10 ENCOUNTER — OFFICE VISIT (OUTPATIENT)
Dept: CARDIOLOGY | Facility: CLINIC | Age: 80
End: 2020-06-10

## 2020-06-10 VITALS
HEART RATE: 79 BPM | HEIGHT: 68 IN | WEIGHT: 150 LBS | DIASTOLIC BLOOD PRESSURE: 74 MMHG | BODY MASS INDEX: 22.73 KG/M2 | SYSTOLIC BLOOD PRESSURE: 120 MMHG

## 2020-06-10 DIAGNOSIS — R68.89 ABNORMAL ANKLE BRACHIAL INDEX (ABI): ICD-10-CM

## 2020-06-10 DIAGNOSIS — I35.0 NONRHEUMATIC AORTIC VALVE STENOSIS: ICD-10-CM

## 2020-06-10 DIAGNOSIS — I73.9 PERIPHERAL VASCULAR DISEASE (HCC): Primary | ICD-10-CM

## 2020-06-10 DIAGNOSIS — I48.91 ATRIAL FIBRILLATION WITH RAPID VENTRICULAR RESPONSE (HCC): ICD-10-CM

## 2020-06-10 DIAGNOSIS — I10 ESSENTIAL HYPERTENSION: ICD-10-CM

## 2020-06-10 PROCEDURE — 99214 OFFICE O/P EST MOD 30 MIN: CPT | Performed by: INTERNAL MEDICINE

## 2020-06-10 NOTE — PROGRESS NOTES
[facility] CARDIOLOGY  [address] [facility] [address]  [place] [address]  Phone: [phone]  Fax: [phone]    06/10/2020    Chief Complaint   Patient presents with   • Follow-up     3 Month    • Claudication        History:   Zak Olmstead is a 80 y.o. male presents today for a regular 3 months. He has complaints today of intermittent episodes of claudication RLE. He states that the right foot stays cold all the time. Throbs and no feeling in it. He has numbness from the knee down. He has a sedintary life style.  He his is unable to stand  Arterial US showed right femoral-femoral bypass graft is patent. Possible mild stenosis in the SFA. Diminshed velocities RLE distally.He has a past medical history significant of essential hypertension, chronic A. fib, COPD, nonrheumatic aortic valve stenosis, AAA repair in 2012 @ [facility] and PVD with left below-knee amputation.  The chart and medications were reviewed today. Problems above addressed this visit.      Past Medical History:   Diagnosis Date   • AAA (abdominal aortic aneurysm) (CMS/[facility])     s/p repair   • BPH (benign prostatic hyperplasia)    • COPD (chronic obstructive pulmonary disease) (CMS/[facility])    • Heart murmur    • Hypertension    • Mononeuritis multiplex    • NSTEMI (non-ST elevated myocardial infarction) (CMS/[facility]) 2018   • PVD (peripheral vascular disease) (CMS/[facility])        Past Surgical History:   Procedure Laterality Date   • ABDOMINAL AORTIC ANEURYSM REPAIR     • APPENDECTOMY     • BACK SURGERY     • BELOW KNEE LEG AMPUTATION Left    • CARDIAC CATHETERIZATION N/A 6/1/2018    Procedure: Left Heart Cath;  Surgeon: Derik Vega MD;  Location: [facility] CATH INVASIVE LOCATION;  Service: Cardiovascular   • COLONOSCOPY N/A 6/15/2017    Procedure: COLONOSCOPY  CPTCODE:04406;  Surgeon: Lincoln Bowens III, MD;  Location: [facility] OR;  Service:    • ENDOSCOPY N/A 6/15/2017    Procedure: ESOPHAGOGASTRODUODENOSCOPY WITH BIOPSY   CPTCODE:60204;  Surgeon: Lincoln Bowens III, MD;  Location: [facility] OR;  Service:    • ESOPHAGEAL DILATATION     • INTERVENTIONAL RADIOLOGY PROCEDURE Left 6/1/2018    Procedure: Abdominal Aortagram with Runoff;  Surgeon: Derik Vega MD;  Location: [facility] CATH INVASIVE LOCATION;  Service: Cardiovascular        Past Social History:  Social History     Socioeconomic History   • Marital status:      Spouse name: Not on file   • Number of children: Not on file   • Years of education: Not on file   • Highest education level: Not on file   Tobacco Use   • Smoking status: Current Every Day Smoker     Packs/day: 0.50     Years: 60.00     Pack years: 30.00     Types: Cigarettes   • Smokeless tobacco: Never Used   Substance and Sexual Activity   • Alcohol use: No   • Drug use: No   • Sexual activity: Not Currently       Past Family History:  Family History   Problem Relation Age of Onset   • Heart disease Mother    • Heart failure Father    • Stroke Brother    • Heart attack Brother        Review of Systems:   Please see HPI  Constitution: No chills, no rigors, no unexplained weight loss or weight gain  Eyes:  No diplopia, no blurred vision, no loss of vision, conjunctiva is pink and sclera is anicteric  ENT:  No tinnitus, no otorrhea, no epistaxis, no sore throat   Respiratory: No cough, no hemoptysis  Cardiovascular: see HPI  Gastrointestinal: No nausea, no vomiting, no hematemesis, no diarrhea or constipation, no melena  Genitourinary: No frequency of dysuria no hematuria  Integument: No pruritis and  no skin rash  Hematologic / Lymphatic: No excessive bleeding, easy bruising, fatigue, lymphadenopathy and petechiae  Musculoskeletal: No joint pain, joint stiffness, joint swelling, muscle pain, muscle weakness and neck pain  Neurological: No dizziness, headaches, light headedness, seizures and vertigo  Endocrine: No frequent urination and nocturia, temperature intolerance, weight gain, unintended and weight  loss, unintended      Current Outpatient Medications   Medication Sig Dispense Refill   • albuterol (PROVENTIL) (2.5 MG/3ML) 0.083% nebulizer solution Take 2.5 mg by nebulization Every 6 (Six) Hours As Needed for Wheezing.     • apixaban (ELIQUIS) 5 MG tablet tablet Take 1 tablet by mouth Every 12 (Twelve) Hours. 60 tablet 5   • cholecalciferol (VITAMIN D3) 400 units tablet Take 400 Units by mouth Daily.     • dilTIAZem CD (CARDIZEM CD) 240 MG 24 hr capsule Take 1 capsule by mouth Daily. 30 capsule 5   • finasteride (PROSCAR) 5 MG tablet Take 1 tablet by mouth Daily. 90 tablet 3   • lisinopril (PRINIVIL,ZESTRIL) 20 MG tablet Take 20 mg by mouth 2 (Two) Times a Day.     • methIMAzole (TAPAZOLE) 10 MG tablet Take 1.5 tablets by mouth Daily. 135 tablet 1   • metoprolol succinate XL (TOPROL-XL) 25 MG 24 hr tablet Take 1 tablet by mouth Daily. 30 tablet 3   • naproxen (NAPROSYN) 500 MG tablet Take 500 mg by mouth Daily As Needed for Mild Pain .     • oxyCODONE (ROXICODONE) 10 MG tablet Take 10 mg by mouth 4 (Four) Times a Day.     • polyethylene glycol (MIRALAX) packet Take 17 g by mouth Every Night.     • pravastatin (PRAVACHOL) 10 MG tablet Take 10 mg by mouth Every Night.     • vitamin B-12 (CYANOCOBALAMIN) 1000 MCG tablet Take 1,000 mcg by mouth Daily.       No current facility-administered medications for this visit.         No Known Allergies    Objective:  Vitals:    06/10/20 1516   BP: 120/74   Pulse: 79     Comfortable NAD  PERRL, conjunctiva clear  Neck supple, no JVD or thyromegaly appreciated  S1/S2 RRR, no m/r/g  Lungs CTA B, normal effort  Abdomen S/NT/ND (+) BS, no HSM appreciated  Extremities warm, no clubbing, cyanosis, or edema  Normal gait  No visible or palpable skin lesions  A/Ox4, mood and affect appropriate  Pulse exam:    Feet are warm bilateral  1+ edema bilateral  Capillary refill is normal  No evidence of ulceration or color change of the toes  PULSES  Right DP and PT are 1+ and Left DP and PT  are 2+    DATA:      Results for orders placed during the hospital encounter of 02/21/20   Transthoracic Echo Complete With Contrast if Necessary Per Protocol    Narrative · There is severe calcification of the aortic valve.  · Moderate aortic valve stenosis is present. Peak gradient of 37 and mean   23 mm Hg  · Estimated EF = 70%.  · Left ventricular systolic function is normal.  · Mild aortic valve regurgitation is present.  · Left ventricular wall thickness is consistent with mild concentric   hypertrophy.             Results for orders placed during the hospital encounter of 05/31/18   Abdominal Aortagram with Runoff    Narrative CARDIAC CATHETERIZATION / INTERVENTION REPORT     DATE OF PROCEDURE: 6/2/2018     INDICATION FOR PROCEDURE: NSTEMI       PROCEDURE PERFORMED:     1. Coronary Angiogram  2. Abdominal Aortogram  3. Unilateral Left Lower extremity angiography     PROCEDURE COMMENTS:    Zak Olmstead was brought to the cath lab and placed on the cardiac   catherization table in the postabsortive state. The patient was prepped   and draped according to the cath lab protocol under strict aseptic and   sterile condition. Patient's right femoral artery sight was prepped and   draped. Under fluoroscopic guidance the right femoral artery was punctured   using a micropuncture kit utilizing the modified Seldinger technique. 4   [place] sheath was introduced over a wire. After aspirating for blood it   was flushed with heparinized saline.    We initially attempted to access the right radial approach.  Needle entry   was successful with good return and wire was advanced however wire was   noted to obstruct at the mid forearm level indicating radial artery   stenosis/occlusion.  At this point she was not placed in the radial and he   had to switch to a femoral approach.    Initially right femoral access was attempted with the micropuncture needle   and a small injection on the right femoral was performed showing total    occlusion of the iliac artery and presence of patent femorofemoral bypass.    We had to proceed to left groin access with a micropuncture needle again.    This time access was obtained and wire was advanced and placement of 4   L[place]ian sheath.     We advanced Jyoti type diagnostic catheters over the wire. The  left and   right coronary arteries  were selectively engaged. Left and right coronary   angiogram were obtained in multiple orthogonal projections.  A pigtail   catheter was used to perform abdominal angiogram of the endovascular   graft.    After completion of the procedure the femoral sheath was removed in the   cath lab and hemostasis was achieved by manual compression. The patient   tolerated the procedure without complications.         FINDINGS:     1. CORONARY ANGIOGRAPHY: Right dominant coronary artery system.    The left main coronary artery bifurcated into the LAD and left circumflex   coronary.  The LAD coursed in the anterior interventricular groove, gave   rise to diagonal branches and reached the apex.    Left circumflex coursed in the left atrial ventricular groove and gives   rise to several marginal branches.    The right coronary artery course in the right atrial ventricular groove I   gave rise to several acute marginal branches.     Left main: 50% ostial stenosis is noted.  There is calcification.     LAD: Mild luminal irregularities are present   Ramus intermedius: Mild luminal disease   Left circumflex:  Mild plaque is noted   RCA: There is chronic total occlusion of the RCA in the mid segment and   there is presence of bridging collaterals as well as left to right   collaterals visualizing the distal RCA   PDA: No disease      Abdominal angiogram: Catheter were placed in the abdominal aorta above the   endograft and manual injection was performed which revealed patency of the   graft however the images suboptimal for full definition.    Left lower extremity angiogram: This is performed  lab sheet and it   revealed patency of the femorofemoral bypass however there is flow into   the profunda which is supplying collateral filling to the distal vessel.  Left SFA is totally occluded at the ostium 100% with no sign of   reconstitution up until the level of knee.  Below the knee imaging was not   performed as contrast was not reaching that segment for proper imaging.     Final impression:    mid RCA with heavy collateral filling which appears adequate  Mild disease of the left coronary system  Severe peripheral vascular disease with patent femorofemoral bypass  Patent endograft in the abdominal aorta with single limb into the left   common iliac.  Right common iliac arteries is totally occluded from the ostium to the   femoral.            RECOMMENDATIONS:   Continue medical management  No good targets for intervention.    EBL: 50 ML  No specimens were removed.  Plan:   Dual antiplatelet therapy.   Patient to be placed on beta blockers, lipid therapy, aspirin, and ACE   inhibitor.     Derik Vega MD  06/02/18  12:12 PM     Procedures       Assessment:  Pt's symptoms are likely from lumbosacral disease. He has pain, numbness, and throbbing from knee down. Appears to be originating from his back.   Atrial fibrillation with rapid ventricular response Chronically anticoagulated with Eliquis 5mg bid  Coronary artery disease S/P stenting in the past and with  in the RCA.  Abdominal aortic aneurysm status post repair  PAD s/p fem-fem bypas and left BKA  Mild to moderate aotic stenosis  PAD with no palpable pulses      Plan:  Continue with Eliquis, Lisinopril, Diltiazem, Metoprolol, and Pravastatin as prescribed  I will see him back in  months or sooner if needed.   He may proceed with back injections. May hold Eliquis 3 days prior to procedure. Restart 2 days post procedure.   Cee Palmer MD    Patient's Body mass index is 22.81 kg/m². BMI is within normal parameters. No follow-up required..          ICD-10-CM ICD-9-CM   1. Peripheral vascular disease (CMS/[facility]) I73.9 443.9   2. Abnormal ankle brachial index (MK) R68.89 796.4   3. Atrial fibrillation with rapid ventricular response (CMS/HCC) I48.91 427.31   4. Nonrheumatic aortic valve stenosis I35.0 424.1   5. Essential hypertension I10 401.9        Thank you for allowing me to participate in the care of Zak Olmstead. Feel free to contact me directly with any further questions or concerns.        Director, Cardiac Cath Lab    MARIPOSA Recio, acting as scribe for Derik Vega MD, [facility], [facility].   06/10/20  16:12     I have read and agree the documentation that has been completed regarding this visit.  By signing this record, I attest that the documentation was completed by my physical presence and is an accurate record of the encounter.  Voice recognition / transcription technology used for some documentation in this chart in attempt to mitigate substantial inefficiencies created by this electronic health record technology.  As a result, there may be some typos and.or nonsensical language introduced into the chart that either overlooked in editing/review and/or that I am unable to correct as patient care needs require me to prioritize my attention to bedside patient care rather than electronic documentation. MA

## 2020-07-21 ENCOUNTER — TELEPHONE (OUTPATIENT)
Dept: CARDIOLOGY | Facility: CLINIC | Age: 80
End: 2020-07-21

## 2020-08-07 PROCEDURE — 99282 EMERGENCY DEPT VISIT SF MDM: CPT

## 2020-08-08 ENCOUNTER — HOSPITAL ENCOUNTER (EMERGENCY)
Facility: HOSPITAL | Age: 80
Discharge: LEFT BEFORE DISCHARGE | End: 2020-08-08
Attending: FAMILY MEDICINE | Admitting: FAMILY MEDICINE

## 2020-08-08 ENCOUNTER — APPOINTMENT (OUTPATIENT)
Dept: GENERAL RADIOLOGY | Facility: HOSPITAL | Age: 80
End: 2020-08-08

## 2020-08-08 VITALS
RESPIRATION RATE: 16 BRPM | OXYGEN SATURATION: 95 % | DIASTOLIC BLOOD PRESSURE: 89 MMHG | WEIGHT: 150 LBS | TEMPERATURE: 98.4 F | HEART RATE: 104 BPM | HEIGHT: 68 IN | BODY MASS INDEX: 22.73 KG/M2 | SYSTOLIC BLOOD PRESSURE: 157 MMHG

## 2020-08-08 DIAGNOSIS — M79.605 LEFT LEG PAIN: Primary | ICD-10-CM
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
## 2020-08-08 RX ORDER — HYDROCODONE BITARTRATE AND ACETAMINOPHEN 7.5; 325 MG/1; MG/1
1 TABLET ORAL ONCE
Status: COMPLETED | OUTPATIENT
Start: 2020-08-08 | End: 2020-08-08

## 2020-08-08 RX ADMIN — HYDROCODONE BITARTRATE AND ACETAMINOPHEN 1 TABLET: 7.5; 325 TABLET ORAL at 02:21

## 2020-08-08 NOTE — ED PROVIDER NOTES
Subjective   80-year-old male presents to the emergency room with left leg pain.  Patient states he fell out of his wheelchair at home and thinks he may have twisted or broken his left leg.  Patient states this happened approximately 2 hours ago.  Patient states he can hardly move it and is concerned because he takes Eliquis daily.  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  Aggravating factors include movement.  Denies any alleviating factors.      History provided by:  Patient and spouse   used: No        Review of Systems   Constitutional: Negative.  Negative for fever.   HENT: Negative.    Respiratory: Negative.    Cardiovascular: Negative.  Negative for chest pain.   Gastrointestinal: Negative.  Negative for abdominal pain.   Endocrine: Negative.    Genitourinary: Negative.  Negative for dysuria.   Musculoskeletal:        (+) Left leg pain   Skin: Negative.    Neurological: Negative.    Psychiatric/Behavioral: Negative.    All other systems reviewed and are negative.      Past Medical History:   Diagnosis Date   • AAA (abdominal aortic aneurysm) (CMS/[facility])     s/p repair   • BPH (benign prostatic hyperplasia)    • COPD (chronic obstructive pulmonary disease) (CMS/[facility])    • Heart murmur    • Hypertension    • Mononeuritis multiplex    • NSTEMI (non-ST elevated myocardial infarction) (CMS/[facility]) 2018   • PVD (peripheral vascular disease) (CMS/[facility])        No Known Allergies    Past Surgical History:   Procedure Laterality Date   • ABDOMINAL AORTIC ANEURYSM REPAIR     • APPENDECTOMY     • BACK SURGERY     • BELOW KNEE LEG AMPUTATION Left    • CARDIAC CATHETERIZATION N/A 6/1/2018    Procedure: Left Heart Cath;  Surgeon: Derik Vega MD;  Location: [facility] INVASIVE LOCATION;  Service: Cardiovascular   • COLONOSCOPY N/A 6/15/2017    Procedure: COLONOSCOPY   CPTCODE:16149;  Surgeon: Lincoln Bowens III, MD;  Location: [facility] OR;  Service:    • ENDOSCOPY N/A 6/15/2017    Procedure: ESOPHAGOGASTRODUODENOSCOPY WITH BIOPSY  CPTCODE:30510;  Surgeon: Lincoln Bowens III, MD;  Location: [facility] OR;  Service:    • ESOPHAGEAL DILATATION     • INTERVENTIONAL RADIOLOGY PROCEDURE Left 6/1/2018    Procedure: Abdominal Aortagram with Runoff;  Surgeon: Derik Vega MD;  Location: [facility] CATH INVASIVE LOCATION;  Service: Cardiovascular       Family History   Problem Relation Age of Onset   • Heart disease Mother    • Heart failure Father    • Stroke Brother    • Heart attack Brother        Social History     Socioeconomic History   • Marital status:      Spouse name: Not on file   • Number of children: Not on file   • Years of education: Not on file   • Highest education level: Not on file   Tobacco Use   • Smoking status: Current Every Day Smoker     Packs/day: 0.50     Years: 60.00     Pack years: 30.00     Types: Cigarettes   • Smokeless tobacco: Never Used   Substance and Sexual Activity   • Alcohol use: No   • Drug use: No   • Sexual activity: Not Currently           Objective   Physical Exam   Constitutional: He is oriented to person, place, and time. He appears well-developed and well-nourished. No distress.   HENT:   Head: Normocephalic and atraumatic.   Right Ear: External ear normal.   Left Ear: External ear normal.   Nose: Nose normal.   Eyes: Pupils are equal, round, and reactive to light. Conjunctivae and EOM are normal.   Neck: Normal range of motion. Neck supple. No JVD present. No tracheal deviation present.   Cardiovascular: Normal rate, regular rhythm and normal heart sounds.   No murmur heard.  Pulmonary/Chest: Effort normal and breath sounds normal. No respiratory distress. He has no wheezes.   Abdominal: Soft. Bowel sounds are normal. There is no tenderness.   Musculoskeletal: Normal range of motion. He exhibits no edema or deformity.        Left  knee: Tenderness found.        Left upper leg: He exhibits tenderness and bony tenderness.        Legs:  Neurological: He is alert and oriented to person, place, and time. No cranial nerve deficit.   Skin: Skin is warm and dry. No rash noted. He is not diaphoretic. No erythema. No pallor.   Psychiatric: He has a normal mood and affect. His behavior is normal. Thought content normal.   Nursing note and vitals reviewed.      Procedures           ED Course  ED Course as of Aug 08 0340   Sat Aug 08, 2020   0243 Pt refused xrays, states he is ready to go home and will figure his leg pain out another day.    [TK]      ED Course User Index  [TK] Britney Granda PA-C                                           MDM  Number of Diagnoses or Management Options  Fall, initial encounter: new and requires workup  Left leg pain: new and requires workup  Risk of Complications, Morbidity, and/or Mortality  Presenting problems: moderate  Diagnostic procedures: minimal  Management options: moderate    Patient Progress  Patient progress: stable      Final diagnoses:   Left leg pain   Fall, initial encounter            Britney Granda PA-C  08/08/20 0340

## 2020-08-08 NOTE — ED NOTES
Provider at bedside at this time explaining to pt the risks of leaving AMA and the benefits of staying and receiving treatment. Pt verbalized understanding and still wishes to sign out Pro De Guzman RN  08/08/20 0252

## 2020-08-08 NOTE — ED NOTES
Jose Alfredo dolan at bedside at this time to take pt for xray and pt continues to refuse, provider made aware     Pro Herndon, RN  08/08/20 3838

## 2020-08-14 ENCOUNTER — APPOINTMENT (OUTPATIENT)
Dept: GENERAL RADIOLOGY | Facility: HOSPITAL | Age: 80
End: 2020-08-14

## 2020-08-14 ENCOUNTER — HOSPITAL ENCOUNTER (INPATIENT)
Facility: HOSPITAL | Age: 80
LOS: 1 days | Discharge: ANOTHER HOSPITAL | End: 2020-08-15
Attending: FAMILY MEDICINE | Admitting: INTERNAL MEDICINE

## 2020-08-14 DIAGNOSIS — S72.002A CLOSED FRACTURE OF LEFT HIP, INITIAL ENCOUNTER (HCC): Primary | ICD-10-CM

## 2020-08-14 PROBLEM — S72.009A HIP FRACTURE (HCC): Status: ACTIVE | Noted: 2020-08-14

## 2020-08-14 LAB
ALBUMIN SERPL-MCNC: 3.57 G/DL (ref 3.5–5.2)
ALBUMIN/GLOB SERPL: 1.2 G/DL
ALP SERPL-CCNC: 109 U/L (ref 39–117)
ALT SERPL W P-5'-P-CCNC: 25 U/L (ref 1–41)
ANION GAP SERPL CALCULATED.3IONS-SCNC: 11 MMOL/L (ref 5–15)
AST SERPL-CCNC: 30 U/L (ref 1–40)
BACTERIA UR QL AUTO: ABNORMAL /HPF
BASOPHILS # BLD AUTO: 0.05 10*3/MM3 (ref 0–0.2)
BASOPHILS NFR BLD AUTO: 0.6 % (ref 0–1.5)
BILIRUB SERPL-MCNC: 3 MG/DL (ref 0–1.2)
BILIRUB UR QL STRIP: ABNORMAL
BUN SERPL-MCNC: 23 MG/DL (ref 8–23)
BUN/CREAT SERPL: 24.7 (ref 7–25)
CALCIUM SPEC-SCNC: 8.7 MG/DL (ref 8.6–10.5)
CHLORIDE SERPL-SCNC: 104 MMOL/L (ref 98–107)
CLARITY UR: CLEAR
CO2 SERPL-SCNC: 25 MMOL/L (ref 22–29)
COLOR UR: ABNORMAL
CREAT SERPL-MCNC: 0.93 MG/DL (ref 0.76–1.27)
DEPRECATED RDW RBC AUTO: 51.5 FL (ref 37–54)
EOSINOPHIL # BLD AUTO: 0.04 10*3/MM3 (ref 0–0.4)
EOSINOPHIL NFR BLD AUTO: 0.5 % (ref 0.3–6.2)
ERYTHROCYTE [DISTWIDTH] IN BLOOD BY AUTOMATED COUNT: 21.1 % (ref 12.3–15.4)
GFR SERPL CREATININE-BSD FRML MDRD: 78 ML/MIN/1.73
GLOBULIN UR ELPH-MCNC: 2.9 GM/DL
GLUCOSE SERPL-MCNC: 131 MG/DL (ref 65–99)
GLUCOSE UR STRIP-MCNC: NEGATIVE MG/DL
HCT VFR BLD AUTO: 34.6 % (ref 37.5–51)
HGB BLD-MCNC: 9.8 G/DL (ref 13–17.7)
HGB UR QL STRIP.AUTO: NEGATIVE
HYALINE CASTS UR QL AUTO: ABNORMAL /LPF
HYPOCHROMIA BLD QL: NORMAL
IMM GRANULOCYTES # BLD AUTO: 0.03 10*3/MM3 (ref 0–0.05)
IMM GRANULOCYTES NFR BLD AUTO: 0.4 % (ref 0–0.5)
KETONES UR QL STRIP: NEGATIVE
LEUKOCYTE ESTERASE UR QL STRIP.AUTO: ABNORMAL
LYMPHOCYTES # BLD AUTO: 1.26 10*3/MM3 (ref 0.7–3.1)
LYMPHOCYTES NFR BLD AUTO: 15.8 % (ref 19.6–45.3)
MCH RBC QN AUTO: 20.1 PG (ref 26.6–33)
MCHC RBC AUTO-ENTMCNC: 28.3 G/DL (ref 31.5–35.7)
MCV RBC AUTO: 71 FL (ref 79–97)
MICROCYTES BLD QL: NORMAL
MONOCYTES # BLD AUTO: 0.81 10*3/MM3 (ref 0.1–0.9)
MONOCYTES NFR BLD AUTO: 10.2 % (ref 5–12)
NEUTROPHILS NFR BLD AUTO: 5.77 10*3/MM3 (ref 1.7–7)
NEUTROPHILS NFR BLD AUTO: 72.5 % (ref 42.7–76)
NITRITE UR QL STRIP: POSITIVE
NRBC BLD AUTO-RTO: 0.3 /100 WBC (ref 0–0.2)
OVALOCYTES BLD QL SMEAR: NORMAL
PH UR STRIP.AUTO: <=5 [PH] (ref 5–8)
PLAT MORPH BLD: NORMAL
PLATELET # BLD AUTO: 218 10*3/MM3 (ref 140–450)
PMV BLD AUTO: 10.5 FL (ref 6–12)
POTASSIUM SERPL-SCNC: 4 MMOL/L (ref 3.5–5.2)
PROT SERPL-MCNC: 6.5 G/DL (ref 6–8.5)
PROT UR QL STRIP: ABNORMAL
RBC # BLD AUTO: 4.87 10*6/MM3 (ref 4.14–5.8)
RBC # UR: ABNORMAL /HPF
REF LAB TEST METHOD: ABNORMAL
SODIUM SERPL-SCNC: 140 MMOL/L (ref 136–145)
SP GR UR STRIP: 1.03 (ref 1–1.03)
SQUAMOUS #/AREA URNS HPF: ABNORMAL /HPF
TROPONIN T SERPL-MCNC: <0.01 NG/ML (ref 0–0.03)
TSH SERPL DL<=0.05 MIU/L-ACNC: 2.17 UIU/ML (ref 0.27–4.2)
UROBILINOGEN UR QL STRIP: ABNORMAL
WBC # BLD AUTO: 7.96 10*3/MM3 (ref 3.4–10.8)
WBC UR QL AUTO: ABNORMAL /HPF

## 2020-08-14 PROCEDURE — 94799 UNLISTED PULMONARY SVC/PX: CPT

## 2020-08-14 PROCEDURE — 93005 ELECTROCARDIOGRAM TRACING: CPT | Performed by: FAMILY MEDICINE

## 2020-08-14 PROCEDURE — 25010000002 ONDANSETRON PER 1 MG: Performed by: FAMILY MEDICINE

## 2020-08-14 PROCEDURE — 71045 X-RAY EXAM CHEST 1 VIEW: CPT

## 2020-08-14 PROCEDURE — 99285 EMERGENCY DEPT VISIT HI MDM: CPT

## 2020-08-14 PROCEDURE — 81001 URINALYSIS AUTO W/SCOPE: CPT | Performed by: FAMILY MEDICINE

## 2020-08-14 PROCEDURE — 85007 BL SMEAR W/DIFF WBC COUNT: CPT | Performed by: FAMILY MEDICINE

## 2020-08-14 PROCEDURE — 80053 COMPREHEN METABOLIC PANEL: CPT | Performed by: FAMILY MEDICINE

## 2020-08-14 PROCEDURE — 84443 ASSAY THYROID STIM HORMONE: CPT | Performed by: INTERNAL MEDICINE

## 2020-08-14 PROCEDURE — 85025 COMPLETE CBC W/AUTO DIFF WBC: CPT | Performed by: FAMILY MEDICINE

## 2020-08-14 PROCEDURE — 71045 X-RAY EXAM CHEST 1 VIEW: CPT | Performed by: RADIOLOGY

## 2020-08-14 PROCEDURE — 94640 AIRWAY INHALATION TREATMENT: CPT

## 2020-08-14 PROCEDURE — 25010000002 MORPHINE PER 10 MG: Performed by: FAMILY MEDICINE

## 2020-08-14 PROCEDURE — 93010 ELECTROCARDIOGRAM REPORT: CPT | Performed by: SPECIALIST

## 2020-08-14 PROCEDURE — 84484 ASSAY OF TROPONIN QUANT: CPT | Performed by: FAMILY MEDICINE

## 2020-08-14 PROCEDURE — 73502 X-RAY EXAM HIP UNI 2-3 VIEWS: CPT | Performed by: RADIOLOGY

## 2020-08-14 PROCEDURE — 73502 X-RAY EXAM HIP UNI 2-3 VIEWS: CPT

## 2020-08-14 PROCEDURE — 99223 1ST HOSP IP/OBS HIGH 75: CPT | Performed by: INTERNAL MEDICINE

## 2020-08-14 PROCEDURE — 25010000002 MORPHINE PER 10 MG: Performed by: INTERNAL MEDICINE

## 2020-08-14 PROCEDURE — 25010000002 ENOXAPARIN PER 10 MG: Performed by: INTERNAL MEDICINE

## 2020-08-14 RX ORDER — FUROSEMIDE 20 MG/1
20 TABLET ORAL DAILY
Status: CANCELLED | OUTPATIENT
Start: 2020-08-15

## 2020-08-14 RX ORDER — SODIUM CHLORIDE 0.9 % (FLUSH) 0.9 %
10 SYRINGE (ML) INJECTION EVERY 12 HOURS SCHEDULED
Status: DISCONTINUED | OUTPATIENT
Start: 2020-08-14 | End: 2020-08-15 | Stop reason: HOSPADM

## 2020-08-14 RX ORDER — IPRATROPIUM BROMIDE AND ALBUTEROL SULFATE 2.5; .5 MG/3ML; MG/3ML
3 SOLUTION RESPIRATORY (INHALATION) EVERY 4 HOURS PRN
COMMUNITY

## 2020-08-14 RX ORDER — SODIUM CHLORIDE 0.9 % (FLUSH) 0.9 %
10 SYRINGE (ML) INJECTION AS NEEDED
Status: DISCONTINUED | OUTPATIENT
Start: 2020-08-14 | End: 2020-08-15 | Stop reason: HOSPADM

## 2020-08-14 RX ORDER — NITROGLYCERIN 0.4 MG/1
0.4 TABLET SUBLINGUAL
COMMUNITY

## 2020-08-14 RX ORDER — MORPHINE SULFATE 2 MG/ML
1 INJECTION, SOLUTION INTRAMUSCULAR; INTRAVENOUS EVERY 4 HOURS PRN
Status: DISCONTINUED | OUTPATIENT
Start: 2020-08-14 | End: 2020-08-15

## 2020-08-14 RX ORDER — DILTIAZEM HYDROCHLORIDE 5 MG/ML
5 INJECTION INTRAVENOUS ONCE
Status: COMPLETED | OUTPATIENT
Start: 2020-08-14 | End: 2020-08-14

## 2020-08-14 RX ORDER — NALOXONE HCL 0.4 MG/ML
0.4 VIAL (ML) INJECTION
Status: DISCONTINUED | OUTPATIENT
Start: 2020-08-14 | End: 2020-08-15 | Stop reason: HOSPADM

## 2020-08-14 RX ORDER — ONDANSETRON 2 MG/ML
4 INJECTION INTRAMUSCULAR; INTRAVENOUS ONCE
Status: COMPLETED | OUTPATIENT
Start: 2020-08-14 | End: 2020-08-14

## 2020-08-14 RX ORDER — OXYCODONE HYDROCHLORIDE 5 MG/1
10 TABLET ORAL EVERY 6 HOURS PRN
Status: DISCONTINUED | OUTPATIENT
Start: 2020-08-14 | End: 2020-08-15

## 2020-08-14 RX ORDER — ONDANSETRON 2 MG/ML
4 INJECTION INTRAMUSCULAR; INTRAVENOUS EVERY 6 HOURS PRN
Status: DISCONTINUED | OUTPATIENT
Start: 2020-08-14 | End: 2020-08-15 | Stop reason: HOSPADM

## 2020-08-14 RX ORDER — METHIMAZOLE 10 MG/1
15 TABLET ORAL DAILY
COMMUNITY

## 2020-08-14 RX ORDER — NITROGLYCERIN 0.4 MG/1
0.4 TABLET SUBLINGUAL
Status: DISCONTINUED | OUTPATIENT
Start: 2020-08-14 | End: 2020-08-15 | Stop reason: HOSPADM

## 2020-08-14 RX ORDER — FUROSEMIDE 20 MG/1
20 TABLET ORAL DAILY
COMMUNITY
End: 2020-08-15 | Stop reason: HOSPADM

## 2020-08-14 RX ORDER — POTASSIUM CHLORIDE 750 MG/1
10 TABLET, FILM COATED, EXTENDED RELEASE ORAL DAILY
Status: CANCELLED | OUTPATIENT
Start: 2020-08-15

## 2020-08-14 RX ORDER — NAPROXEN 250 MG/1
500 TABLET ORAL DAILY PRN
Status: CANCELLED | OUTPATIENT
Start: 2020-08-14

## 2020-08-14 RX ORDER — NITROGLYCERIN 0.4 MG/1
0.4 TABLET SUBLINGUAL
Status: CANCELLED | OUTPATIENT
Start: 2020-08-14

## 2020-08-14 RX ORDER — BUDESONIDE AND FORMOTEROL FUMARATE DIHYDRATE 160; 4.5 UG/1; UG/1
2 AEROSOL RESPIRATORY (INHALATION)
Status: DISCONTINUED | OUTPATIENT
Start: 2020-08-14 | End: 2020-08-15 | Stop reason: HOSPADM

## 2020-08-14 RX ORDER — ACETAMINOPHEN 160 MG/5ML
650 SOLUTION ORAL EVERY 4 HOURS PRN
Status: DISCONTINUED | OUTPATIENT
Start: 2020-08-14 | End: 2020-08-15

## 2020-08-14 RX ORDER — ACETAMINOPHEN 650 MG/1
650 SUPPOSITORY RECTAL EVERY 4 HOURS PRN
Status: DISCONTINUED | OUTPATIENT
Start: 2020-08-14 | End: 2020-08-15

## 2020-08-14 RX ORDER — POTASSIUM CHLORIDE 750 MG/1
10 TABLET, FILM COATED, EXTENDED RELEASE ORAL DAILY
COMMUNITY
End: 2020-08-15 | Stop reason: HOSPADM

## 2020-08-14 RX ORDER — IPRATROPIUM BROMIDE AND ALBUTEROL SULFATE 2.5; .5 MG/3ML; MG/3ML
3 SOLUTION RESPIRATORY (INHALATION)
Status: DISCONTINUED | OUTPATIENT
Start: 2020-08-14 | End: 2020-08-15 | Stop reason: HOSPADM

## 2020-08-14 RX ORDER — IPRATROPIUM BROMIDE AND ALBUTEROL SULFATE 2.5; .5 MG/3ML; MG/3ML
3 SOLUTION RESPIRATORY (INHALATION) EVERY 4 HOURS PRN
Status: CANCELLED | OUTPATIENT
Start: 2020-08-14

## 2020-08-14 RX ORDER — ACETAMINOPHEN 325 MG/1
650 TABLET ORAL EVERY 4 HOURS PRN
Status: DISCONTINUED | OUTPATIENT
Start: 2020-08-14 | End: 2020-08-15

## 2020-08-14 RX ORDER — OXYCODONE HYDROCHLORIDE 5 MG/1
10 TABLET ORAL 4 TIMES DAILY
Status: CANCELLED | OUTPATIENT
Start: 2020-08-14

## 2020-08-14 RX ADMIN — BUDESONIDE AND FORMOTEROL FUMARATE DIHYDRATE 2 PUFF: 160; 4.5 AEROSOL RESPIRATORY (INHALATION) at 20:47

## 2020-08-14 RX ADMIN — DILTIAZEM HYDROCHLORIDE 5 MG: 5 INJECTION INTRAVENOUS at 14:52

## 2020-08-14 RX ADMIN — MORPHINE SULFATE 4 MG: 4 INJECTION, SOLUTION INTRAMUSCULAR; INTRAVENOUS at 15:38

## 2020-08-14 RX ADMIN — MORPHINE SULFATE 1 MG: 2 INJECTION, SOLUTION INTRAMUSCULAR; INTRAVENOUS at 20:08

## 2020-08-14 RX ADMIN — ENOXAPARIN SODIUM 30 MG: 30 INJECTION SUBCUTANEOUS at 22:03

## 2020-08-14 RX ADMIN — SODIUM CHLORIDE, PRESERVATIVE FREE 10 ML: 5 INJECTION INTRAVENOUS at 15:39

## 2020-08-14 RX ADMIN — IPRATROPIUM BROMIDE AND ALBUTEROL SULFATE 3 ML: .5; 3 SOLUTION RESPIRATORY (INHALATION) at 20:47

## 2020-08-14 RX ADMIN — METOPROLOL TARTRATE 12.5 MG: 25 TABLET, FILM COATED ORAL at 22:03

## 2020-08-14 RX ADMIN — ONDANSETRON 4 MG: 2 INJECTION INTRAMUSCULAR; INTRAVENOUS at 15:37

## 2020-08-14 NOTE — ED NOTES
Fall bracelet placed on pt and education given he verb understanding     Zandra Dominique, RN  08/14/20 5029

## 2020-08-14 NOTE — H&P
[facility] Medicine Services  HISTORY & PHYSICAL    Patient Identification:  Name:  Zak Olmstead  Age:  80 y.o.  Sex:  male  :  1940  MRN:  [id number]   Visit Number:  [id number]  Primary Care Physician:  Ely Berumen APRN     Subjective     Chief complaint:   Chief Complaint   Patient presents with   • Hip Pain   • Fall     History of presenting illness:   Mr. Olmstead is a 80-year-old male with multiple medical problems who has a history of left BKA was out on the porch at 11:30 at night about 3 days ago who was trying to urinate on the porch missed a step and fell out of his wheelchair afterwards he was having left hip pain.  He try to manage it at home but finally decided to come to the emergency room he was found to have left intertrochanteric hip fracture.  He denies having any chest pain, shortness of breath or nausea or vomiting.  He denies any melena or hematochezia or hematemesis.  He is a smoker however has been trying to decrease his smoking.  He smoked a few cigarettes since last week.  He does use 2 L nasal cannula oxygen intermittently for COPD.   ---------------------------------------------------------------------------------------------------------------------   Review of Systems all other systems were reviewed and otherwise negative except as per HPI  ---------------------------------------------------------------------------------------------------------------------   Past Medical History:   Diagnosis Date   • AAA (abdominal aortic aneurysm) (CMS/HCC)     s/p repair   • BPH (benign prostatic hyperplasia)    • COPD (chronic obstructive pulmonary disease) (CMS/HCC)    • Heart murmur    • Hypertension    • Mononeuritis multiplex    • NSTEMI (non-ST elevated myocardial infarction) (CMS/HCC) 2018   • PVD (peripheral vascular disease) (CMS/HCC)      Past Surgical History:   Procedure Laterality Date   • ABDOMINAL AORTIC ANEURYSM REPAIR     • APPENDECTOMY     • BACK  SURGERY     • BELOW KNEE LEG AMPUTATION Left    • CARDIAC CATHETERIZATION N/A 6/1/2018    Procedure: Left Heart Cath;  Surgeon: Derik Vega MD;  Location: [facility] CATH INVASIVE LOCATION;  Service: Cardiovascular   • COLONOSCOPY N/A 6/15/2017    Procedure: COLONOSCOPY  CPTCODE:63181;  Surgeon: Lincoln Bowens III, MD;  Location: [facility] OR;  Service:    • ENDOSCOPY N/A 6/15/2017    Procedure: ESOPHAGOGASTRODUODENOSCOPY WITH BIOPSY  CPTCODE:11565;  Surgeon: Lincoln Bowens III, MD;  Location: [facility] OR;  Service:    • ESOPHAGEAL DILATATION     • INTERVENTIONAL RADIOLOGY PROCEDURE Left 6/1/2018    Procedure: Abdominal Aortagram with Runoff;  Surgeon: Derik Vega MD;  Location: [facility] CATH INVASIVE LOCATION;  Service: Cardiovascular     Family History   Problem Relation Age of Onset   • Heart disease Mother    • Heart failure Father    • Stroke Brother    • Heart attack Brother      Social History     Socioeconomic History   • Marital status:      Spouse name: Not on file   • Number of children: Not on file   • Years of education: Not on file   • Highest education level: Not on file   Tobacco Use   • Smoking status: Current Every Day Smoker     Packs/day: 0.50     Years: 60.00     Pack years: 30.00     Types: Cigarettes   • Smokeless tobacco: Never Used   Substance and Sexual Activity   • Alcohol use: No   • Drug use: No   • Sexual activity: Not Currently     ---------------------------------------------------------------------------------------------------------------------   Allergies:  Patient has no known allergies.  ---------------------------------------------------------------------------------------------------------------------   Medications below are reported home medications pulling from within the system; at this time, these medications have not been reconciled unless otherwise specified and are in the verification process for further verifcation as current home medications.    Prior to  Admission Medications     Prescriptions Last Dose Informant Patient Reported? Taking?    albuterol (PROVENTIL) (2.5 MG/3ML) 0.083% nebulizer solution  Spouse/Significant Other Yes No    Take 2.5 mg by nebulization Every 6 (Six) Hours As Needed for Wheezing.    apixaban (ELIQUIS) 5 MG tablet tablet   No No    Take 1 tablet by mouth Every 12 (Twelve) Hours.    cholecalciferol (VITAMIN D3) 400 units tablet  Spouse/Significant Other Yes No    Take 400 Units by mouth Daily.    dilTIAZem CD (CARDIZEM CD) 240 MG 24 hr capsule   No No    Take 1 capsule by mouth Daily.    finasteride (PROSCAR) 5 MG tablet   No No    Take 1 tablet by mouth Daily.    lisinopril (PRINIVIL,ZESTRIL) 20 MG tablet  Spouse/Significant Other Yes No    Take 20 mg by mouth 2 (Two) Times a Day.    methIMAzole (TAPAZOLE) 10 MG tablet   No No    Take 1.5 tablets by mouth Daily.    metoprolol succinate XL (TOPROL-XL) 25 MG 24 hr tablet   No No    Take 1 tablet by mouth Daily.    naproxen (NAPROSYN) 500 MG tablet  Spouse/Significant Other Yes No    Take 500 mg by mouth Daily As Needed for Mild Pain .    oxyCODONE (ROXICODONE) 10 MG tablet  PEYMAN Yes No    Take 10 mg by mouth 4 (Four) Times a Day.    polyethylene glycol (MIRALAX) packet  Spouse/Significant Other Yes No    Take 17 g by mouth Every Night.    pravastatin (PRAVACHOL) 10 MG tablet  Spouse/Significant Other Yes No    Take 10 mg by mouth Every Night.    vitamin B-12 (CYANOCOBALAMIN) 1000 MCG tablet  Spouse/Significant Other Yes No    Take 1,000 mcg by mouth Daily.        ---------------------------------------------------------------------------------------------------------------------    Objective     Hospital Scheduled Meds:          Current listed hospital scheduled medications may not yet reflect those currently placed in orders that are signed and held, awaiting patient's arrival to floor/unit.     ---------------------------------------------------------------------------------------------------------------------   Vital Signs:  Temp:  [98 °F (36.7 °C)] 98 °F (36.7 °C)  Heart Rate:  [] 98  Resp:  [18] 18  BP: (113-133)/(69-97) 131/85  Mean Arterial Pressure (Non-Invasive) for the past 24 hrs (Last 3 readings):   Noninvasive MAP (mmHg)   08/14/20 1803 92   08/14/20 1747 89   08/14/20 1733 81     SpO2 Percentage    08/14/20 1733 08/14/20 1747 08/14/20 1803   SpO2: 92% 94% 92%     SpO2:  [91 %-97 %] 92 %  on   ;   Device (Oxygen Therapy): room air    Body mass index is 22.81 kg/m².  Wt Readings from Last 3 Encounters:   08/14/20 68 kg (150 lb)   08/07/20 68 kg (150 lb)   06/10/20 68 kg (150 lb)     ---------------------------------------------------------------------------------------------------------------------   Physical Exam:  GEN: NAD, disheveled a bit  EYES: PERRLA, no scleral icterus  HENT: Moist oral mucosa, no ulcerations or exudates  CV: Irregular rate and rhythm.  Slightly tachycardic with heart rate in the 90s.  Loud systolic crescendo murmur noted in all heart valve areas  PULM: Increased upper airway secretions, pulmonary congestion, intermittent end expiratory wheeze.  Equal breath sounds bilaterally with good airflow  GI: Normal bowel sounds, nondistended, nontender, soft  NEURO: No facial droop, speech is normal and not slurred, no unilateral deficits of strength noted.  SKIN: Skin is pale, no lower extremity edema  PSYCH: Alert and oriented x4, no confusions or agitation  MSK; left below-knee amputation, left hip is externally rotated and flexed due to a fracture  ---------------------------------------------------------------------------------------------------------------------  EKG:    Atrial fibrillation, no ST or T wave changes       ---------------------------------------------------------------------------------------------------------------------   Results from last 7 days    Lab Units 08/14/20  1420   TROPONIN T ng/mL <0.010           Results from last 7 days   Lab Units 08/14/20  1420   WBC 10*3/mm3 7.96   HEMOGLOBIN g/dL 9.8*   HEMATOCRIT % 34.6*   MCV fL 71.0*   MCHC g/dL 28.3*   PLATELETS 10*3/mm3 218     Results from last 7 days   Lab Units 08/14/20  1420   SODIUM mmol/L 140   POTASSIUM mmol/L 4.0   CHLORIDE mmol/L 104   CO2 mmol/L 25.0   BUN mg/dL 23   CREATININE mg/dL 0.93   EGFR IF NONAFRICN AM mL/min/1.73 78   CALCIUM mg/dL 8.7   GLUCOSE mg/dL 131*   ALBUMIN g/dL 3.57   BILIRUBIN mg/dL 3.0*   ALK PHOS U/L 109   AST (SGOT) U/L 30   ALT (SGPT) U/L 25   Estimated Creatinine Clearance: 60.9 mL/min (by C-G formula based on SCr of 0.93 mg/dL).     ---------------------------------------------------------------------------------------------------------------------  Imaging Results (Last 7 Days)     Procedure Component Value Units Date/Time    XR Chest 1 View [[id number]] Collected:  08/14/20 1456     Updated:  08/14/20 1500    Narrative:       EXAMINATION: XR CHEST 1 VW-      CLINICAL INDICATION:     hip     TECHNIQUE:  XR CHEST 1 VW-      COMPARISON: 02/21/2020      FINDINGS:      Lungs are aerated.   Heart size, mediastinum, and pulmonary vascularity are unremarkable.   No pneumothorax.   No effusions.   No acute osseous findings.          Impression:       No radiographic evidence of acute cardiac or pulmonary  disease.     This report was finalized on 8/14/2020 2:58 PM by Dr. Antwon Burris MD.       XR Hip With or Without Pelvis 2 - 3 View Left [[id number]] Collected:  08/14/20 1455     Updated:  08/14/20 1459    Narrative:       EXAMINATION: XR HIP W OR WO PELVIS 2-3 VIEW LEFT-      CLINICAL INDICATION:poss fracture     COMPARISON: None      TECHNIQUE: AP pelvis, single view left hip     FINDINGS:   Acute comminuted fracture left hip intertrochanteric in nature. Varus  angulation of distal fracture fragments. No dislocation of the femoral  head. No additional fractures  identified.       Impression:       Left intertrochanteric fracture.     This report was finalized on 8/14/2020 2:56 PM by Dr. Antwon Burris MD.           I have personally reviewed the above radiology results.     Last Echocardiogram:  Results for orders placed during the hospital encounter of 02/21/20   Transthoracic Echo Complete With Contrast if Necessary Per Protocol    Narrative · There is severe calcification of the aortic valve.  · Moderate aortic valve stenosis is present. Peak gradient of 37 and mean   23 mm Hg  · Estimated EF = 70%.  · Left ventricular systolic function is normal.  · Mild aortic valve regurgitation is present.  · Left ventricular wall thickness is consistent with mild concentric   hypertrophy.        ---------------------------------------------------------------------------------------------------------------------    Assessment & Plan      - left IT hip fracture- due to fall at home from w/c. MELISSAA onthe same site.  Orthopedic surgery has been consulted and planning on repair.  However patient is on anticoagulation for A. fib and therefore will need to hold for 48 hours.  Likely timeline of surgery will be on Monday morning.    -Chronic atrial fibrillation-on metoprolol, diltiazem, apixaban.  Holding apixaban for hip surgery    -Chronic hypoxic respiratory failure due to COPD-increased rhonchi and wheeze on exam however does not appear to be in exacerbation.  He only uses albuterol nebulizer as needed at home.  Will start inhaler regimen to optimize lung function for surgery    -Hypertension-on lisinopril at home.  Can resume due to elevated blood pressure    -History of back pain due to osteoarthritis and DJD of spine-on oxycodone at home which can resume    -Coronary artery disease-resume home beta-blocker, statin, not on aspirin    -Hyperthyroidism-on methimazole.  Will repeat TSH    -BPH-continue finasteride    -B12 deficiency-continue home supplement    -Vitamin D  deficiency-continue home supplement    -Peripheral vascular disease-left lower extremity below-knee amputation    -History of AAA-status post repair    -Aortic valve stenosis-audible loud murmur.  Will obtain echocardiogram for preop work-up.     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  Wheelchair-bound    ---------------------------------------------------  DVT Prophylaxis: Lovenox       Code Status: Full       Katayoun Behbahani, MD  Hospitalist Service -- [facility]       08/14/20  18:25

## 2020-08-14 NOTE — CONSULTS
Consults    Patient Identification:  Name:  Zak Olmstead  Age:  80 y.o.  Sex:  male  :  1940  MRN:  [id number]  Visit Number:  [id number]  Primary care provider:  Ely Berumen APRN    History of presenting illness:   This is a chronically ill 80-year-old male who presented to [facility] emergency department secondary to left hip fracture.  Patient notes last Friday 7 days ago he fell off his wheelchair ramp while on his electronic wheelchair, patient noted a direct impact trauma to the left hip.  Patient did seek initial medical treatment however signed out of that facility AMA.  The patient's wife was at bedside assisting with the history.  The patient notes pain today proximal to the left intertrochanteric area.  The patient does have multiple medical comorbidities including history of atrial fibrillation, peripheral vascular disease, coronary artery disease, mononeuritis multiplex, and COPD.  Patient took his last dose of Eliquis this morning.  Orthopedic surgery was consulted secondary to left intertrochanteric fracture.  He is being admitted under hospitalist service.  Patient has a history of below-knee amputation to the left lower extremity that was performed in  secondary to gangrene left foot.        Review of Systems:     Constitution: No weight gain  Eyes:  No loss of vision  ENT:  no epistaxis  Respiratory:  no hemoptysis  Cardiovascular: no CP, positive SOB.  Gastrointestinal: No abdominal pain  Genitourinary: No hematuria  Integument: No skin rash  Hematologic / Lymphatic: No petechiae  Musculoskeletal: See HPI  Neurological: No seizures   Endocrine: No tremors  ---------------------------------------------------------------------------------------------------------------------  ---------------------------------------------------------------------------------------------------------------------   Past History:  Family History   Problem Relation Age  of Onset   • Heart disease Mother    • Heart failure Father    • Stroke Brother    • Heart attack Brother      Past Medical History:   Diagnosis Date   • AAA (abdominal aortic aneurysm) (CMS/HCC)     s/p repair   • BPH (benign prostatic hyperplasia)    • COPD (chronic obstructive pulmonary disease) (CMS/[facility])    • Heart murmur    • Hypertension    • Mononeuritis multiplex    • NSTEMI (non-ST elevated myocardial infarction) (CMS/HCC) 2018   • PVD (peripheral vascular disease) (CMS/HCC)      Past Surgical History:   Procedure Laterality Date   • ABDOMINAL AORTIC ANEURYSM REPAIR     • APPENDECTOMY     • BACK SURGERY     • BELOW KNEE LEG AMPUTATION Left    • CARDIAC CATHETERIZATION N/A 6/1/2018    Procedure: Left Heart Cath;  Surgeon: Derik Vega MD;  Location: [facility] CATH INVASIVE LOCATION;  Service: Cardiovascular   • COLONOSCOPY N/A 6/15/2017    Procedure: COLONOSCOPY  CPTCODE:44113;  Surgeon: Lincoln Bowens III, MD;  Location: [facility] OR;  Service:    • ENDOSCOPY N/A 6/15/2017    Procedure: ESOPHAGOGASTRODUODENOSCOPY WITH BIOPSY  CPTCODE:95939;  Surgeon: Lincoln Bowens III, MD;  Location: [facility] OR;  Service:    • ESOPHAGEAL DILATATION     • INTERVENTIONAL RADIOLOGY PROCEDURE Left 6/1/2018    Procedure: Abdominal Aortagram with Runoff;  Surgeon: Derik Vega MD;  Location: [facility] CATH INVASIVE LOCATION;  Service: Cardiovascular     Social History     Socioeconomic History   • Marital status:      Spouse name: Not on file   • Number of children: Not on file   • Years of education: Not on file   • Highest education level: Not on file   Tobacco Use   • Smoking status: Current Every Day Smoker     Packs/day: 0.50     Years: 60.00     Pack years: 30.00     Types: Cigarettes   • Smokeless tobacco: Never Used   Substance and Sexual Activity   • Alcohol use: No   • Drug use: No   • Sexual activity: Not Currently      ---------------------------------------------------------------------------------------------------------------------   Allergies:  Patient has no known allergies.  ---------------------------------------------------------------------------------------------------------------------   Prior to Admission Medications     Prescriptions Last Dose Informant Patient Reported? Taking?    albuterol (PROVENTIL) (2.5 MG/3ML) 0.083% nebulizer solution  Spouse/Significant Other Yes No    Take 2.5 mg by nebulization Every 6 (Six) Hours As Needed for Wheezing.    apixaban (ELIQUIS) 5 MG tablet tablet   No No    Take 1 tablet by mouth Every 12 (Twelve) Hours.    cholecalciferol (VITAMIN D3) 400 units tablet  Spouse/Significant Other Yes No    Take 400 Units by mouth Daily.    dilTIAZem CD (CARDIZEM CD) 240 MG 24 hr capsule   No No    Take 1 capsule by mouth Daily.    finasteride (PROSCAR) 5 MG tablet   No No    Take 1 tablet by mouth Daily.    lisinopril (PRINIVIL,ZESTRIL) 20 MG tablet  Spouse/Significant Other Yes No    Take 20 mg by mouth 2 (Two) Times a Day.    methIMAzole (TAPAZOLE) 10 MG tablet   No No    Take 1.5 tablets by mouth Daily.    metoprolol succinate XL (TOPROL-XL) 25 MG 24 hr tablet   No No    Take 1 tablet by mouth Daily.    naproxen (NAPROSYN) 500 MG tablet  Spouse/Significant Other Yes No    Take 500 mg by mouth Daily As Needed for Mild Pain .    oxyCODONE (ROXICODONE) 10 MG tablet  PEYMAN Yes No    Take 10 mg by mouth 4 (Four) Times a Day.    polyethylene glycol (MIRALAX) packet  Spouse/Significant Other Yes No    Take 17 g by mouth Every Night.    pravastatin (PRAVACHOL) 10 MG tablet  Spouse/Significant Other Yes No    Take 10 mg by mouth Every Night.    vitamin B-12 (CYANOCOBALAMIN) 1000 MCG tablet  Spouse/Significant Other Yes No    Take 1,000 mcg by mouth Daily.        Hospital Meds:         ---------------------------------------------------------------------------------------------------------------------   Vital Signs:  Temp:  [98 °F (36.7 °C)] 98 °F (36.7 °C)  Heart Rate:  [] 98  Resp:  [18] 18  BP: (116-133)/(75-97) 128/75      08/14/20  1332   Weight: 68 kg (150 lb)     Body mass index is 22.81 kg/m².  ---------------------------------------------------------------------------------------------------------------------   Physical exam:  Constitutional: Chronically ill male.  No respiratory distress.      HEENT:Normocephalic and atraumatic, PERRLA  Neck:  Neck supple.  Cardiovascular:  Normal rate  Pulmonary/Chest:  No respiratory distress  Abdominal:  Soft, no tenderness.   Musculoskeletal: Upon examination of the left hip there is no open wounds noted.  Patient is tender to palpation at the intertrochanteric region of the left hip.  Neurovascularly intact to the left BKA stump. no Hematoma noted.  Patient has evidence of previous left below-knee amputation.  No cyanosis to the left BKA site noted.  Small scabbing noted at the left knee.  Neurological:  Alert and oriented to person, place, and time.   Skin:  Skin is warm and dry  Psychiatric:  Normal mood and affect  Peripheral vascular: trace pedal edema  ---------------------------------------------------------------------------------------------------------------------   .  ---------------------------------------------------------------------------------------------------------------------   Results from last 7 days   Lab Units 08/14/20  1420   WBC 10*3/mm3 7.96   HEMOGLOBIN g/dL 9.8*   HEMATOCRIT % 34.6*   MCV fL 71.0*   MCHC g/dL 28.3*   PLATELETS 10*3/mm3 218         Results from last 7 days   Lab Units 08/14/20  1420   SODIUM mmol/L 140   POTASSIUM mmol/L 4.0   CHLORIDE mmol/L 104   CO2 mmol/L 25.0   BUN mg/dL 23   CREATININE mg/dL 0.93   EGFR IF NONAFRICN AM mL/min/1.73 78   CALCIUM mg/dL 8.7   GLUCOSE mg/dL 131*   ALBUMIN g/dL 3.57    BILIRUBIN mg/dL 3.0*   ALK PHOS U/L 109   AST (SGOT) U/L 30   ALT (SGPT) U/L 25   Estimated Creatinine Clearance: 60.9 mL/min (by C-G formula based on SCr of 0.93 mg/dL).  No results found for: AMMONIA  Results from last 7 days   Lab Units 08/14/20  1420   TROPONIN T ng/mL <0.010         Lab Results   Component Value Date    HGBA1C 6.10 (H) 02/22/2020     Lab Results   Component Value Date    TSH <0.005 (L) 02/21/2020    FREET4 1.34 04/03/2020     No results found for: PREGTESTUR, PREGSERUM, HCG, HCGQUANT  Pain Management Panel     There is no flowsheet data to display.        No results found for: BLOODCX  No results found for: URINECX  No results found for: WOUNDCX  No results found for: STOOLCX      ---------------------------------------------------------------------------------------------------------------------   Imaging Results (Last 7 Days)     Procedure Component Value Units Date/Time    XR Chest 1 View [[id number]] Collected:  08/14/20 1456     Updated:  08/14/20 1500    Narrative:       EXAMINATION: XR CHEST 1 VW-      CLINICAL INDICATION:     hip     TECHNIQUE:  XR CHEST 1 VW-      COMPARISON: 02/21/2020      FINDINGS:      Lungs are aerated.   Heart size, mediastinum, and pulmonary vascularity are unremarkable.   No pneumothorax.   No effusions.   No acute osseous findings.          Impression:       No radiographic evidence of acute cardiac or pulmonary  disease.     This report was finalized on 8/14/2020 2:58 PM by Dr. Antwon Burris MD.       XR Hip With or Without Pelvis 2 - 3 View Left [[id number]] Collected:  08/14/20 1455     Updated:  08/14/20 1459    Narrative:       EXAMINATION: XR HIP W OR WO PELVIS 2-3 VIEW LEFT-      CLINICAL INDICATION:poss fracture     COMPARISON: None      TECHNIQUE: AP pelvis, single view left hip     FINDINGS:   Acute comminuted fracture left hip intertrochanteric in nature. Varus  angulation of distal fracture fragments. No dislocation of the femoral  head. No  additional fractures identified.       Impression:       Left intertrochanteric fracture.     This report was finalized on 8/14/2020 2:56 PM by Dr. Antwon Burris MD.           ----------------------------------------------------------------------------------------------------------------------  Assessment:   #1 left intertrochanteric fracture          Plan:   Discussed the case with Dr. Mancuso.  He recommends patient undergo surgical intervention.  Patient will need left hip short TFNA operative fixation.  Patient has multiple medical comorbidities.  He is higher risk of surgical complications.  Patient will need to be evaluated by hospitalist service preoperatively to evaluate surgical risk and medical optimization.  Patient's last dose of Eliquis was this morning.  Patient will need Eliquis held for 48 hours prior to surgical intervention.  Ortho will follow.     Thank you for the consult.    Yevgeniy Abdalla, ALICIA  08/14/20  16:22

## 2020-08-14 NOTE — ED PROVIDER NOTES
Subjective   Patient is an 80-year-old male presents emergency department for left hip pain.  The patient states that he fell off his porch on Friday evening.  He states that he is wheelchair-bound due to history of severe peripheral neuropathy, and a left AKA.  He states that he went to the edge of his porch, to urinate off the side of his porch at 11:30 at night, and miscalculated the brain.  He fell forward off of the wheelchair, and landed on his left hip.  The patient came to an outlying ER for medical care and was told he had a hip fracture but left AMA.  It is unclear why the patient left AMA.  He went home, and his PCP advised him to come back to the ER for further evaluation.  The patient states that he has had increased pain in his left hip but he does not weight-bear or walk at baseline.  The patient is on Eliquis for A. fib.  He also has a history of COPD and wears as needed home oxygen at 2 L.  He is a chronic smoker.  He states that there is no other injuries including any head injuries or loss of consciousness.  He denies any neck pain back pain or additional complaints at this time.          Review of Systems   Constitutional: Negative for activity change, appetite change, chills, diaphoresis, fatigue and fever.   HENT: Negative for congestion, postnasal drip, rhinorrhea, sinus pressure, sinus pain and sneezing.    Eyes: Negative for discharge and itching.   Respiratory: Negative for apnea, cough, choking, chest tightness, shortness of breath and wheezing.    Cardiovascular: Negative for chest pain, palpitations and leg swelling.   Gastrointestinal: Negative for abdominal distention, abdominal pain, diarrhea, nausea and vomiting.   Genitourinary: Negative for difficulty urinating and flank pain.   Musculoskeletal: Positive for arthralgias. Negative for back pain.   Neurological: Negative for dizziness and light-headedness.   Psychiatric/Behavioral: Negative for agitation and decreased concentration.        Past Medical History:   Diagnosis Date   • AAA (abdominal aortic aneurysm) (CMS/HCC)     s/p repair   • BPH (benign prostatic hyperplasia)    • COPD (chronic obstructive pulmonary disease) (CMS/HCC)    • Heart murmur    • Hypertension    • Mononeuritis multiplex    • NSTEMI (non-ST elevated myocardial infarction) (CMS/HCC) 2018   • PVD (peripheral vascular disease) (CMS/HCC)        No Known Allergies    Past Surgical History:   Procedure Laterality Date   • ABDOMINAL AORTIC ANEURYSM REPAIR     • APPENDECTOMY     • BACK SURGERY     • BELOW KNEE LEG AMPUTATION Left    • CARDIAC CATHETERIZATION N/A 6/1/2018    Procedure: Left Heart Cath;  Surgeon: Derik Vega MD;  Location: [facility] CATH INVASIVE LOCATION;  Service: Cardiovascular   • COLONOSCOPY N/A 6/15/2017    Procedure: COLONOSCOPY  CPTCODE:16670;  Surgeon: Lincoln Bowens III, MD;  Location: [facility] OR;  Service:    • ENDOSCOPY N/A 6/15/2017    Procedure: ESOPHAGOGASTRODUODENOSCOPY WITH BIOPSY  CPTCODE:99493;  Surgeon: Lincoln Bowens III, MD;  Location: [facility] OR;  Service:    • ESOPHAGEAL DILATATION     • INTERVENTIONAL RADIOLOGY PROCEDURE Left 6/1/2018    Procedure: Abdominal Aortagram with Runoff;  Surgeon: Derik Vega MD;  Location: [facility] CATH INVASIVE LOCATION;  Service: Cardiovascular       Family History   Problem Relation Age of Onset   • Heart disease Mother    • Heart failure Father    • Stroke Brother    • Heart attack Brother        Social History     Socioeconomic History   • Marital status:      Spouse name: Not on file   • Number of children: Not on file   • Years of education: Not on file   • Highest education level: Not on file   Tobacco Use   • Smoking status: Current Every Day Smoker     Packs/day: 0.50     Years: 60.00     Pack years: 30.00     Types: Cigarettes   • Smokeless tobacco: Never Used   Substance and Sexual Activity   • Alcohol use: No   • Drug use: No   • Sexual activity: Not Currently           Objective    Physical Exam   Constitutional: He is oriented to person, place, and time. He appears well-developed and well-nourished. No distress.   Elderly and frail cigarette smoke odor   HENT:   Head: Normocephalic and atraumatic.   Right Ear: External ear normal.   Left Ear: External ear normal.   Mouth/Throat: No oropharyngeal exudate.   Eyes: Pupils are equal, round, and reactive to light. Conjunctivae and EOM are normal. Right eye exhibits no discharge. Left eye exhibits no discharge. No scleral icterus.   Neck: Normal range of motion. Neck supple. No JVD present. No tracheal deviation present. No thyromegaly present.   Cardiovascular: Normal rate, regular rhythm, normal heart sounds and intact distal pulses. Exam reveals no gallop and no friction rub.   No murmur heard.  Pulmonary/Chest: Effort normal and breath sounds normal. No stridor. No respiratory distress. He has no wheezes. He has no rales.   Abdominal: Soft. Bowel sounds are normal. He exhibits no distension and no mass. There is no tenderness. There is no guarding.   Musculoskeletal: Normal range of motion. He exhibits no edema, tenderness or deformity.   Left AKA with tenderness over left lateral hip   Neurological: He is alert and oriented to person, place, and time.   Skin: Skin is warm and dry. Capillary refill takes less than 2 seconds. No rash noted. He is not diaphoretic. No erythema. No pallor.   Psychiatric: He has a normal mood and affect. His behavior is normal.   Nursing note and vitals reviewed.      Procedures           ED Course                                           MDM  Number of Diagnoses or Management Options  Closed fracture of left hip, initial encounter (CMS/[facility]): new and requires workup     Amount and/or Complexity of Data Reviewed  Clinical lab tests: ordered and reviewed  Tests in the radiology section of CPT®: ordered and reviewed  Tests in the medicine section of CPT®: ordered and reviewed  Discuss the patient with other  providers: yes  Independent visualization of images, tracings, or specimens: yes    Risk of Complications, Morbidity, and/or Mortality  Presenting problems: high  Diagnostic procedures: high  Management options: high    Patient Progress  Patient progress: stable      Final diagnoses:   Closed fracture of left hip, initial encounter (CMS/[facility])            Monique Bah DO  08/14/20 8040

## 2020-08-15 ENCOUNTER — APPOINTMENT (OUTPATIENT)
Dept: GENERAL RADIOLOGY | Facility: HOSPITAL | Age: 80
End: 2020-08-15

## 2020-08-15 ENCOUNTER — APPOINTMENT (OUTPATIENT)
Dept: CARDIOLOGY | Facility: HOSPITAL | Age: 80
End: 2020-08-15

## 2020-08-15 ENCOUNTER — HOSPITAL ENCOUNTER (INPATIENT)
Facility: HOSPITAL | Age: 80
LOS: 9 days | Discharge: HOSPICE | End: 2020-08-24
Attending: INTERNAL MEDICINE | Admitting: INTERNAL MEDICINE

## 2020-08-15 VITALS
OXYGEN SATURATION: 94 % | TEMPERATURE: 97.7 F | RESPIRATION RATE: 18 BRPM | BODY MASS INDEX: 23.86 KG/M2 | WEIGHT: 157.4 LBS | DIASTOLIC BLOOD PRESSURE: 55 MMHG | HEART RATE: 99 BPM | HEIGHT: 68 IN | SYSTOLIC BLOOD PRESSURE: 113 MMHG

## 2020-08-15 DIAGNOSIS — G89.29 OTHER CHRONIC PAIN: ICD-10-CM

## 2020-08-15 DIAGNOSIS — Z87.81 S/P LEFT HIP FRACTURE: ICD-10-CM

## 2020-08-15 DIAGNOSIS — S72.002A CLOSED FRACTURE OF LEFT HIP, INITIAL ENCOUNTER (HCC): Primary | ICD-10-CM

## 2020-08-15 PROBLEM — N39.0 ACUTE UTI (URINARY TRACT INFECTION): Status: ACTIVE | Noted: 2020-08-15

## 2020-08-15 PROBLEM — J98.9 CHRONIC RESPIRATORY DISEASE: Status: ACTIVE | Noted: 2020-08-15

## 2020-08-15 PROBLEM — N52.9 ERECTILE DYSFUNCTION: Status: RESOLVED | Noted: 2020-02-12 | Resolved: 2020-08-15

## 2020-08-15 PROBLEM — I21.4 NSTEMI (NON-ST ELEVATED MYOCARDIAL INFARCTION) (HCC): Status: RESOLVED | Noted: 2018-05-31 | Resolved: 2020-08-15

## 2020-08-15 PROBLEM — I35.0 NONRHEUMATIC AORTIC VALVE STENOSIS: Status: RESOLVED | Noted: 2018-09-24 | Resolved: 2020-08-15

## 2020-08-15 PROBLEM — N13.8 BPH WITH OBSTRUCTION/LOWER URINARY TRACT SYMPTOMS: Status: RESOLVED | Noted: 2020-02-12 | Resolved: 2020-08-15

## 2020-08-15 PROBLEM — I34.81 MITRAL ANNULAR CALCIFICATION: Status: RESOLVED | Noted: 2018-09-24 | Resolved: 2020-08-15

## 2020-08-15 PROBLEM — I10 HTN (HYPERTENSION): Status: ACTIVE | Noted: 2020-08-15

## 2020-08-15 PROBLEM — J44.9 COPD (CHRONIC OBSTRUCTIVE PULMONARY DISEASE) (HCC): Status: ACTIVE | Noted: 2020-08-15

## 2020-08-15 PROBLEM — I48.91 ATRIAL FIBRILLATION WITH RAPID VENTRICULAR RESPONSE (HCC): Status: RESOLVED | Noted: 2018-06-01 | Resolved: 2020-08-15

## 2020-08-15 PROBLEM — I25.10 CAD (CORONARY ARTERY DISEASE): Status: ACTIVE | Noted: 2020-08-15

## 2020-08-15 PROBLEM — N40.1 BPH WITH OBSTRUCTION/LOWER URINARY TRACT SYMPTOMS: Status: RESOLVED | Noted: 2020-02-12 | Resolved: 2020-08-15

## 2020-08-15 PROBLEM — I48.20 CHRONIC ATRIAL FIBRILLATION (HCC): Status: ACTIVE | Noted: 2020-08-15

## 2020-08-15 PROBLEM — I35.0 AORTIC STENOSIS: Status: ACTIVE | Noted: 2020-08-15

## 2020-08-15 PROBLEM — R30.0 DYSURIA: Status: RESOLVED | Noted: 2020-02-12 | Resolved: 2020-08-15

## 2020-08-15 LAB
ANION GAP SERPL CALCULATED.3IONS-SCNC: 9.1 MMOL/L (ref 5–15)
BH CV ECHO MEAS - % IVS THICK: -9 %
BH CV ECHO MEAS - % LVPW THICK: 23.6 %
BH CV ECHO MEAS - AI DEC SLOPE: 168 CM/SEC^2
BH CV ECHO MEAS - AI MAX PG: 57.8 MMHG
BH CV ECHO MEAS - AI MAX VEL: 380 CM/SEC
BH CV ECHO MEAS - AI P1/2T: 662.5 MSEC
BH CV ECHO MEAS - AO MAX PG (FULL): 71.7 MMHG
BH CV ECHO MEAS - AO MAX PG: 90 MMHG
BH CV ECHO MEAS - AO MEAN PG (FULL): 36 MMHG
BH CV ECHO MEAS - AO MEAN PG: 46 MMHG
BH CV ECHO MEAS - AO ROOT AREA (BSA CORRECTED): 1.8
BH CV ECHO MEAS - AO ROOT AREA: 8.6 CM^2
BH CV ECHO MEAS - AO ROOT DIAM: 3.3 CM
BH CV ECHO MEAS - AO V2 MAX: 427.8 CM/SEC
BH CV ECHO MEAS - AO V2 MEAN: 275.3 CM/SEC
BH CV ECHO MEAS - AO V2 VTI: 76.9 CM
BH CV ECHO MEAS - AVA(I,A): 0.51 CM^2
BH CV ECHO MEAS - AVA(I,D): 0.51 CM^2
BH CV ECHO MEAS - AVA(V,A): 0.51 CM^2
BH CV ECHO MEAS - AVA(V,D): 0.51 CM^2
BH CV ECHO MEAS - BSA(HAYCOCK): 1.9 M^2
BH CV ECHO MEAS - BSA: 1.8 M^2
BH CV ECHO MEAS - BZI_BMI: 23.9 KILOGRAMS/M^2
BH CV ECHO MEAS - BZI_METRIC_HEIGHT: 172.7 CM
BH CV ECHO MEAS - BZI_METRIC_WEIGHT: 71.2 KG
BH CV ECHO MEAS - EDV(CUBED): 71.7 ML
BH CV ECHO MEAS - EDV(MOD-SP4): 76.6 ML
BH CV ECHO MEAS - EDV(TEICH): 76.6 ML
BH CV ECHO MEAS - EF(CUBED): 62.7 %
BH CV ECHO MEAS - EF(MOD-SP4): 59.9 %
BH CV ECHO MEAS - EF(TEICH): 54.7 %
BH CV ECHO MEAS - ESV(CUBED): 26.7 ML
BH CV ECHO MEAS - ESV(MOD-SP4): 30.7 ML
BH CV ECHO MEAS - ESV(TEICH): 34.7 ML
BH CV ECHO MEAS - FS: 28 %
BH CV ECHO MEAS - IVS/LVPW: 1.1
BH CV ECHO MEAS - IVSD: 1.3 CM
BH CV ECHO MEAS - IVSS: 1.2 CM
BH CV ECHO MEAS - LA DIMENSION: 4.2 CM
BH CV ECHO MEAS - LA/AO: 1.3
BH CV ECHO MEAS - LV DIASTOLIC VOL/BSA (35-75): 41.5 ML/M^2
BH CV ECHO MEAS - LV MASS(C)D: 194 GRAMS
BH CV ECHO MEAS - LV MASS(C)DI: 105.2 GRAMS/M^2
BH CV ECHO MEAS - LV MASS(C)S: 134.9 GRAMS
BH CV ECHO MEAS - LV MASS(C)SI: 73.1 GRAMS/M^2
BH CV ECHO MEAS - LV MAX PG: 2 MMHG
BH CV ECHO MEAS - LV MEAN PG: 1 MMHG
BH CV ECHO MEAS - LV SYSTOLIC VOL/BSA (12-30): 16.6 ML/M^2
BH CV ECHO MEAS - LV V1 MAX: 69.9 CM/SEC
BH CV ECHO MEAS - LV V1 MEAN: 47.2 CM/SEC
BH CV ECHO MEAS - LV V1 VTI: 12.4 CM
BH CV ECHO MEAS - LVIDD: 4.2 CM
BH CV ECHO MEAS - LVIDS: 3 CM
BH CV ECHO MEAS - LVLD AP4: 6.9 CM
BH CV ECHO MEAS - LVLS AP4: 5.2 CM
BH CV ECHO MEAS - LVOT AREA (M): 3.1 CM^2
BH CV ECHO MEAS - LVOT AREA: 3.1 CM^2
BH CV ECHO MEAS - LVOT DIAM: 2 CM
BH CV ECHO MEAS - LVPWD: 1.2 CM
BH CV ECHO MEAS - LVPWS: 1.5 CM
BH CV ECHO MEAS - PA ACC TIME: 0.1 SEC
BH CV ECHO MEAS - PA PR(ACCEL): 36.3 MMHG
BH CV ECHO MEAS - RAP SYSTOLE: 10 MMHG
BH CV ECHO MEAS - RVSP: 77 MMHG
BH CV ECHO MEAS - SI(AO): 356.5 ML/M^2
BH CV ECHO MEAS - SI(CUBED): 24.4 ML/M^2
BH CV ECHO MEAS - SI(LVOT): 21.1 ML/M^2
BH CV ECHO MEAS - SI(MOD-SP4): 24.9 ML/M^2
BH CV ECHO MEAS - SI(TEICH): 22.7 ML/M^2
BH CV ECHO MEAS - SV(AO): 657.5 ML
BH CV ECHO MEAS - SV(CUBED): 45 ML
BH CV ECHO MEAS - SV(LVOT): 39 ML
BH CV ECHO MEAS - SV(MOD-SP4): 45.9 ML
BH CV ECHO MEAS - SV(TEICH): 41.9 ML
BH CV ECHO MEAS - TR MAX VEL: 395.3 CM/SEC
BUN SERPL-MCNC: 21 MG/DL (ref 8–23)
BUN/CREAT SERPL: 22.6 (ref 7–25)
CALCIUM SPEC-SCNC: 8.1 MG/DL (ref 8.6–10.5)
CHLORIDE SERPL-SCNC: 106 MMOL/L (ref 98–107)
CO2 SERPL-SCNC: 24.9 MMOL/L (ref 22–29)
CREAT SERPL-MCNC: 0.93 MG/DL (ref 0.76–1.27)
GFR SERPL CREATININE-BSD FRML MDRD: 78 ML/MIN/1.73
GLUCOSE SERPL-MCNC: 111 MG/DL (ref 65–99)
MAGNESIUM SERPL-MCNC: 2.2 MG/DL (ref 1.6–2.4)
POTASSIUM SERPL-SCNC: 4.5 MMOL/L (ref 3.5–5.2)
SARS-COV-2 RNA RESP QL NAA+PROBE: NOT DETECTED
SODIUM SERPL-SCNC: 140 MMOL/L (ref 136–145)

## 2020-08-15 PROCEDURE — 93005 ELECTROCARDIOGRAM TRACING: CPT | Performed by: INTERNAL MEDICINE

## 2020-08-15 PROCEDURE — 94799 UNLISTED PULMONARY SVC/PX: CPT

## 2020-08-15 PROCEDURE — 87635 SARS-COV-2 COVID-19 AMP PRB: CPT | Performed by: INTERNAL MEDICINE

## 2020-08-15 PROCEDURE — 99223 1ST HOSP IP/OBS HIGH 75: CPT | Performed by: INTERNAL MEDICINE

## 2020-08-15 PROCEDURE — 73562 X-RAY EXAM OF KNEE 3: CPT | Performed by: RADIOLOGY

## 2020-08-15 PROCEDURE — 83735 ASSAY OF MAGNESIUM: CPT | Performed by: INTERNAL MEDICINE

## 2020-08-15 PROCEDURE — 25010000002 ENOXAPARIN PER 10 MG: Performed by: INTERNAL MEDICINE

## 2020-08-15 PROCEDURE — 93306 TTE W/DOPPLER COMPLETE: CPT

## 2020-08-15 PROCEDURE — 99239 HOSP IP/OBS DSCHRG MGMT >30: CPT | Performed by: INTERNAL MEDICINE

## 2020-08-15 PROCEDURE — 93010 ELECTROCARDIOGRAM REPORT: CPT | Performed by: SPECIALIST

## 2020-08-15 PROCEDURE — 99222 1ST HOSP IP/OBS MODERATE 55: CPT | Performed by: SPECIALIST

## 2020-08-15 PROCEDURE — 80048 BASIC METABOLIC PNL TOTAL CA: CPT | Performed by: INTERNAL MEDICINE

## 2020-08-15 PROCEDURE — 93005 ELECTROCARDIOGRAM TRACING: CPT | Performed by: NURSE PRACTITIONER

## 2020-08-15 PROCEDURE — 93306 TTE W/DOPPLER COMPLETE: CPT | Performed by: SPECIALIST

## 2020-08-15 PROCEDURE — 73560 X-RAY EXAM OF KNEE 1 OR 2: CPT

## 2020-08-15 PROCEDURE — 25010000002 MORPHINE PER 10 MG: Performed by: INTERNAL MEDICINE

## 2020-08-15 RX ORDER — DILTIAZEM HYDROCHLORIDE 240 MG/1
240 CAPSULE, COATED, EXTENDED RELEASE ORAL DAILY
Status: DISCONTINUED | OUTPATIENT
Start: 2020-08-16 | End: 2020-08-21

## 2020-08-15 RX ORDER — FINASTERIDE 5 MG/1
5 TABLET, FILM COATED ORAL DAILY
Status: DISCONTINUED | OUTPATIENT
Start: 2020-08-15 | End: 2020-08-15 | Stop reason: HOSPADM

## 2020-08-15 RX ORDER — METHIMAZOLE 5 MG/1
5 TABLET ORAL DAILY
Status: DISCONTINUED | OUTPATIENT
Start: 2020-08-16 | End: 2020-08-20

## 2020-08-15 RX ORDER — METOPROLOL TARTRATE 50 MG/1
50 TABLET, FILM COATED ORAL 3 TIMES DAILY
Status: DISCONTINUED | OUTPATIENT
Start: 2020-08-15 | End: 2020-08-15 | Stop reason: HOSPADM

## 2020-08-15 RX ORDER — OXYCODONE HYDROCHLORIDE 5 MG/1
10 TABLET ORAL 4 TIMES DAILY
Status: DISCONTINUED | OUTPATIENT
Start: 2020-08-15 | End: 2020-08-24 | Stop reason: HOSPADM

## 2020-08-15 RX ORDER — NICOTINE 21 MG/24HR
1 PATCH, TRANSDERMAL 24 HOURS TRANSDERMAL EVERY 24 HOURS
Status: DISCONTINUED | OUTPATIENT
Start: 2020-08-16 | End: 2020-08-24 | Stop reason: HOSPADM

## 2020-08-15 RX ORDER — METOPROLOL SUCCINATE 25 MG/1
25 TABLET, EXTENDED RELEASE ORAL
Status: DISCONTINUED | OUTPATIENT
Start: 2020-08-15 | End: 2020-08-15

## 2020-08-15 RX ORDER — ONDANSETRON 2 MG/ML
4 INJECTION INTRAMUSCULAR; INTRAVENOUS EVERY 6 HOURS PRN
Status: DISCONTINUED | OUTPATIENT
Start: 2020-08-15 | End: 2020-08-24 | Stop reason: HOSPADM

## 2020-08-15 RX ORDER — SODIUM CHLORIDE 0.9 % (FLUSH) 0.9 %
10 SYRINGE (ML) INJECTION EVERY 12 HOURS SCHEDULED
Status: DISCONTINUED | OUTPATIENT
Start: 2020-08-15 | End: 2020-08-24 | Stop reason: HOSPADM

## 2020-08-15 RX ORDER — OXYCODONE HYDROCHLORIDE 5 MG/1
5 TABLET ORAL ONCE
Status: COMPLETED | OUTPATIENT
Start: 2020-08-15 | End: 2020-08-15

## 2020-08-15 RX ORDER — METOPROLOL TARTRATE 5 MG/5ML
5 INJECTION INTRAVENOUS ONCE
Status: COMPLETED | OUTPATIENT
Start: 2020-08-15 | End: 2020-08-15

## 2020-08-15 RX ORDER — METOPROLOL SUCCINATE 25 MG/1
25 TABLET, EXTENDED RELEASE ORAL
Status: DISCONTINUED | OUTPATIENT
Start: 2020-08-16 | End: 2020-08-15

## 2020-08-15 RX ORDER — METHIMAZOLE 10 MG/1
5 TABLET ORAL DAILY
Status: DISCONTINUED | OUTPATIENT
Start: 2020-08-15 | End: 2020-08-15 | Stop reason: HOSPADM

## 2020-08-15 RX ORDER — IPRATROPIUM BROMIDE AND ALBUTEROL SULFATE 2.5; .5 MG/3ML; MG/3ML
3 SOLUTION RESPIRATORY (INHALATION) EVERY 4 HOURS PRN
Status: DISCONTINUED | OUTPATIENT
Start: 2020-08-15 | End: 2020-08-24 | Stop reason: HOSPADM

## 2020-08-15 RX ORDER — NITROGLYCERIN 0.4 MG/1
0.4 TABLET SUBLINGUAL
Status: DISCONTINUED | OUTPATIENT
Start: 2020-08-15 | End: 2020-08-24 | Stop reason: HOSPADM

## 2020-08-15 RX ORDER — MORPHINE SULFATE 2 MG/ML
2 INJECTION, SOLUTION INTRAMUSCULAR; INTRAVENOUS
Status: DISCONTINUED | OUTPATIENT
Start: 2020-08-15 | End: 2020-08-15 | Stop reason: HOSPADM

## 2020-08-15 RX ORDER — SODIUM CHLORIDE 0.9 % (FLUSH) 0.9 %
10 SYRINGE (ML) INJECTION AS NEEDED
Status: DISCONTINUED | OUTPATIENT
Start: 2020-08-15 | End: 2020-08-18

## 2020-08-15 RX ORDER — ONDANSETRON 4 MG/1
4 TABLET, FILM COATED ORAL EVERY 6 HOURS PRN
Status: DISCONTINUED | OUTPATIENT
Start: 2020-08-15 | End: 2020-08-24 | Stop reason: HOSPADM

## 2020-08-15 RX ORDER — OXYCODONE HYDROCHLORIDE AND ACETAMINOPHEN 5; 325 MG/1; MG/1
1 TABLET ORAL EVERY 8 HOURS PRN
Status: DISCONTINUED | OUTPATIENT
Start: 2020-08-15 | End: 2020-08-15 | Stop reason: HOSPADM

## 2020-08-15 RX ORDER — FINASTERIDE 5 MG/1
5 TABLET, FILM COATED ORAL DAILY
Status: DISCONTINUED | OUTPATIENT
Start: 2020-08-16 | End: 2020-08-24 | Stop reason: HOSPADM

## 2020-08-15 RX ORDER — ACETAMINOPHEN 325 MG/1
650 TABLET ORAL EVERY 8 HOURS PRN
Status: DISCONTINUED | OUTPATIENT
Start: 2020-08-15 | End: 2020-08-15 | Stop reason: HOSPADM

## 2020-08-15 RX ORDER — DILTIAZEM HYDROCHLORIDE 240 MG/1
240 CAPSULE, COATED, EXTENDED RELEASE ORAL DAILY
Status: DISCONTINUED | OUTPATIENT
Start: 2020-08-15 | End: 2020-08-15 | Stop reason: HOSPADM

## 2020-08-15 RX ADMIN — IPRATROPIUM BROMIDE AND ALBUTEROL SULFATE 3 ML: .5; 3 SOLUTION RESPIRATORY (INHALATION) at 13:04

## 2020-08-15 RX ADMIN — OXYCODONE HYDROCHLORIDE 10 MG: 5 TABLET ORAL at 23:39

## 2020-08-15 RX ADMIN — BUDESONIDE AND FORMOTEROL FUMARATE DIHYDRATE 2 PUFF: 160; 4.5 AEROSOL RESPIRATORY (INHALATION) at 18:53

## 2020-08-15 RX ADMIN — METOPROLOL TARTRATE 25 MG: 25 TABLET, FILM COATED ORAL at 23:39

## 2020-08-15 RX ADMIN — METOPROLOL TARTRATE 50 MG: 50 TABLET, FILM COATED ORAL at 11:30

## 2020-08-15 RX ADMIN — METHIMAZOLE 5 MG: 10 TABLET ORAL at 07:35

## 2020-08-15 RX ADMIN — BUDESONIDE AND FORMOTEROL FUMARATE DIHYDRATE 2 PUFF: 160; 4.5 AEROSOL RESPIRATORY (INHALATION) at 06:56

## 2020-08-15 RX ADMIN — ENOXAPARIN SODIUM 30 MG: 30 INJECTION SUBCUTANEOUS at 07:34

## 2020-08-15 RX ADMIN — METOPROLOL TARTRATE 50 MG: 50 TABLET, FILM COATED ORAL at 19:31

## 2020-08-15 RX ADMIN — METOPROLOL TARTRATE 50 MG: 50 TABLET, FILM COATED ORAL at 16:14

## 2020-08-15 RX ADMIN — IPRATROPIUM BROMIDE AND ALBUTEROL SULFATE 3 ML: .5; 3 SOLUTION RESPIRATORY (INHALATION) at 18:53

## 2020-08-15 RX ADMIN — SODIUM CHLORIDE 1000 ML: 9 INJECTION, SOLUTION INTRAVENOUS at 08:23

## 2020-08-15 RX ADMIN — SODIUM CHLORIDE, PRESERVATIVE FREE 10 ML: 5 INJECTION INTRAVENOUS at 23:40

## 2020-08-15 RX ADMIN — OXYCODONE HYDROCHLORIDE 20 MG: 15 TABLET ORAL at 19:31

## 2020-08-15 RX ADMIN — OXYCODONE 5 MG: 5 TABLET ORAL at 11:43

## 2020-08-15 RX ADMIN — OXYCODONE 10 MG: 5 TABLET ORAL at 07:41

## 2020-08-15 RX ADMIN — DILTIAZEM HYDROCHLORIDE 240 MG: 240 CAPSULE, COATED, EXTENDED RELEASE ORAL at 07:35

## 2020-08-15 RX ADMIN — OXYCODONE HYDROCHLORIDE AND ACETAMINOPHEN 1 TABLET: 5; 325 TABLET ORAL at 11:30

## 2020-08-15 RX ADMIN — METOPROLOL TARTRATE 12.5 MG: 25 TABLET, FILM COATED ORAL at 07:35

## 2020-08-15 RX ADMIN — MORPHINE SULFATE 1 MG: 2 INJECTION, SOLUTION INTRAMUSCULAR; INTRAVENOUS at 01:12

## 2020-08-15 RX ADMIN — METOPROLOL TARTRATE 5 MG: 1 INJECTION, SOLUTION INTRAVENOUS at 09:41

## 2020-08-15 RX ADMIN — METOPROLOL TARTRATE 12.5 MG: 25 TABLET, FILM COATED ORAL at 08:40

## 2020-08-15 RX ADMIN — SODIUM CHLORIDE, PRESERVATIVE FREE 10 ML: 5 INJECTION INTRAVENOUS at 07:36

## 2020-08-15 RX ADMIN — ENOXAPARIN SODIUM 30 MG: 30 INJECTION SUBCUTANEOUS at 19:31

## 2020-08-15 RX ADMIN — IPRATROPIUM BROMIDE AND ALBUTEROL SULFATE 3 ML: .5; 3 SOLUTION RESPIRATORY (INHALATION) at 00:34

## 2020-08-15 RX ADMIN — FINASTERIDE 5 MG: 5 TABLET, FILM COATED ORAL at 07:34

## 2020-08-15 RX ADMIN — MORPHINE SULFATE 1 MG: 2 INJECTION, SOLUTION INTRAMUSCULAR; INTRAVENOUS at 04:51

## 2020-08-15 RX ADMIN — IPRATROPIUM BROMIDE AND ALBUTEROL SULFATE 3 ML: .5; 3 SOLUTION RESPIRATORY (INHALATION) at 06:56

## 2020-08-15 NOTE — PLAN OF CARE
Patient to be transferred to [facility].  Report called to [facility].    See MAR for pain management.  Give pain med before transport.    No acute distress noted this shift.        Called results to [facility].  Bed assigned by Devorah Palmer [facility].

## 2020-08-15 NOTE — DISCHARGE SUMMARY
[facility] Medicine Services  DISCHARGE SUMMARY    Patient Identification:  Name:  Zak Olmstead  Age:  80 y.o.  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 Olmstead is a 80-year-old male with multiple medical problems who has a history of left BKA was out on the porch at 11:30 at night about 3 days ago who was trying to urinate on the porch missed a step and fell out of his wheelchair afterwards he was having left hip pain.  He try to manage it at home but finally decided to come to the emergency room he was found to have left intertrochanteric hip fracture.  He denies having any chest pain, shortness of breath or nausea or vomiting.  He denies any melena or hematochezia or hematemesis.  He is a smoker however has been trying to decrease his smoking.  He smoked a few cigarettes since last week.  He does use 2 L nasal cannula oxygen  intermittently for COPD.     Hospital Course     Mr. Olmstead admitted yesterday after a fall causing left hip fracture.  Orthopedic surgery was consulted and recommended operative management of fracture.  Patient has left below knee amputation due to prior complications of peripheral vascular disease.  He is wheelchair-bound however he is independent with transfers and mobility.  He has history of atrial fibrillation on apixaban.  Apixaban was held on admission, last dose was administered the morning of.  Orthopedic surgery was planning on operative management on Monday 8/17.  He was found to have increased upper airway congestion with expiratory wheeze  Without COPD exacerbation.  He is only on DUONEBS at home.  I have added Symbicort as well as DuoNeb's here to optimize pulmonary function preoperatively.  Heart rate has been intermittently elevated from 90-120s due to pain mostly.  His dose of metoprolol at home has been increased (changed to metoprolol tartrate 50 mg TID from Toprol XL 25 mg) as well as increased pain medication to control pain better (takes oxycodone 20 mg po bid at home. Added low dose oxycodone q8h PRN and morphine 2 mg q2h PRN).  He is also on diltiazem which can be titrated as needed.    during exam he was found to have a loud systolic murmur representing aortic stenosis.  On review of medical record noted an echocardiogram done was obtained in February 2020 that had mentioned mild aortic regurgitation and moderate aortic stenosis.  Echocardiogram was repeated as part of preop work-up and showed severe aortic stenosis with aortic valve area of 0.5, aortic valve mean pressure gradient of 46 and maximum pressure gradient of 90 as well as moderate mitral regurgitation and pulmonary hypertension with RVSP greater than 55.   Cardiology was then consulted due to high risk of cardiac complications intraoperatively.  They agree with increased risk and recommend cardiac evaluation by cardiothoracic  surgery or cardiology for aortic stenosis repair emergently prior to hip surgery.  orthopedic surgery and anesthesiology also felt that surgical intervention for hip fracture will be too high risk and would like further cardiac evaluations prior to pursuing surgery.  I have discussed this with patient as well as his family.  They all in agreement regarding pursuing cardiac evaluation.  Patient has been accepted and is being transferred to [facility].    Discharge Vitals/Physical Examination     Vital Signs:  Temp:  [97.4 °F (36.3 °C)-98.4 °F (36.9 °C)] 97.4 °F (36.3 °C)  Heart Rate:  [] 107  Resp:  [18-20] 18  BP: (113-168)/(57-91) 121/69  Mean Arterial Pressure (Non-Invasive) for the past 24 hrs (Last 3 readings):   Noninvasive MAP (mmHg)   08/15/20 1511 83   08/15/20 1102 91   08/15/20 0811 94     SpO2 Percentage    08/15/20 1304 08/15/20 1315 08/15/20 1511   SpO2: 95% 96% 97%     SpO2:  [91 %-99 %] 97 %  on  Flow (L/min):  [2] 2;   Device (Oxygen Therapy): nasal cannula    Body mass index is 23.93 kg/m².  Wt Readings from Last 3 Encounters:   08/15/20 71.4 kg (157 lb 6.4 oz)   08/07/20 68 kg (150 lb)   06/10/20 68 kg (150 lb)         Physical Exam:  GEN: NAD  CV: irregular, slightly tachycardic, loud systolic murmur  PULM: upper airway congestion, improves with cough, CTA today.   GI: +bs, SOFT, NDNT  PSYCH: alert and oriented x 4, no confusion, hard of hearing.     Pertinent Laboratory/Radiology Results     Pertinent Laboratory Results:  Results from last 7 days   Lab Units 08/14/20  1420   TROPONIN T ng/mL <0.010           Results from last 7 days   Lab Units 08/14/20  1420   WBC 10*3/mm3 7.96   HEMOGLOBIN g/dL 9.8*   HEMATOCRIT % 34.6*   MCV fL 71.0*   MCHC g/dL 28.3*   PLATELETS 10*3/mm3 218     Results from last 7 days   Lab Units 08/15/20  0850 08/14/20  1420   SODIUM mmol/L 140 140   POTASSIUM mmol/L 4.5 4.0   MAGNESIUM mg/dL 2.2  --    CHLORIDE mmol/L 106 104   CO2 mmol/L 24.9 25.0   BUN mg/dL 21  23   CREATININE mg/dL 0.93 0.93   EGFR IF NONAFRICN AM mL/min/1.73 78 78   CALCIUM mg/dL 8.1* 8.7   GLUCOSE mg/dL 111* 131*   ALBUMIN g/dL  --  3.57   BILIRUBIN mg/dL  --  3.0*   ALK PHOS U/L  --  109   AST (SGOT) U/L  --  30   ALT (SGPT) U/L  --  25   Estimated Creatinine Clearance: 64 mL/min (by C-G formula based on SCr of 0.93 mg/dL).  No results found for: AMMONIA    No results found for: HGBA1C, POCGLU  Lab Results   Component Value Date    HGBA1C 6.10 (H) 02/22/2020     Lab Results   Component Value Date    TSH 2.170 08/14/2020    FREET4 1.34 04/03/2020      Pertinent Radiology Results:  Imaging Results (All)     Procedure Component Value Units Date/Time    XR Chest 1 View [[id number]] Collected:  08/14/20 1456     Updated:  08/14/20 1500    Narrative:       EXAMINATION: XR CHEST 1 VW-      CLINICAL INDICATION:     hip     TECHNIQUE:  XR CHEST 1 VW-      COMPARISON: 02/21/2020      FINDINGS:      Lungs are aerated.   Heart size, mediastinum, and pulmonary vascularity are unremarkable.   No pneumothorax.   No effusions.   No acute osseous findings.          Impression:       No radiographic evidence of acute cardiac or pulmonary  disease.     This report was finalized on 8/14/2020 2:58 PM by Dr. Antwon Burris MD.       XR Hip With or Without Pelvis 2 - 3 View Left [[id number]] Collected:  08/14/20 1455     Updated:  08/14/20 1459    Narrative:       EXAMINATION: XR HIP W OR WO PELVIS 2-3 VIEW LEFT-      CLINICAL INDICATION:poss fracture     COMPARISON: None      TECHNIQUE: AP pelvis, single view left hip     FINDINGS:   Acute comminuted fracture left hip intertrochanteric in nature. Varus  angulation of distal fracture fragments. No dislocation of the femoral  head. No additional fractures identified.       Impression:       Left intertrochanteric fracture.     This report was finalized on 8/14/2020 2:56 PM by Dr. Antwon Burris MD.           Echocardiogram Findings     Left Ventricle Left ventricular systolic  function is normal. Estimated EF appears to be in the range of 56 - 60%. Normal left ventricular cavity size noted. All left ventricular wall segments contract normally. Left ventricular wall thickness is consistent with mild concentric hypertrophy. Septal wall motion is normal. Left ventricular diastolic dysfunction is noted (grade I a w/high LAP) consistent with impaired relaxation.   Right Ventricle Normal right ventricular cavity size and systolic function noted.   Left Atrium Left atrial cavity size is mild-to-moderately dilated.   Right Atrium Right atrial cavity size is mildly dilated.   Aortic Valve The aortic valve is abnormal in structure. There is severe calcification of the aortic valve mainly affecting the non, left and right coronary cusp(s).Mild aortic valve regurgitation is present. Severe aortic valve stenosis is present. Aortic valve maximum pressure gradient is 90.0 mmHg. Aortic valve mean pressure gradient is 46.0 mmHg.   Mitral Valve The mitral valve is abnormal in structure. Severe MAC is present. Moderate mitral valve regurgitation is present. No significant mitral valve stenosis is present.   Tricuspid Valve The tricuspid valve is grossly normal. No evidence of tricuspid valve stenosis is present. Moderate to severe tricuspid valve regurgitation is present. Estimated right ventricular systolic pressure from tricuspid regurgitation is markedly elevated (>55 mmHg). Calculated right ventricular systolic pressure from tricuspid regurgitation is 77 mmHg.   Pulmonic Valve The pulmonic valve is not well visualized. There is no significant pulmonic valve stenosis present. There is no pulmonic valve regurgitation present.   Greater Vessels No dilation of the aortic root is present. No dilation of the sinuses of Valsalva is present.   Pericardium There is no evidence of pericardial effusion.       Test Results Pending at Discharge:   Order Current Status    COVID-19,CEPHEID,COR/LEON/PAD IN-HOUSE(OR  EMERGENT/ADD-ON),NP SWAB IN TRANSPORT MEDIA 3-4 HR TAT - Swab, Nasopharynx Collected (08/15/20 1505)      none    Discharge Disposition/Discharge Medications/Discharge Appointments     Discharge Disposition:   Short Term Hospital (DC - External)    Condition at Discharge:  Stable     DME Prescribed at Discharge:  Home oxygen 2 L NC    Discharge Diet:  Diet Instructions     Diet: Regular      Discharge Diet:  Regular          Discharge Activity:  Activity Instructions     Other Activity Instructions      Activity Instructions: bed rest due to unstable left hip fracture          Code Status While Inpatient:  Code Status and Medical Interventions:   Ordered at: 08/14/20 5767     Code Status:    CPR     Medical Interventions (Level of Support Prior to Arrest):    Full       Discharge Medications:     Discharge Medications      Continue These Medications      Instructions Start Date   dilTIAZem  MG 24 hr capsule  Commonly known as:  CARDIZEM CD   240 mg, Oral, Daily      finasteride 5 MG tablet  Commonly known as:  PROSCAR   5 mg, Oral, Daily      ipratropium-albuterol 0.5-2.5 mg/3 ml nebulizer  Commonly known as:  DUO-NEB   3 mL, Nebulization, Every 4 Hours PRN      methIMAzole 5 MG tablet  Commonly known as:  TAPAZOLE   5 mg, Oral, Daily      metoprolol succinate XL 25 MG 24 hr tablet  Commonly known as:  TOPROL-XL   25 mg, Oral, Every 24 Hours Scheduled      naproxen 500 MG tablet  Commonly known as:  NAPROSYN   500 mg, Oral, Daily PRN      nitroglycerin 0.4 MG SL tablet  Commonly known as:  NITROSTAT   0.4 mg, Sublingual, Every 5 Minutes PRN, Take no more than 3 doses in 15 minutes.      oxyCODONE 10 MG tablet  Commonly known as:  ROXICODONE   10 mg, Oral, 4 Times Daily         Stop These Medications    apixaban 2.5 MG tablet tablet  Commonly known as:  ELIQUIS     furosemide 20 MG tablet  Commonly known as:  LASIX     potassium chloride 10 MEQ CR tablet  Commonly known as:  K-DUR            Discharge  Appointments:  Your Scheduled Appointments    Sep 15, 2020  1:45 PM EDT  Follow Up with Derik Vega MD  [facility] CARDIOLOGY (--) [address] [facility] [address]  [facility] [id number] [phone]   Arrive 15 minutes prior to appointment.             Katayoun Behbahani, MD  Hospitalist Service -- [facility]       08/15/20  15:17    Discharge Time: > 30 minutes to discuss plan of care with family and patient and coordinate care and transfer.

## 2020-08-15 NOTE — CONSULTS
Consult Note        Date of Admit: 8/14/2020  Date of Consult: 08/15/20  Monique Bah DO          Reason for consultation: Severe aortic valve stenosis, preoperative evaluation    Subjective       Subjective       History of Presenting Illness:   Zak Olmstead is a 80 y.o. male  presented with fall which occurred about 3 days ago. He experienced worsening left hip pain and presented to the ED for further evaluation. He has a history of ASCVD, AAA s/p repair, aortic valve stenosis, hypertension, peripheral vascular disease, and COPD.  Prior to left hip fracture repair, primary team ordered an echocardiogram. This revealed severe aortic stenosis, worse compared to prior echocardiogram. Thus cardiology was consulted for preoperative cardiac evaluation prior to ORIF left hip. Upon evaluation this morning, the patient denies any chest pains. He does have dyspnea which occurs after activity usually in the evenings. He continues to smoke about 2 PPD. He follows with Dr. Vega as an outpatient. He underwent cardiac catheterization in 2018 which revealed  of the RCA.     Cardiac risk factors:arteriosclerotic heart disease, hypertension, peripheral vascular disease and Sedentary life style    Last Echo:  8/15/2020  · Left ventricular wall thickness is consistent with mild concentric hypertrophy.  · Left ventricular systolic function is normal, EF 56-60%.  · Left ventricular diastolic dysfunction (grade I a) consistent with impaired relaxation.  · Left atrial cavity size is mild-to-moderately dilated.  · There is severe calcification of the aortic valve mainly affecting the non, left and right coronary cusp(s).  · Mild aortic valve regurgitation is present.  · Severe aortic valve stenosis is present.  · Aortic valve mean pressure gradient is 46.0 mmHg.  · Aortic valve maximum pressure gradient is 90.0 mmHg.  · Moderate mitral valve regurgitation is present  · Moderate to severe tricuspid valve regurgitation is  present.  · Calculated right ventricular systolic pressure from tricuspid regurgitation is 77 mmHg.      Last Cath: 6/2/2018  Final impression:    mid RCA with heavy collateral filling which appears adequate  Mild disease of the left coronary system  Severe peripheral vascular disease with patent femorofemoral bypass  Patent endograft in the abdominal aorta with single limb into the left common iliac.  Right common iliac arteries is totally occluded from the ostium to the femoral.      Past Medical History:   Diagnosis Date   • AAA (abdominal aortic aneurysm) (CMS/HCC)     s/p repair   • BPH (benign prostatic hyperplasia)    • COPD (chronic obstructive pulmonary disease) (CMS/[facility])    • Heart murmur    • Hypertension    • Mononeuritis multiplex    • NSTEMI (non-ST elevated myocardial infarction) (CMS/HCC) 2018   • PVD (peripheral vascular disease) (CMS/HCC)      Past Surgical History:   Procedure Laterality Date   • ABDOMINAL AORTIC ANEURYSM REPAIR     • APPENDECTOMY     • BACK SURGERY     • BELOW KNEE LEG AMPUTATION Left    • CARDIAC CATHETERIZATION N/A 6/1/2018    Procedure: Left Heart Cath;  Surgeon: Derik Vega MD;  Location: [facility] INVASIVE LOCATION;  Service: Cardiovascular   • COLONOSCOPY N/A 6/15/2017    Procedure: COLONOSCOPY  CPTCODE:88287;  Surgeon: Lincoln Bowens III, MD;  Location: [facility];  Service:    • ENDOSCOPY N/A 6/15/2017    Procedure: ESOPHAGOGASTRODUODENOSCOPY WITH BIOPSY  CPTCODE:90018;  Surgeon: Lincoln Bowens III, MD;  Location: [facility] OR;  Service:    • ESOPHAGEAL DILATATION     • INTERVENTIONAL RADIOLOGY PROCEDURE Left 6/1/2018    Procedure: Abdominal Aortagram with Runoff;  Surgeon: Derik Vega MD;  Location: [facility] INVASIVE LOCATION;  Service: Cardiovascular     Family History   Problem Relation Age of Onset   • Heart disease Mother    • Heart failure Father    • Stroke Brother    • Heart attack Brother      Social History     Tobacco Use   • Smoking status:  Current Every Day Smoker     Packs/day: 0.50     Years: 60.00     Pack years: 30.00     Types: Cigarettes   • Smokeless tobacco: Never Used   Substance Use Topics   • Alcohol use: No   • Drug use: No     Medications Prior to Admission   Medication Sig Dispense Refill Last Dose   • apixaban (ELIQUIS) 2.5 MG tablet tablet Take 2.5 mg by mouth 2 (Two) Times a Day.   8/14/2020 at 1100   • dilTIAZem CD (CARDIZEM CD) 240 MG 24 hr capsule Take 1 capsule by mouth Daily. 30 capsule 5 8/14/2020 at Unknown time   • finasteride (PROSCAR) 5 MG tablet Take 1 tablet by mouth Daily. 90 tablet 3 8/14/2020 at Unknown time   • furosemide (LASIX) 20 MG tablet Take 20 mg by mouth Daily.   8/14/2020 at 1100   • methIMAzole (TAPAZOLE) 5 MG tablet Take 5 mg by mouth Daily.   8/14/2020 at 1100   • metoprolol succinate XL (TOPROL-XL) 25 MG 24 hr tablet Take 1 tablet by mouth Daily. 30 tablet 3 8/14/2020 at Unknown time   • oxyCODONE (ROXICODONE) 10 MG tablet Take 10 mg by mouth 4 (Four) Times a Day.   8/14/2020 at 1100   • potassium chloride (K-DUR) 10 MEQ CR tablet Take 10 mEq by mouth Daily.   8/14/2020 at 1100   • ipratropium-albuterol (DUO-NEB) 0.5-2.5 mg/3 ml nebulizer Take 3 mL by nebulization Every 4 (Four) Hours As Needed for Wheezing.   Unknown at Unknown time   • naproxen (NAPROSYN) 500 MG tablet Take 500 mg by mouth Daily As Needed for Mild Pain .   Unknown at Unknown time   • nitroglycerin (NITROSTAT) 0.4 MG SL tablet Place 0.4 mg under the tongue Every 5 (Five) Minutes As Needed for Chest Pain. Take no more than 3 doses in 15 minutes.   Unknown at Unknown time     Allergies:  Patient has no known allergies.    Review of Systems   Constitutional: Positive for fatigue. Negative for chills and fever.   HENT: Negative for congestion, ear pain, rhinorrhea and sore throat.    Respiratory: Positive for shortness of breath. Negative for cough and wheezing.    Cardiovascular: Negative for chest pain, palpitations and leg swelling.    Gastrointestinal: Negative for abdominal pain, diarrhea, nausea and vomiting.   Genitourinary: Negative for dysuria and urgency.   Musculoskeletal: Negative for back pain and myalgias.   Skin: Negative for rash and wound.   Neurological: Negative for dizziness, light-headedness and headaches.   Psychiatric/Behavioral: Negative for sleep disturbance. The patient is not nervous/anxious.        Objective       Objective      Vital Signs  Temp:  [97.4 °F (36.3 °C)-98.4 °F (36.9 °C)] 97.4 °F (36.3 °C)  Heart Rate:  [] 120  Resp:  [18-20] 18  BP: (113-168)/(57-97) 126/57  Vital Signs (last 72 hrs)       08/12 0700  -  08/13 0659 08/13 0700  -  08/14 0659 08/14 0700  -  08/15 0659 08/15 0700  -  08/15 1108   Most Recent    Temp (°F)     97.4 -  98.4      97.4     97.4 (36.3)    Heart Rate     91 -  118    118 -  137     120    Resp     18 -  20    18 -  20     18    BP     113/74 -  168/83    126/57 -  135/76     126/57    SpO2 (%)     91 -  98    93 -  99     98        Body mass index is 23.93 kg/m².    Intake/Output Summary (Last 24 hours) at 8/15/2020 1108  Last data filed at 8/15/2020 1102  Gross per 24 hour   Intake 360 ml   Output 400 ml   Net -40 ml       Physical Exam:  Physical exam:  Constitutional:    HENT:  Head:  Normocephalic and atraumatic.    Eyes:  Conjunctivae and EOM are normal.  Pupils are equal, round, and reactive to light.  No scleral icterus.    Neck:  Neck supple.  No JVD present.    Cardiovascular: Normal rate, irregular rhythm, Grade 4/6 systolic murmur LLSB, RUSB  Pulmonary/Chest:  Vesicular breath sounds B/L  Abdominal:  Soft.  Bowel sounds are normal.  No distension and no tenderness.    Neurological:  Alert and oriented to person, place, and time. No focal deficits, normal coordination and gate.  Skin:  Skin is warm and dry. No rash noted. No pallor.   Lower extremities: No edema, Left AKA    Results review     Results Review:    I reviewed the patient's new clinical results.  Results  from last 7 days   Lab Units 20  1420   TROPONIN T ng/mL <0.010     Results from last 7 days   Lab Units 20  1420   WBC 10*3/mm3 7.96   HEMOGLOBIN g/dL 9.8*   PLATELETS 10*3/mm3 218     Results from last 7 days   Lab Units 08/15/20  0850 20  1420   SODIUM mmol/L 140 140   POTASSIUM mmol/L 4.5 4.0   CHLORIDE mmol/L 106 104   CO2 mmol/L 24.9 25.0   BUN mg/dL 21 23   CREATININE mg/dL 0.93 0.93   CALCIUM mg/dL 8.1* 8.7   GLUCOSE mg/dL 111* 131*   ALT (SGPT) U/L  --  25   AST (SGOT) U/L  --  30     Lab Results   Component Value Date    INR 3.37 (H) 2020    INR 3.12 (H) 2020    INR 3.04 (H) 2020    INR 2.44 (H) 2018    INR 2.06 (H) 2018    INR 2.09 (H) 2018    INR 1.80 (H) 2018     Lab Results   Component Value Date    MG 2.2 08/15/2020    MG 1.9 2020    MG 2.2 2018     Lab Results   Component Value Date    TSH 2.170 2020    PSA 2.950 2020    TRIG 98 2020    HDL 26 (L) 2020    LDL 53 2020      Lab Results   Component Value Date    PROBNP 10,433.0 (H) 2020       EC/15/020    ECG/EMG Results (last 24 hours)     Procedure Component Value Units Date/Time    ECG 12 Lead [[id number]] Collected:  20 1415     Updated:  20 1415    ECG 12 Lead [[id number]] Collected:  08/15/20 0816     Updated:  08/15/20 0817    Narrative:       Test Reason : a fib  Blood Pressure : **/** mmHG  Vent. Rate : 122 BPM     Atrial Rate : 125 BPM     P-R Int : 000 ms          QRS Dur : 082 ms      QT Int : 296 ms       P-R-T Axes : 000 026 061 degrees     QTc Int : 421 ms    Atrial fibrillation with rapid ventricular response  Abnormal ECG  When compared with ECG of 14-AUG-2020 14:15, (Unconfirmed)  No significant change was found    Referred By:  BEHBAHANI           Confirmed By:     Adult Transthoracic Echo Complete W/ Cont if Necessary Per Protocol [[id number]] Collected:  08/15/20 0859     Updated:  08/15/20 0929     BSA 1.8 m^2       IVSd 1.3 cm      IVSs 1.2 cm      LVIDd 4.2 cm      LVIDs 3.0 cm      LVPWd 1.2 cm      BH CV ECHO BILLY - LVPWS 1.5 cm      IVS/LVPW 1.1     FS 28.0 %      EDV(Teich) 76.6 ml      ESV(Teich) 34.7 ml      EF(Teich) 54.7 %      EDV(cubed) 71.7 ml      ESV(cubed) 26.7 ml      EF(cubed) 62.7 %      % IVS thick -9.0 %      % LVPW thick 23.6 %      LV mass(C)d 194.0 grams      LV mass(C)dI 105.2 grams/m^2      LV mass(C)s 134.9 grams      LV mass(C)sI 73.1 grams/m^2      SV(Teich) 41.9 ml      SI(Teich) 22.7 ml/m^2      SV(cubed) 45.0 ml      SI(cubed) 24.4 ml/m^2      Ao root diam 3.3 cm      Ao root area 8.6 cm^2      LA dimension 4.2 cm      LA/Ao 1.3     LVOT diam 2.0 cm      LVOT area 3.1 cm^2      LVOT area(traced) 3.1 cm^2      LVLd ap4 6.9 cm      EDV(MOD-sp4) 76.6 ml      LVLs ap4 5.2 cm      ESV(MOD-sp4) 30.7 ml      EF(MOD-sp4) 59.9 %      SV(MOD-sp4) 45.9 ml      SI(MOD-sp4) 24.9 ml/m^2      Ao root area (BSA corrected) 1.8     LV Montaño Vol (BSA corrected) 41.5 ml/m^2      LV Sys Vol (BSA corrected) 16.6 ml/m^2      Ao pk maria ines 427.8 cm/sec      Ao max PG 73.6 mmHg      Ao max PG (full) 71.7 mmHg      Ao V2 mean 275.3 cm/sec      Ao mean PG 37.0 mmHg      Ao mean PG (full) 36.0 mmHg      Ao V2 VTI 76.9 cm      WENDY(I,A) 0.51 cm^2      WENDY(I,D) 0.51 cm^2      WENDY(V,A) 0.51 cm^2      WENDY(V,D) 0.51 cm^2      AI max maria ines 380.0 cm/sec      AI max PG 57.8 mmHg      AI dec slope 168.0 cm/sec^2      AI P1/2t 662.5 msec      LV V1 max PG 2.0 mmHg      LV V1 mean PG 1.0 mmHg      LV V1 max 69.9 cm/sec      LV V1 mean 47.2 cm/sec      LV V1 VTI 12.4 cm      SV(Ao) 657.5 ml      SI(Ao) 356.5 ml/m^2      SV(LVOT) 39.0 ml      SI(LVOT) 21.1 ml/m^2      PA acc time 0.1 sec      TR max maria ines 395.3 cm/sec      RVSP(TR) 72.7 mmHg      RAP systole 10.0 mmHg      PA pr(Accel) 36.3 mmHg       CV ECHO BILLY - BZI_BMI 23.9 kilograms/m^2       CV ECHO BILLY - BSA(HAYCOCK) 1.9 m^2       CV ECHO BILLY - BZI_METRIC_WEIGHT 71.2 kg        CV ECHO BILLY - BZI_METRIC_HEIGHT 172.7 cm           Imaging Results (Last 72 Hours)     Procedure Component Value Units Date/Time    XR Chest 1 View [[id number]] Collected:  08/14/20 1456     Updated:  08/14/20 1500    Narrative:       EXAMINATION: XR CHEST 1 VW-      CLINICAL INDICATION:     hip     TECHNIQUE:  XR CHEST 1 VW-      COMPARISON: 02/21/2020      FINDINGS:      Lungs are aerated.   Heart size, mediastinum, and pulmonary vascularity are unremarkable.   No pneumothorax.   No effusions.   No acute osseous findings.          Impression:       No radiographic evidence of acute cardiac or pulmonary  disease.     This report was finalized on 8/14/2020 2:58 PM by Dr. Antwon Burris MD.       XR Hip With or Without Pelvis 2 - 3 View Left [[id number]] Collected:  08/14/20 1455     Updated:  08/14/20 1459    Narrative:       EXAMINATION: XR HIP W OR WO PELVIS 2-3 VIEW LEFT-      CLINICAL INDICATION:poss fracture     COMPARISON: None      TECHNIQUE: AP pelvis, single view left hip     FINDINGS:   Acute comminuted fracture left hip intertrochanteric in nature. Varus  angulation of distal fracture fragments. No dislocation of the femoral  head. No additional fractures identified.       Impression:       Left intertrochanteric fracture.     This report was finalized on 8/14/2020 2:56 PM by Dr. Antwon Burris MD.             Assessment      1. Severe aortic valve stenosis noted on echocardiogram 8/15/2020  2. Left hip fracture  3. Chronic atrial fibrillation, on Eliquis for anticoagulation  4. ASCVD noted on cardiac catheterization 6/2018, revealing  of the RCA  5. COPD  6. Essential Hypertension  7. Hypothyroidism  8. History of AAA s/p repair in the remote past  9. Peripheral vascular disease s/p left below knee amputation      Recommendations     1. She has critical aortic stenosis with high gradients as well as coronary artery disease, peripheral vascular disease, and severe portal hypertension this will put  him as a high preoperative risk for hip surgery if this is decided to go through anticoagulation should be held 1 to 2 days prior to the surgery  2. Regarding his aortic stenosis this should be addressed after surgery he is been seen by Dr. Vega who is his cardiologist further recommendation will depend about Dr. Vega`s recommendation  3. His atrial fibrillation is rate controlled we will continue current management  4. Blood pressure is acceptable continue current management  5. He was strongly advised to quit smoking      I have discussed my impression and recommendations with the patient and family.    Thank you very much for asking us to be involved in this patient's care.  We will follow along with you.      ALICIA Galindo, Adelso Soto MD, FACC, performed the services described in this documentation as documented by the above-named individual under my supervision and made necessary changes in the note is both accurate and complete  08/15/20  11:08

## 2020-08-15 NOTE — PLAN OF CARE
Pt resting on and off throughout the night. Pt complains frequently of pain in left extremity. PRN pain medication utilized. No other complaints at this time. Will continue to follow plan of care and monitor.

## 2020-08-15 NOTE — PROGRESS NOTES
Inpatient Progress Note  Zak Olmstead  Date: 08/15/20  MRN: [id number]      Subjective:   Orthopedic surgerie been following the patient secondary to a left hip intertrochanteric fracture.  The patient was noted to have severe aortic stenosis.  Hospitalist service contacted orthopedic surgery and they are recommending that the patient be transferred today to a higher acuity facility for evaluation of the severe aortic stenosis secondary to this more than likely needing surgical repair prior to the left hip surgical intervention being performed.  The patient continues to have left hip pain as well as some  pain to the distal femur at the BKA site.        Objective:    Vitals:    08/15/20 0657 08/15/20 0708 08/15/20 0811 08/15/20 1102   BP: 124/91  135/76 126/57   BP Location: Left arm  Left arm Right arm   Patient Position: Lying  Lying Lying   Pulse: 118 118 (!) 137 120   Resp: 18 20 18 18   Temp: 97.7 °F (36.5 °C)   97.4 °F (36.3 °C)   TempSrc: Oral   Oral   SpO2: 95% 93% 99% 98%   Weight:       Height:              Physical Exam:  Constitutional:    No respiratory distress.        Musculoskeletal: Upon examination of the left hip there is no large effusion noted, no skin discoloration, or open wounds noted. The patient has tenderness to palpation at the distal femur proximal to the femoral condyle.  The patient is neurovascularly intact at the left BKA site.  The patient has no large hematoma noted.  He is tender to palpation at the intertrochanteric region of the left hip.  Small scabbing noted at the left knee.      Labs:    Results from last 7 days   Lab Units 08/14/20  1420   WBC 10*3/mm3 7.96   HEMOGLOBIN g/dL 9.8*   HEMATOCRIT % 34.6*   MCV fL 71.0*   MCHC g/dL 28.3*   PLATELETS 10*3/mm3 218         Results from last 7 days   Lab Units 08/15/20  0850 08/14/20  1420   SODIUM mmol/L 140 140   POTASSIUM mmol/L 4.5 4.0   MAGNESIUM mg/dL 2.2  --    CHLORIDE mmol/L 106 104   CO2 mmol/L 24.9 25.0   BUN mg/dL 21  23   CREATININE mg/dL 0.93 0.93   EGFR IF NONAFRICN AM mL/min/1.73 78 78   CALCIUM mg/dL 8.1* 8.7   GLUCOSE mg/dL 111* 131*   ALBUMIN g/dL  --  3.57   BILIRUBIN mg/dL  --  3.0*   ALK PHOS U/L  --  109   AST (SGOT) U/L  --  30   ALT (SGPT) U/L  --  25   Estimated Creatinine Clearance: 64 mL/min (by C-G formula based on SCr of 0.93 mg/dL).  No results found for: AMMONIA  Results from last 7 days   Lab Units 08/14/20  1420   TROPONIN T ng/mL <0.010         No results found for: HGBA1C  Lab Results   Component Value Date    TSH 2.170 08/14/2020    FREET4 1.34 04/03/2020         Pain Management Panel     There is no flowsheet data to display.          No results found for: BLOODCX  No results found for: URINECX  No results found for: WOUNDCX  No results found for: STOOLCX              Radiology:  Imaging Results (Last 72 Hours)     Procedure Component Value Units Date/Time    XR Chest 1 View [[id number]] Collected:  08/14/20 1456     Updated:  08/14/20 1500    Narrative:       EXAMINATION: XR CHEST 1 VW-      CLINICAL INDICATION:     hip     TECHNIQUE:  XR CHEST 1 VW-      COMPARISON: 02/21/2020      FINDINGS:      Lungs are aerated.   Heart size, mediastinum, and pulmonary vascularity are unremarkable.   No pneumothorax.   No effusions.   No acute osseous findings.          Impression:       No radiographic evidence of acute cardiac or pulmonary  disease.     This report was finalized on 8/14/2020 2:58 PM by Dr. Antwon Burris MD.       XR Hip With or Without Pelvis 2 - 3 View Left [[id number]] Collected:  08/14/20 1455     Updated:  08/14/20 1459    Narrative:       EXAMINATION: XR HIP W OR WO PELVIS 2-3 VIEW LEFT-      CLINICAL INDICATION:poss fracture     COMPARISON: None      TECHNIQUE: AP pelvis, single view left hip     FINDINGS:   Acute comminuted fracture left hip intertrochanteric in nature. Varus  angulation of distal fracture fragments. No dislocation of the femoral  head. No additional fractures identified.        Impression:       Left intertrochanteric fracture.     This report was finalized on 8/14/2020 2:56 PM by Dr. Antwon Burris MD.               Assessment:   1.  Left intertrochanteric fracture          Plan:   Hospitalist is recommending transfer to higher level acuity facility.  Patient has severe aortic stenosis.  He is high risk of surgical complications with severe aortic stenosis.  Transfer will be attempted to be made today.  In the meantime while waiting for transfer we will order x-rays of the left knee to evaluate and rule out fracture at the distal femur as well.  This is unlikely, but will need x-ray to rule out.  Discussed this with radiology that he does have a intertrochanteric fracture.  Needs to move left stump site as little as possible for patient comfort.  Radiology notes that very little motion will be required to obtain the knee views.  Patient is pending transfer to higher level acuity facility today.        Yevgeniy Abdalla, APRN August 15, 2020 12:53

## 2020-08-15 NOTE — NURSING NOTE
ACC REVIEW REPORT: [facility]        PATIENT NAME: Zak Olmstead    PATIENT ID: [id number]        COVID-19 ACC SCREENING       DOES THE PATIENT HAVE A FEVER GREATER THAN OR EQUAL .4: no    IS THE PATIENT EXPERIENCING SHORTNESS OF BREATH: no    DOES THE PATIENT HAVE A COUGH: no    DOES THE PATIENT HAVE ANY OF THE FOLLOWING RISK FACTORS:    EXPOSURE TO SUSPECTED OR KNOWN COVID-19: unknown    RECENT TRAVEL HISTORY TO ENDEMIC AREA (DOMESTIC/LOCAL): no    IS THE PATIENT A HEALTHCARE WORKER: no    HAS THE PATIENT BEEN TESTED FOR COVID-19: yes    DATE TESTED: today    LAB TESTING SENT TO: done at [facility] - cephiad results negative          BED: pending    BED TYPE: telemetry    BED GIVEN TO: Joe    TIME BED GIVEN: 1710    YOB: 1940    AGE: 80    GENDER: M    PREVIOUS ADMIT TO [facility]:     PREVIOUS ADMISSION DATE:     PATIENT CLASS:     TODAY'S DATE: 8/15/2020    TRANSFER DATE: 8-15-20    ETA: after 1900    TRANSFERRING FACILITY: [facility]    TRANSFERRING FACILITY PHONE # : [phone]    TRANSFERRING MD: Behbahani    DATE/TIME REQUEST RECEIVED: 8-15-20@[facility] RN: Miladys Guillen    REPORT FROM: Joe on [facility]    TIME REPORT TAKEN: 1510    DIAGNOSIS: left intertrochanteric fx, severe aortic stenosis    REASON FOR TRANSFER TO [facility]: higher level of care    TRANSPORTATION: EMS    CLINICAL REASON FOR TRANSFER TO [facility]: 80 y.o. Who was sitting on his porch in his motorized w/c - accidentally fell off the porch along with the w/c and he fractured his hip; he has left BKA as well (2012)      CLINICAL INFORMATION    HEIGHT:     WEIGHT: 68kg    ALLERGIES: NKA    GAINES: no    INFECTIOUS DISEASE:     ISOLATION:     LAST VITAL SIGNS:  TIME: 11:02  TEMP: 97.4  PULSE: 88  B/P: 126/57  RESP: 18    LAB INFORMATION: bun 23, Cr 0.93, glucose 131, total bilirubin 3, WBC 7.96, H&H: 9.8/34.6   Ua: 2+ bilirubin, 1+ protein, 1+ bacteria, tr LE, + nitrite    CULTURE INFORMATION: no cxs done    MEDS/IV FLUIDS: #20 left ac -  SL  Pt received one liter NS bolus  cardizem  IVP metoprolol x1 this morning  Metoprolol oral dose increased  Morphine prn - none taken today  Scheduled roxicodone 20mg bid  5mg oxycodone q8 prn      CARDIAC SYSTEM:    CHEST PAIN: no    RHYTHM: afib    Is patient taking or has patient been given any drugs that could increase bleeding? yes  (Plavix, Brilinta, Effient, Eliquis, Xarelto, Warfarin, Integrilin, Angiomax)    DRUG: eliquis                         DOSE/FREQUENCY: last dose taken 8/14               lovenox                                                              30mg q12 - given at 0800 today    CARDIAC ENZYMES: troponin @1420 on 8/14: <0.010    CARDIAC NOTES: pt had episode of afib RVR this morning 's-170's - he was asymptomatic  ECHO done showing severe AS  Pt needs cardiac clearance/evaluation for possible TAVR before orthopedic surgery can be considered      RESPIRATORY SYSTEM:    LUNG SOUNDS:  Some congestion    OXYGEN: 2 liters    O2 SAT: 96%    RADIOLOGY RESULTS: CXR neg    RESPIRATORY STATUS: no soa    CNS/MUSCULOSKELETAL    ALERT AND ORIENTED:  yes    CNS/MUSCULOSKELETAL NOTES: pt gets around in motorized w/c; he has been in bed today      GI//GY      ABDOMINAL PAIN: none reported    VOMITING: no    DIARRHEA: no    NAUSEA: no    GI//GY NOTES: vooiding per urinal - has had about 500cc dark UO today    PAST MEDICAL HISTORY: COPD - home oxygen, AAA repair, HTN, PVT, NSTEMI, appy, back surgery, esophageal diltation, mononeuritis multiplex, left AKA, peripheral neuropathy    OTHER SYMPTOM NOTES: bruising on hip; no lacerations from the fall    ADDITIONAL NOTES: no abx ordered          Sadia Guillen RN  8/15/2020  15:30

## 2020-08-15 NOTE — NURSING NOTE
Notified [facility] that EMS had left with patient en route. Nurse states room had changed to 371S. Also called patient son Low to let him know he had left the facility and was on his way to [facility] with new room update. Son will let rest of family know.

## 2020-08-16 ENCOUNTER — ANESTHESIA EVENT (OUTPATIENT)
Dept: TELEMETRY | Facility: HOSPITAL | Age: 80
End: 2020-08-16

## 2020-08-16 ENCOUNTER — APPOINTMENT (OUTPATIENT)
Dept: GENERAL RADIOLOGY | Facility: HOSPITAL | Age: 80
End: 2020-08-16

## 2020-08-16 ENCOUNTER — ANESTHESIA (OUTPATIENT)
Dept: TELEMETRY | Facility: HOSPITAL | Age: 80
End: 2020-08-16

## 2020-08-16 LAB
ABO GROUP BLD: NORMAL
ABO GROUP BLD: NORMAL
ALBUMIN SERPL-MCNC: 3.3 G/DL (ref 3.5–5.2)
ALBUMIN/GLOB SERPL: 1.2 G/DL
ALP SERPL-CCNC: 102 U/L (ref 39–117)
ALT SERPL W P-5'-P-CCNC: 28 U/L (ref 1–41)
ANION GAP SERPL CALCULATED.3IONS-SCNC: 9 MMOL/L (ref 5–15)
APTT PPP: 43.4 SECONDS (ref 24–37)
AST SERPL-CCNC: 24 U/L (ref 1–40)
BASOPHILS # BLD AUTO: 0.04 10*3/MM3 (ref 0–0.2)
BASOPHILS NFR BLD AUTO: 0.5 % (ref 0–1.5)
BILIRUB SERPL-MCNC: 3.1 MG/DL (ref 0–1.2)
BLD GP AB SCN SERPL QL: NEGATIVE
BUN SERPL-MCNC: 21 MG/DL (ref 8–23)
BUN/CREAT SERPL: 20.6 (ref 7–25)
CALCIUM SPEC-SCNC: 8.3 MG/DL (ref 8.6–10.5)
CHLORIDE SERPL-SCNC: 103 MMOL/L (ref 98–107)
CHOLEST SERPL-MCNC: 111 MG/DL (ref 0–200)
CO2 SERPL-SCNC: 26 MMOL/L (ref 22–29)
CREAT SERPL-MCNC: 1.02 MG/DL (ref 0.76–1.27)
DEPRECATED RDW RBC AUTO: 53.2 FL (ref 37–54)
EOSINOPHIL # BLD AUTO: 0.15 10*3/MM3 (ref 0–0.4)
EOSINOPHIL NFR BLD AUTO: 1.7 % (ref 0.3–6.2)
ERYTHROCYTE [DISTWIDTH] IN BLOOD BY AUTOMATED COUNT: 20.9 % (ref 12.3–15.4)
GFR SERPL CREATININE-BSD FRML MDRD: 70 ML/MIN/1.73
GLOBULIN UR ELPH-MCNC: 2.8 GM/DL
GLUCOSE SERPL-MCNC: 108 MG/DL (ref 65–99)
HCT VFR BLD AUTO: 31.7 % (ref 37.5–51)
HDLC SERPL-MCNC: 20 MG/DL (ref 40–60)
HGB BLD-MCNC: 8.9 G/DL (ref 13–17.7)
IMM GRANULOCYTES # BLD AUTO: 0.03 10*3/MM3 (ref 0–0.05)
IMM GRANULOCYTES NFR BLD AUTO: 0.3 % (ref 0–0.5)
INR PPP: 1.38 (ref 0.85–1.16)
LDLC SERPL CALC-MCNC: 71 MG/DL (ref 0–100)
LDLC/HDLC SERPL: 3.53 {RATIO}
LYMPHOCYTES # BLD AUTO: 1.59 10*3/MM3 (ref 0.7–3.1)
LYMPHOCYTES NFR BLD AUTO: 18.5 % (ref 19.6–45.3)
MAGNESIUM SERPL-MCNC: 2.5 MG/DL (ref 1.6–2.4)
MCH RBC QN AUTO: 20.5 PG (ref 26.6–33)
MCHC RBC AUTO-ENTMCNC: 28.1 G/DL (ref 31.5–35.7)
MCV RBC AUTO: 72.9 FL (ref 79–97)
MONOCYTES # BLD AUTO: 0.67 10*3/MM3 (ref 0.1–0.9)
MONOCYTES NFR BLD AUTO: 7.8 % (ref 5–12)
NEUTROPHILS NFR BLD AUTO: 6.12 10*3/MM3 (ref 1.7–7)
NEUTROPHILS NFR BLD AUTO: 71.2 % (ref 42.7–76)
NRBC BLD AUTO-RTO: 0.5 /100 WBC (ref 0–0.2)
NT-PROBNP SERPL-MCNC: 8015 PG/ML (ref 0–1800)
OVALOCYTES BLD QL SMEAR: NORMAL
PLAT MORPH BLD: NORMAL
PLATELET # BLD AUTO: 204 10*3/MM3 (ref 140–450)
PMV BLD AUTO: 10.7 FL (ref 6–12)
POTASSIUM SERPL-SCNC: 3.8 MMOL/L (ref 3.5–5.2)
PROT SERPL-MCNC: 6.1 G/DL (ref 6–8.5)
PROTHROMBIN TIME: 16.7 SECONDS (ref 11.5–14)
RBC # BLD AUTO: 4.35 10*6/MM3 (ref 4.14–5.8)
RH BLD: POSITIVE
RH BLD: POSITIVE
SODIUM SERPL-SCNC: 138 MMOL/L (ref 136–145)
T&S EXPIRATION DATE: NORMAL
TRIGL SERPL-MCNC: 102 MG/DL (ref 0–150)
VLDLC SERPL-MCNC: 20.4 MG/DL
WBC # BLD AUTO: 8.6 10*3/MM3 (ref 3.4–10.8)
WBC MORPH BLD: NORMAL

## 2020-08-16 PROCEDURE — 87040 BLOOD CULTURE FOR BACTERIA: CPT | Performed by: NURSE PRACTITIONER

## 2020-08-16 PROCEDURE — 85730 THROMBOPLASTIN TIME PARTIAL: CPT | Performed by: INTERNAL MEDICINE

## 2020-08-16 PROCEDURE — 86900 BLOOD TYPING SEROLOGIC ABO: CPT | Performed by: NURSE PRACTITIONER

## 2020-08-16 PROCEDURE — 85610 PROTHROMBIN TIME: CPT | Performed by: INTERNAL MEDICINE

## 2020-08-16 PROCEDURE — 86850 RBC ANTIBODY SCREEN: CPT | Performed by: NURSE PRACTITIONER

## 2020-08-16 PROCEDURE — 86900 BLOOD TYPING SEROLOGIC ABO: CPT

## 2020-08-16 PROCEDURE — 85007 BL SMEAR W/DIFF WBC COUNT: CPT | Performed by: INTERNAL MEDICINE

## 2020-08-16 PROCEDURE — 99222 1ST HOSP IP/OBS MODERATE 55: CPT | Performed by: INTERNAL MEDICINE

## 2020-08-16 PROCEDURE — 83735 ASSAY OF MAGNESIUM: CPT | Performed by: INTERNAL MEDICINE

## 2020-08-16 PROCEDURE — 99233 SBSQ HOSP IP/OBS HIGH 50: CPT | Performed by: INTERNAL MEDICINE

## 2020-08-16 PROCEDURE — 99222 1ST HOSP IP/OBS MODERATE 55: CPT | Performed by: ORTHOPAEDIC SURGERY

## 2020-08-16 PROCEDURE — 85025 COMPLETE CBC W/AUTO DIFF WBC: CPT | Performed by: INTERNAL MEDICINE

## 2020-08-16 PROCEDURE — 83880 ASSAY OF NATRIURETIC PEPTIDE: CPT | Performed by: INTERNAL MEDICINE

## 2020-08-16 PROCEDURE — 86901 BLOOD TYPING SEROLOGIC RH(D): CPT | Performed by: NURSE PRACTITIONER

## 2020-08-16 PROCEDURE — 93010 ELECTROCARDIOGRAM REPORT: CPT | Performed by: INTERNAL MEDICINE

## 2020-08-16 PROCEDURE — 80061 LIPID PANEL: CPT | Performed by: PHYSICIAN ASSISTANT

## 2020-08-16 PROCEDURE — 86901 BLOOD TYPING SEROLOGIC RH(D): CPT

## 2020-08-16 PROCEDURE — 25010000002 CEFTRIAXONE PER 250 MG: Performed by: NURSE PRACTITIONER

## 2020-08-16 PROCEDURE — 80053 COMPREHEN METABOLIC PANEL: CPT | Performed by: INTERNAL MEDICINE

## 2020-08-16 PROCEDURE — 99223 1ST HOSP IP/OBS HIGH 75: CPT | Performed by: THORACIC SURGERY (CARDIOTHORACIC VASCULAR SURGERY)

## 2020-08-16 RX ORDER — MAGNESIUM SULFATE HEPTAHYDRATE 40 MG/ML
4 INJECTION, SOLUTION INTRAVENOUS AS NEEDED
Status: DISCONTINUED | OUTPATIENT
Start: 2020-08-16 | End: 2020-08-24 | Stop reason: HOSPADM

## 2020-08-16 RX ORDER — MAGNESIUM SULFATE HEPTAHYDRATE 40 MG/ML
2 INJECTION, SOLUTION INTRAVENOUS AS NEEDED
Status: DISCONTINUED | OUTPATIENT
Start: 2020-08-16 | End: 2020-08-24 | Stop reason: HOSPADM

## 2020-08-16 RX ORDER — ASPIRIN 81 MG/1
81 TABLET ORAL DAILY
Status: DISCONTINUED | OUTPATIENT
Start: 2020-08-16 | End: 2020-08-18 | Stop reason: SDUPTHER

## 2020-08-16 RX ORDER — ATORVASTATIN CALCIUM 40 MG/1
40 TABLET, FILM COATED ORAL NIGHTLY
Status: DISCONTINUED | OUTPATIENT
Start: 2020-08-16 | End: 2020-08-24 | Stop reason: HOSPADM

## 2020-08-16 RX ADMIN — METHIMAZOLE 5 MG: 5 TABLET ORAL at 09:18

## 2020-08-16 RX ADMIN — METOPROLOL TARTRATE 25 MG: 25 TABLET, FILM COATED ORAL at 22:31

## 2020-08-16 RX ADMIN — OXYCODONE HYDROCHLORIDE 10 MG: 5 TABLET ORAL at 05:16

## 2020-08-16 RX ADMIN — ASPIRIN 81 MG: 81 TABLET, COATED ORAL at 12:31

## 2020-08-16 RX ADMIN — DILTIAZEM HYDROCHLORIDE 240 MG: 240 CAPSULE, COATED, EXTENDED RELEASE ORAL at 09:18

## 2020-08-16 RX ADMIN — OXYCODONE HYDROCHLORIDE 10 MG: 5 TABLET ORAL at 12:31

## 2020-08-16 RX ADMIN — SODIUM CHLORIDE, PRESERVATIVE FREE 10 ML: 5 INJECTION INTRAVENOUS at 20:36

## 2020-08-16 RX ADMIN — METOPROLOL TARTRATE 25 MG: 25 TABLET, FILM COATED ORAL at 14:57

## 2020-08-16 RX ADMIN — NICOTINE 1 PATCH: 21 PATCH, EXTENDED RELEASE TRANSDERMAL at 09:42

## 2020-08-16 RX ADMIN — OXYCODONE HYDROCHLORIDE 10 MG: 5 TABLET ORAL at 17:54

## 2020-08-16 RX ADMIN — CEFTRIAXONE SODIUM 1 G: 1 INJECTION, POWDER, FOR SOLUTION INTRAMUSCULAR; INTRAVENOUS at 02:00

## 2020-08-16 RX ADMIN — ATORVASTATIN CALCIUM 40 MG: 40 TABLET, FILM COATED ORAL at 20:35

## 2020-08-16 RX ADMIN — CEFTRIAXONE SODIUM 1 G: 1 INJECTION, POWDER, FOR SOLUTION INTRAMUSCULAR; INTRAVENOUS at 20:35

## 2020-08-16 RX ADMIN — METOPROLOL TARTRATE 25 MG: 25 TABLET, FILM COATED ORAL at 05:15

## 2020-08-16 RX ADMIN — FINASTERIDE 5 MG: 5 TABLET, FILM COATED ORAL at 09:18

## 2020-08-16 NOTE — PLAN OF CARE
Patient is alert and oriented and has complaints of pain on and off throughout the shift. Pain medication given and effective. Patient has been seen by ortho CT and cardiology and has been assessed for a TAVR placement possibly tomorrow. Patient has aortic valve stenosis and will this will have to be taken care of prior to orthopedic surgery per cardiology and CT.

## 2020-08-16 NOTE — PLAN OF CARE
Problem: Pain, Chronic (Adult)  Goal: Identify Related Risk Factors and Signs and Symptoms  Flowsheets (Taken 8/16/2020 0339)  Related Risk Factors (Chronic Pain): disease process; procedures/treatments; traumatic injury  Signs and Symptoms (Chronic Pain): anorexia; fatigue/weakness; verbalization of pain descriptors; verbalization of pain/discomfort for a prolonged time period     Problem: Skin Injury Risk (Adult)  Goal: Identify Related Risk Factors and Signs and Symptoms  Flowsheets (Taken 8/16/2020 0339)  Related Risk Factors (Skin Injury Risk): advanced age; fluid intake inadequate; mobility impaired     Problem: Fall Risk (Adult)  Goal: Identify Related Risk Factors and Signs and Symptoms  Flowsheets (Taken 8/16/2020 0339)  Related Risk Factors (Fall Risk): gait/mobility problems; history of falls; sleep pattern alteration; environment unfamiliar     Problem: Patient Care Overview  Goal: Plan of Care Review  Flowsheets  Taken 8/16/2020 0339  Progress: no change  Outcome Summary: Pt transfered to floor from [facility]. Rested some, but was awakened for treatment interventions frequently. Some pain reported, but PO meds did help. A-Fib on monitor. Makes needs known to staff.  Taken 8/15/2020 2150  Plan of Care Reviewed With: patient

## 2020-08-16 NOTE — PROGRESS NOTES
[facility]  PROGRESS NOTE    Patient Name: Zak Olmstead  : 1940  MRN: [id number]    Date of Admission: 8/15/2020  Primary Care Physician: Ely Berumen APRN    Subjective   Subjective     CC:  Hip fracture, severe AS    HPI:  Pt states that his pain is not well controlled this am. Otherwise, he denies any issues since admission.     Review of Systems  Gen- No fevers, chills  CV- No chest pain, palpitations  Resp- No cough, dyspnea  GI- No N/V/D, abd pain    Objective   Objective     Vital Signs:   Temp:  [97.4 °F (36.3 °C)-98.1 °F (36.7 °C)] 98.1 °F (36.7 °C)  Heart Rate:  [] 87  Resp:  [18-20] 18  BP: (105-135)/(55-91) 121/69        Physical Exam:  Constitutional: No acute distress, awake, alert  HENT: NCAT, mucous membranes moist  Respiratory: Clear to auscultation bilaterally, respiratory effort normal   Cardiovascular: harsh holosystolic murmur heard loudest at RUSB, no rubs or gallops, RLE cold and with weak pedal pulse  Gastrointestinal: Positive bowel sounds, soft, nontender, nondistended  Musculoskeletal: s/p LBKA, no RLE edema  Psychiatric: Appropriate affect, cooperative  Neurologic: Oriented x 3, strength symmetric in all extremities, Cranial Nerves grossly intact to confrontation, speech clear  Skin: No rashes    Results Reviewed:  Results from last 7 days   Lab Units 20  0127 20  1420   WBC 10*3/mm3 8.60 7.96   HEMOGLOBIN g/dL 8.9* 9.8*   HEMATOCRIT % 31.7* 34.6*   PLATELETS 10*3/mm3 204 218   INR  1.38*  --      Results from last 7 days   Lab Units 20  0127 08/15/20  0850 20  1420   SODIUM mmol/L 138 140 140   POTASSIUM mmol/L 3.8 4.5 4.0   CHLORIDE mmol/L 103 106 104   CO2 mmol/L 26.0 24.9 25.0   BUN mg/dL 21 21 23   CREATININE mg/dL 1.02 0.93 0.93   GLUCOSE mg/dL 108* 111* 131*   CALCIUM mg/dL 8.3* 8.1* 8.7   ALT (SGPT) U/L 28  --  25   AST (SGOT) U/L 24  --  30   TROPONIN T ng/mL  --   --  <0.010   PROBNP pg/mL  8,015.0*  --   --      Estimated Creatinine Clearance: 58.6 mL/min (by C-G formula based on SCr of 1.02 mg/dL).    Microbiology Results Abnormal     None          Imaging Results (Last 24 Hours)     ** No results found for the last 24 hours. **          Results for orders placed during the hospital encounter of 08/14/20   Adult Transthoracic Echo Complete W/ Cont if Necessary Per Protocol    Narrative · Left ventricular wall thickness is consistent with mild concentric   hypertrophy.  · Left ventricular systolic function is normal, EF 56-60%.  · Left ventricular diastolic dysfunction (grade I a) consistent with   impaired relaxation.  · Left atrial cavity size is mild-to-moderately dilated.  · There is severe calcification of the aortic valve mainly affecting the   non, left and right coronary cusp(s).  · Mild aortic valve regurgitation is present.  · Severe aortic valve stenosis is present.  · Aortic valve mean pressure gradient is 46.0 mmHg.  · Aortic valve maximum pressure gradient is 90.0 mmHg.  · Moderate mitral valve regurgitation is present  · Moderate to severe tricuspid valve regurgitation is present.  · Calculated right ventricular systolic pressure from tricuspid   regurgitation is 77 mmHg.          I have reviewed the medications:  Scheduled Meds:  cefTRIAXone 1 g Intravenous Q24H   dilTIAZem  mg Oral Daily   finasteride 5 mg Oral Daily   methIMAzole 5 mg Oral Daily   metoprolol tartrate 25 mg Oral Q8H   nicotine 1 patch Transdermal Q24H   oxyCODONE 10 mg Oral 4x Daily   sodium chloride 10 mL Intravenous Q12H     Continuous Infusions:   PRN Meds:.ipratropium-albuterol  •  nitroglycerin  •  ondansetron **OR** ondansetron  •  sodium chloride    Assessment/Plan   Assessment & Plan     Active Hospital Problems    Diagnosis  POA   • **Closed left hip fracture (CMS/HCC) [S72.002A]  Yes   • Chronic atrial fibrillation (CMS/[facility]) [I48.20]  Yes   • Acute UTI (urinary tract infection) [N39.0]  Yes   • HTN  (hypertension) [I10]  Yes   • CAD (coronary artery disease) [I25.10]  Yes   • COPD (chronic obstructive pulmonary disease) (CMS/[facility]) [J44.9]  Yes   • Chronic respiratory disease [J98.9]  Yes   • Aortic stenosis [I35.0]  Yes   • S/p left hip fracture [Z87.81]  Not Applicable   • Peripheral vascular disease (CMS/[facility]) [I73.9]  Yes      Resolved Hospital Problems   No resolved problems to display.        Brief Hospital Course to date:  Zak Olmstead is a 80 y.o. 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  Plan had been for repair surgery there, however, a pre-op TTE was done and showed severe AS (previously just noted as being moderate).  Decision was made to have him transferred here for CTS/Cardiology evaluation for possible emergent aortic valve repair prior to repair of L hip.    Plan:    Severe Aortic Stenosis  --Cards and CTS to evaluate today  --BP control  --TTE from OSH reviewed    Left hip fracture  H/o L BKA  --Ortho consulted  --pre-procedure COVID NEGATIVE (8/14)    UTI  --UA from OSH with 1+ bacteria, + nitrites, and trace LE  --UCx PENDING  --continue IV Rocephin for now    H/o Afib  --CHADs2 VASc of 4, on Eliquis at home.  Last dose was am of 8/14.  Continue to hold in anticipation of surgery  --continue Metoprolol    PVD  H/o AAA s/p repair  --s/p L BKA, uses wheelchair at baseline  --weak RLE pulses on admission, neurovascular checks ordered    CAD  --not on ASA at home  --continue beta blocker, statin, per OSH H&P, pt was on Lisinopril, however, I do not see this on his med rec.  Will need to start at some point.     Hyperthyroidism  --continue Methimazole  --TSH wnl    COPD  --not in exacerbation  --continue current inhalers/duonebs    Tobacco Abuse  --nicotine patch while inpatient      DVT Prophylaxis:  Eliquis on hold for anticipated  OR      Disposition: I expect the patient to be discharged TBD    CODE STATUS:   Code Status and Medical Interventions:   Ordered at: 08/15/20 2229     Level Of Support Discussed With:    Patient     Code Status:    CPR     Medical Interventions (Level of Support Prior to Arrest):    Full         Electronically signed by Agnes Plata MD, 08/16/20, 05:28.

## 2020-08-16 NOTE — ANESTHESIA PREPROCEDURE EVALUATION
Anesthesia Evaluation                  Airway   Dental      Pulmonary    (+) COPD,   Cardiovascular     (+) hypertension well controlled, valvular problems/murmurs, past MI , CAD, dysrhythmias Atrial Fib, PVD,     ROS comment: EF 60%  SEVERE AS WITH WENDY OF 0.51CM2, GRADIENT OF 90 MM HG    Neuro/Psych  (+) numbness,     GI/Hepatic/Renal/Endo      Musculoskeletal     Abdominal    Substance History      OB/GYN          Other   arthritis,                      Anesthesia Plan

## 2020-08-16 NOTE — CONSULTS
Orthopedic Consult      Patient: Zak Olmstead    Date of Admission: 8/15/2020  9:42 PM    YOB: 1940    Medical Record Number: [id number]    Attending Physician: Agnes Plata MD    Consulting Physician: Edmar Aponte MD      Chief Complaint(s): S/p left hip fracture [Z87.81]      History of Present Illness: 80 y.o. male admitted to [facility], [place], operated by [facility] with S/p left hip fracture [Z87.81]. I was consulted for further evaluation and treatment of left hip fracture.  In short this is an 80-year-old severely ill individual with history of COPD, atrial fibrillation, peripheral vascular disease, status post BKA and severe aortic stenosis who was transferred from [facility] for treatment of left hip fracture and aortic stenosis.  Patient has on 2 L home oxygen secondary to chronic respiratory failure and continues to smoke on a daily basis.  He presented to outside hospital after mechanical fall on 8/7/2020 in which he landed on his left hip.  He primarily mobilizes with a wheelchair and only transfers with the right leg.  He does not wear a prosthesis.  He is complaining of severe pain in the left hip with any attempted movement.  As long as he does not move, he rates his pain a 2/10 on the pain scale.  With any movement, his hip pain is a 10/10 on the pain scale.  He was transferred up to [facility] for treatment of his severe aortic stenosis as well as his hip fracture.  He denies any numbness or tingling in the extremity.  He isolates his pain to the left hip and thigh region.  He states it radiates down to the knee.     No Known Allergies     Home Medications:  Medications Prior to Admission   Medication Sig Dispense Refill Last Dose   • dilTIAZem CD (CARDIZEM CD) 240 MG 24 hr capsule Take 1 capsule by mouth Daily. 30 capsule 5 8/14/2020 at Unknown time   • finasteride (PROSCAR) 5 MG tablet Take 1 tablet by mouth Daily. 90 tablet 3 8/14/2020 at Unknown time   •  ipratropium-albuterol (DUO-NEB) 0.5-2.5 mg/3 ml nebulizer Take 3 mL by nebulization Every 4 (Four) Hours As Needed for Wheezing.   Unknown at Unknown time   • methIMAzole (TAPAZOLE) 5 MG tablet Take 5 mg by mouth Daily.   8/14/2020 at 1100   • metoprolol succinate XL (TOPROL-XL) 25 MG 24 hr tablet Take 1 tablet by mouth Daily. 30 tablet 3 8/14/2020 at Unknown time   • naproxen (NAPROSYN) 500 MG tablet Take 500 mg by mouth Daily As Needed for Mild Pain .   Unknown at Unknown time   • nitroglycerin (NITROSTAT) 0.4 MG SL tablet Place 0.4 mg under the tongue Every 5 (Five) Minutes As Needed for Chest Pain. Take no more than 3 doses in 15 minutes.   Unknown at Unknown time   • oxyCODONE (ROXICODONE) 10 MG tablet Take 10 mg by mouth 4 (Four) Times a Day.   8/14/2020 at 1100       Current Medications:  Scheduled Meds:  cefTRIAXone 1 g Intravenous Q24H   dilTIAZem  mg Oral Daily   finasteride 5 mg Oral Daily   methIMAzole 5 mg Oral Daily   metoprolol tartrate 25 mg Oral Q8H   nicotine 1 patch Transdermal Q24H   oxyCODONE 10 mg Oral 4x Daily   sodium chloride 10 mL Intravenous Q12H     Continuous Infusions:   PRN Meds:.ipratropium-albuterol  •  magnesium sulfate **OR** magnesium sulfate **OR** magnesium sulfate  •  nitroglycerin  •  ondansetron **OR** ondansetron  •  sodium chloride    Past Medical History:   Diagnosis Date   • AAA (abdominal aortic aneurysm) (CMS/HCC)     s/p repair   • Atrial fibrillation with rapid ventricular response (CMS/[facility]) 6/1/2018   • BPH (benign prostatic hyperplasia)    • BPH with obstruction/lower urinary tract symptoms 2/12/2020   • CAD (coronary artery disease)    • CAD (coronary artery disease) 8/15/2020   • Chronic atrial fibrillation (CMS/HCC)    • Chronic respiratory failure (CMS/HCC)    • COPD (chronic obstructive pulmonary disease) (CMS/HCC)    • Dysuria 2/12/2020   • Erectile dysfunction 2/12/2020   • Heart murmur    • Hypertension    • Hyperthyroidism    • Mitral annular  calcification 9/24/2018   • Mononeuritis multiplex    • Nonrheumatic aortic valve stenosis 9/24/2018   • NSTEMI (non-ST elevated myocardial infarction) (CMS/[facility]) 2018   • PVD (peripheral vascular disease) (CMS/[facility])         Past Surgical History:   Procedure Laterality Date   • ABDOMINAL AORTIC ANEURYSM REPAIR     • APPENDECTOMY     • BACK SURGERY     • BELOW KNEE LEG AMPUTATION Left    • CARDIAC CATHETERIZATION N/A 6/1/2018    Procedure: Left Heart Cath;  Surgeon: Derik Vega MD;  Location: [facility] CATH INVASIVE LOCATION;  Service: Cardiovascular   • COLONOSCOPY N/A 6/15/2017    Procedure: COLONOSCOPY  CPTCODE:64953;  Surgeon: Lincoln Bowens III, MD;  Location: [facility] OR;  Service:    • ENDOSCOPY N/A 6/15/2017    Procedure: ESOPHAGOGASTRODUODENOSCOPY WITH BIOPSY  CPTCODE:24517;  Surgeon: Lincoln Bowens III, MD;  Location: [facility] OR;  Service:    • ESOPHAGEAL DILATATION     • INTERVENTIONAL RADIOLOGY PROCEDURE Left 6/1/2018    Procedure: Abdominal Aortagram with Runoff;  Surgeon: Derik Vega MD;  Location: [facility] CATH INVASIVE LOCATION;  Service: Cardiovascular        Social History     Occupational History   • Not on file   Tobacco Use   • Smoking status: Current Every Day Smoker     Packs/day: 0.50     Years: 60.00     Pack years: 30.00     Types: Cigarettes   • Smokeless tobacco: Never Used   Substance and Sexual Activity   • Alcohol use: No   • Drug use: No   • Sexual activity: Not Currently    Social History     Social History Narrative   • Not on file        Family History   Problem Relation Age of Onset   • Heart disease Mother    • Heart failure Father    • Stroke Brother    • Heart attack Brother        Review of Systems:   General: negative for - chills, fatigue, fever, malaise or night sweats  Psychological: negative for - behavioral disorder, hostility, irritability, mood swings, obsessive thoughts or suicidal ideation  Ophthalmic: negative for - blurry vision, double vision or eye  "pain  HEENT: negative for - headaches, hearing change, sinus pain, sore throat or visual changes  Hematological and Lymphatic: negative for - bleeding problems, jaundice, night sweats, pallor or swollen lymph nodes  Endocrine: negative for - malaise/lethargy, mood swings, palpitations, polydipsia/polyuria, skin changes or unexpected weight changes  Respiratory: negative for - cough, shortness of breath or wheezing  Cardiovascular: negative for - chest pain, dyspnea on exertion, palpitations or shortness of breath  Gastrointestinal: negative for - abdominal pain, change in bowel habits, change in stools, constipation, gas/bloating or stool incontinence  Genito-Urinary: negative for - dysuria, genital discharge, nocturia, pelvic pain or scrotal mass/pain  Musculoskeletal: positive for - pain in hip - left  Neurological: negative for - behavioral changes, headaches, speech problems or visual changes  Dermatological: negative for hair changes, lumps, pruritus and skin lesion changes    Physical Exam: 80 y.o. male    GENERAL APPEARANCE: awake, alert & oriented x 3, in no acute distress  Vitals:    08/15/20 2154 08/16/20 0428 08/16/20 0738 08/16/20 0918   BP: 127/73 121/69 111/68 119/76   BP Location: Right arm Right arm Right arm    Patient Position: Lying  Lying    Pulse: 98 87  85   Resp: 20 18 16    Temp: 97.8 °F (36.6 °C) 98.1 °F (36.7 °C) 97.8 °F (36.6 °C)    TempSrc: Oral Oral Oral    SpO2: 99% 98%     Weight: 71.7 kg (158 lb)      Height: 172.3 cm (67.84\")        PSYCH: normal affect  HEAD: Normocephalic, without obvious abnormality, atraumatic  EYES: No icterus, corneas clear, PERRLA  CARDIOVASCULAR: pulses palpable and equal bilaterally. Capillary refill less than 2 seconds  LUNGS:  breathing nonlabored  ABDOMEN: Soft, nontender, nondistended  BACK: No C-T- L spine tenderness  EXTREMITIES: no clubbing, cyanosis  MUSCULOSKELETAL:    Left upper extremity: Full painless range of motion of shoulder, elbow, wrist, " and digits.  Nontender to palpation throughout the extremity.  Intact EPL, FPL, EDC, FDP, FDS, interosseous, wrist flexion, wrist extension, biceps, triceps, and deltoid. Sensation intact light touch to median, radial, ulnar, and axillary nerves. Palpable radial pulse.  Skin is intact without significant lesions or wounds.    Right upper extremity: Full painless range of motion of shoulder, elbow, wrist, and digits. Nontender to palpation throughout the extremity.  Intact EPL, FPL, EDC, FDP, FDS, interosseous, wrist flexion, wrist extension, biceps, triceps, and deltoid. Sensation intact light touch to median, radial, ulnar, and axillary nerves. Palpable radial pulse.  Skin is intact without significant lesions or wounds.    Pelvis: Stable to AP and lateral compression.    Left lower extremity: Hip and knee range of motion is limited secondary to pain.  The hip is in a flexed and externally rotated position of comfort.  Positive pain with logroll of hip.  Sensation intact to residual limb distally.  Skin is warm and perfused.    Right lower extremity: Full, painless range of motion of hip, knee, ankle, toes.  Nontender to palpation throughout the extremity.  Negative pain with logroll. Intact EHL, FHL, tibialis anterior, and gastrocsoleus. Sensation intact to light touch to deep peroneal, superficial peroneal, sural, saphenous, tibial nerves. Palpable DP and PT pulses. Skin -intact without evidence of breakdown. Edema -none.      Diagnostic Tests:    Admission on 08/15/2020   Component Date Value Ref Range Status   • ABO Type 08/16/2020 O   Final   • RH type 08/16/2020 Positive   Final   • Antibody Screen 08/16/2020 Negative   Final   • T&S Expiration Date 08/16/2020 8/19/2020 11:59:59 PM   Final   • WBC 08/16/2020 8.60  3.40 - 10.80 10*3/mm3 Final   • RBC 08/16/2020 4.35  4.14 - 5.80 10*6/mm3 Final   • Hemoglobin 08/16/2020 8.9* 13.0 - 17.7 g/dL Final   • Hematocrit 08/16/2020 31.7* 37.5 - 51.0 % Final   • MCV  08/16/2020 72.9* 79.0 - 97.0 fL Final   • MCH 08/16/2020 20.5* 26.6 - 33.0 pg Final   • MCHC 08/16/2020 28.1* 31.5 - 35.7 g/dL Final   • RDW 08/16/2020 20.9* 12.3 - 15.4 % Final   • RDW-SD 08/16/2020 53.2  37.0 - 54.0 fl Final   • MPV 08/16/2020 10.7  6.0 - 12.0 fL Final   • Platelets 08/16/2020 204  140 - 450 10*3/mm3 Final   • Neutrophil % 08/16/2020 71.2  42.7 - 76.0 % Final   • Lymphocyte % 08/16/2020 18.5* 19.6 - 45.3 % Final   • Monocyte % 08/16/2020 7.8  5.0 - 12.0 % Final   • Eosinophil % 08/16/2020 1.7  0.3 - 6.2 % Final   • Basophil % 08/16/2020 0.5  0.0 - 1.5 % Final   • Immature Grans % 08/16/2020 0.3  0.0 - 0.5 % Final   • Neutrophils, Absolute 08/16/2020 6.12  1.70 - 7.00 10*3/mm3 Final   • Lymphocytes, Absolute 08/16/2020 1.59  0.70 - 3.10 10*3/mm3 Final   • Monocytes, Absolute 08/16/2020 0.67  0.10 - 0.90 10*3/mm3 Final   • Eosinophils, Absolute 08/16/2020 0.15  0.00 - 0.40 10*3/mm3 Final   • Basophils, Absolute 08/16/2020 0.04  0.00 - 0.20 10*3/mm3 Final   • Immature Grans, Absolute 08/16/2020 0.03  0.00 - 0.05 10*3/mm3 Final   • nRBC 08/16/2020 0.5* 0.0 - 0.2 /100 WBC Final   • Protime 08/16/2020 16.7* 11.5 - 14.0 Seconds Final   • INR 08/16/2020 1.38* 0.85 - 1.16 Final   • Glucose 08/16/2020 108* 65 - 99 mg/dL Final   • BUN 08/16/2020 21  8 - 23 mg/dL Final   • Creatinine 08/16/2020 1.02  0.76 - 1.27 mg/dL Final   • Sodium 08/16/2020 138  136 - 145 mmol/L Final   • Potassium 08/16/2020 3.8  3.5 - 5.2 mmol/L Final   • Chloride 08/16/2020 103  98 - 107 mmol/L Final   • CO2 08/16/2020 26.0  22.0 - 29.0 mmol/L Final   • Calcium 08/16/2020 8.3* 8.6 - 10.5 mg/dL Final   • Total Protein 08/16/2020 6.1  6.0 - 8.5 g/dL Final   • Albumin 08/16/2020 3.30* 3.50 - 5.20 g/dL Final   • ALT (SGPT) 08/16/2020 28  1 - 41 U/L Final   • AST (SGOT) 08/16/2020 24  1 - 40 U/L Final   • Alkaline Phosphatase 08/16/2020 102  39 - 117 U/L Final   • Total Bilirubin 08/16/2020 3.1* 0.0 - 1.2 mg/dL Final   • eGFR Non African  Amer 08/16/2020 70  >60 mL/min/1.73 Final   • Globulin 08/16/2020 2.8  gm/dL Final   • A/G Ratio 08/16/2020 1.2  g/dL Final   • BUN/Creatinine Ratio 08/16/2020 20.6  7.0 - 25.0 Final   • Anion Gap 08/16/2020 9.0  5.0 - 15.0 mmol/L Final   • PTT 08/16/2020 43.4* 24.0 - 37.0 seconds Final   • Magnesium 08/16/2020 2.5* 1.6 - 2.4 mg/dL Final   • proBNP 08/16/2020 8,015.0* 0.0-1,800.0 pg/mL Final   • Ovalocytes 08/16/2020 Slight/1+  None Seen Final   • WBC Morphology 08/16/2020 Normal  Normal Final   • Platelet Morphology 08/16/2020 Normal  Normal Final   • ABO Type 08/16/2020 O   Final   • RH type 08/16/2020 Positive   Final   Admission on 08/14/2020, Discharged on 08/15/2020   Component Date Value Ref Range Status   • Glucose 08/14/2020 131* 65 - 99 mg/dL Final   • BUN 08/14/2020 23  8 - 23 mg/dL Final   • Creatinine 08/14/2020 0.93  0.76 - 1.27 mg/dL Final   • Sodium 08/14/2020 140  136 - 145 mmol/L Final   • Potassium 08/14/2020 4.0  3.5 - 5.2 mmol/L Final   • Chloride 08/14/2020 104  98 - 107 mmol/L Final   • CO2 08/14/2020 25.0  22.0 - 29.0 mmol/L Final   • Calcium 08/14/2020 8.7  8.6 - 10.5 mg/dL Final   • Total Protein 08/14/2020 6.5  6.0 - 8.5 g/dL Final   • Albumin 08/14/2020 3.57  3.50 - 5.20 g/dL Final   • ALT (SGPT) 08/14/2020 25  1 - 41 U/L Final   • AST (SGOT) 08/14/2020 30  1 - 40 U/L Final   • Alkaline Phosphatase 08/14/2020 109  39 - 117 U/L Final   • Total Bilirubin 08/14/2020 3.0* 0.0 - 1.2 mg/dL Final   • eGFR Non African Amer 08/14/2020 78  >60 mL/min/1.73 Final   • Globulin 08/14/2020 2.9  gm/dL Final   • A/G Ratio 08/14/2020 1.2  g/dL Final   • BUN/Creatinine Ratio 08/14/2020 24.7  7.0 - 25.0 Final   • Anion Gap 08/14/2020 11.0  5.0 - 15.0 mmol/L Final   • Color, UA 08/14/2020 Veena* Yellow, Straw Final    Dipstick results may be inaccurate due to color interference.    • Appearance, UA 08/14/2020 Clear  Clear Final   • pH, UA 08/14/2020 <=5.0  5.0 - 8.0 Final   • Specific Gravity, UA 08/14/2020  1.026  1.005 - 1.030 Final   • Glucose, UA 08/14/2020 Negative  Negative Final   • Ketones, UA 08/14/2020 Negative  Negative Final   • Bilirubin, UA 08/14/2020 Moderate (2+)* Negative Final   • Blood, UA 08/14/2020 Negative  Negative Final   • Protein, UA 08/14/2020 30 mg/dL (1+)* Negative Final   • Leuk Esterase, UA 08/14/2020 Trace* Negative Final   • Nitrite, UA 08/14/2020 Positive* Negative Final   • Urobilinogen, UA 08/14/2020 2.0 E.U./dL* 0.2 - 1.0 E.U./dL Final   • Troponin T 08/14/2020 <0.010  0.000 - 0.030 ng/mL Final   • WBC 08/14/2020 7.96  3.40 - 10.80 10*3/mm3 Final   • RBC 08/14/2020 4.87  4.14 - 5.80 10*6/mm3 Final   • Hemoglobin 08/14/2020 9.8* 13.0 - 17.7 g/dL Final   • Hematocrit 08/14/2020 34.6* 37.5 - 51.0 % Final   • MCV 08/14/2020 71.0* 79.0 - 97.0 fL Final   • MCH 08/14/2020 20.1* 26.6 - 33.0 pg Final   • MCHC 08/14/2020 28.3* 31.5 - 35.7 g/dL Final   • RDW 08/14/2020 21.1* 12.3 - 15.4 % Final   • RDW-SD 08/14/2020 51.5  37.0 - 54.0 fl Final   • MPV 08/14/2020 10.5  6.0 - 12.0 fL Final   • Platelets 08/14/2020 218  140 - 450 10*3/mm3 Final   • Neutrophil % 08/14/2020 72.5  42.7 - 76.0 % Final   • Lymphocyte % 08/14/2020 15.8* 19.6 - 45.3 % Final   • Monocyte % 08/14/2020 10.2  5.0 - 12.0 % Final   • Eosinophil % 08/14/2020 0.5  0.3 - 6.2 % Final   • Basophil % 08/14/2020 0.6  0.0 - 1.5 % Final   • Immature Grans % 08/14/2020 0.4  0.0 - 0.5 % Final   • Neutrophils, Absolute 08/14/2020 5.77  1.70 - 7.00 10*3/mm3 Final   • Lymphocytes, Absolute 08/14/2020 1.26  0.70 - 3.10 10*3/mm3 Final   • Monocytes, Absolute 08/14/2020 0.81  0.10 - 0.90 10*3/mm3 Final   • Eosinophils, Absolute 08/14/2020 0.04  0.00 - 0.40 10*3/mm3 Final   • Basophils, Absolute 08/14/2020 0.05  0.00 - 0.20 10*3/mm3 Final   • Immature Grans, Absolute 08/14/2020 0.03  0.00 - 0.05 10*3/mm3 Final   • nRBC 08/14/2020 0.3* 0.0 - 0.2 /100 WBC Final   • Hypochromia 08/14/2020 Large/3+  None Seen Final   • Microcytes 08/14/2020 Mod/2+   None Seen Final   • Ovalocytes 08/14/2020 Slight/1+  None Seen Final   • Platelet Morphology 08/14/2020 Normal  Normal Final   • RBC, UA 08/14/2020 None Seen  None Seen, 0-2 /HPF Final   • WBC, UA 08/14/2020 0-2  None Seen, 0-2 /HPF Final   • Bacteria, UA 08/14/2020 1+* None Seen /HPF Final   • Squamous Epithelial Cells, UA 08/14/2020 None Seen  None Seen, 0-2 /HPF Final   • Hyaline Casts, UA 08/14/2020 None Seen  None Seen /LPF Final   • Methodology 08/14/2020 Automated Microscopy   Final   • TSH 08/14/2020 2.170  0.270 - 4.200 uIU/mL Final   • BSA 08/15/2020 1.8  m^2 Final   • IVSd 08/15/2020 1.3  cm Final   • IVSs 08/15/2020 1.2  cm Final   • LVIDd 08/15/2020 4.2  cm Final   • LVIDs 08/15/2020 3.0  cm Final   • LVPWd 08/15/2020 1.2  cm Final   • BH CV ECHO BILLY - LVPWS 08/15/2020 1.5  cm Final   • IVS/LVPW 08/15/2020 1.1   Final   • FS 08/15/2020 28.0  % Final   • EDV(Teich) 08/15/2020 76.6  ml Final   • ESV(Teich) 08/15/2020 34.7  ml Final   • EF(Teich) 08/15/2020 54.7  % Final   • EDV(cubed) 08/15/2020 71.7  ml Final   • ESV(cubed) 08/15/2020 26.7  ml Final   • EF(cubed) 08/15/2020 62.7  % Final   • % IVS thick 08/15/2020 -9.0  % Final   • % LVPW thick 08/15/2020 23.6  % Final   • LV mass(C)d 08/15/2020 194.0  grams Final   • LV mass(C)dI 08/15/2020 105.2  grams/m^2 Final   • LV mass(C)s 08/15/2020 134.9  grams Final   • LV mass(C)sI 08/15/2020 73.1  grams/m^2 Final   • SV(Teich) 08/15/2020 41.9  ml Final   • SI(Teich) 08/15/2020 22.7  ml/m^2 Final   • SV(cubed) 08/15/2020 45.0  ml Final   • SI(cubed) 08/15/2020 24.4  ml/m^2 Final   • Ao root diam 08/15/2020 3.3  cm Final   • Ao root area 08/15/2020 8.6  cm^2 Final   • LA dimension 08/15/2020 4.2  cm Final   • LA/Ao 08/15/2020 1.3   Final   • LVOT diam 08/15/2020 2.0  cm Final   • LVOT area 08/15/2020 3.1  cm^2 Final   • LVOT area(traced) 08/15/2020 3.1  cm^2 Final   • LVLd ap4 08/15/2020 6.9  cm Final   • EDV(MOD-sp4) 08/15/2020 76.6  ml Final   • LVLs ap4  08/15/2020 5.2  cm Final   • ESV(MOD-sp4) 08/15/2020 30.7  ml Final   • EF(MOD-sp4) 08/15/2020 59.9  % Final   • SV(MOD-sp4) 08/15/2020 45.9  ml Final   • SI(MOD-sp4) 08/15/2020 24.9  ml/m^2 Final   • Ao root area (BSA corrected) 08/15/2020 1.8   Final   • LV Montaño Vol (BSA corrected) 08/15/2020 41.5  ml/m^2 Final   • LV Sys Vol (BSA corrected) 08/15/2020 16.6  ml/m^2 Final   • Ao pk maria ines 08/15/2020 427.8  cm/sec Final   • Ao max PG 08/15/2020 90.0  mmHg Final   • Ao max PG (full) 08/15/2020 71.7  mmHg Final   • Ao V2 mean 08/15/2020 275.3  cm/sec Final   • Ao mean PG 08/15/2020 46.0  mmHg Final   • Ao mean PG (full) 08/15/2020 36.0  mmHg Final   • Ao V2 VTI 08/15/2020 76.9  cm Final   • WENDY(I,A) 08/15/2020 0.51  cm^2 Final   • WENDY(I,D) 08/15/2020 0.51  cm^2 Final   • WENDY(V,A) 08/15/2020 0.51  cm^2 Final   • WENDY(V,D) 08/15/2020 0.51  cm^2 Final   • AI max maria ines 08/15/2020 380.0  cm/sec Final   • AI max PG 08/15/2020 57.8  mmHg Final   • AI dec slope 08/15/2020 168.0  cm/sec^2 Final   • AI P1/2t 08/15/2020 662.5  msec Final   • LV V1 max PG 08/15/2020 2.0  mmHg Final   • LV V1 mean PG 08/15/2020 1.0  mmHg Final   • LV V1 max 08/15/2020 69.9  cm/sec Final   • LV V1 mean 08/15/2020 47.2  cm/sec Final   • LV V1 VTI 08/15/2020 12.4  cm Final   • SV(Ao) 08/15/2020 657.5  ml Final   • SI(Ao) 08/15/2020 356.5  ml/m^2 Final   • SV(LVOT) 08/15/2020 39.0  ml Final   • SI(LVOT) 08/15/2020 21.1  ml/m^2 Final   • PA acc time 08/15/2020 0.1  sec Final   • TR max maria ines 08/15/2020 395.3  cm/sec Final   • RVSP(TR) 08/15/2020 77  mmHg Final   • RAP systole 08/15/2020 10.0  mmHg Final   • PA pr(Accel) 08/15/2020 36.3  mmHg Final   • BH CV ECHO BILLY - BZI_BMI 08/15/2020 23.9  kilograms/m^2 Final   • BH CV ECHO BILLY - BSA(HAYCOCK) 08/15/2020 1.9  m^2 Final   • BH CV ECHO BILLY - BZI_METRIC_WEIGHT 08/15/2020 71.2  kg Final   • BH CV ECHO BILLY - BZI_METRIC_HEIGHT 08/15/2020 172.7  cm Final   • Glucose 08/15/2020 111* 65 - 99 mg/dL Final   • BUN  08/15/2020 21  8 - 23 mg/dL Final   • Creatinine 08/15/2020 0.93  0.76 - 1.27 mg/dL Final   • Sodium 08/15/2020 140  136 - 145 mmol/L Final   • Potassium 08/15/2020 4.5  3.5 - 5.2 mmol/L Final   • Chloride 08/15/2020 106  98 - 107 mmol/L Final   • CO2 08/15/2020 24.9  22.0 - 29.0 mmol/L Final   • Calcium 08/15/2020 8.1* 8.6 - 10.5 mg/dL Final   • eGFR Non African Amer 08/15/2020 78  >60 mL/min/1.73 Final   • BUN/Creatinine Ratio 08/15/2020 22.6  7.0 - 25.0 Final   • Anion Gap 08/15/2020 9.1  5.0 - 15.0 mmol/L Final   • Magnesium 08/15/2020 2.2  1.6 - 2.4 mg/dL Final   • COVID19 08/15/2020 Not Detected  Not Detected - Ref. Range Final       Xr Chest 1 View    Result Date: 8/14/2020  Narrative: EXAMINATION: XR CHEST 1 VW-  CLINICAL INDICATION:     hip  TECHNIQUE:  XR CHEST 1 VW-  COMPARISON: 02/21/2020  FINDINGS:  Lungs are aerated. Heart size, mediastinum, and pulmonary vascularity are unremarkable. No pneumothorax. No effusions. No acute osseous findings.       Impression: No radiographic evidence of acute cardiac or pulmonary disease.  This report was finalized on 8/14/2020 2:58 PM by Dr. Antwon Burris MD.      Xr Hip With Or Without Pelvis 2 - 3 View Left    Result Date: 8/14/2020  Narrative: EXAMINATION: XR HIP W OR WO PELVIS 2-3 VIEW LEFT-  CLINICAL INDICATION:poss fracture  COMPARISON: None  TECHNIQUE: AP pelvis, single view left hip  FINDINGS: Acute comminuted fracture left hip intertrochanteric in nature. Varus angulation of distal fracture fragments. No dislocation of the femoral head. No additional fractures identified.      Impression: Left intertrochanteric fracture.  This report was finalized on 8/14/2020 2:56 PM by Dr. Antwon Burris MD.      Radiographs left hip were personally reviewed.  Radiographs demonstrate left intertrochanteric hip fracture with varus angulation.    Assessment:  Patient Active Problem List   Diagnosis   • Peripheral vascular disease (CMS/HCC)   • Closed left hip fracture  (CMS/[facility])   • Chronic atrial fibrillation (CMS/[facility])   • Acute UTI (urinary tract infection)   • HTN (hypertension)   • CAD (coronary artery disease)   • COPD (chronic obstructive pulmonary disease) (CMS/[facility])   • Chronic respiratory disease   • Aortic stenosis   • S/p left hip fracture           Plan:  The patient has clinical and radiographic evidence of left intertrochanteric hip fracture.  I had an extensive discussion with the patient/family regarding treatment options for this injury taking into account the nature of the diagnosis, the patient's prior level of cognitive and physical function, medical comorbidities, and goals for treatment.  Operative and nonoperative options as well as alternatives were presented and the risks and benefits of each option were discussed.  Patient is high risk with either intervention.  Nonoperative treatment would result in pain and limited mobility for an extended period of time placing the patient at risk for pneumonia and blood clots.  Operative treatment would place him at high risk due to his multiple medical comorbidities including severe aortic stenosis.  After extensive discussion, the patient/family elected to proceed with operative fixation of left intertrochanteric hip fracture with the goal to improve patient function, decrease pain, and restore mobility. The risks, benefits, potential complications, and alternatives were again discussed with the patient in detail. Risks included but were not limited to bleeding, infection, anesthesia risks, damage to neurovascular structures, nonunion, malunion, loss of hardware fixation, pain, continued pain, iatrogenic fracture, possible need for future surgery including the potential for amputation, blood clots, myocardial infarction, stroke, and death. Dara-operative blood management and the potential for blood transfusion were discussed with risks and options clearly outlined. Specific details of the surgical procedure,  hospitalization, recovery, rehabilitation, and long-term precautions were also presented. Pre-operative teaching was provided. Implant/prosthesis and equipment selection was outlined, and the many options available were explained; the final choice will be made at the time of the procedure to match the anatomy and condition of the bone, ligaments, tendons, and muscles. Given this instruction, the patient elected to proceed with the operative fixation left intertrochanteric hip fracture.    Consult from CT surgery reveals patient is not a candidate for fixation of hip until aortic stenosis has been addressed.  Patient is a potential candidate for TAVR procedure.  Awaiting cardiology evaluation.    Plan for delayed surgical fixation of left intertrochanteric hip fracture until cardiac risk has been addressed and/or procedures performed.    Hold anticoagulation until need for cardiac procedure determined.  Patient will need transient anticoagulation that is rapidly reversible as needed as patient will likely require TAVR procedure and hip surgery within the next several days.    Date: 8/16/2020    Edmar Aponte MD

## 2020-08-16 NOTE — CONSULTS
[facility]  CARDIOLOGY CONSULTATION NOTE    Zak Olmstead  : 1940  MRN:[id number]    Date of Admission:8/15/2020  Date of Consultation: 20    PCP: Ely Berumen APRN  Cardiologist: Dr. Derik Vega    IDENTIFICATION: A 80 y.o. male resident of [place], [place]     CC: Left hip fracture and severe AS    PROBLEM LIST:   1. Severe aortic stenosis  a. Echo 8/15/20: EF 56-60%, severe calcification of AV with severe AS; mean gradient 46mmHg, peak systolic velocity 4.2cm/sec,  moderate MR, mod-severe TR, RVSP 77mmHg  2. Chronic atrial fibrillation, on Eliquis   3. Coronary artery disease  a. [facility] 2018:  of mid RCA with collateral filling; mild disease of left system   4. Left hip fracture 2020  5. Hypertension   6. Hyperlipidemia  7. COPD on 2 L NC  8. Hyperthyroidism on Methimazole  9. Peripheral arterial disease  a. S/p left BKA  b. Right Fem-fem bypass, IDB  c. 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 Ongoing tobacco abuse   11. Limited mobility/Wheelchair bound   12. AAA s/p repair with endograft    ALLERGIES: No Known Allergies    HOME MEDICINES:   Prior to Admission Medications     Prescriptions Last Dose Informant Patient Reported? Taking?    dilTIAZem CD (CARDIZEM CD) 240 MG 24 hr capsule  Spouse/Significant Other No No    Take 1 capsule by mouth Daily.    finasteride (PROSCAR) 5 MG tablet  Spouse/Significant Other No No    Take 1 tablet by mouth Daily.    ipratropium-albuterol (DUO-NEB) 0.5-2.5 mg/3 ml nebulizer  Spouse/Significant Other Yes No    Take 3 mL by nebulization Every 4 (Four) Hours As Needed for Wheezing.    methIMAzole (TAPAZOLE) 5 MG tablet  Spouse/Significant Other Yes No    Take 5 mg by mouth Daily.    metoprolol succinate XL (TOPROL-XL) 25 MG 24 hr tablet  Spouse/Significant Other No No    Take 1 tablet by mouth Daily.    naproxen (NAPROSYN) 500 MG tablet  Spouse/Significant Other Yes No    Take 500 mg by  mouth Daily As Needed for Mild Pain .    nitroglycerin (NITROSTAT) 0.4 MG SL tablet  Spouse/Significant Other Yes No    Place 0.4 mg under the tongue Every 5 (Five) Minutes As Needed for Chest Pain. Take no more than 3 doses in 15 minutes.    oxyCODONE (ROXICODONE) 10 MG tablet  PEYMAN Yes No    Take 10 mg by mouth 4 (Four) Times a Day.        HPI: Mr. Olmstead is an 81 y/o WM with history as noted above who presents in transfer from [facility] with left intertrochanteric hip fracture and severe AS. He apparently fell out of his wheelchair going down a ramp about a week ago and sustained a left hip fracture. He has a history of CAD, PAD with left BKA and right fem-fem bypass, as well as aortic stenosis which has been followed by Dr. Vega in [place]. An echo yesterday demonstrated criteria consistent with severe AS, and he was transferred here for higher level of care and possible TAVR prior to hip surgery. He denies any cardiac symptoms, specifically no angina, unusual dyspnea, syncope/pre-syncope, or heart failure symptoms. He is sedentary and is in a wheelchair at all times. He has a history of CAD with  of RCA with collateral filling by cath 2018. He as well had AAA repair with endograft and right fem-fem bypass. He continues to smoke about 1/2 PPD, no alcohol or drug use.       ROS: All systems have been reviewed and are negative with the exception of those mentioned in the HPI and problem list above.    Surgical History:   Past Surgical History:   Procedure Laterality Date   • ABDOMINAL AORTIC ANEURYSM REPAIR     • APPENDECTOMY     • BACK SURGERY     • BELOW KNEE LEG AMPUTATION Left    • CARDIAC CATHETERIZATION N/A 6/1/2018    Procedure: Left Heart Cath;  Surgeon: Derik Vega MD;  Location: [facility] INVASIVE LOCATION;  Service: Cardiovascular   • COLONOSCOPY N/A 6/15/2017    Procedure: COLONOSCOPY  CPTCODE:09059;  Surgeon: Lincoln Bowens III, MD;  Location: [facility] OR;  Service:    • ENDOSCOPY N/A  "6/15/2017    Procedure: ESOPHAGOGASTRODUODENOSCOPY WITH BIOPSY  CPTCODE:70279;  Surgeon: Lincoln Bowens III, MD;  Location: [facility] OR;  Service:    • ESOPHAGEAL DILATATION     • INTERVENTIONAL RADIOLOGY PROCEDURE Left 6/1/2018    Procedure: Abdominal Aortagram with Runoff;  Surgeon: Derik Vega MD;  Location: [facility] CATH INVASIVE LOCATION;  Service: Cardiovascular     Social History:   Social History     Socioeconomic History   • Marital status:    Tobacco Use   • Smoking status: Current Every Day Smoker     Packs/day: 0.50     Years: 60.00     Pack years: 30.00     Types: Cigarettes   • Smokeless tobacco: Never Used   Substance and Sexual Activity   • Alcohol use: No   • Drug use: No   • Sexual activity: Not Currently     Family History:   Family History   Problem Relation Age of Onset   • Heart disease Mother    • Heart failure Father    • Stroke Brother    • Heart attack Brother        Objective     /76   Pulse 85   Temp 97.8 °F (36.6 °C) (Oral)   Resp 16   Ht 172.3 cm (67.84\")   Wt 71.7 kg (158 lb)   SpO2 98%   BMI 24.14 kg/m²     Intake/Output Summary (Last 24 hours) at 8/16/2020 1034  Last data filed at 8/16/2020 0428  Gross per 24 hour   Intake --   Output 275 ml   Net -275 ml       PHYSICAL EXAM:  CONSTITUTIONAL: Well nourished, cooperative, in no acute distress  HEENT: Normocephalic, atraumatic, PERRLA, no JVD  CARDIOVASCULAR:  Irregular rhythm and fast rate, 2/6 systolic ejection murmur, no gallop, rub. Right radial pulse 2+, left radial pulse not palpable; right foot pulses not palpable but present by doppler.   RESPIRATORY: Clear to auscultation, normal respiratory effort, no wheezing, rales or rhonchi  GI: Soft, nontender, normal bowel sounds  MUSCULOSKELETAL: Let BKA, no edema  SKIN: Warm, dry. No bleeding, bruising or rash  NEUROLOGICAL: No focal deficits  PSYCHIATRIC: Normal mood and affect. Behavior is normal     Labs/Diagnostic Data  Results from last 7 days   Lab Units " 08/16/20  0127 08/15/20  0850 08/14/20  1420   SODIUM mmol/L 138 140 140   POTASSIUM mmol/L 3.8 4.5 4.0   CHLORIDE mmol/L 103 106 104   CO2 mmol/L 26.0 24.9 25.0   BUN mg/dL 21 21 23   CREATININE mg/dL 1.02 0.93 0.93   GLUCOSE mg/dL 108* 111* 131*   CALCIUM mg/dL 8.3* 8.1* 8.7     Results from last 7 days   Lab Units 08/14/20  1420   TROPONIN T ng/mL <0.010     Results from last 7 days   Lab Units 08/16/20  0127 08/14/20  1420   WBC 10*3/mm3 8.60 7.96   HEMOGLOBIN g/dL 8.9* 9.8*   HEMATOCRIT % 31.7* 34.6*   PLATELETS 10*3/mm3 204 218     Results from last 7 days   Lab Units 08/16/20  0127   MAGNESIUM mg/dL 2.5*         Results from last 7 days   Lab Units 08/14/20  1420   TSH uIU/mL 2.170         Results from last 7 days   Lab Units 08/16/20  0127   PROBNP pg/mL 8,015.0*     Results from last 7 days   Lab Units 08/16/20  0127   PROTIME Seconds 16.7*   INR  1.38*   APTT seconds 43.4*     I personally reviewed the patient's EKG/Telemetry data    Radiology Data:   CXR 8/14/20:  IMPRESSION:  No radiographic evidence of acute cardiac or pulmonary  disease.     Echo 8/15/20:  Interpretation Summary     · Left ventricular wall thickness is consistent with mild concentric hypertrophy.  · Left ventricular systolic function is normal, EF 56-60%.  · Left ventricular diastolic dysfunction (grade I a) consistent with impaired relaxation.  · Left atrial cavity size is mild-to-moderately dilated.  · There is severe calcification of the aortic valve mainly affecting the non, left and right coronary cusp(s).  · Mild aortic valve regurgitation is present.  · Severe aortic valve stenosis is present.  · Aortic valve mean pressure gradient is 46.0 mmHg.  · Aortic valve maximum pressure gradient is 90.0 mmHg.  · Moderate mitral valve regurgitation is present  · Moderate to severe tricuspid valve regurgitation is present.  · Calculated right ventricular systolic pressure from tricuspid regurgitation is 77 mmHg.     Current  Medications:    cefTRIAXone 1 g Intravenous Q24H   dilTIAZem  mg Oral Daily   finasteride 5 mg Oral Daily   methIMAzole 5 mg Oral Daily   metoprolol tartrate 25 mg Oral Q8H   nicotine 1 patch Transdermal Q24H   oxyCODONE 10 mg Oral 4x Daily   sodium chloride 10 mL Intravenous Q12H          Assessment and Plan:     1. Severe AS  - echo as noted above with LVEF normal; Mean gradient 46 mmHg and max 90 mmHg; RVSP 77 mmHg  - Dr. Phillips has seen for possible TAVR    2. CAD  -  of mid RCA by cath 2018  - no angina  - needs aspirin and statin     3. Chronic Afib  4. Left hip fracture  5. COPD/tobacco abuse  6. Hyperthyroidism   7. Anemia     ADDENDUM: I have interviewed and examined this patient.  I have reviewed the patient's chart.  I have reviewed his echocardiogram done at the outside hospital.  My conclusions:         - He has severe valvular aortic stenosis.  It is asymptomatic in this 80-year-old gentleman with an extremely sedentary existence.       - He has multiple intercurrent problems that include previously documented coronary artery disease, severe peripheral vascular disease status post BKA, ongoing active tobacco use with apparent significant (suspect severe) lung disease, chronic atrial fibrillation, as well as advanced age.        - I am not sure that TAVR, with its risk that is certainly not inconsequential, would reduce this patient's chances of survival during this hospitalization, even with orthopedic surgery.         I discussed this with the patient.  I will asked the TAVR team to assume his cardiology care tomorrow morning with further recommendations as they see fit.          Thank you for allowing me to participate in the care of Zak Olmstead. Feel free to contact me directly with any further questions or concerns.    Scribed for Neymar Ch MD by Shirley Dumont PA-C. 8/16/2020  11:29

## 2020-08-16 NOTE — H&P
[facility] Medicine Services  HISTORY AND PHYSICAL    Patient Name: Zak Olmstead  : 1940  MRN: [id number]  Primary Care Physician: Ely Berumen APRN  Date of admission: 8/15/2020      Subjective   Subjective     Chief Complaint:  Fall     HPI:  Zak Olmstead is a 80 y.o. male w/ a hx of COPD, chronic respiratory failure on 2 liters PRN, chronic atrial fibrillation (BB, dilt and Eliquis), hyperthyroidism, HTN, CAD, PVD, remote left BKA and severe aortic stenosis who was transferred from [facility] for further evaluation/tx of a left hip fracture.  Pt originally presented to the OSH ED w/ c/o a fall. Pt c/o falling off of his porch 1 week ago (2020). Since falling he has had ongoing left hip pain. XRAYS c/w left hip fracture. Ortho was consulted and recommended operative mgt of the fracture.  Due to pt's extensive medical hx, Cardiology was also consulted for OR clearance. Pt had an ECHO done @ the OSH that was c/w severe aortic stenosis. Both teams felt that w/ the increased risk and pt's hx that pt needed further cardiac evaluation prior to surgical intervention. Pt was transferred to [facility] for further evaluation.   In addition to hip pain, pt c/o dysuria chronically. Pt also c/o chronic shortness of breath w/ PRN oxygen.       Review of Systems   Constitutional: Negative.  Negative for chills, fatigue and fever.   HENT: Negative.  Negative for congestion, postnasal drip, rhinorrhea, sinus pressure, sinus pain, sneezing, sore throat and trouble swallowing.    Eyes: Negative.  Negative for visual disturbance.   Respiratory: Negative.  Negative for cough and shortness of breath.    Cardiovascular: Negative.  Negative for chest pain, palpitations and leg swelling.   Gastrointestinal: Negative.  Negative for abdominal pain, blood in stool, constipation, diarrhea, nausea and vomiting.   Genitourinary: Negative.  Negative for decreased urine volume, difficulty  urinating, dysuria, flank pain, frequency, hematuria and urgency.   Musculoskeletal:        Left hip pain after fall.    Skin: Negative.  Negative for color change, pallor, rash and wound.   Neurological: Negative.  Negative for dizziness, tremors, seizures, syncope, facial asymmetry, speech difficulty, weakness, light-headedness, numbness and headaches.   Hematological: Negative.  Does not bruise/bleed easily.   Psychiatric/Behavioral: Negative.  Negative for confusion.   All other systems reviewed and are negative.       Personal History     Past Medical History:   Diagnosis Date   • AAA (abdominal aortic aneurysm) (CMS/HCC)     s/p repair   • Atrial fibrillation with rapid ventricular response (CMS/HCC) 6/1/2018   • BPH (benign prostatic hyperplasia)    • BPH with obstruction/lower urinary tract symptoms 2/12/2020   • CAD (coronary artery disease)    • CAD (coronary artery disease) 8/15/2020   • Chronic atrial fibrillation (CMS/HCC)    • Chronic respiratory failure (CMS/HCC)    • COPD (chronic obstructive pulmonary disease) (CMS/HCC)    • Dysuria 2/12/2020   • Erectile dysfunction 2/12/2020   • Heart murmur    • Hypertension    • Hyperthyroidism    • Mitral annular calcification 9/24/2018   • Mononeuritis multiplex    • Nonrheumatic aortic valve stenosis 9/24/2018   • NSTEMI (non-ST elevated myocardial infarction) (CMS/HCC) 2018   • PVD (peripheral vascular disease) (CMS/HCC)        Past Surgical History:   Procedure Laterality Date   • ABDOMINAL AORTIC ANEURYSM REPAIR     • APPENDECTOMY     • BACK SURGERY     • BELOW KNEE LEG AMPUTATION Left    • CARDIAC CATHETERIZATION N/A 6/1/2018    Procedure: Left Heart Cath;  Surgeon: Derik Vega MD;  Location: [facility] INVASIVE LOCATION;  Service: Cardiovascular   • COLONOSCOPY N/A 6/15/2017    Procedure: COLONOSCOPY  CPTCODE:78244;  Surgeon: Lincoln Bowens III, MD;  Location: [facility] OR;  Service:    • ENDOSCOPY N/A 6/15/2017    Procedure:  ESOPHAGOGASTRODUODENOSCOPY WITH BIOPSY  CPTCODE:23588;  Surgeon: Lincoln Bowens III, MD;  Location: [facility] OR;  Service:    • ESOPHAGEAL DILATATION     • INTERVENTIONAL RADIOLOGY PROCEDURE Left 6/1/2018    Procedure: Abdominal Aortagram with Runoff;  Surgeon: Derik Vega MD;  Location: [facility] CATH INVASIVE LOCATION;  Service: Cardiovascular       Family History: family history includes Heart attack in his brother; Heart disease in his mother; Heart failure in his father; Stroke in his brother. Otherwise pertinent FHx was reviewed and unremarkable.     Social History:  reports that he has been smoking cigarettes. He has a 30.00 pack-year smoking history. He has never used smokeless tobacco. He reports that he does not drink alcohol or use drugs.  Social History     Social History Narrative   • Not on file       Medications:  Available home medication information reviewed.  Medications Prior to Admission   Medication Sig Dispense Refill Last Dose   • dilTIAZem CD (CARDIZEM CD) 240 MG 24 hr capsule Take 1 capsule by mouth Daily. 30 capsule 5 8/14/2020 at Unknown time   • finasteride (PROSCAR) 5 MG tablet Take 1 tablet by mouth Daily. 90 tablet 3 8/14/2020 at Unknown time   • ipratropium-albuterol (DUO-NEB) 0.5-2.5 mg/3 ml nebulizer Take 3 mL by nebulization Every 4 (Four) Hours As Needed for Wheezing.   Unknown at Unknown time   • methIMAzole (TAPAZOLE) 5 MG tablet Take 5 mg by mouth Daily.   8/14/2020 at 1100   • metoprolol succinate XL (TOPROL-XL) 25 MG 24 hr tablet Take 1 tablet by mouth Daily. 30 tablet 3 8/14/2020 at Unknown time   • naproxen (NAPROSYN) 500 MG tablet Take 500 mg by mouth Daily As Needed for Mild Pain .   Unknown at Unknown time   • nitroglycerin (NITROSTAT) 0.4 MG SL tablet Place 0.4 mg under the tongue Every 5 (Five) Minutes As Needed for Chest Pain. Take no more than 3 doses in 15 minutes.   Unknown at Unknown time   • oxyCODONE (ROXICODONE) 10 MG tablet Take 10 mg by mouth 4 (Four)  Times a Day.   8/14/2020 at 1100       No Known Allergies    Objective   Objective     Vital Signs:   Temp:  [97.4 °F (36.3 °C)-98.4 °F (36.9 °C)] 97.8 °F (36.6 °C)  Heart Rate:  [] 98  Resp:  [18-20] 20  BP: (105-135)/(55-91) 127/73        Physical Exam   Constitutional: He is oriented to person, place, and time. He appears well-developed and well-nourished. No distress.   HENT:   Head: Normocephalic and atraumatic.   Eyes: Pupils are equal, round, and reactive to light. EOM are normal.   Neck: Normal range of motion. Neck supple.   Cardiovascular: Normal rate and intact distal pulses. An irregular rhythm present. Exam reveals no gallop and no friction rub.   Murmur heard.  Pulmonary/Chest: Effort normal. No stridor. No respiratory distress. He has no rales. He exhibits no tenderness.   Diminished breath sounds bilateral bases. Faint expiratory wheeze.    Abdominal: Soft. Bowel sounds are normal. He exhibits no distension. There is no tenderness.   Musculoskeletal:   Acute left hip fracture.   Left BKA.    Neurological: He is alert and oriented to person, place, and time. No cranial nerve deficit.   Skin: Skin is warm and dry. Capillary refill takes more than 3 seconds. No rash noted. He is not diaphoretic. No erythema. No pallor.   Diminished pulses RLE.    Psychiatric: He has a normal mood and affect. His behavior is normal. Judgment and thought content normal.   Nursing note and vitals reviewed.       Results Reviewed:  I have personally reviewed current lab and radiology data.    Results from last 7 days   Lab Units 08/14/20  1420   WBC 10*3/mm3 7.96   HEMOGLOBIN g/dL 9.8*   HEMATOCRIT % 34.6*   PLATELETS 10*3/mm3 218     Results from last 7 days   Lab Units 08/15/20  0850 08/14/20  1420   SODIUM mmol/L 140 140   POTASSIUM mmol/L 4.5 4.0   CHLORIDE mmol/L 106 104   CO2 mmol/L 24.9 25.0   BUN mg/dL 21 23   CREATININE mg/dL 0.93 0.93   GLUCOSE mg/dL 111* 131*   CALCIUM mg/dL 8.1* 8.7   ALT (SGPT) U/L  --   25   AST (SGOT) U/L  --  30   TROPONIN T ng/mL  --  <0.010     Estimated Creatinine Clearance: 64.2 mL/min (by C-G formula based on SCr of 0.93 mg/dL).  Brief Urine Lab Results  (Last result in the past 365 days)      Color   Clarity   Blood   Leuk Est   Nitrite   Protein   CREAT   Urine HCG        08/14/20 1636 Veena  Comment:  Dipstick results may be inaccurate due to color interference.  Clear Negative Trace Positive 30 mg/dL (1+)             Imaging Results (Last 24 Hours)     ** No results found for the last 24 hours. **        Results for orders placed during the hospital encounter of 08/14/20   Adult Transthoracic Echo Complete W/ Cont if Necessary Per Protocol    Narrative · Left ventricular wall thickness is consistent with mild concentric   hypertrophy.  · Left ventricular systolic function is normal, EF 56-60%.  · Left ventricular diastolic dysfunction (grade I a) consistent with   impaired relaxation.  · Left atrial cavity size is mild-to-moderately dilated.  · There is severe calcification of the aortic valve mainly affecting the   non, left and right coronary cusp(s).  · Mild aortic valve regurgitation is present.  · Severe aortic valve stenosis is present.  · Aortic valve mean pressure gradient is 46.0 mmHg.  · Aortic valve maximum pressure gradient is 90.0 mmHg.  · Moderate mitral valve regurgitation is present  · Moderate to severe tricuspid valve regurgitation is present.  · Calculated right ventricular systolic pressure from tricuspid   regurgitation is 77 mmHg.          Assessment/Plan   Assessment & Plan     Active Hospital Problems    Diagnosis POA   • **Closed left hip fracture (CMS/HCC) [S72.002A] Yes   • Chronic atrial fibrillation (CMS/formerly [facility]) [I48.20] Yes   • Acute UTI (urinary tract infection) [N39.0] Yes   • HTN (hypertension) [I10] Yes   • CAD (coronary artery disease) [I25.10] Yes   • COPD (chronic obstructive pulmonary disease) (CMS/formerly [facility]) [J44.9] Yes   • Chronic respiratory disease  [J98.9] Yes   • Peripheral vascular disease (CMS/HCC) [I73.9] Yes       Assessment & Plan    **Left hip fracture  -hip xray obtained @ OSH  -NPO after midnight   -baseline pre-op labs/EKG, type/screen- in am  -last dose of Apixaban was on morning of admission to OSH 8/14/2020  -symptom mgt   -consult Ortho to see in am (Dr. Aponte)  -CT surgery consulted to see in am (Dr. Cabrera); for pre-op clearance, severe aortic stenosis     **Acute UTI  -blood cultures pending  -UA @ OSH c/w acute UTI; culture ordered  -IV Rocephin started   -bladder scan q 4hrs     **Aortic stenosis  -ECHO done @ OSH; EF 56-60% w/ grade I a diastolic dysfunction and severe aortic stenosis; RVSP 77mmHg  -CT surgery consulted to see in am     **Chronic atrial fibrillation   -routine Apixaban held since morning of 8/14/2020  -EKG pending; rate controlled  -routine Cardizem and Toprol continued     **HTN, CAD, hyperthyroidism  -continue routine tapazole  -continue routine Toprol   -PRN nitro continued    **COPD, chronic respiratory failure   -CXR @ OSH stable   -PRN oxygen; 2 liters at home as needed  -PRN duo nebs continued     **PVD  -remote left BKA  -pulses on RLE weak; NV/doppler checks ordered       DVT prophylaxis:  Mechanical       CODE STATUS:    Code Status and Medical Interventions:   Ordered at: 08/15/20 3558     Level Of Support Discussed With:    Patient     Code Status:    CPR     Medical Interventions (Level of Support Prior to Arrest):    Full       Admission Status:  I believe this patient meets INPATIENT status due to need for treatment of his hip fracture, severe left ear.  I feel patient’s risk for adverse outcomes and need for care warrant INPATIENT evaluation and I predict the patient’s care encounter to likely last beyond 2 midnights.      Electronically signed by ALICIA Elena, 08/15/20, 10:10 PM.    Brief Attending Admission Attestation     I have seen and examined the patient, performing an independent  face-to-face diagnostic evaluation with plan of care reviewed and developed with the advanced practice clinician (APC).         Vitals:    08/15/20 2154   BP: 127/73   Pulse: 98   Resp: 20   Temp: 97.8 °F (36.6 °C)   SpO2: 99%-2 L nasal cannula       Attending Physical Exam:  RS-poor effort, decreased air entry at the bases,  CVS- s1s2 irregular, murmur in all valvular areas  ABD- soft, non tender, not distended, no organomegaly.  EXT-left BKA, right extremity-poorly palpable pulses, cool to touch, poor capillary refill,  NEURO- AAO-3, no focal defecit.      Old records reviewed and summarized in PM hx.    Brief Summary Statement:   80 years old male transferred from [facility]- for evaluation of aortic stenosis/lefthip fracture -secondary to fall (3 days back).  Patient had episode of A. fib-RVR-on 8/15/2020-a.m.-responded to beta-blocker.  Patient is very limited in physical activity secondary to BKA, denies any episodes of chest pain, no complaint of lightheadedness or syncope recently, does complain of chronic dyspnea, no PND or orthopnea symptoms, also complains of chronic cough-with whitish sputum production.  Does complain of pain with any movement of his left hip.    COVID-19-severe test negative on 8/15/2020.    Remainder of detailed HPI is as noted above and has been reviewed and/or edited by me for completeness.    PM HX:  Past Medical History:   Diagnosis Date   • AAA (abdominal aortic aneurysm)-status post repair        • Atrial fibrillation with rapid ventricular response (-CHADS2 VASC=4)  -On Eliquis, metoprolol 50/3 times daily, Cardizem 240 mg p.o. daily, 6/1/2018   • BPH (benign prostatic hyperplasia)      2/12/2020   • CAD (coronary artery disease)-    • COPD (chronic obstructive pulmonary disease)-on 2 L nasal cannula  -Still active smoker    •  Severe aortic stenosis, moderate MR/chronic CHF-as per discharge summary,-echo- on 8/15/2020-EF of 56%, grade 1 diastolic dysfunction, severe  left ear, aortic valve pressure gradient 46 mmHg, moderate MR, RVSP 55 mmHg.  -Lasix on hold      • Hypertension  Hyperlipidemia- pravastatin 10 mg p.o. daily      • Hyperthyroidism-methimazole 5 mg p.o. daily       GERD/esophageal dilatation 9/24/2018   • Mononeuritis multiplex/HX left BKA/history of back surgery      • PVD (peripheral vascular disease) (left BKA)-status post femorofemoral bypass             LABS:  EKG A. fib-122 bpm, Q waves in lead III, ,  Cardiac cath- 6/2018- showed chronic total occlusion of mid RCA-with heavy collateral filling, mild disease in left coronary system.-Was recommended dual antiplatelet therapy    Brief Assessment/Plan :  CT surgery evaluation-for severe aortic stenosis.  Cardiology consult- for chronic A. fib,   repeat CBC, anemic with hemoglobin of 10 at the external ED, no complaint of acute bleeding other than bruising secondary to hip fracture, patient's hemoglobin has been dropping from 11-10-9, therefore did not start patient on any anticoagulation.  Orthopedic consult.    See above for further detailed assessment and plan developed with APC which I have reviewed and/or edited for completeness.    Electronically signed by Aurelio Burroughs MD, 08/15/20, 10:32 PM.

## 2020-08-16 NOTE — NURSING NOTE
Patient was scheduled to have surgery with Dr Aponte this AM. Dr Phillips with CT surgery came to assess the patient prior to surgery and stated in his note that at this point he cannot clear the patient for the repair of his hip due to aortic stenosis that needs to be addressed. I spoke with Dr. Aponte at 0741 and informed him of Dr Phillips assessment and asked him to call Dr Phillips to discuss the patients current status.

## 2020-08-17 ENCOUNTER — ANESTHESIA EVENT (OUTPATIENT)
Dept: PERIOP | Facility: HOSPITAL | Age: 80
End: 2020-08-17

## 2020-08-17 ENCOUNTER — ANESTHESIA (OUTPATIENT)
Dept: PERIOP | Facility: HOSPITAL | Age: 80
End: 2020-08-17

## 2020-08-17 ENCOUNTER — APPOINTMENT (OUTPATIENT)
Dept: GENERAL RADIOLOGY | Facility: HOSPITAL | Age: 80
End: 2020-08-17

## 2020-08-17 LAB
ANION GAP SERPL CALCULATED.3IONS-SCNC: 7 MMOL/L (ref 5–15)
BUN SERPL-MCNC: 19 MG/DL (ref 8–23)
BUN/CREAT SERPL: 25 (ref 7–25)
CALCIUM SPEC-SCNC: 8.1 MG/DL (ref 8.6–10.5)
CHLORIDE SERPL-SCNC: 107 MMOL/L (ref 98–107)
CO2 SERPL-SCNC: 25 MMOL/L (ref 22–29)
CREAT SERPL-MCNC: 0.76 MG/DL (ref 0.76–1.27)
DEPRECATED RDW RBC AUTO: 53.1 FL (ref 37–54)
ERYTHROCYTE [DISTWIDTH] IN BLOOD BY AUTOMATED COUNT: 21 % (ref 12.3–15.4)
GFR SERPL CREATININE-BSD FRML MDRD: 99 ML/MIN/1.73
GLUCOSE SERPL-MCNC: 126 MG/DL (ref 65–99)
HCT VFR BLD AUTO: 30.9 % (ref 37.5–51)
HGB BLD-MCNC: 8.8 G/DL (ref 13–17.7)
MCH RBC QN AUTO: 20.4 PG (ref 26.6–33)
MCHC RBC AUTO-ENTMCNC: 28.5 G/DL (ref 31.5–35.7)
MCV RBC AUTO: 71.7 FL (ref 79–97)
PLATELET # BLD AUTO: 235 10*3/MM3 (ref 140–450)
POTASSIUM SERPL-SCNC: 4.1 MMOL/L (ref 3.5–5.2)
RBC # BLD AUTO: 4.31 10*6/MM3 (ref 4.14–5.8)
SODIUM SERPL-SCNC: 139 MMOL/L (ref 136–145)
WBC # BLD AUTO: 8.7 10*3/MM3 (ref 3.4–10.8)

## 2020-08-17 PROCEDURE — 99233 SBSQ HOSP IP/OBS HIGH 50: CPT | Performed by: INTERNAL MEDICINE

## 2020-08-17 PROCEDURE — 85027 COMPLETE CBC AUTOMATED: CPT | Performed by: INTERNAL MEDICINE

## 2020-08-17 PROCEDURE — 99232 SBSQ HOSP IP/OBS MODERATE 35: CPT | Performed by: NURSE PRACTITIONER

## 2020-08-17 PROCEDURE — C1713 ANCHOR/SCREW BN/BN,TIS/BN: HCPCS | Performed by: ORTHOPAEDIC SURGERY

## 2020-08-17 PROCEDURE — 76000 FLUOROSCOPY <1 HR PHYS/QHP: CPT

## 2020-08-17 PROCEDURE — 25010000002 ROPIVACAINE PER 1 MG: Performed by: NURSE ANESTHETIST, CERTIFIED REGISTERED

## 2020-08-17 PROCEDURE — 27245 TREAT THIGH FRACTURE: CPT | Performed by: ORTHOPAEDIC SURGERY

## 2020-08-17 PROCEDURE — C1769 GUIDE WIRE: HCPCS | Performed by: ORTHOPAEDIC SURGERY

## 2020-08-17 PROCEDURE — 25010000002 FENTANYL CITRATE (PF) 100 MCG/2ML SOLUTION: Performed by: NURSE ANESTHETIST, CERTIFIED REGISTERED

## 2020-08-17 PROCEDURE — 27245 TREAT THIGH FRACTURE: CPT | Performed by: PHYSICIAN ASSISTANT

## 2020-08-17 PROCEDURE — 99231 SBSQ HOSP IP/OBS SF/LOW 25: CPT | Performed by: THORACIC SURGERY (CARDIOTHORACIC VASCULAR SURGERY)

## 2020-08-17 PROCEDURE — 25010000002 PHENYLEPHRINE PER 1 ML: Performed by: NURSE ANESTHETIST, CERTIFIED REGISTERED

## 2020-08-17 PROCEDURE — 80048 BASIC METABOLIC PNL TOTAL CA: CPT | Performed by: INTERNAL MEDICINE

## 2020-08-17 PROCEDURE — 25010000002 MORPHINE PER 10 MG: Performed by: INTERNAL MEDICINE

## 2020-08-17 PROCEDURE — 25010000002 DEXAMETHASONE PER 1 MG: Performed by: NURSE ANESTHETIST, CERTIFIED REGISTERED

## 2020-08-17 PROCEDURE — 25010000002 ONDANSETRON PER 1 MG: Performed by: ANESTHESIOLOGY

## 2020-08-17 PROCEDURE — 25010000002 NEOSTIGMINE 10 MG/10ML SOLUTION: Performed by: ANESTHESIOLOGY

## 2020-08-17 PROCEDURE — 25010000003 CEFAZOLIN IN DEXTROSE 2-4 GM/100ML-% SOLUTION: Performed by: NURSE ANESTHETIST, CERTIFIED REGISTERED

## 2020-08-17 PROCEDURE — 25010000002 ROPIVACAINE PER 1 MG: Performed by: ORTHOPAEDIC SURGERY

## 2020-08-17 PROCEDURE — 0QS736Z REPOSITION LEFT UPPER FEMUR WITH INTRAMEDULLARY INTERNAL FIXATION DEVICE, PERCUTANEOUS APPROACH: ICD-10-PCS | Performed by: ORTHOPAEDIC SURGERY

## 2020-08-17 DEVICE — LOCKING SCREW, FULLY THREADED: Type: IMPLANTABLE DEVICE | Site: HIP | Status: FUNCTIONAL

## 2020-08-17 DEVICE — LONG NAIL KIT R1.5, TI, LEFT
Type: IMPLANTABLE DEVICE | Site: HIP | Status: FUNCTIONAL
Brand: GAMMA

## 2020-08-17 DEVICE — LAG SCREW, TI
Type: IMPLANTABLE DEVICE | Site: HIP | Status: FUNCTIONAL
Brand: GAMMA

## 2020-08-17 RX ORDER — OXYCODONE HYDROCHLORIDE 5 MG/1
5 TABLET ORAL EVERY 4 HOURS PRN
Status: DISCONTINUED | OUTPATIENT
Start: 2020-08-17 | End: 2020-08-24 | Stop reason: HOSPADM

## 2020-08-17 RX ORDER — CEFAZOLIN SODIUM 2 G/100ML
2 INJECTION, SOLUTION INTRAVENOUS EVERY 8 HOURS
Status: COMPLETED | OUTPATIENT
Start: 2020-08-18 | End: 2020-08-18

## 2020-08-17 RX ORDER — LIDOCAINE HYDROCHLORIDE 10 MG/ML
INJECTION, SOLUTION EPIDURAL; INFILTRATION; INTRACAUDAL; PERINEURAL AS NEEDED
Status: DISCONTINUED | OUTPATIENT
Start: 2020-08-17 | End: 2020-08-17 | Stop reason: SURG

## 2020-08-17 RX ORDER — ESMOLOL HYDROCHLORIDE 10 MG/ML
INJECTION INTRAVENOUS AS NEEDED
Status: DISCONTINUED | OUTPATIENT
Start: 2020-08-17 | End: 2020-08-17 | Stop reason: SURG

## 2020-08-17 RX ORDER — GLYCOPYRROLATE 0.2 MG/ML
INJECTION INTRAMUSCULAR; INTRAVENOUS AS NEEDED
Status: DISCONTINUED | OUTPATIENT
Start: 2020-08-17 | End: 2020-08-17 | Stop reason: SURG

## 2020-08-17 RX ORDER — ROCURONIUM BROMIDE 10 MG/ML
INJECTION, SOLUTION INTRAVENOUS AS NEEDED
Status: DISCONTINUED | OUTPATIENT
Start: 2020-08-17 | End: 2020-08-17 | Stop reason: SURG

## 2020-08-17 RX ORDER — ROPIVACAINE HYDROCHLORIDE 5 MG/ML
INJECTION, SOLUTION EPIDURAL; INFILTRATION; PERINEURAL
Status: COMPLETED | OUTPATIENT
Start: 2020-08-17 | End: 2020-08-17

## 2020-08-17 RX ORDER — FENTANYL CITRATE 50 UG/ML
50 INJECTION, SOLUTION INTRAMUSCULAR; INTRAVENOUS
Status: DISCONTINUED | OUTPATIENT
Start: 2020-08-17 | End: 2020-08-17 | Stop reason: HOSPADM

## 2020-08-17 RX ORDER — SODIUM CHLORIDE 9 MG/ML
INJECTION, SOLUTION INTRAVENOUS CONTINUOUS PRN
Status: DISCONTINUED | OUTPATIENT
Start: 2020-08-17 | End: 2020-08-17 | Stop reason: SURG

## 2020-08-17 RX ORDER — ACETAMINOPHEN 500 MG
1000 TABLET ORAL EVERY 8 HOURS
Status: DISCONTINUED | OUTPATIENT
Start: 2020-08-17 | End: 2020-08-19

## 2020-08-17 RX ORDER — MORPHINE SULFATE 2 MG/ML
2 INJECTION, SOLUTION INTRAMUSCULAR; INTRAVENOUS ONCE
Status: COMPLETED | OUTPATIENT
Start: 2020-08-17 | End: 2020-08-17

## 2020-08-17 RX ORDER — ROPIVACAINE HYDROCHLORIDE 2 MG/ML
8 INJECTION, SOLUTION EPIDURAL; INFILTRATION CONTINUOUS
Status: DISCONTINUED | OUTPATIENT
Start: 2020-08-17 | End: 2020-08-20

## 2020-08-17 RX ORDER — ONDANSETRON 2 MG/ML
INJECTION INTRAMUSCULAR; INTRAVENOUS AS NEEDED
Status: DISCONTINUED | OUTPATIENT
Start: 2020-08-17 | End: 2020-08-17 | Stop reason: SURG

## 2020-08-17 RX ORDER — MAGNESIUM HYDROXIDE 1200 MG/15ML
LIQUID ORAL AS NEEDED
Status: DISCONTINUED | OUTPATIENT
Start: 2020-08-17 | End: 2020-08-17 | Stop reason: HOSPADM

## 2020-08-17 RX ORDER — FENTANYL CITRATE 50 UG/ML
INJECTION, SOLUTION INTRAMUSCULAR; INTRAVENOUS AS NEEDED
Status: DISCONTINUED | OUTPATIENT
Start: 2020-08-17 | End: 2020-08-17 | Stop reason: SURG

## 2020-08-17 RX ORDER — IPRATROPIUM BROMIDE AND ALBUTEROL SULFATE 2.5; .5 MG/3ML; MG/3ML
3 SOLUTION RESPIRATORY (INHALATION) ONCE AS NEEDED
Status: DISCONTINUED | OUTPATIENT
Start: 2020-08-17 | End: 2020-08-17 | Stop reason: HOSPADM

## 2020-08-17 RX ORDER — SODIUM CHLORIDE 0.9 % (FLUSH) 0.9 %
3 SYRINGE (ML) INJECTION EVERY 12 HOURS SCHEDULED
Status: DISCONTINUED | OUTPATIENT
Start: 2020-08-17 | End: 2020-08-18

## 2020-08-17 RX ORDER — OXYCODONE HYDROCHLORIDE 5 MG/1
10 TABLET ORAL EVERY 4 HOURS PRN
Status: DISCONTINUED | OUTPATIENT
Start: 2020-08-17 | End: 2020-08-24 | Stop reason: HOSPADM

## 2020-08-17 RX ORDER — ETOMIDATE 2 MG/ML
INJECTION INTRAVENOUS AS NEEDED
Status: DISCONTINUED | OUTPATIENT
Start: 2020-08-17 | End: 2020-08-17 | Stop reason: SURG

## 2020-08-17 RX ORDER — CEFAZOLIN SODIUM 2 G/100ML
INJECTION, SOLUTION INTRAVENOUS AS NEEDED
Status: DISCONTINUED | OUTPATIENT
Start: 2020-08-17 | End: 2020-08-17 | Stop reason: SURG

## 2020-08-17 RX ORDER — SODIUM CHLORIDE, SODIUM LACTATE, POTASSIUM CHLORIDE, AND CALCIUM CHLORIDE .6; .31; .03; .02 G/100ML; G/100ML; G/100ML; G/100ML
9 INJECTION, SOLUTION INTRAVENOUS CONTINUOUS
Status: DISCONTINUED | OUTPATIENT
Start: 2020-08-17 | End: 2020-08-18

## 2020-08-17 RX ORDER — DEXAMETHASONE SODIUM PHOSPHATE 4 MG/ML
INJECTION, SOLUTION INTRA-ARTICULAR; INTRALESIONAL; INTRAMUSCULAR; INTRAVENOUS; SOFT TISSUE AS NEEDED
Status: DISCONTINUED | OUTPATIENT
Start: 2020-08-17 | End: 2020-08-17 | Stop reason: SURG

## 2020-08-17 RX ORDER — FAMOTIDINE 20 MG/1
20 TABLET, FILM COATED ORAL ONCE
Status: COMPLETED | OUTPATIENT
Start: 2020-08-17 | End: 2020-08-17

## 2020-08-17 RX ORDER — NEOSTIGMINE METHYLSULFATE 1 MG/ML
INJECTION, SOLUTION INTRAVENOUS AS NEEDED
Status: DISCONTINUED | OUTPATIENT
Start: 2020-08-17 | End: 2020-08-17 | Stop reason: SURG

## 2020-08-17 RX ORDER — SODIUM CHLORIDE 0.9 % (FLUSH) 0.9 %
10 SYRINGE (ML) INJECTION AS NEEDED
Status: DISCONTINUED | OUTPATIENT
Start: 2020-08-17 | End: 2020-08-24 | Stop reason: HOSPADM

## 2020-08-17 RX ORDER — ASPIRIN 81 MG/1
81 TABLET ORAL EVERY 12 HOURS SCHEDULED
Status: DISCONTINUED | OUTPATIENT
Start: 2020-08-18 | End: 2020-08-18

## 2020-08-17 RX ADMIN — ETOMIDATE 20 MG: 2 INJECTION, SOLUTION INTRAVENOUS at 16:34

## 2020-08-17 RX ADMIN — TRANEXAMIC ACID 1000 MG: 100 INJECTION, SOLUTION INTRAVENOUS at 16:57

## 2020-08-17 RX ADMIN — FENTANYL CITRATE 50 MCG: 50 INJECTION, SOLUTION INTRAMUSCULAR; INTRAVENOUS at 17:49

## 2020-08-17 RX ADMIN — ASPIRIN 81 MG: 81 TABLET, COATED ORAL at 09:58

## 2020-08-17 RX ADMIN — MORPHINE SULFATE 2 MG: 2 INJECTION, SOLUTION INTRAMUSCULAR; INTRAVENOUS at 03:24

## 2020-08-17 RX ADMIN — SODIUM CHLORIDE, POTASSIUM CHLORIDE, SODIUM LACTATE AND CALCIUM CHLORIDE 1000 ML: 600; 310; 30; 20 INJECTION, SOLUTION INTRAVENOUS at 18:18

## 2020-08-17 RX ADMIN — FAMOTIDINE 20 MG: 20 TABLET, FILM COATED ORAL at 15:01

## 2020-08-17 RX ADMIN — ACETAMINOPHEN 1000 MG: 500 TABLET, FILM COATED ORAL at 23:24

## 2020-08-17 RX ADMIN — GLYCOPYRROLATE 0.4 MG: 0.2 INJECTION INTRAMUSCULAR; INTRAVENOUS at 18:05

## 2020-08-17 RX ADMIN — SODIUM CHLORIDE: 9 INJECTION, SOLUTION INTRAVENOUS at 16:27

## 2020-08-17 RX ADMIN — DILTIAZEM HYDROCHLORIDE 240 MG: 240 CAPSULE, COATED, EXTENDED RELEASE ORAL at 09:58

## 2020-08-17 RX ADMIN — OXYCODONE HYDROCHLORIDE 10 MG: 5 TABLET ORAL at 11:45

## 2020-08-17 RX ADMIN — DEXAMETHASONE SODIUM PHOSPHATE 4 MG: 4 INJECTION, SOLUTION INTRAMUSCULAR; INTRAVENOUS at 17:00

## 2020-08-17 RX ADMIN — TRANEXAMIC ACID 1000 MG: 100 INJECTION, SOLUTION INTRAVENOUS at 17:48

## 2020-08-17 RX ADMIN — FENTANYL CITRATE 75 MCG: 50 INJECTION, SOLUTION INTRAMUSCULAR; INTRAVENOUS at 17:31

## 2020-08-17 RX ADMIN — ONDANSETRON 4 MG: 2 INJECTION INTRAMUSCULAR; INTRAVENOUS at 18:05

## 2020-08-17 RX ADMIN — FINASTERIDE 5 MG: 5 TABLET, FILM COATED ORAL at 09:58

## 2020-08-17 RX ADMIN — PHENYLEPHRINE HYDROCHLORIDE 160 MCG: 10 INJECTION, SOLUTION INTRAMUSCULAR; INTRAVENOUS; SUBCUTANEOUS at 16:34

## 2020-08-17 RX ADMIN — NICOTINE 1 PATCH: 21 PATCH, EXTENDED RELEASE TRANSDERMAL at 09:59

## 2020-08-17 RX ADMIN — NEOSTIGMINE 3 MG: 1 INJECTION INTRAVENOUS at 18:05

## 2020-08-17 RX ADMIN — METHIMAZOLE 5 MG: 5 TABLET ORAL at 09:58

## 2020-08-17 RX ADMIN — OXYCODONE HYDROCHLORIDE 10 MG: 5 TABLET ORAL at 05:25

## 2020-08-17 RX ADMIN — LIDOCAINE HYDROCHLORIDE 50 MG: 10 INJECTION, SOLUTION EPIDURAL; INFILTRATION; INTRACAUDAL; PERINEURAL at 16:34

## 2020-08-17 RX ADMIN — ROCURONIUM BROMIDE 50 MG: 10 SOLUTION INTRAVENOUS at 16:34

## 2020-08-17 RX ADMIN — FENTANYL CITRATE 25 MCG: 50 INJECTION, SOLUTION INTRAMUSCULAR; INTRAVENOUS at 16:34

## 2020-08-17 RX ADMIN — SODIUM CHLORIDE, POTASSIUM CHLORIDE, SODIUM LACTATE AND CALCIUM CHLORIDE 600 ML: 600; 310; 30; 20 INJECTION, SOLUTION INTRAVENOUS at 16:27

## 2020-08-17 RX ADMIN — ESMOLOL HYDROCHLORIDE 10 MG: 10 INJECTION, SOLUTION INTRAVENOUS at 18:17

## 2020-08-17 RX ADMIN — METOPROLOL TARTRATE 25 MG: 25 TABLET, FILM COATED ORAL at 23:23

## 2020-08-17 RX ADMIN — METOPROLOL TARTRATE 25 MG: 25 TABLET, FILM COATED ORAL at 13:06

## 2020-08-17 RX ADMIN — ESMOLOL HYDROCHLORIDE 10 MG: 10 INJECTION, SOLUTION INTRAVENOUS at 18:20

## 2020-08-17 RX ADMIN — ROPIVACAINE HYDROCHLORIDE 30 ML: 5 INJECTION, SOLUTION EPIDURAL; INFILTRATION; PERINEURAL at 15:36

## 2020-08-17 RX ADMIN — OXYCODONE HYDROCHLORIDE 10 MG: 5 TABLET ORAL at 23:23

## 2020-08-17 RX ADMIN — SODIUM CHLORIDE, PRESERVATIVE FREE 10 ML: 5 INJECTION INTRAVENOUS at 10:04

## 2020-08-17 RX ADMIN — OXYCODONE HYDROCHLORIDE 10 MG: 5 TABLET ORAL at 01:17

## 2020-08-17 RX ADMIN — ESMOLOL HYDROCHLORIDE 10 MG: 10 INJECTION, SOLUTION INTRAVENOUS at 18:15

## 2020-08-17 RX ADMIN — ROPIVACAINE HYDROCHLORIDE 8 ML/HR: 2 INJECTION, SOLUTION EPIDURAL; INFILTRATION at 18:29

## 2020-08-17 RX ADMIN — SODIUM CHLORIDE, POTASSIUM CHLORIDE, SODIUM LACTATE AND CALCIUM CHLORIDE 9 ML/HR: 600; 310; 30; 20 INJECTION, SOLUTION INTRAVENOUS at 15:01

## 2020-08-17 RX ADMIN — METOPROLOL TARTRATE 25 MG: 25 TABLET, FILM COATED ORAL at 05:25

## 2020-08-17 RX ADMIN — CEFAZOLIN SODIUM 2 G: 2 INJECTION, SOLUTION INTRAVENOUS at 17:03

## 2020-08-17 RX ADMIN — ATORVASTATIN CALCIUM 40 MG: 40 TABLET, FILM COATED ORAL at 23:23

## 2020-08-17 RX ADMIN — PHENYLEPHRINE HYDROCHLORIDE 0.5 MCG/KG/MIN: 10 INJECTION INTRAVENOUS at 16:34

## 2020-08-17 RX ADMIN — FENTANYL CITRATE 50 MCG: 50 INJECTION, SOLUTION INTRAMUSCULAR; INTRAVENOUS at 17:57

## 2020-08-17 NOTE — PROGRESS NOTES
Discharge Planning Assessment  [facility]     Patient Name: Zak Olmstead  MRN: [id number]  Today's Date: 8/17/2020    Admit Date: 8/15/2020    Discharge Needs Assessment     Row Name 08/17/20 0915       Living Environment    Lives With  spouse    Current Living Arrangements  home/apartment/condo    Primary Care Provided by  self    Provides Primary Care For  no one    Family Caregiver if Needed  spouse;child(dav), adult       Transition Planning    Patient/Family Anticipates Transition to  home with help/services       Discharge Needs Assessment    Equipment Currently Used at Home  wheelchair;shower chair;oxygen        Discharge Plan     Row Name 08/17/20 0915       Plan    Plan  Home w/ HH    Provided Post Acute Provider List?  Yes    Post Acute Provider List  Home Health;Nursing Home    Patient/Family in Agreement with Plan  yes    Plan Comments  Spoke w/ patient, wife, and son at bedside. Patient lives w/ his wife in a one story in [facility]. PTA he was independent w/ ADLs and used a WC for mobility. He was able to transfer independently. He is on 2L home oxygen at night and as needed, provided through [employer]. Per ortho note, surgery is tentatively scheduled for today. Therapy eval pending s/p surgery. Dicussed possible needs- rehab and HH. Patient is not agreeable to short term rehab and prefers to retun home w/ [facility]. CM will await therapy recs and make referrals at that time. CM following.     Final Discharge Disposition Code  06 - home with home health care        Destination      Coordination has not been started for this encounter.      Durable Medical Equipment      Coordination has not been started for this encounter.      Dialysis/Infusion      Coordination has not been started for this encounter.      Home Medical Care      Coordination has not been started for this encounter.      Therapy      Coordination has not been started for this encounter.      Community Resources       Coordination has not been started for this encounter.          Demographic Summary     Row Name 08/17/20 0913       General Information    Arrived From  home    Reason for Consult  discharge planning        Functional Status     Row Name 08/17/20 0913       Functional Status    Usual Activity Tolerance  fair    Current Activity Tolerance  fair        Psychosocial    No documentation.       Abuse/Neglect    No documentation.       Legal    No documentation.       Substance Abuse    No documentation.       Patient Forms    No documentation.           Lu Lloyd RN

## 2020-08-17 NOTE — NURSING NOTE
"Called by primary nurse r/t patient refusing urine soaked linen to be changed.  Then patient in A&O*4.  I spoke with the patient about the absolute need to change his bed.  I explained that skin will break down when you don't move and even more so when its wet.  He said \"you can give me something to knock me out or you can wait until I have surgery.  But you will not move me before then!\"  I tried to convince the patient any way I could to allow us to turn and clean him.  He said \"he didn't care what we got for his pain unless it put him out.  Otherwise, stop talking about it.\"  We will try later to clean him up.  Will continue to monitor.        Further need assessed by nurse to change patient.  Patient spilled his urinal onto himself and was still refusing a bed change.  [facility] called after multiple attempts to change patient.  After a long discussion with the patient and then eventually his wife, led to the decision of allowing his wife and son to come up and change the patient.  Per [facility] the wife and son are allowed to be at  together.  Will continue to monitor.  Patients family to arrive around 0600.  "

## 2020-08-17 NOTE — ANESTHESIA PREPROCEDURE EVALUATION
Anesthesia Evaluation     Patient summary reviewed and Nursing notes reviewed   NPO Solid Status: > 8 hours  NPO Liquid Status: > 6 hours           Airway   Mallampati: I  Dental    (+) poor dentition    Pulmonary    (+) COPD, decreased breath sounds,   Cardiovascular     ECG reviewed  Rhythm: irregular  Rate: normal    (+) hypertension, valvular problems/murmurs AS, CAD, dysrhythmias Atrial Fib, murmur, PVD,       Neuro/Psych  (+) numbness,     GI/Hepatic/Renal/Endo      Musculoskeletal     Abdominal    Substance History      OB/GYN          Other        ROS/Med Hx Other: · Left ventricular wall thickness is consistent with mild concentric hypertrophy.  · Left ventricular systolic function is normal, EF 56-60%.  · Left ventricular diastolic dysfunction (grade I a) consistent with impaired relaxation.  · Left atrial cavity size is mild-to-moderately dilated.  · There is severe calcification of the aortic valve mainly affecting the non, left and right coronary cusp(s).  · Mild aortic valve regurgitation is present.  · Severe aortic valve stenosis is present.  · Aortic valve mean pressure gradient is 46.0 mmHg.  · Aortic valve maximum pressure gradient is 90.0 mmHg.  · Moderate mitral valve regurgitation is present  · Moderate to severe tricuspid valve regurgitation is present.  · Calculated right ventricular systolic pressure from tricuspid regurgitation is 77 mmHg.                     Anesthesia Plan    ASA 4     general with block   (I discussed with the patient his markedly increased risk of perioperative  Morbidity and mortality consequent to his comorbidities.  He understands and accepts.)    Anesthetic plan, all risks, benefits, and alternatives have been provided, discussed and informed consent has been obtained with: patient.    Plan discussed with CRNA.

## 2020-08-17 NOTE — ANESTHESIA POSTPROCEDURE EVALUATION
Patient: Zak Olmstead    Procedure Summary     Date:  08/17/20 Room / Location:   [facility]  /  [facility]    Anesthesia Start:  1627 Anesthesia Stop:  1836    Procedure:  HIP TROCHANTERIC NAILING WITH INTRAMEDULLARY HIP SCREW, GAMMA NAIL (Left Hip) Diagnosis:       Closed intertrochanteric fracture of hip, left, initial encounter (CMS/[facility])      (left intertrochanteric hip fracture)    Surgeon:  Edmar Aponte MD Provider:  Bossman Peres MD    Anesthesia Type:  general with block ASA Status:  4          Anesthesia Type: general with block    Vitals  Vitals Value Taken Time   /107 8/17/2020  6:30 PM   Temp     Pulse 119 8/17/2020  6:36 PM   Resp     SpO2     Vitals shown include unvalidated device data.        Post Anesthesia Care and Evaluation    Patient location during evaluation: PACU  Patient participation: complete - patient cannot participate  Level of consciousness: lethargic  Pain score: 0  Pain management: adequate  Airway patency: patent  Anesthetic complications: No anesthetic complications  PONV Status: none  Cardiovascular status: acceptable  Respiratory status: acceptable  Hydration status: acceptable    Comments: Given his cardiac status icu observation over night indicated  [facility] Maria I/v infusion plus  albumin as immediate Rx for systolic BP less than 100 mmhg

## 2020-08-17 NOTE — DISCHARGE INSTRUCTIONS
"DISCHARGE INSTRUCTIONS   Dr. Aponte     Fixation left intertrochanteric hip fracture    Wound Care   1) Keep wound / incision area clean and dry.   2) Dressing to remain in place until post-operative day 7. If a splint or cast is present, leave in place until next appointment. Upon dressing removal (if no splint/cast present), assess for wound drainage. If no drainage is present, keep wound / incision area open to air as much as possible. If drainage is present, place sterile dressing to cover wound and assess daily. If drainage continues to occur after post-operative day 10, call the office for an urgent appointment. (You should be seen in the clinic within 1-2 days of calling).  DO NOT REMOVE SUTURES (IF PRESENT) UNDER ANY CIRCUMSTANCES PRIOR TO FOLLOW UP APPOINTMENT.  3) No baths or swimming until otherwise instructed. The wound must remain dry for 10 days after surgery. After 10 days, you may begin to shower only if no drainage is present. No submerging the wound under standing water until cleared by your physician (no baths, hot tubs, swimming pools, etc). Sponge baths are the best way to perform personal hygiene while at the same time protecting the wound from moisture. If splint or cast is present, do not allow it to get wet.   4) Prior to showering, the wound must remain dry for 72 consecutive hours (no drainage whatsoever) prior to showering. If the wound drains or spots, the clock \"resets\" - make sure the wound has been drainage-free for 72 consecutive hours.   5) Once you are allowed to get the wound wet, please use gentle soap to wash the wound area. DO NOT aggressively scrub the wound with a washcloth or bath sponge. Please visually inspect your wound(s) at least once daily. If the wound(s) are in a difficult to see location, please use a mirror or have someone else assist with visual inspection.   6) No scrubbing the wound. You may \"pad dry\" the wound, but do not rub, as this may open up the wound and " "pre-dispose to wound infection.   7) Do not apply lotions or creams to incision site, unless instructed otherwise.   8) Observe for redness, swelling, or drainage. Please call the clinic immediately if you have fevers, chills with warmth/redness surrounding wound site or if you notice pus drainage from the wound site     Activity   No heavy lifting objects greater than 10 pounds.   No driving while on narcotic pain medication.   No submerging wound under standing water (pool, bath tub, etc.) until otherwise instructed.   You may be non-weightbearing on your operative (left lower) extremity   Use crutches or a walker for ambulation as instructed.      Blood Clot Prophylaxis   (Aspirin vs. Lovenox administration is determined by your surgeon and tailored to your specific risk profile. You will be discharged with either of these medications, but not both.) You will need to complete a total 4 week course of enteric coated aspirin 325 mg or 81 mg (baby aspirin) twice daily, in order to minimize your risk of blood clots following surgery. You will be supplied with a prescription to obtain this. You will need to compete a total 2 week course of Lovenox after surgery, in order to minimize the risk of blood clots following surgery. This requires a single shot in the abdomen, to be taken once daily. You will be supplied with the prescription to obtain this. Prior to your discharge from the hospital, the nursing staff will instruct you on self-administration of the Lovenox, if you will be returning directly home from the hospital.     Discharge Pain Medications   You will be given a prescription for pain medication. You should start taking this the same day after your surgery. Wean off as tolerated. Do not wait to take the pain medication until the pain is severe, as it will be difficult to \"catch up\" once this occurs. The pain medication usually reaches its full effect ~1 hour after ingesting. If you have been sent home on " "Valium, this medication works well for muscle spasms. If you have been sent home on Colace, this medication should be taken until you are off all narcotic (i.e. Norco, Percocet, Oxycodone, etc) pain medications, in order to prevent constipation. Percocet or Norco have Tylenol in their ingredient lists. You must be careful not to exceed 4,000mg (4 grams) of Tylenol, from all sources, within a single 24-hr period. This means that you may not take more than 12 Percocets or Norcos within a 24-hr period. Do NOT take Regular or Extra Strength Tylenol when taking your Percocet or Norco medications. If you have been sent home with a combination of oxycodone and Tylenol, please take Tylenol as scheduled.  The oxycodone is to be taken as needed for \"breakthrough\" pain.  Some common side effects of the narcotic pain medications (Percocet, Oxycodone, Norco, etc) include nausea and itching. Benadryl is a great over the counter medication that helps calm your stomach, decreases your anxiety levels, and minimizes the itching. You can easily purchase this at your local pharmacy as an over-the-counter medication. Please abide by the instructions as printed on the bottle. If your nausea persists, make sure to take small amounts of crackers or other lighter foods.     Follow-Up   Follow-up with Dr. Aponte's office in 2 weeks from the surgery date for a post-operative evaluation. Have the following xrays done upon arrival to the follow-up appointment: AP and lateral views left femur. Please call Dr. Aponte's office at [phone] for orthopaedic appointments or questions.  "

## 2020-08-17 NOTE — PROGRESS NOTES
Psychiatric Medicine Services  PROGRESS NOTE    Patient Name: Zak Olmstead  : 1940  MRN: [id number]    Date of Admission: 8/15/2020  Primary Care Physician: Ely Berumen APRN    Subjective   Subjective   CC:  Hip fracture, severe AS    HPI:  Patient lying in bed complaining of hip pain.  Wife and son are in the room and states that CT surgeons, cardiologist and orthopedist spoken with him.  Patient is supposed to have surgery today and they are waiting for the anesthesiologist.  No adverse events overnight.    Review of Systems  Gen- No fevers, chills  CV- No chest pain, palpitations  Resp- No cough, dyspnea  GI- No N/V/D, abd pain  MS- +right hip pain  All other systems have been reviewed and the pertinent positives and negatives are listed above on the HPI or ROS    Objective   Objective   Vital Signs:   Temp:  [96.8 °F (36 °C)-97.8 °F (36.6 °C)] 96.9 °F (36.1 °C)  Heart Rate:  [] 101  Resp:  [16-19] 18  BP: (123-148)/(79-95) 137/86  Physical Exam:  Constitutional: Alert, WD, WN male in NAD  Eyes: EOMI, sclerae anicteric, no conjunctival injection  Head: NCAT  ENT: [place], moist mucous membranes   Cardiovascular: IRR IRR, no R/G, +loud murmur, +weak right pedal pulse; cool foot  Gastrointestinal: Soft, NT, ND +BS  Musculoskeletal: SMALLS; no LE edema; LBKA; right leg pain to light palpation  Neurologic: Oriented x4, strength symmetric in all extremities, follows all commands, speech clear  Skin: No rashes on exposed skin  Psychiatric: Pleasant and cooperative; normal affect    Results Reviewed:  Results from last 7 days   Lab Units 20  0824 20  0127 20  1420   WBC 10*3/mm3 8.70 8.60 7.96   HEMOGLOBIN g/dL 8.8* 8.9* 9.8*   HEMATOCRIT % 30.9* 31.7* 34.6*   PLATELETS 10*3/mm3 235 204 218   INR   --  1.38*  --      Results from last 7 days   Lab Units 20  0127 08/15/20  0850 20  1420   SODIUM mmol/L 138 140 140   POTASSIUM mmol/L 3.8 4.5 4.0      CHLORIDE mmol/L 103 106 104   CO2 mmol/L 26.0 24.9 25.0   BUN mg/dL 21 21 23   CREATININE mg/dL 1.02 0.93 0.93   GLUCOSE mg/dL 108* 111* 131*   CALCIUM mg/dL 8.3* 8.1* 8.7   ALT (SGPT) U/L 28  --  25   AST (SGOT) U/L 24  --  30   TROPONIN T ng/mL  --   --  <0.010   PROBNP pg/mL 8,015.0*  --   --      Estimated Creatinine Clearance: 58.6 mL/min (by C-G formula based on SCr of 1.02 mg/dL).    Microbiology Results Abnormal     Procedure Component Value - Date/Time    Blood Culture - Blood, Hand, Right [[id number]] Collected:  08/16/20 0127    Lab Status:  Preliminary result Specimen:  Blood from Hand, Right Updated:  08/17/20 0330     Blood Culture No growth at 24 hours    Blood Culture - Blood, Arm, Right [[id number]] Collected:  08/16/20 0127    Lab Status:  Preliminary result Specimen:  Blood from Arm, Right Updated:  08/17/20 0330     Blood Culture No growth at 24 hours          Imaging Results (Last 24 Hours)     ** No results found for the last 24 hours. **          Results for orders placed during the hospital encounter of 08/14/20   Adult Transthoracic Echo Complete W/ Cont if Necessary Per Protocol    Narrative · Left ventricular wall thickness is consistent with mild concentric   hypertrophy.  · Left ventricular systolic function is normal, EF 56-60%.  · Left ventricular diastolic dysfunction (grade I a) consistent with   impaired relaxation.  · Left atrial cavity size is mild-to-moderately dilated.  · There is severe calcification of the aortic valve mainly affecting the   non, left and right coronary cusp(s).  · Mild aortic valve regurgitation is present.  · Severe aortic valve stenosis is present.  · Aortic valve mean pressure gradient is 46.0 mmHg.  · Aortic valve maximum pressure gradient is 90.0 mmHg.  · Moderate mitral valve regurgitation is present  · Moderate to severe tricuspid valve regurgitation is present.  · Calculated right ventricular systolic pressure from tricuspid   regurgitation is 77  mmHg.          I have reviewed the medications:  Scheduled Meds:    aspirin 81 mg Oral Daily   atorvastatin 40 mg Oral Nightly   cefTRIAXone 1 g Intravenous Q24H   dilTIAZem  mg Oral Daily   finasteride 5 mg Oral Daily   methIMAzole 5 mg Oral Daily   metoprolol tartrate 25 mg Oral Q8H   nicotine 1 patch Transdermal Q24H   oxyCODONE 10 mg Oral 4x Daily   sodium chloride 10 mL Intravenous Q12H     Continuous Infusions:   PRN Meds:.ipratropium-albuterol  •  magnesium sulfate **OR** magnesium sulfate **OR** magnesium sulfate  •  nitroglycerin  •  ondansetron **OR** ondansetron  •  sodium chloride    Assessment/Plan   Assessment & Plan     Active Hospital Problems    Diagnosis  POA   • **Closed left hip fracture (CMS/HCC) [S72.002A]  Yes   • Chronic atrial fibrillation (CMS/[facility]) [I48.20]  Yes   • Acute UTI (urinary tract infection) [N39.0]  Yes   • HTN (hypertension) [I10]  Yes   • CAD (coronary artery disease) [I25.10]  Yes   • COPD (chronic obstructive pulmonary disease) (CMS/[facility]) [J44.9]  Yes   • Chronic respiratory disease [J98.9]  Yes   • Aortic stenosis [I35.0]  Yes   • S/p left hip fracture [Z87.81]  Not Applicable   • Peripheral vascular disease (CMS/HCC) [I73.9]  Yes      Resolved Hospital Problems   No resolved problems to display.     Brief Hospital Course to date:  Zak Olmstead is a 80 y.o. 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  Plan had been for repair surgery there, however, a pre-op TTE was done and showed severe AS (previously just noted as being moderate).  Decision was made to have him transferred here for CTS/Cardiology evaluation for possible emergent aortic valve repair prior to repair of L hip.    These problems are new to me today    This patient's problems and plans were partially entered by my partner and updated as appropriate by me  08/17/20.    Plan:  Severe Aortic Stenosis  --Cards and CTS to evaluated; CTS signed off d/t not a TAVR candidate; Card thinks that patient should have hip surgery without full cardiac work-up  --BP control  --TTE from OSH reviewed    Left hip fracture  H/o L BKA  --Ortho consulted  --pre-procedure COVID NEGATIVE (8/14)  --To OR today per Dr Aponte; pending anesthesia evaluation    UTI  --UA from OSH with 1+ bacteria, + nitrites, and trace LE  --UCx still PENDING  --continue IV Rocephin for now    H/o Afib  --CHADs2 VASc of 4, on Eliquis at home.  Last dose was am of 8/14. --hold in anticipation of surgery; re-start after surgery  --continue Metoprolol    PVD  H/o AAA s/p repair  --s/p L BKA, uses wheelchair at baseline  --weak RLE pulses on admission, neurovascular checks ordered    CAD  HTN  --not on ASA at home  --continue beta blocker, statin, per OSH H&P,   --Was on Lisinopril, however, I do not see this on his med rec.  Will need to start at some point.     Hyperthyroidism  --continue Methimazole  --TSH wnl    COPD  --not in exacerbation  --continue current inhalers/duonebs    Tobacco Abuse  --nicotine patch while inpatient  --tobacco cessation aducation    DVT Prophylaxis:  Eliquis on hold for anticipated OR    Disposition: I expect the patient to be discharged TBD    CODE STATUS:   Code Status and Medical Interventions:   Ordered at: 08/15/20 5771     Level Of Support Discussed With:    Patient     Code Status:    CPR     Medical Interventions (Level of Support Prior to Arrest):    Full     Electronically signed by ALICIA Rollins, 08/17/20, 09:19.

## 2020-08-17 NOTE — PLAN OF CARE
Problem: Pain, Chronic (Adult)  Goal: Identify Related Risk Factors and Signs and Symptoms  Outcome: Ongoing (interventions implemented as appropriate)  Flowsheets (Taken 8/17/2020 0822 by Rosetta Rice RN)  Related Risk Factors (Chronic Pain): traumatic injury  Signs and Symptoms (Chronic Pain): sleep pattern change;verbalization of pain descriptors;fatigue/weakness     Problem: Patient Care Overview  Goal: Plan of Care Review  Outcome: Ongoing (interventions implemented as appropriate)  Flowsheets  Taken 8/17/2020 1659 by Linda Rodrigues RN  Progress: no change  Outcome Summary: Pt is currently off the unit for surgery. Left unit with IV intact to left AC. O2 at 2 liters per nasal cannula. Remains in Atrial Fib on the monitor Pt refuses to turn today due to pain. Pt anticipates the pain. Pain managed with oral medication. To prep and holding per patient bed.  Taken 8/17/2020 1453 by Patrizia Corbett, RN  Plan of Care Reviewed With: patient

## 2020-08-17 NOTE — PLAN OF CARE
Pt VSS and AO.  Pt pain has been treated with PO and IV pain medication.  Pt refused to be turned last night in spite of incontinent episodes and accidental spilling of his urinal.  Multiple attempts were made to persuade Pt to turn, but he refused to turn due to the pain even after receiving pain medication.  Hospitalist and House supervisor were notified and were unable to persuade Pt.  Pt finally said that if his family came to help him he would turn.  Was able to change Pt dirty linens this Am, but was unable to visualize his coccyx area at all.  Notified oncoming nurse of difficulty with turning/cleaning Pt.  Continuing to monitor.

## 2020-08-17 NOTE — PAYOR COMM NOTE
"[facility]  NPI:[id number]    Utilization Review  Contact: Tere Cantrell RN  Phone: [phone]  Fax:[phone]    INITIATE INPATIENT AUTHORIZATION   PENDING AUTH # [id number]     Zak Preciado (80 y.o. Male)     Date of Birth Social Security Number Address Home Phone MRN    1940  [address] [facility] [address] [phone] [id number]    Sabianist Marital Status          None        Admission Date Admission Type Admitting Provider Attending Provider Department, Room/Bed    20 Emergency Behbahani, Katayoun, MD  [facility] 3 [facility], 3315/2S    Discharge Date Discharge Disposition Discharge Destination        [facility] - [place]              Attending Provider:  (none)   Allergies:  No Known Allergies    Isolation:  None   Infection:  None   Code Status:  CPR    Ht:  172.7 cm (68\")   Wt:  71.4 kg (157 lb 6.4 oz)    Admission Cmt:  None   Principal Problem:  None                Active Insurance as of 2020     Primary Coverage     Payor Plan Insurance Group Employer/Plan Group    HUMANA MEDICARE REPLACEMENT HUMANA MEDICARE REPLACEMENT [id number]     Payor Plan Address Payor Plan Phone Number Payor Plan Fax Number Effective Dates    [address] [address] [phone]  2018 - None Entered    [facility], [facility] [address]       Subscriber Name Subscriber Birth Date Member ID       ZAK PRECIADO 1940 [id number]                 Emergency Contacts      (Rel.) Home Phone Work Phone Mobile Phone    Mirna Preciado (Spouse) [phone]    Low Preciado (Son) -- -- [phone]    Kassidy Ayon [phone] -- --               History & Physical      Behbahani, Katayoun, MD at 20 1825               [facility] Medicine Services  HISTORY & PHYSICAL    Patient Identification:  Name:  Zak Preciado  Age:  80 y.o.  Sex:  male  :  1940  MRN:  [id number]   Visit Number:  " [id number]  Primary Care Physician:  Ely Berumen APRN     Subjective     Chief complaint:   Chief Complaint   Patient presents with   • Hip Pain   • Fall     History of presenting illness:   Mr. Olmstead is a 80-year-old male with multiple medical problems who has a history of left BKA was out on the porch at 11:30 at night about 3 days ago who was trying to urinate on the porch missed a step and fell out of his wheelchair afterwards he was having left hip pain.  He try to manage it at home but finally decided to come to the emergency room he was found to have left intertrochanteric hip fracture.  He denies having any chest pain, shortness of breath or nausea or vomiting.  He denies any melena or hematochezia or hematemesis.  He is a smoker however has been trying to decrease his smoking.  He smoked a few cigarettes since last week.  He does use 2 L nasal cannula oxygen intermittently for COPD.   ---------------------------------------------------------------------------------------------------------------------   Review of Systems all other systems were reviewed and otherwise negative except as per HPI  ---------------------------------------------------------------------------------------------------------------------   Past Medical History:   Diagnosis Date   • AAA (abdominal aortic aneurysm) (CMS/[facility])     s/p repair   • BPH (benign prostatic hyperplasia)    • COPD (chronic obstructive pulmonary disease) (CMS/[facility])    • Heart murmur    • Hypertension    • Mononeuritis multiplex    • NSTEMI (non-ST elevated myocardial infarction) (CMS/HCC) 2018   • PVD (peripheral vascular disease) (CMS/[facility])      Past Surgical History:   Procedure Laterality Date   • ABDOMINAL AORTIC ANEURYSM REPAIR     • APPENDECTOMY     • BACK SURGERY     • BELOW KNEE LEG AMPUTATION Left    • CARDIAC CATHETERIZATION N/A 6/1/2018    Procedure: Left Heart Cath;  Surgeon: Derik Vega MD;  Location: [facility] CATH INVASIVE LOCATION;  Service:  Cardiovascular   • COLONOSCOPY N/A 6/15/2017    Procedure: COLONOSCOPY  CPTCODE:60298;  Surgeon: Lincoln Bowens III, MD;  Location:  [facility] OR;  Service:    • ENDOSCOPY N/A 6/15/2017    Procedure: ESOPHAGOGASTRODUODENOSCOPY WITH BIOPSY  CPTCODE:55657;  Surgeon: Lincoln Bowens III, MD;  Location:  [facility] OR;  Service:    • ESOPHAGEAL DILATATION     • INTERVENTIONAL RADIOLOGY PROCEDURE Left 6/1/2018    Procedure: Abdominal Aortagram with Runoff;  Surgeon: Derik Vega MD;  [facility]:  [facility] CATH INVASIVE LOCATION;  Service: Cardiovascular     Family History   Problem Relation Age of Onset   • Heart disease Mother    • Heart failure Father    • Stroke Brother    • Heart attack Brother      Social History     Socioeconomic History   • Marital status:      Spouse name: Not on file   • Number of children: Not on file   • Years of education: Not on file   • Highest education level: Not on file   Tobacco Use   • Smoking status: Current Every Day Smoker     Packs/day: 0.50     Years: 60.00     Pack years: 30.00     Types: Cigarettes   • Smokeless tobacco: Never Used   Substance and Sexual Activity   • Alcohol use: No   • Drug use: No   • Sexual activity: Not Currently     ---------------------------------------------------------------------------------------------------------------------   Allergies:  Patient has no known allergies.  ---------------------------------------------------------------------------------------------------------------------   Medications below are reported home medications pulling from within the system; at this time, these medications have not been reconciled unless otherwise specified and are in the verification process for further verifcation as current home medications.    Prior to Admission Medications     Prescriptions Last Dose Informant Patient Reported? Taking?    albuterol (PROVENTIL) (2.5 MG/3ML) 0.083% nebulizer solution  Spouse/Significant Other Yes No    Take 2.5 mg by  nebulization Every 6 (Six) Hours As Needed for Wheezing.    apixaban (ELIQUIS) 5 MG tablet tablet   No No    Take 1 tablet by mouth Every 12 (Twelve) Hours.    cholecalciferol (VITAMIN D3) 400 units tablet  Spouse/Significant Other Yes No    Take 400 Units by mouth Daily.    dilTIAZem CD (CARDIZEM CD) 240 MG 24 hr capsule   No No    Take 1 capsule by mouth Daily.    finasteride (PROSCAR) 5 MG tablet   No No    Take 1 tablet by mouth Daily.    lisinopril (PRINIVIL,ZESTRIL) 20 MG tablet  Spouse/Significant Other Yes No    Take 20 mg by mouth 2 (Two) Times a Day.    methIMAzole (TAPAZOLE) 10 MG tablet   No No    Take 1.5 tablets by mouth Daily.    metoprolol succinate XL (TOPROL-XL) 25 MG 24 hr tablet   No No    Take 1 tablet by mouth Daily.    naproxen (NAPROSYN) 500 MG tablet  Spouse/Significant Other Yes No    Take 500 mg by mouth Daily As Needed for Mild Pain .    oxyCODONE (ROXICODONE) 10 MG tablet  PEYMAN Yes No    Take 10 mg by mouth 4 (Four) Times a Day.    polyethylene glycol (MIRALAX) packet  Spouse/Significant Other Yes No    Take 17 g by mouth Every Night.    pravastatin (PRAVACHOL) 10 MG tablet  Spouse/Significant Other Yes No    Take 10 mg by mouth Every Night.    vitamin B-12 (CYANOCOBALAMIN) 1000 MCG tablet  Spouse/Significant Other Yes No    Take 1,000 mcg by mouth Daily.        ---------------------------------------------------------------------------------------------------------------------    Objective     Hospital Scheduled Meds:          Current listed hospital scheduled medications may not yet reflect those currently placed in orders that are signed and held, awaiting patient's arrival to floor/unit.    ---------------------------------------------------------------------------------------------------------------------   Vital Signs:  Temp:  [98 °F (36.7 °C)] 98 °F (36.7 °C)  Heart Rate:  [] 98  Resp:  [18] 18  BP: (113-133)/(69-97) 131/85  Mean Arterial Pressure (Non-Invasive) for the  past 24 hrs (Last 3 readings):   Noninvasive MAP (mmHg)   08/14/20 1803 92   08/14/20 1747 89   08/14/20 1733 81     SpO2 Percentage    08/14/20 1733 08/14/20 1747 08/14/20 1803   SpO2: 92% 94% 92%     SpO2:  [91 %-97 %] 92 %  on   ;   Device (Oxygen Therapy): room air    Body mass index is 22.81 kg/m².  Wt Readings from Last 3 Encounters:   08/14/20 68 kg (150 lb)   08/07/20 68 kg (150 lb)   06/10/20 68 kg (150 lb)     ---------------------------------------------------------------------------------------------------------------------   Physical Exam:  GEN: NAD, disheveled a bit  EYES: PERRLA, no scleral icterus  HENT: Moist oral mucosa, no ulcerations or exudates  CV: Irregular rate and rhythm.  Slightly tachycardic with heart rate in the 90s.  Loud systolic crescendo murmur noted in all heart valve areas  PULM: Increased upper airway secretions, pulmonary congestion, intermittent end expiratory wheeze.  Equal breath sounds bilaterally with good airflow  GI: Normal bowel sounds, nondistended, nontender, soft  NEURO: No facial droop, speech is normal and not slurred, no unilateral deficits of strength noted.  SKIN: Skin is pale, no lower extremity edema  PSYCH: Alert and oriented x4, no confusions or agitation  MSK; left below-knee amputation, left hip is externally rotated and flexed due to a fracture  ---------------------------------------------------------------------------------------------------------------------  EKG:    Atrial fibrillation, no ST or T wave changes       ---------------------------------------------------------------------------------------------------------------------   Results from last 7 days   Lab Units 08/14/20  1420   TROPONIN T ng/mL <0.010           Results from last 7 days   Lab Units 08/14/20  1420   WBC 10*3/mm3 7.96   HEMOGLOBIN g/dL 9.8*   HEMATOCRIT % 34.6*   MCV fL 71.0*   MCHC g/dL 28.3*   PLATELETS 10*3/mm3 218     Results from last 7 days   Lab Units 08/14/20  1420    SODIUM mmol/L 140   POTASSIUM mmol/L 4.0   CHLORIDE mmol/L 104   CO2 mmol/L 25.0   BUN mg/dL 23   CREATININE mg/dL 0.93   EGFR IF NONAFRICN AM mL/min/1.73 78   CALCIUM mg/dL 8.7   GLUCOSE mg/dL 131*   ALBUMIN g/dL 3.57   BILIRUBIN mg/dL 3.0*   ALK PHOS U/L 109   AST (SGOT) U/L 30   ALT (SGPT) U/L 25   Estimated Creatinine Clearance: 60.9 mL/min (by C-G formula based on SCr of 0.93 mg/dL).     ---------------------------------------------------------------------------------------------------------------------  Imaging Results (Last 7 Days)     Procedure Component Value Units Date/Time    XR Chest 1 View [[id number]] Collected:  08/14/20 1456     Updated:  08/14/20 1500    Narrative:       EXAMINATION: XR CHEST 1 VW-      CLINICAL INDICATION:     hip     TECHNIQUE:  XR CHEST 1 VW-      COMPARISON: 02/21/2020      FINDINGS:      Lungs are aerated.   Heart size, mediastinum, and pulmonary vascularity are unremarkable.   No pneumothorax.   No effusions.   No acute osseous findings.          Impression:       No radiographic evidence of acute cardiac or pulmonary  disease.     This report was finalized on 8/14/2020 2:58 PM by Dr. Antwon Burris MD.       XR Hip With or Without Pelvis 2 - 3 View Left [[id number]] Collected:  08/14/20 1455     Updated:  08/14/20 1459    Narrative:       EXAMINATION: XR HIP W OR WO PELVIS 2-3 VIEW LEFT-      CLINICAL INDICATION:poss fracture     COMPARISON: None      TECHNIQUE: AP pelvis, single view left hip     FINDINGS:   Acute comminuted fracture left hip intertrochanteric in nature. Varus  angulation of distal fracture fragments. No dislocation of the femoral  head. No additional fractures identified.       Impression:       Left intertrochanteric fracture.     This report was finalized on 8/14/2020 2:56 PM by Dr. Antwon Burris MD.           I have personally reviewed the above radiology results.     Last Echocardiogram:  Results for orders placed during the hospital encounter of  02/21/20   Transthoracic Echo Complete With Contrast if Necessary Per Protocol    Narrative · There is severe calcification of the aortic valve.  · Moderate aortic valve stenosis is present. Peak gradient of 37 and mean   23 mm Hg  · Estimated EF = 70%.  · Left ventricular systolic function is normal.  · Mild aortic valve regurgitation is present.  · Left ventricular wall thickness is consistent with mild concentric   hypertrophy.        ---------------------------------------------------------------------------------------------------------------------    Assessment & Plan      - left IT hip fracture- due to fall at home from w/c. BKA onthe same site.  Orthopedic surgery has been consulted and planning on repair.  However patient is on anticoagulation for A. fib and therefore will need to hold for 48 hours.  Likely timeline of surgery will be on Monday morning.    -Chronic atrial fibrillation-on metoprolol, diltiazem, apixaban.  Holding apixaban for hip surgery    -Chronic hypoxic respiratory failure due to COPD-increased rhonchi and wheeze on exam however does not appear to be in exacerbation.  He only uses albuterol nebulizer as needed at home.  Will start inhaler regimen to optimize lung function for surgery    -Hypertension-on lisinopril at home.  Can resume due to elevated blood pressure    -History of back pain due to osteoarthritis and DJD of spine-on oxycodone at home which can resume    -Coronary artery disease-resume home beta-blocker, statin, not on aspirin    -Hyperthyroidism-on methimazole.  Will repeat TSH    -BPH-continue finasteride    -B12 deficiency-continue home supplement    -Vitamin D deficiency-continue home supplement    -Peripheral vascular disease-left lower extremity below-knee amputation    -History of AAA-status post repair    -Aortic valve stenosis-audible loud murmur.  Will obtain echocardiogram for preop work-up.     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  Wheelchair-bound    ---------------------------------------------------  DVT Prophylaxis: Lovenox       Code Status: Full       Katayoun Behbahani, MD  Hospitalist Service -- [facility]       08/14/20  18:25         Electronically signed by Behbahani, Katayoun, MD at 08/15/20 1709          Emergency Department Notes      Zandra Dominique, RN at 08/14/20 1336        Fall bracelet placed on pt and education given he verb understanding     Zandra Dominique, RN  08/14/20 1336      Electronically signed by Zandra Dominique RN at 08/14/20 1336     Cale Mendez RN at 08/14/20 1349        PT LEFT LEG AMPUTATION BELOW KNEE PTA     Cale Mendez RN  08/14/20 1354      Electronically signed by Cale Mendez RN at 08/14/20 1354     Cody Torres at 08/14/20 1415        EKG done at 1415 and was given to Dr. Bah.      Cody Torres  08/14/20 1417      Electronically signed by Cody Torres at 08/14/20 1417     Monique Bah DO at 08/14/20 1428          Subjective   Patient is an 80-year-old male presents emergency department for left hip pain.  The patient states that he fell off his porch on Friday evening.  He states that he is wheelchair-bound due to history of severe peripheral neuropathy, and a left AKA.  He states that he went to the edge of his porch, to urinate off the side of his porch at 11:30 at night, and miscalculated the brain.  He fell forward off of the wheelchair, and landed on his left hip.  The patient came to an outlying ER for medical care and was told he had a hip fracture but left AMA.  It is unclear why the patient left AMA.  He went home, and his PCP advised him to come back to the ER for further evaluation.  The patient states that he has had increased pain in his left hip but he does not weight-bear or walk at baseline.  The patient is on Eliquis for A. fib.  He also has a history of COPD and wears as needed home oxygen at 2 L.  He is a chronic  smoker.  He states that there is no other injuries including any head injuries or loss of consciousness.  He denies any neck pain back pain or additional complaints at this time.          Review of Systems   Constitutional: Negative for activity change, appetite change, chills, diaphoresis, fatigue and fever.   HENT: Negative for congestion, postnasal drip, rhinorrhea, sinus pressure, sinus pain and sneezing.    Eyes: Negative for discharge and itching.   Respiratory: Negative for apnea, cough, choking, chest tightness, shortness of breath and wheezing.    Cardiovascular: Negative for chest pain, palpitations and leg swelling.   Gastrointestinal: Negative for abdominal distention, abdominal pain, diarrhea, nausea and vomiting.   Genitourinary: Negative for difficulty urinating and flank pain.   Musculoskeletal: Positive for arthralgias. Negative for back pain.   Neurological: Negative for dizziness and light-headedness.   Psychiatric/Behavioral: Negative for agitation and decreased concentration.       Past Medical History:   Diagnosis Date   • AAA (abdominal aortic aneurysm) (CMS/[facility])     s/p repair   • BPH (benign prostatic hyperplasia)    • COPD (chronic obstructive pulmonary disease) (CMS/[facility])    • Heart murmur    • Hypertension    • Mononeuritis multiplex    • NSTEMI (non-ST elevated myocardial infarction) (CMS/[facility]) 2018   • PVD (peripheral vascular disease) (CMS/[facility])        No Known Allergies    Past Surgical History:   Procedure Laterality Date   • ABDOMINAL AORTIC ANEURYSM REPAIR     • APPENDECTOMY     • BACK SURGERY     • BELOW KNEE LEG AMPUTATION Left    • CARDIAC CATHETERIZATION N/A 6/1/2018    Procedure: Left Heart Cath;  Surgeon: Derik Vega MD;  Location: [facility] CATH INVASIVE LOCATION;  Service: Cardiovascular   • COLONOSCOPY N/A 6/15/2017    Procedure: COLONOSCOPY  CPTCODE:64026;  Surgeon: Lincoln Bowens III, MD;  Location: [facility] OR;  Service:    • ENDOSCOPY N/A 6/15/2017    Procedure:  ESOPHAGOGASTRODUODENOSCOPY WITH BIOPSY  CPTCODE:88761;  Surgeon: Lincoln Bowens III, MD;  Location: [facility] OR;  Service:    • ESOPHAGEAL DILATATION     • INTERVENTIONAL RADIOLOGY PROCEDURE Left 6/1/2018    Procedure: Abdominal Aortagram with Runoff;  Surgeon: Derik eVga MD;  Location: [facility] INVASIVE LOCATION;  Service: Cardiovascular       Family History   Problem Relation Age of Onset   • Heart disease Mother    • Heart failure Father    • Stroke Brother    • Heart attack Brother        Social History     Socioeconomic History   • Marital status:      Spouse name: Not on file   • Number of children: Not on file   • Years of education: Not on file   • Highest education level: Not on file   Tobacco Use   • Smoking status: Current Every Day Smoker     Packs/day: 0.50     Years: 60.00     Pack years: 30.00     Types: Cigarettes   • Smokeless tobacco: Never Used   Substance and Sexual Activity   • Alcohol use: No   • Drug use: No   • Sexual activity: Not Currently           Objective   Physical Exam   Constitutional: He is oriented to person, place, and time. He appears well-developed and well-nourished. No distress.   Elderly and frail cigarette smoke odor   HENT:   Head: Normocephalic and atraumatic.   Right Ear: External ear normal.   Left Ear: External ear normal.   Mouth/Throat: No oropharyngeal exudate.   Eyes: Pupils are equal, round, and reactive to light. Conjunctivae and EOM are normal. Right eye exhibits no discharge. Left eye exhibits no discharge. No scleral icterus.   Neck: Normal range of motion. Neck supple. No JVD present. No tracheal deviation present. No thyromegaly present.   Cardiovascular: Normal rate, regular rhythm, normal heart sounds and intact distal pulses. Exam reveals no gallop and no friction rub.   No murmur heard.  Pulmonary/Chest: Effort normal and breath sounds normal. No stridor. No respiratory distress. He has no wheezes. He has no rales.   Abdominal: Soft.  Bowel sounds are normal. He exhibits no distension and no mass. There is no tenderness. There is no guarding.   Musculoskeletal: Normal range of motion. He exhibits no edema, tenderness or deformity.   Left AKA with tenderness over left lateral hip   Neurological: He is alert and oriented to person, place, and time.   Skin: Skin is warm and dry. Capillary refill takes less than 2 seconds. No rash noted. He is not diaphoretic. No erythema. No pallor.   Psychiatric: He has a normal mood and affect. His behavior is normal.   Nursing note and vitals reviewed.      Procedures          ED Course                                           MDM  Number of Diagnoses or Management Options  Closed fracture of left hip, initial encounter (CMS/[facility]): new and requires workup     Amount and/or Complexity of Data Reviewed  Clinical lab tests: ordered and reviewed  Tests in the radiology section of CPT®:  ordered and reviewed  Tests in the medicine section of CPT®:  ordered and reviewed  Discuss the patient with other providers: yes  Independent visualization of images, tracings, or specimens: yes    Risk of Complications, Morbidity, and/or Mortality  Presenting problems: high  Diagnostic procedures: high  Management options: high    Patient Progress  Patient progress: stable      Final diagnoses:   Closed fracture of left hip, initial encounter (CMS/[facility])            Monique Bah DO  08/14/20 1848      Electronically signed by Monique Bah DO at 08/14/20 1848         Facility-Administered Medications as of 8/15/2020   Medication Dose Route Frequency Provider Last Rate Last Dose   • [COMPLETED] dilTIAZem (CARDIZEM) injection 5 mg  5 mg Intravenous Once Monique Bah DO   5 mg at 08/14/20 1452   • [COMPLETED] metoprolol tartrate (LOPRESSOR) injection 5 mg  5 mg Intravenous Once Behbahani, Katayoun, MD   5 mg at 08/15/20 0941   • [COMPLETED] metoprolol tartrate (LOPRESSOR) tablet 12.5 mg  12.5 mg Oral Once Behbahani,  MD Manny   12.5 mg at 08/15/20 0840   • [COMPLETED] morphine injection 4 mg  4 mg Intravenous Once Monique Bah DO   4 mg at 08/14/20 1538   • [COMPLETED] ondansetron (ZOFRAN) injection 4 mg  4 mg Intravenous Once Monique Bah DO   4 mg at 08/14/20 1537   • [COMPLETED] oxyCODONE (ROXICODONE) immediate release tablet 5 mg  5 mg Oral Once Behbahani, Katayoun, MD   5 mg at 08/15/20 1143   • [COMPLETED] sodium chloride 0.9 % bolus 1,000 mL  1,000 mL Intravenous Once Behbahani, Katayoun, MD 2,000 mL/hr at 08/15/20 0823 1,000 mL at 08/15/20 0823        Physician Progress Notes (all)      Yevgeniy Abdalla APRN at 08/15/20 1252          Inpatient Progress Note  Zak Olmstead  Date: 08/15/20  MRN: [id number]      Subjective:   Orthopedic surgerie been following the patient secondary to a left hip intertrochanteric fracture.  The patient was noted to have severe aortic stenosis.  Hospitalist service contacted orthopedic surgery and they are recommending that the patient be transferred today to a higher acuity facility for evaluation of the severe aortic stenosis secondary to this more than likely needing surgical repair prior to the left hip surgical intervention being performed.  The patient continues to have left hip pain as well as some  pain to the distal femur at the BKA site.        Objective:    Vitals:    08/15/20 0657 08/15/20 0708 08/15/20 0811 08/15/20 1102   BP: 124/91  135/76 126/57   BP Location: Left arm  Left arm Right arm   Patient Position: Lying  Lying Lying   Pulse: 118 118 (!) 137 120   Resp: 18 20 18 18   Temp: 97.7 °F (36.5 °C)   97.4 °F (36.3 °C)   TempSrc: Oral   Oral   SpO2: 95% 93% 99% 98%   Weight:       Height:              Physical Exam:  Constitutional:    No respiratory distress.        Musculoskeletal: Upon examination of the left hip there is no large effusion noted, no skin discoloration, or open wounds noted. The patient has tenderness to palpation at the distal femur  proximal to the femoral condyle.  The patient is neurovascularly intact at the left BKA site.  The patient has no large hematoma noted.  He is tender to palpation at the intertrochanteric region of the left hip.  Small scabbing noted at the left knee.      Labs:    Results from last 7 days   Lab Units 08/14/20  1420   WBC 10*3/mm3 7.96   HEMOGLOBIN g/dL 9.8*   HEMATOCRIT % 34.6*   MCV fL 71.0*   MCHC g/dL 28.3*   PLATELETS 10*3/mm3 218         Results from last 7 days   Lab Units 08/15/20  0850 08/14/20  1420   SODIUM mmol/L 140 140   POTASSIUM mmol/L 4.5 4.0   MAGNESIUM mg/dL 2.2  --    CHLORIDE mmol/L 106 104   CO2 mmol/L 24.9 25.0   BUN mg/dL 21 23   CREATININE mg/dL 0.93 0.93   EGFR IF NONAFRICN AM mL/min/1.73 78 78   CALCIUM mg/dL 8.1* 8.7   GLUCOSE mg/dL 111* 131*   ALBUMIN g/dL  --  3.57   BILIRUBIN mg/dL  --  3.0*   ALK PHOS U/L  --  109   AST (SGOT) U/L  --  30   ALT (SGPT) U/L  --  25   Estimated Creatinine Clearance: 64 mL/min (by C-G formula based on SCr of 0.93 mg/dL).  No results found for: AMMONIA  Results from last 7 days   Lab Units 08/14/20  1420   TROPONIN T ng/mL <0.010         No results found for: HGBA1C  Lab Results   Component Value Date    TSH 2.170 08/14/2020    FREET4 1.34 04/03/2020         Pain Management Panel     There is no flowsheet data to display.          No results found for: BLOODCX  No results found for: URINECX  No results found for: WOUNDCX  No results found for: STOOLCX              Radiology:  Imaging Results (Last 72 Hours)     Procedure Component Value Units Date/Time    XR Chest 1 View [[id number]] Collected:  08/14/20 1456     Updated:  08/14/20 1500    Narrative:       EXAMINATION: XR CHEST 1 VW-      CLINICAL INDICATION:     hip     TECHNIQUE:  XR CHEST 1 VW-      COMPARISON: 02/21/2020      FINDINGS:      Lungs are aerated.   Heart size, mediastinum, and pulmonary vascularity are unremarkable.   No pneumothorax.   No effusions.   No acute osseous findings.           Impression:       No radiographic evidence of acute cardiac or pulmonary  disease.     This report was finalized on 8/14/2020 2:58 PM by Dr. Antwon Burris MD.       XR Hip With or Without Pelvis 2 - 3 View Left [[id number]] Collected:  08/14/20 1455     Updated:  08/14/20 1459    Narrative:       EXAMINATION: XR HIP W OR WO PELVIS 2-3 VIEW LEFT-      CLINICAL INDICATION:poss fracture     COMPARISON: None      TECHNIQUE: AP pelvis, single view left hip     FINDINGS:   Acute comminuted fracture left hip intertrochanteric in nature. Varus  angulation of distal fracture fragments. No dislocation of the femoral  head. No additional fractures identified.       Impression:       Left intertrochanteric fracture.     This report was finalized on 8/14/2020 2:56 PM by Dr. Antwon Burris MD.               Assessment:   1.  Left intertrochanteric fracture          Plan:   Hospitalist is recommending transfer to higher level acuity facility.  Patient has severe aortic stenosis.  He is high risk of surgical complications with severe aortic stenosis.  Transfer will be attempted to be made today.  In the meantime while waiting for transfer we will order x-rays of the left knee to evaluate and rule out fracture at the distal femur as well.  This is unlikely, but will need x-ray to rule out.  Discussed this with radiology that he does have a intertrochanteric fracture.  Needs to move left stump site as little as possible for patient comfort.  Radiology notes that very little motion will be required to obtain the knee views.  Patient is pending transfer to higher level acuity facility today.        ALICIA Bolaños August 15, 2020 12:53    Electronically signed by Yevgeniy Abdalla APRN at 08/15/20 1302          Consult Notes (all)      Adelso Soto MD at 08/15/20 1107      Consult Orders    1. Inpatient Cardiology Consult [[id number]] ordered by Behbahani, Katayoun, MD at 08/15/20 1108                Consult Note        Date of  Admit: 8/14/2020  Date of Consult: 08/15/20  Monique Bah DO          Reason for consultation: Severe aortic valve stenosis, preoperative evaluation    Subjective       Subjective       History of Presenting Illness:   Zak Olmstead is a 80 y.o. male  presented with fall which occurred about 3 days ago. He experienced worsening left hip pain and presented to the ED for further evaluation. He has a history of ASCVD, AAA s/p repair, aortic valve stenosis, hypertension, peripheral vascular disease, and COPD.  Prior to left hip fracture repair, primary team ordered an echocardiogram. This revealed severe aortic stenosis, worse compared to prior echocardiogram. Thus cardiology was consulted for preoperative cardiac evaluation prior to ORIF left hip. Upon evaluation this morning, the patient denies any chest pains. He does have dyspnea which occurs after activity usually in the evenings. He continues to smoke about 2 PPD. He follows with Dr. Vega as an outpatient. He underwent cardiac catheterization in 2018 which revealed  of the RCA.     Cardiac risk factors:arteriosclerotic heart disease, hypertension, peripheral vascular disease and Sedentary life style    Last Echo:  8/15/2020  · Left ventricular wall thickness is consistent with mild concentric hypertrophy.  · Left ventricular systolic function is normal, EF 56-60%.  · Left ventricular diastolic dysfunction (grade I a) consistent with impaired relaxation.  · Left atrial cavity size is mild-to-moderately dilated.  · There is severe calcification of the aortic valve mainly affecting the non, left and right coronary cusp(s).  · Mild aortic valve regurgitation is present.  · Severe aortic valve stenosis is present.  · Aortic valve mean pressure gradient is 46.0 mmHg.  · Aortic valve maximum pressure gradient is 90.0 mmHg.  · Moderate mitral valve regurgitation is present  · Moderate to severe tricuspid valve regurgitation is present.  · Calculated right  ventricular systolic pressure from tricuspid regurgitation is 77 mmHg.      Last Cath: 6/2/2018  Final impression:    mid RCA with heavy collateral filling which appears adequate  Mild disease of the left coronary system  Severe peripheral vascular disease with patent femorofemoral bypass  Patent endograft in the abdominal aorta with single limb into the left common iliac.  Right common iliac arteries is totally occluded from the ostium to the femoral.      Past Medical History:   Diagnosis Date   • AAA (abdominal aortic aneurysm) (CMS/HCC)     s/p repair   • BPH (benign prostatic hyperplasia)    • COPD (chronic obstructive pulmonary disease) (CMS/Formerly [facility])    • Heart murmur    • Hypertension    • Mononeuritis multiplex    • NSTEMI (non-ST elevated myocardial infarction) (CMS/HCC) 2018   • PVD (peripheral vascular disease) (CMS/HCC)      Past Surgical History:   Procedure Laterality Date   • ABDOMINAL AORTIC ANEURYSM REPAIR     • APPENDECTOMY     • BACK SURGERY     • BELOW KNEE LEG AMPUTATION Left    • CARDIAC CATHETERIZATION N/A 6/1/2018    Procedure: Left Heart Cath;  Surgeon: Derik Vega MD;  Location: [facility] INVASIVE LOCATION;  Service: Cardiovascular   • COLONOSCOPY N/A 6/15/2017    Procedure: COLONOSCOPY  CPTCODE:44831;  Surgeon: Lincoln Bowens III, MD;  Location: [facility];  Service:    • ENDOSCOPY N/A 6/15/2017    Procedure: ESOPHAGOGASTRODUODENOSCOPY WITH BIOPSY  CPTCODE:26816;  Surgeon: Lincoln Bowens III, MD;  Location: [facility];  Service:    • ESOPHAGEAL DILATATION     • INTERVENTIONAL RADIOLOGY PROCEDURE Left 6/1/2018    Procedure: Abdominal Aortagram with Runoff;  Surgeon: Derik Vega MD;  Location: [facility] INVASIVE LOCATION;  Service: Cardiovascular     Family History   Problem Relation Age of Onset   • Heart disease Mother    • Heart failure Father    • Stroke Brother    • Heart attack Brother      Social History     Tobacco Use   • Smoking status: Current Every Day Smoker      Packs/day: 0.50     Years: 60.00     Pack years: 30.00     Types: Cigarettes   • Smokeless tobacco: Never Used   Substance Use Topics   • Alcohol use: No   • Drug use: No     Medications Prior to Admission   Medication Sig Dispense Refill Last Dose   • apixaban (ELIQUIS) 2.5 MG tablet tablet Take 2.5 mg by mouth 2 (Two) Times a Day.   8/14/2020 at 1100   • dilTIAZem CD (CARDIZEM CD) 240 MG 24 hr capsule Take 1 capsule by mouth Daily. 30 capsule 5 8/14/2020 at Unknown time   • finasteride (PROSCAR) 5 MG tablet Take 1 tablet by mouth Daily. 90 tablet 3 8/14/2020 at Unknown time   • furosemide (LASIX) 20 MG tablet Take 20 mg by mouth Daily.   8/14/2020 at 1100   • methIMAzole (TAPAZOLE) 5 MG tablet Take 5 mg by mouth Daily.   8/14/2020 at 1100   • metoprolol succinate XL (TOPROL-XL) 25 MG 24 hr tablet Take 1 tablet by mouth Daily. 30 tablet 3 8/14/2020 at Unknown time   • oxyCODONE (ROXICODONE) 10 MG tablet Take 10 mg by mouth 4 (Four) Times a Day.   8/14/2020 at 1100   • potassium chloride (K-DUR) 10 MEQ CR tablet Take 10 mEq by mouth Daily.   8/14/2020 at 1100   • ipratropium-albuterol (DUO-NEB) 0.5-2.5 mg/3 ml nebulizer Take 3 mL by nebulization Every 4 (Four) Hours As Needed for Wheezing.   Unknown at Unknown time   • naproxen (NAPROSYN) 500 MG tablet Take 500 mg by mouth Daily As Needed for Mild Pain .   Unknown at Unknown time   • nitroglycerin (NITROSTAT) 0.4 MG SL tablet Place 0.4 mg under the tongue Every 5 (Five) Minutes As Needed for Chest Pain. Take no more than 3 doses in 15 minutes.   Unknown at Unknown time     Allergies:  Patient has no known allergies.    Review of Systems   Constitutional: Positive for fatigue. Negative for chills and fever.   HENT: Negative for congestion, ear pain, rhinorrhea and sore throat.    Respiratory: Positive for shortness of breath. Negative for cough and wheezing.    Cardiovascular: Negative for chest pain, palpitations and leg swelling.   Gastrointestinal: Negative for  abdominal pain, diarrhea, nausea and vomiting.   Genitourinary: Negative for dysuria and urgency.   Musculoskeletal: Negative for back pain and myalgias.   Skin: Negative for rash and wound.   Neurological: Negative for dizziness, light-headedness and headaches.   Psychiatric/Behavioral: Negative for sleep disturbance. The patient is not nervous/anxious.        Objective       Objective      Vital Signs  Temp:  [97.4 °F (36.3 °C)-98.4 °F (36.9 °C)] 97.4 °F (36.3 °C)  Heart Rate:  [] 120  Resp:  [18-20] 18  BP: (113-168)/(57-97) 126/57  Vital Signs (last 72 hrs)       08/12 0700  -  08/13 0659 08/13 0700  -  08/14 0659 08/14 0700  -  08/15 0659 08/15 0700  -  08/15 1108   Most Recent    Temp (°F)     97.4 -  98.4      97.4     97.4 (36.3)    Heart Rate     91 -  118    118 -  137     120    Resp     18 -  20    18 -  20     18    BP     113/74 -  168/83    126/57 -  135/76     126/57    SpO2 (%)     91 -  98    93 -  99     98        Body mass index is 23.93 kg/m².    Intake/Output Summary (Last 24 hours) at 8/15/2020 1108  Last data filed at 8/15/2020 1102  Gross per 24 hour   Intake 360 ml   Output 400 ml   Net -40 ml       Physical Exam:  Physical exam:  Constitutional:    HENT:  Head:  Normocephalic and atraumatic.    Eyes:  Conjunctivae and EOM are normal.  Pupils are equal, round, and reactive to light.  No scleral icterus.    Neck:  Neck supple.  No JVD present.    Cardiovascular: Normal rate, irregular rhythm, Grade 4/6 systolic murmur LLSB, RUSB  Pulmonary/Chest:  Vesicular breath sounds B/L  Abdominal:  Soft.  Bowel sounds are normal.  No distension and no tenderness.    Neurological:  Alert and oriented to person, place, and time. No focal deficits, normal coordination and gate.  Skin:  Skin is warm and dry. No rash noted. No pallor.   Lower extremities: No edema, Left AKA    Results review     Results Review:    I reviewed the patient's new clinical results.  Results from last 7 days   Lab Units  20  1420   TROPONIN T ng/mL <0.010     Results from last 7 days   Lab Units 20  1420   WBC 10*3/mm3 7.96   HEMOGLOBIN g/dL 9.8*   PLATELETS 10*3/mm3 218     Results from last 7 days   Lab Units 08/15/20  0850 20  1420   SODIUM mmol/L 140 140   POTASSIUM mmol/L 4.5 4.0   CHLORIDE mmol/L 106 104   CO2 mmol/L 24.9 25.0   BUN mg/dL 21 23   CREATININE mg/dL 0.93 0.93   CALCIUM mg/dL 8.1* 8.7   GLUCOSE mg/dL 111* 131*   ALT (SGPT) U/L  --  25   AST (SGOT) U/L  --  30     Lab Results   Component Value Date    INR 3.37 (H) 2020    INR 3.12 (H) 2020    INR 3.04 (H) 2020    INR 2.44 (H) 2018    INR 2.06 (H) 2018    INR 2.09 (H) 2018    INR 1.80 (H) 2018     Lab Results   Component Value Date    MG 2.2 08/15/2020    MG 1.9 2020    MG 2.2 2018     Lab Results   Component Value Date    TSH 2.170 2020    PSA 2.950 2020    TRIG 98 2020    HDL 26 (L) 2020    LDL 53 2020      Lab Results   Component Value Date    PROBNP 10,433.0 (H) 2020       EC/15/020    ECG/EMG Results (last 24 hours)     Procedure Component Value Units Date/Time    ECG 12 Lead [[id number]] Collected:  20 141     Updated:  20 141    ECG 12 Lead [[id number]] Collected:  08/15/20 0816     Updated:  08/15/20 0817    Narrative:       Test Reason : a fib  Blood Pressure : **/** mmHG  Vent. Rate : 122 BPM     Atrial Rate : 125 BPM     P-R Int : 000 ms          QRS Dur : 082 ms      QT Int : 296 ms       P-R-T Axes : 000 026 061 degrees     QTc Int : 421 ms    Atrial fibrillation with rapid ventricular response  Abnormal ECG  When compared with ECG of 14-AUG-2020 14:15, (Unconfirmed)  No significant change was found    Referred By:  BEHBAHANI           Confirmed By:     Adult Transthoracic Echo Complete W/ Cont if Necessary Per Protocol [[id number]] Collected:  08/15/20 0859     Updated:  08/15/20 0929     BSA 1.8 m^2      IVSd 1.3 cm      IVSs 1.2  cm      LVIDd 4.2 cm      LVIDs 3.0 cm      LVPWd 1.2 cm      BH CV ECHO BILLY - LVPWS 1.5 cm      IVS/LVPW 1.1     FS 28.0 %      EDV(Teich) 76.6 ml      ESV(Teich) 34.7 ml      EF(Teich) 54.7 %      EDV(cubed) 71.7 ml      ESV(cubed) 26.7 ml      EF(cubed) 62.7 %      % IVS thick -9.0 %      % LVPW thick 23.6 %      LV mass(C)d 194.0 grams      LV mass(C)dI 105.2 grams/m^2      LV mass(C)s 134.9 grams      LV mass(C)sI 73.1 grams/m^2      SV(Teich) 41.9 ml      SI(Teich) 22.7 ml/m^2      SV(cubed) 45.0 ml      SI(cubed) 24.4 ml/m^2      Ao root diam 3.3 cm      Ao root area 8.6 cm^2      LA dimension 4.2 cm      LA/Ao 1.3     LVOT diam 2.0 cm      LVOT area 3.1 cm^2      LVOT area(traced) 3.1 cm^2      LVLd ap4 6.9 cm      EDV(MOD-sp4) 76.6 ml      LVLs ap4 5.2 cm      ESV(MOD-sp4) 30.7 ml      EF(MOD-sp4) 59.9 %      SV(MOD-sp4) 45.9 ml      SI(MOD-sp4) 24.9 ml/m^2      Ao root area (BSA corrected) 1.8     LV Montaño Vol (BSA corrected) 41.5 ml/m^2      LV Sys Vol (BSA corrected) 16.6 ml/m^2      Ao pk maria ines 427.8 cm/sec      Ao max PG 73.6 mmHg      Ao max PG (full) 71.7 mmHg      Ao V2 mean 275.3 cm/sec      Ao mean PG 37.0 mmHg      Ao mean PG (full) 36.0 mmHg      Ao V2 VTI 76.9 cm      WENDY(I,A) 0.51 cm^2      WENDY(I,D) 0.51 cm^2      WENDY(V,A) 0.51 cm^2      WENDY(V,D) 0.51 cm^2      AI max maria ines 380.0 cm/sec      AI max PG 57.8 mmHg      AI dec slope 168.0 cm/sec^2      AI P1/2t 662.5 msec      LV V1 max PG 2.0 mmHg      LV V1 mean PG 1.0 mmHg      LV V1 max 69.9 cm/sec      LV V1 mean 47.2 cm/sec      LV V1 VTI 12.4 cm      SV(Ao) 657.5 ml      SI(Ao) 356.5 ml/m^2      SV(LVOT) 39.0 ml      SI(LVOT) 21.1 ml/m^2      PA acc time 0.1 sec      TR max maria ines 395.3 cm/sec      RVSP(TR) 72.7 mmHg      RAP systole 10.0 mmHg      PA pr(Accel) 36.3 mmHg       CV ECHO BILLY - BZI_BMI 23.9 kilograms/m^2       CV ECHO BILLY - BSA(HAYCOCK) 1.9 m^2       CV ECHO BILLY - BZI_METRIC_WEIGHT 71.2 kg       CV ECHO BILLY -  BZI_METRIC_HEIGHT 172.7 cm           Imaging Results (Last 72 Hours)     Procedure Component Value Units Date/Time    XR Chest 1 View [[id number]] Collected:  08/14/20 1456     Updated:  08/14/20 1500    Narrative:       EXAMINATION: XR CHEST 1 VW-      CLINICAL INDICATION:     hip     TECHNIQUE:  XR CHEST 1 VW-      COMPARISON: 02/21/2020      FINDINGS:      Lungs are aerated.   Heart size, mediastinum, and pulmonary vascularity are unremarkable.   No pneumothorax.   No effusions.   No acute osseous findings.          Impression:       No radiographic evidence of acute cardiac or pulmonary  disease.     This report was finalized on 8/14/2020 2:58 PM by Dr. Antwon Burris MD.       XR Hip With or Without Pelvis 2 - 3 View Left [[id number]] Collected:  08/14/20 1455     Updated:  08/14/20 1459    Narrative:       EXAMINATION: XR HIP W OR WO PELVIS 2-3 VIEW LEFT-      CLINICAL INDICATION:poss fracture     COMPARISON: None      TECHNIQUE: AP pelvis, single view left hip     FINDINGS:   Acute comminuted fracture left hip intertrochanteric in nature. Varus  angulation of distal fracture fragments. No dislocation of the femoral  head. No additional fractures identified.       Impression:       Left intertrochanteric fracture.     This report was finalized on 8/14/2020 2:56 PM by Dr. Antwon Burris MD.             Assessment      1. Severe aortic valve stenosis noted on echocardiogram 8/15/2020  2. Left hip fracture  3. Chronic atrial fibrillation, on Eliquis for anticoagulation  4. ASCVD noted on cardiac catheterization 6/2018, revealing  of the RCA  5. COPD  6. Essential Hypertension  7. Hypothyroidism  8. History of AAA s/p repair in the remote past  9. Peripheral vascular disease s/p left below knee amputation      Recommendations     1. She has critical aortic stenosis with high gradients as well as coronary artery disease, peripheral vascular disease, and severe portal hypertension this will put him as a high  preoperative risk for hip surgery if this is decided to go through anticoagulation should be held 1 to 2 days prior to the surgery  2. Regarding his aortic stenosis this should be addressed after surgery he is been seen by Dr. Vega who is his cardiologist further recommendation will depend about Dr. Vega`s recommendation  3. His atrial fibrillation is rate controlled we will continue current management  4. Blood pressure is acceptable continue current management  5. He was strongly advised to quit smoking      I have discussed my impression and recommendations with the patient and family.    Thank you very much for asking us to be involved in this patient's care.  We will follow along with you.      ALICIA Galindo Khaled Saleh, MD, [facility], performed the services described in this documentation as documented by the above-named individual under my supervision and made necessary changes in the note is both accurate and complete  08/15/20  11:08        Electronically signed by Adelso Soto MD at 08/15/20 1152     Yevgeniy Abdalla APRN at 20 1622      Consult Orders    1. Ortho (on-call MD unless specified) [[id number]] ordered by Jordan Vasquez PA at 20 1608                                                Consults    Patient Identification:  Name:  Zak Olmstead  Age:  80 y.o.  Sex:  male  :  1940  MRN:  [id number]  Visit Number:  [id number]  Primary care provider:  Ely Berumen APRN    History of presenting illness:   This is a chronically ill 80-year-old male who presented to [facility] emergency department secondary to left hip fracture.  Patient notes last Friday 7 days ago he fell off his wheelchair ramp while on his electronic wheelchair, patient noted a direct impact trauma to the left hip.  Patient did seek initial medical treatment however signed out of that facility AMA.  The patient's wife was at bedside assisting with the history.  The patient notes pain today  proximal to the left intertrochanteric area.  The patient does have multiple medical comorbidities including history of atrial fibrillation, peripheral vascular disease, coronary artery disease, mononeuritis multiplex, and COPD.  Patient took his last dose of Eliquis this morning.  Orthopedic surgery was consulted secondary to left intertrochanteric fracture.  He is being admitted under hospitalist service.  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        Review of Systems:     Constitution: No weight gain  Eyes:  No loss of vision  ENT:  no epistaxis  Respiratory:  no hemoptysis  Cardiovascular: no CP, positive SOB.  Gastrointestinal: No abdominal pain  Genitourinary: No hematuria  Integument: No skin rash  Hematologic / Lymphatic: No petechiae  Musculoskeletal: See HPI  Neurological: No seizures   Endocrine: No tremors  ---------------------------------------------------------------------------------------------------------------------  ---------------------------------------------------------------------------------------------------------------------   Past History:  Family History   Problem Relation Age of Onset   • Heart disease Mother    • Heart failure Father    • Stroke Brother    • Heart attack Brother      Past Medical History:   Diagnosis Date   • AAA (abdominal aortic aneurysm) (CMS/Former[facility])     s/p repair   • BPH (benign prostatic hyperplasia)    • COPD (chronic obstructive pulmonary disease) (CMS/Former[facility])    • Heart murmur    • Hypertension    • Mononeuritis multiplex    • NSTEMI (non-ST elevated myocardial infarction) (CMS/HCC) 2018   • PVD (peripheral vascular disease) (CMS/HCC)      Past Surgical History:   Procedure Laterality Date   • ABDOMINAL AORTIC ANEURYSM REPAIR     • APPENDECTOMY     • BACK SURGERY     • BELOW KNEE LEG AMPUTATION Left    • CARDIAC CATHETERIZATION N/A 6/1/2018    Procedure: Left Heart Cath;   Surgeon: Derik Vega MD;  Location: [facility] CATH INVASIVE LOCATION;  Service: Cardiovascular   • COLONOSCOPY N/A 6/15/2017    Procedure: COLONOSCOPY  CPTCODE:82596;  Surgeon: Lincoln Bowens III, MD;  Location: [facility] OR;  Service:    • ENDOSCOPY N/A 6/15/2017    Procedure: ESOPHAGOGASTRODUODENOSCOPY WITH BIOPSY  CPTCODE:89303;  Surgeon: Lincoln Bowens III, MD;  Location: [facility] OR;  Service:    • ESOPHAGEAL DILATATION     • INTERVENTIONAL RADIOLOGY PROCEDURE Left 6/1/2018    Procedure: Abdominal Aortagram with Runoff;  Surgeon: Derik Vega MD;  Location: [facility] CATH INVASIVE LOCATION;  Service: Cardiovascular     Social History     Socioeconomic History   • Marital status:      Spouse name: Not on file   • Number of children: Not on file   • Years of education: Not on file   • Highest education level: Not on file   Tobacco Use   • Smoking status: Current Every Day Smoker     Packs/day: 0.50     Years: 60.00     Pack years: 30.00     Types: Cigarettes   • Smokeless tobacco: Never Used   Substance and Sexual Activity   • Alcohol use: No   • Drug use: No   • Sexual activity: Not Currently     ---------------------------------------------------------------------------------------------------------------------   Allergies:  Patient has no known allergies.  ---------------------------------------------------------------------------------------------------------------------   Prior to Admission Medications     Prescriptions Last Dose Informant Patient Reported? Taking?    albuterol (PROVENTIL) (2.5 MG/3ML) 0.083% nebulizer solution  Spouse/Significant Other Yes No    Take 2.5 mg by nebulization Every 6 (Six) Hours As Needed for Wheezing.    apixaban (ELIQUIS) 5 MG tablet tablet   No No    Take 1 tablet by mouth Every 12 (Twelve) Hours.    cholecalciferol (VITAMIN D3) 400 units tablet  Spouse/Significant Other Yes No    Take 400 Units by mouth Daily.    dilTIAZem CD (CARDIZEM CD) 240 MG 24 hr capsule    No No    Take 1 capsule by mouth Daily.    finasteride (PROSCAR) 5 MG tablet   No No    Take 1 tablet by mouth Daily.    lisinopril (PRINIVIL,ZESTRIL) 20 MG tablet  Spouse/Significant Other Yes No    Take 20 mg by mouth 2 (Two) Times a Day.    methIMAzole (TAPAZOLE) 10 MG tablet   No No    Take 1.5 tablets by mouth Daily.    metoprolol succinate XL (TOPROL-XL) 25 MG 24 hr tablet   No No    Take 1 tablet by mouth Daily.    naproxen (NAPROSYN) 500 MG tablet  Spouse/Significant Other Yes No    Take 500 mg by mouth Daily As Needed for Mild Pain .    oxyCODONE (ROXICODONE) 10 MG tablet  PEYMAN Yes No    Take 10 mg by mouth 4 (Four) Times a Day.    polyethylene glycol (MIRALAX) packet  Spouse/Significant Other Yes No    Take 17 g by mouth Every Night.    pravastatin (PRAVACHOL) 10 MG tablet  Spouse/Significant Other Yes No    Take 10 mg by mouth Every Night.    vitamin B-12 (CYANOCOBALAMIN) 1000 MCG tablet  Spouse/Significant Other Yes No    Take 1,000 mcg by mouth Daily.        [facility] Meds:        ---------------------------------------------------------------------------------------------------------------------   Vital Signs:  Temp:  [98 °F (36.7 °C)] 98 °F (36.7 °C)  Heart Rate:  [] 98  Resp:  [18] 18  BP: (116-133)/(75-97) 128/75      08/14/20  1332   Weight: 68 kg (150 lb)     Body mass index is 22.81 kg/m².  ---------------------------------------------------------------------------------------------------------------------   Physical exam:  Constitutional: Chronically ill male.  No respiratory distress.      HEENT:Normocephalic and atraumatic, PERRLA  Neck:  Neck supple.  Cardiovascular:  Normal rate  Pulmonary/Chest:  No respiratory distress  Abdominal:  Soft, no tenderness.   Musculoskeletal: Upon examination of the left hip there is no open wounds noted.  Patient is tender to palpation at the intertrochanteric region of the left hip.  Neurovascularly intact to the left BKA stump. no Hematoma noted.   Patient has evidence of previous left below-knee amputation.  No cyanosis to the left BKA site noted.  Small scabbing noted at the left knee.  Neurological:  Alert and oriented to person, place, and time.   Skin:  Skin is warm and dry  Psychiatric:  Normal mood and affect  Peripheral vascular: trace pedal edema  ---------------------------------------------------------------------------------------------------------------------   .  ---------------------------------------------------------------------------------------------------------------------   Results from last 7 days   Lab Units 08/14/20  1420   WBC 10*3/mm3 7.96   HEMOGLOBIN g/dL 9.8*   HEMATOCRIT % 34.6*   MCV fL 71.0*   MCHC g/dL 28.3*   PLATELETS 10*3/mm3 218         Results from last 7 days   Lab Units 08/14/20  1420   SODIUM mmol/L 140   POTASSIUM mmol/L 4.0   CHLORIDE mmol/L 104   CO2 mmol/L 25.0   BUN mg/dL 23   CREATININE mg/dL 0.93   EGFR IF NONAFRICN AM mL/min/1.73 78   CALCIUM mg/dL 8.7   GLUCOSE mg/dL 131*   ALBUMIN g/dL 3.57   BILIRUBIN mg/dL 3.0*   ALK PHOS U/L 109   AST (SGOT) U/L 30   ALT (SGPT) U/L 25   Estimated Creatinine Clearance: 60.9 mL/min (by C-G formula based on SCr of 0.93 mg/dL).  No results found for: AMMONIA  Results from last 7 days   Lab Units 08/14/20  1420   TROPONIN T ng/mL <0.010         Lab Results   Component Value Date    HGBA1C 6.10 (H) 02/22/2020     Lab Results   Component Value Date    TSH <0.005 (L) 02/21/2020    FREET4 1.34 04/03/2020     No results found for: PREGTESTUR, PREGSERUM, HCG, HCGQUANT  Pain Management Panel     There is no flowsheet data to display.        No results found for: BLOODCX  No results found for: URINECX  No results found for: WOUNDCX  No results found for: STOOLCX      ---------------------------------------------------------------------------------------------------------------------   Imaging Results (Last 7 Days)     Procedure Component Value Units Date/Time    XR Chest 1 View  [[id number]] Collected:  08/14/20 1456     Updated:  08/14/20 1500    Narrative:       EXAMINATION: XR CHEST 1 VW-      CLINICAL INDICATION:     hip     TECHNIQUE:  XR CHEST 1 VW-      COMPARISON: 02/21/2020      FINDINGS:      Lungs are aerated.   Heart size, mediastinum, and pulmonary vascularity are unremarkable.   No pneumothorax.   No effusions.   No acute osseous findings.          Impression:       No radiographic evidence of acute cardiac or pulmonary  disease.     This report was finalized on 8/14/2020 2:58 PM by Dr. Antwon Burris MD.       XR Hip With or Without Pelvis 2 - 3 View Left [[id number]] Collected:  08/14/20 1455     Updated:  08/14/20 1459    Narrative:       EXAMINATION: XR HIP W OR WO PELVIS 2-3 VIEW LEFT-      CLINICAL INDICATION:poss fracture     COMPARISON: None      TECHNIQUE: AP pelvis, single view left hip     FINDINGS:   Acute comminuted fracture left hip intertrochanteric in nature. Varus  angulation of distal fracture fragments. No dislocation of the femoral  head. No additional fractures identified.       Impression:       Left intertrochanteric fracture.     This report was finalized on 8/14/2020 2:56 PM by Dr. Antwon Burris MD.           ----------------------------------------------------------------------------------------------------------------------  Assessment:   #1 left intertrochanteric fracture          Plan:   Discussed the case with Dr. Mancuso.  He recommends patient undergo surgical intervention.  Patient will need left hip short TFNA operative fixation.  Patient has multiple medical comorbidities.  He is higher risk of surgical complications.  Patient will need to be evaluated by hospitalist service preoperatively to evaluate surgical risk and medical optimization.  Patient's last dose of Eliquis was this morning.  Patient will need Eliquis held for 48 hours prior to surgical intervention.  Ortho will follow.     Thank you for the consult.    Yevgeniy Abdalla,  ALICIA  20  16:22    Electronically signed by Yevgeniy Abdalla APRN at 20 1633          Discharge Summary      Behbahani, Katayoun, MD at 08/15/20 1454              [facility] Medicine Services  DISCHARGE SUMMARY    Patient Identification:  Name:  Zak Olmstead  Age:  80 y.o.  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 Olmstead is a 80-year-old male with multiple medical problems who has a history of left BKA was out on the porch at 11:30 at night about 3 days ago who was trying to urinate on the porch missed a step and fell out of his wheelchair afterwards he was having left hip pain.  He try to manage it at home but finally decided to come to the emergency room he was found to have left intertrochanteric hip fracture.  He denies having any chest pain, shortness of breath or nausea or vomiting.  He denies any melena or  hematochezia or hematemesis.  He is a smoker however has been trying to decrease his smoking.  He smoked a few cigarettes since last week.  He does use 2 L nasal cannula oxygen intermittently for COPD.     Hospital Course     Mr. Olmstead admitted yesterday after a fall causing left hip fracture.  Orthopedic surgery was consulted and recommended operative management of fracture.  Patient has left below knee amputation due to prior complications of peripheral vascular disease.  He is wheelchair-bound however he is independent with transfers and mobility.  He has history of atrial fibrillation on apixaban.  Apixaban was held on admission, last dose was administered the morning of.  Orthopedic surgery was planning on operative management on Monday 8/17.  He was found to have increased upper airway congestion with expiratory wheeze  Without COPD exacerbation.  He is only on DUONEBS at home.  I have added Symbicort as well as DuoNeb's here to optimize pulmonary function preoperatively.  Heart rate has been intermittently elevated from 90-120s due to pain mostly.  His dose of metoprolol at home has been increased (changed to metoprolol tartrate 50 mg TID from Toprol XL 25 mg) as well as increased pain medication to control pain better (takes oxycodone 20 mg po bid at home. Added low dose oxycodone q8h PRN and morphine 2 mg q2h PRN).  He is also on diltiazem which can be titrated as needed.    during exam he was found to have a loud systolic murmur representing aortic stenosis.  On review of medical record noted an echocardiogram done was obtained in February 2020 that had mentioned mild aortic regurgitation and moderate aortic stenosis.  Echocardiogram was repeated as part of preop work-up and showed severe aortic stenosis with aortic valve area of 0.5, aortic valve mean pressure gradient of 46 and maximum pressure gradient of 90 as well as moderate mitral regurgitation and pulmonary hypertension with RVSP greater than 55.    Cardiology was then consulted due to high risk of cardiac complications intraoperatively.  They agree with increased risk and recommend cardiac evaluation by cardiothoracic surgery or cardiology for aortic stenosis repair emergently prior to hip surgery.  orthopedic surgery and anesthesiology also felt that surgical intervention for hip fracture will be too high risk and would like further cardiac evaluations prior to pursuing surgery.  I have discussed this with patient as well as his family.  They all in agreement regarding pursuing cardiac evaluation.  Patient has been accepted and is being transferred to [facility].    Discharge Vitals/Physical Examination     Vital Signs:  Temp:  [97.4 °F (36.3 °C)-98.4 °F (36.9 °C)] 97.4 °F (36.3 °C)  Heart Rate:  [] 107  Resp:  [18-20] 18  BP: (113-168)/(57-91) 121/69  Mean Arterial Pressure (Non-Invasive) for the past 24 hrs (Last 3 readings):   Noninvasive MAP (mmHg)   08/15/20 1511 83   08/15/20 1102 91   08/15/20 0811 94     SpO2 Percentage    08/15/20 1304 08/15/20 1315 08/15/20 1511   SpO2: 95% 96% 97%     SpO2:  [91 %-99 %] 97 %  on  Flow (L/min):  [2] 2;   Device (Oxygen Therapy): nasal cannula    Body mass index is 23.93 kg/m².  Wt Readings from Last 3 Encounters:   08/15/20 71.4 kg (157 lb 6.4 oz)   08/07/20 68 kg (150 lb)   06/10/20 68 kg (150 lb)         Physical Exam:  GEN: NAD  CV: irregular, slightly tachycardic, loud systolic murmur  PULM: upper airway congestion, improves with cough, CTA today.   GI: +bs, SOFT, NDNT  PSYCH: alert and oriented x 4, no confusion, hard of hearing.     Pertinent Laboratory/Radiology Results     Pertinent Laboratory Results:  Results from last 7 days   Lab Units 08/14/20  1420   TROPONIN T ng/mL <0.010           Results from last 7 days   Lab Units 08/14/20  1420   WBC 10*3/mm3 7.96   HEMOGLOBIN g/dL 9.8*   HEMATOCRIT % 34.6*   MCV fL 71.0*   MCHC g/dL 28.3*   PLATELETS 10*3/mm3 218     Results from last 7 days   Lab  Units 08/15/20  0850 08/14/20  1420   SODIUM mmol/L 140 140   POTASSIUM mmol/L 4.5 4.0   MAGNESIUM mg/dL 2.2  --    CHLORIDE mmol/L 106 104   CO2 mmol/L 24.9 25.0   BUN mg/dL 21 23   CREATININE mg/dL 0.93 0.93   EGFR IF NONAFRICN AM mL/min/1.73 78 78   CALCIUM mg/dL 8.1* 8.7   GLUCOSE mg/dL 111* 131*   ALBUMIN g/dL  --  3.57   BILIRUBIN mg/dL  --  3.0*   ALK PHOS U/L  --  109   AST (SGOT) U/L  --  30   ALT (SGPT) U/L  --  25   Estimated Creatinine Clearance: 64 mL/min (by C-G formula based on SCr of 0.93 mg/dL).  No results found for: AMMONIA    No results found for: HGBA1C, POCGLU  Lab Results   Component Value Date    HGBA1C 6.10 (H) 02/22/2020     Lab Results   Component Value Date    TSH 2.170 08/14/2020    FREET4 1.34 04/03/2020      Pertinent Radiology Results:  Imaging Results (All)     Procedure Component Value Units Date/Time    XR Chest 1 View [[id number]] Collected:  08/14/20 1456     Updated:  08/14/20 1500    Narrative:       EXAMINATION: XR CHEST 1 VW-      CLINICAL INDICATION:     hip     TECHNIQUE:  XR CHEST 1 VW-      COMPARISON: 02/21/2020      FINDINGS:      Lungs are aerated.   Heart size, mediastinum, and pulmonary vascularity are unremarkable.   No pneumothorax.   No effusions.   No acute osseous findings.          Impression:       No radiographic evidence of acute cardiac or pulmonary  disease.     This report was finalized on 8/14/2020 2:58 PM by Dr. Antwon Burris MD.       XR Hip With or Without Pelvis 2 - 3 View Left [[id number]] Collected:  08/14/20 1455     Updated:  08/14/20 1459    Narrative:       EXAMINATION: XR HIP W OR WO PELVIS 2-3 VIEW LEFT-      CLINICAL INDICATION:poss fracture     COMPARISON: None      TECHNIQUE: AP pelvis, single view left hip     FINDINGS:   Acute comminuted fracture left hip intertrochanteric in nature. Varus  angulation of distal fracture fragments. No dislocation of the femoral  head. No additional fractures identified.       Impression:       Left  intertrochanteric fracture.     This report was finalized on 8/14/2020 2:56 PM by Dr. Antwon Burris MD.           Echocardiogram Findings     Left Ventricle Left ventricular systolic function is normal. Estimated EF appears to be in the range of 56 - 60%. Normal left ventricular cavity size noted. All left ventricular wall segments contract normally. Left ventricular wall thickness is consistent with mild concentric hypertrophy. Septal wall motion is normal. Left ventricular diastolic dysfunction is noted (grade I a w/high LAP) consistent with impaired relaxation.   Right Ventricle Normal right ventricular cavity size and systolic function noted.   Left Atrium Left atrial cavity size is mild-to-moderately dilated.   Right Atrium Right atrial cavity size is mildly dilated.   Aortic Valve The aortic valve is abnormal in structure. There is severe calcification of the aortic valve mainly affecting the non, left and right coronary cusp(s).Mild aortic valve regurgitation is present. Severe aortic valve stenosis is present. Aortic valve maximum pressure gradient is 90.0 mmHg. Aortic valve mean pressure gradient is 46.0 mmHg.   Mitral Valve The mitral valve is abnormal in structure. Severe MAC is present. Moderate mitral valve regurgitation is present. No significant mitral valve stenosis is present.   Tricuspid Valve The tricuspid valve is grossly normal. No evidence of tricuspid valve stenosis is present. Moderate to severe tricuspid valve regurgitation is present. Estimated right ventricular systolic pressure from tricuspid regurgitation is markedly elevated (>55 mmHg). Calculated right ventricular systolic pressure from tricuspid regurgitation is 77 mmHg.   Pulmonic Valve The pulmonic valve is not well visualized. There is no significant pulmonic valve stenosis present. There is no pulmonic valve regurgitation present.   Greater Vessels No dilation of the aortic root is present. No dilation of the sinuses of Valsalva  is present.   Pericardium There is no evidence of pericardial effusion.       Test Results Pending at Discharge:   Order Current Status    COVID-19,CEPHEID,COR/LEON/PAD IN-HOUSE(OR EMERGENT/ADD-ON),NP SWAB IN TRANSPORT MEDIA 3-4 HR TAT - Swab, Nasopharynx Collected (08/15/20 1507)      none    Discharge Disposition/Discharge Medications/Discharge Appointments     Discharge Disposition:   Short Term Hospital (DC - External)    Condition at Discharge:  Stable     DME Prescribed at Discharge:  Home oxygen 2 L NC    Discharge Diet:  Diet Instructions     Diet: Regular      Discharge Diet:  Regular          Discharge Activity:  Activity Instructions     Other Activity Instructions      Activity Instructions: bed rest due to unstable left hip fracture          Code Status While Inpatient:  Code Status and Medical Interventions:   Ordered at: 08/14/20 1606     Code Status:    CPR     Medical Interventions (Level of Support Prior to Arrest):    Full       Discharge Medications:     Discharge Medications      Continue These Medications      Instructions Start Date   dilTIAZem  MG 24 hr capsule  Commonly known as:  CARDIZEM CD   240 mg, Oral, Daily      finasteride 5 MG tablet  Commonly known as:  PROSCAR   5 mg, Oral, Daily      ipratropium-albuterol 0.5-2.5 mg/3 ml nebulizer  Commonly known as:  DUO-NEB   3 mL, Nebulization, Every 4 Hours PRN      methIMAzole 5 MG tablet  Commonly known as:  TAPAZOLE   5 mg, Oral, Daily      metoprolol succinate XL 25 MG 24 hr tablet  Commonly known as:  TOPROL-XL   25 mg, Oral, Every 24 Hours Scheduled      naproxen 500 MG tablet  Commonly known as:  NAPROSYN   500 mg, Oral, Daily PRN      nitroglycerin 0.4 MG SL tablet  Commonly known as:  NITROSTAT   0.4 mg, Sublingual, Every 5 Minutes PRN, Take no more than 3 doses in 15 minutes.      oxyCODONE 10 MG tablet  Commonly known as:  ROXICODONE   10 mg, Oral, 4 Times Daily         Stop These Medications    apixaban 2.5 MG tablet  tablet  Commonly known as:  ELIQUIS     furosemide 20 MG tablet  Commonly known as:  LASIX     potassium chloride 10 MEQ CR tablet  Commonly known as:  K-DUR            Discharge Appointments:  Your Scheduled Appointments    Sep 15, 2020  1:45 PM EDT  Follow Up with Derik Vega MD  [facility] CARDIOLOGY (--) [address] [facility] [address]  [facility] [address]  [phone]   Arrive 15 minutes prior to appointment.             Katayoun Behbahani, MD  Hospitalist Service -- [facility]       08/15/20  15:17    Discharge Time: > 30 minutes to discuss plan of care with family and patient and coordinate care and transfer.        Electronically signed by Behbahani, Katayoun, MD at 08/15/20 5640

## 2020-08-17 NOTE — BRIEF OP NOTE
HIP TROCHANTERIC NAILING WITH INTRAMEDULLARY HIP SCREW  Progress Note    Zak Ishan  8/17/2020    Pre-op Diagnosis:   left intertrochanteric hip fracture       Post-Op Diagnosis Codes:     * Closed intertrochanteric fracture of hip, left, initial encounter (CMS/[facility]) [S72.142A]    Procedure/CPT® Codes:  FL OPEN FIX INTER/SUBTROCH FX,IMPLNT [96455]  FL INSERT AND REMOVE BONE PIN [20699]      Procedure(s):  HIP TROCHANTERIC NAILING WITH INTRAMEDULLARY HIP SCREW, GAMMA NAIL    Surgeon(s):  Edmar Aponte MD    Anesthesia: General    Staff:   Circulator: Lionel Bush RN; Anand Lozano RN  Radiology Technologist: Val Greene  Scrstephanie Person: Zuri Santos  Vendor Representative: Rolly Brennan; Gregg Esteban  Assistant: Tanna Hinojosa PA-C  Assistant: Tanna Hinojosa PA-C      Estimated Blood Loss: 150 mL    Urine Voided: * No values recorded between 8/17/2020  4:27 PM and 8/17/2020  6:16 PM *    Specimens:                None          Drains: * No LDAs found *    Findings: Comminuted left intertrochanteric hip fracture    Complications: None apparent    Assistant: Tanna Hinojosa PA-C  was responsible for performing the following activities: Retraction, Suction, Irrigation, Suturing, Closing and Placing Dressing and their skilled assistance was necessary for the success of this case.    Edmar Aponte MD     Date: 8/17/2020  Time: 18:16

## 2020-08-17 NOTE — ANESTHESIA PROCEDURE NOTES
Peripheral IV    Patient location during procedure: pre-op  Line placed for Difficult Access.  Performed By   Anesthesiologist: Bossman Peres MD  Preanesthetic Checklist  Completed: patient identified, site marked, surgical consent, pre-op evaluation, timeout performed, IV checked, risks and benefits discussed and monitors and equipment checked  Peripheral IV Prep   Patient position: supine   Prep: ChloraPrep  Peripheral IV Procedure   Laterality:right  Location:  Arm  Catheter size: 18 G         Post Assessment   Dressing Type: tape and transparent.    IV Dressing/Site: clean, dry and intact

## 2020-08-17 NOTE — PROGRESS NOTES
"[facility] at [facility]  IP Progress Note      Chief Complaint: Follow-up of aortic stenosis and CAD    Subjective   Complaining of severe left hip pain, so much that he refused to be changed by the nurses.  Request that please do anything to make the pain go away.  Has not had any chest pain or shortness of breath.  He is wheelchair-bound, fell off the wheelchair and fractured his left hip on the side where he has had a BKA.  States that his right leg is also weak and he does not stand or walk.    Objective     Blood pressure 130/95, pulse 105, temperature 96.8 °F (36 °C), temperature source Oral, resp. rate 18, height 172.3 cm (67.84\"), weight 71.7 kg (158 lb), SpO2 96 %.     Intake/Output Summary (Last 24 hours) at 8/17/2020 0722  Last data filed at 8/16/2020 1840  Gross per 24 hour   Intake --   Output 50 ml   Net -50 ml       Physical Exam:  General: No acute distress.   Neck: no JVD.  Chest:No respiratory distress, breath sounds are normal. No wheezes,  rhonchi or rales.  Cardiovascular: Normal S1 and S2, 3/6 systolic ejection murmur.    Extremities: No edema.  Left BKA.    Results Review:     I reviewed the patient's new clinical results.    Results from last 7 days   Lab Units 08/16/20  0127   WBC 10*3/mm3 8.60   HEMOGLOBIN g/dL 8.9*   HEMATOCRIT % 31.7*   PLATELETS 10*3/mm3 204     Results from last 7 days   Lab Units 08/16/20  0127   SODIUM mmol/L 138   POTASSIUM mmol/L 3.8   CHLORIDE mmol/L 103   CO2 mmol/L 26.0   BUN mg/dL 21   CREATININE mg/dL 1.02   CALCIUM mg/dL 8.3*   BILIRUBIN mg/dL 3.1*   ALK PHOS U/L 102   ALT (SGPT) U/L 28   AST (SGOT) U/L 24   GLUCOSE mg/dL 108*     Results from last 7 days   Lab Units 08/16/20  0127   SODIUM mmol/L 138   POTASSIUM mmol/L 3.8   CHLORIDE mmol/L 103   CO2 mmol/L 26.0   BUN mg/dL 21   CREATININE mg/dL 1.02   GLUCOSE mg/dL 108*   CALCIUM mg/dL 8.3*     Results from last 7 days   Lab Units 08/16/20  0127   INR  1.38*     Lab Results "   Component Value Date    CKTOTAL 216 (H) 06/01/2018    CKMB 19.92 (C) 06/01/2018    CKMBINDEX 9.2 (H) 06/01/2018    TROPONINI 6.258 (C) 06/01/2018    TROPONINT <0.010 08/14/2020     Results from last 7 days   Lab Units 08/14/20  1420   TSH uIU/mL 2.170     Results from last 7 days   Lab Units 08/16/20  0127   CHOLESTEROL mg/dL 111   TRIGLYCERIDES mg/dL 102   HDL CHOL mg/dL 20*   LDL CHOL mg/dL 71           Tele: Atrial fibrillation with controlled rate.      Assessment:  1. Severe aortic stenosis, asymptomatic with no current angina, heart failure, dizziness or syncope.  2. CAD with chronic total occlusion of the RCA with collaterals, moderate, nonobstructive disease of the left main with mild plaque of the LAD/circumflex.  Normal ejection fraction.  3. Chronic atrial fibrillation, controlled ventricular rate, was on Eliquis as outpatient.  4. Severe peripheral arterial disease, status post left to right femorofemoral bypass and left BKA.  5. Status post AAA endograft repair.  6. Hypertension.  7. Dyslipidemia.  8. Ongoing smoking.    Plan:  1. I have reviewed the patient's chart and previous cardiac evaluations.  He has a very tortuous aorta post endograft repair and has occluded right iliac.  His right radial was also occluded and access could not be obtained for cardiac ablation study.  2. At present his main problem is left hip pain following fracture otherwise he has not been ambulatory and has been wheelchair confined.  3. If it was not for hip surgery the patient would not need any further cardiac evaluation for his stable/asymptomatic valve disease and CAD and can be managed with medical therapy.  4. Do not think it is in his best interest to put him through extensive cardiac evaluation including repeat cardiac catheterization study, MACIE, CTA etc. to prepare him for TAVR and considering that he may not be an appropriate candidate due to vascular limitations.  5. At present he is not even able to move in the  bed and allow his possible available access for coronary angiography which is left femoral as his right iliac is occluded.  6. I feel that combined risk of morbidity and mortality from all above work-up is at least as much if not higher than proceeding directly to left hip operation.  Not sure that much can be done to lower his overall risk just to proceed to hip surgery.  7. I discussed this with the patient, he is understands the risks of anesthesia and hip operation and that not much can be done to lower this risk without exposing him to similar or high risk.  I recommend proceeding to left hip surgery if  with careful anesthesia and postoperative care.  Avoiding hypotension and hypovolemia is paramount considering his severe aortic stenosis.  8. Continue current medications, restart Eliquis postoperatively.  9. Thank you for allowing us to participate in the care of this patient.  10. We will revisit on Wednesday, August 19, 2020, please call if needed sooner.    Juan Nunez MD, FACC, [facility]

## 2020-08-17 NOTE — OP NOTE
OPERATIVE REPORT     DATE OF PROCEDURE: 8/17/2020    SURGEON: Edmar Aponte M.D.     ASSISTANT(S): Circulator: Lionel Bush RN; Anand Lozano RN  Radiology Technologist: Val Greene  Scrub Person: Zuri Santos  Vendor Representative: Rolly Brennan; Gregg Esteban  Assistant: Tanna Hinojosa PA-C  Assistant: Tanna Hinojosa PA-C    Note-PA was utilized during the case to facilitate positioning the patient, exposure, retraction, placement of final components and definitive closure.    PREOPERATIVE DIAGNOSIS: Left closed intertrochanteric femur fracture     POSTOPERATIVE DIAGNOSIS: same     PROCEDURE: Operative fixation of left closed intertrochanteric femur fracture with cephalomedullary device     SURGICAL DETAILS:     APPROACH: Direct lateral     ANESTHESIA: General     PREOPERATIVE ANTIBIOTICS: Ancef 2 g IV    ESTIMATED BLOOD LOSS: 150 cc     SPECIMENS: None     IMPLANTS: [place] gamma 3 nail 11 x 360 mm x 130 degree, 95 x 10.5 mm proximal lag screw, 2 distal interlocking 5.0 millimeter screws    DRAINS: None     LOCAL INJECTION: None     MODIFIER(S): None     COMPLICATIONS: None apparent    INDICATIONS FOR PROCEDURE: Patient is 80-year-old male who sustained a mechanical fall out of his wheelchair resulting in intertrochanteric hip fracture.  He was seen in outside facility in [place] and deemed too high risk for surgical intervention due to cardiac morbidities.  He was subsequently transferred to [facility] for further evaluation and treatment.  Upon cardiology and cardiothoracic surgery evaluation, he was eventually cleared with no further cardiac intervention.  We discussed the nature of his injury and recommended surgical stabilization for mere pain control as the patient is nonweightbearing through the extremity.  The risks, benefits, alternatives, and potential complications of the this surgery were discussed with the patient/family in detail to include but  not limited to infection, bleeding, anesthesia risks, nerve injury, vessel injury, nonunion, malunion, hardware failure, iatrogenic fracture, pain, blood clots, possible need for blood transfusion, possible need for further procedures, myocardial infarction, stroke, and death. Specific details of the procedure, hospitalization, recovery, rehabilitation, and long-term precautions were also provided. Pre-operative teaching was provided. Surgical device selection was outlined, and the many options available were explained; final choices will be made at the time of the procedure to match the anatomy and condition of the bone, and soft tissue structures. Understanding of all topics was conveyed to me by the patient/family, and consent was given to proceed with a operative fixation of left closed intertrochanteric femur fracture.     INTRAOPERATIVE FINDINGS: Comminuted left intertrochanteric hip fracture    PROCEDURE: The patient was identified in the preoperative holding area. The operative site was confirmed and marked. A MONSERRAT hose and sequential compression device were placed on the nonoperative leg. The risks, benefits, and alternatives to surgery were again confirmed with the patient/family and they wished to proceed. The patient was brought to the operating room, where a huddle was performed with the patient and all vital surgical team members to confirm the correct operative site, procedure, anesthesia type, and operative plan with the patient. After anesthesia was performed, the patient was positioned in the supine position on the Michele table.  A bump was placed under the operative hip and a bone foam ramp was placed into the operative extremity.. The ipsilateral arm was draped across his chest and padded and secured into position. All bony prominences were padded. A surgical time out was performed with all personnel in the operating room to confirm patient identity, the correct operative site and extremity,  correct radiographic studies, availability of appropriate surgical equipment and agreement on the planned procedure.     Provisional reduction of the fracture was then performed under fluoroscopic guidance. Using a combination of axial traction, abduction/adduction, internal/external rotation, and flexion/extension of the operative limb, fracture reduction was obtained and verified under orthogonal fluoroscopic imaging. Next, attention was turned to the operative portion of the case. Intravenous antibiotic prophylaxis was given and confirmed with the anesthesia team. The operative extremity was prepped and draped in the usual sterile fashion.     Attention was then turned to the operative hip.  In order to apply traction to the hip and restore reduction, a traction pin was placed in the proximal tibia approximately 2 fingerbreadths distal from the tibial tubercle and 2 fingerbreadths lateral from the tibial crest.  Small percutaneous incision was made followed by placement of a threaded Steinmann pin perpendicular to the tibial axis.  A traction bow was then applied followed by sterile rope and placement of weights in order to reduce the fracture.     Fluoroscopy was utilized to verify adequate traction and reduction of the fracture.  Next, under fluoroscopic guidance a guidepin was then placed at the modified medial trochanteric starting point. After confirming the position of the starting point with orthogonal fluoroscopic imaging, this guidepin was advanced into the intramedullary canal just distal to the lesser trochanter. A minimal incision was made at the skin around the guidepin and blunt dissection was used to open the soft tissue envelope down to the tip of the greater trochanter. An opening reamer was then placed over the guidepin and reamed to the level of the lesser trochanter. Maintenance of fracture reduction was verified using fluoroscopy while this occurred. The guidepin and opening reamer were then  removed. Next a ball-tipped guidewire was placed through the medullary canal down to the level of the distal femur physeal scar, obtaining a center-center position at the knee. The length of the intended nail was then measured. Sequential reaming then proceeded, increasing in diameter by 0.5 mm until adequate cortical chatter was obtained. The appropriate diameter nail was selected and canal was over reamed by 1.5 mm to accomodate nail insertion.     The selected nail was then opened and assembled on the back table. After verifying correct assembly, the nail was inserted into the intramedullary canal and impacted to the correct depth using a mallet. Placement was verified under fluoroscopy. The targeting arm was then attached to the impaction guide and a small incision was made laterally over the trochanter region to advance the aiming guide to the lateral cortex. Under fluoroscopic guidance, a guide pin was then placed across the fracture, and advanced to the center-center position in the femoral head. Orthogonal fluoroscopic imaging verified appropriate placement of the pin across the fracture, femoral neck, and the position in the femoral head. The length of the lag screw was then measured. A reamer was then utilized to create the pathway from the lateral cortex to the femoral head to allow for lag screw insertion. The lag screw was then inserted over the guidewire and advanced across the fracture into the femoral head. The set screw was then tightened to lock the construct rotationally. Manual compression at the fracture site then proceeded and was monitored using fluoroscopic imaging. Once compression was completed, the set screw was again checked and definitively tightened if indicated. Attention was then turned to the distal interlocking screw(s).  Using perfect Wiyot technique, 2 interlocking screw(s) were placed first in the static hole then in the static portion of the dynamic hole. Once the distal  fixation was complete, the nail insertion guide was then removed and traction released.  The Steinmann pin was then removed from the tibia.  Final orthogonal fluoroscopic imaging was obtained to confirm appropriate hardware placement and fracture reduction.     The wounds were then irrigated copiously with saline followed by layered closure using #1 Vicryl suture for the fascial and deep layers, #2-0 Vicryl for the subcuticular layer and 3-0 nylon for skin. Sterile dressings were then applied to the wounds and a sequential compression device to the operative extremity. Anesthesia was reversed, the patient was transferred to hospital bed, and then to the PACU in stable condition.     No apparent complications occurred during the procedure Instrument, sponge and needle counts were correct x 2.     POST OPERATIVE PLAN:   Weight Bearing: May weight-bear for transfers only as needed.  DVT prophylaxis: Aspirin 81 mg twice daily for 4 weeks  23 hours perioperative antibiotic prophylaxis   Therapy/Activity: PT OT for mobilization  Wound Care: Dressings remain in place for 7 days postop  Pain control: Oral and IV pain medication as needed  Social work for discharge planning   Follow up: Dr. Aponte in 2 weeks

## 2020-08-17 NOTE — ANESTHESIA PROCEDURE NOTES
Airway  Urgency: elective    Date/Time: 8/17/2020 4:36 PM  Airway not difficult    General Information and Staff    Patient location during procedure: OR  CRNA: Brett Bernal CRNA    Indications and Patient Condition  Indications for airway management: airway protection    Preoxygenated: yes  MILS not maintained throughout  Mask difficulty assessment: 2 - vent by mask + OA or adjuvant +/- NMBA    Final Airway Details  Final airway type: endotracheal airway      Successful airway: ETT  Cuffed: yes   Successful intubation technique: direct laryngoscopy  Facilitating devices/methods: intubating stylet  Endotracheal tube insertion site: oral  Blade: [place]  Blade size: 2  ETT size (mm): 7.5  Cormack-Lehane Classification: grade I - full view of glottis  Placement verified by: chest auscultation and capnometry   Cuff volume (mL): 8  Measured from: lips  ETT/EBT  to lips (cm): 21  Number of attempts at approach: 1  Assessment: lips, teeth, and gum same as pre-op and atraumatic intubation    Additional Comments  Negative epigastric sounds, Breath sound equal bilaterally with symmetric chest rise and fall

## 2020-08-17 NOTE — PROGRESS NOTES
"  SUBJECTIVE  Patient remains in significant amount of pain.  Dr. Nunez with TAVR team believes patient risk would not improve with cardiac intervention.  Recommends proceeding with fixation of hip without further delay.    OBJECTIVE  Temp (24hrs), Av.5 °F (36.4 °C), Min:96.8 °F (36 °C), Max:97.8 °F (36.6 °C)    Blood pressure 130/95, pulse 105, temperature 96.8 °F (36 °C), temperature source Oral, resp. rate 18, height 172.3 cm (67.84\"), weight 71.7 kg (158 lb), SpO2 96 %.    Lab Results (last 24 hours)     Procedure Component Value Units Date/Time    Blood Culture - Blood, Hand, Right [[id number]] Collected:  20    Specimen:  Blood from Hand, Right Updated:  20     Blood Culture No growth at 24 hours    Blood Culture - Blood, Arm, Right [[id number]] Collected:  20    Specimen:  Blood from Arm, Right Updated:  20 033     Blood Culture No growth at 24 hours    Lipid Panel [[id number]]  (Abnormal) Collected:  20    Specimen:  Blood Updated:  20 1213     Total Cholesterol 111 mg/dL      Triglycerides 102 mg/dL      HDL Cholesterol 20 mg/dL      LDL Cholesterol  71 mg/dL      VLDL Cholesterol 20.4 mg/dL      LDL/HDL Ratio 3.53    Narrative:       Cholesterol Reference Ranges  (U.S. Department of Health and Human Services ATP III Classifications)    Desirable          <200 mg/dL  Borderline High    200-239 mg/dL  High Risk          >240 mg/dL      Triglyceride Reference Ranges  (U.S. Department of Health and Human Services ATP III Classifications)    Normal           <150 mg/dL  Borderline High  150-199 mg/dL  High             200-499 mg/dL  Very High        >500 mg/dL    HDL Reference Ranges  (U.S. Department of Health and Human Services ATP III Classifcations)    Low     <40 mg/dl (major risk factor for CHD)  High    >60 mg/dl ('negative' risk factor for CHD)        LDL Reference Ranges  (U.S. Department of Health and Human Services ATP III " Classifcations)    Optimal          <100 mg/dL  Near Optimal     100-129 mg/dL  Borderline High  130-159 mg/dL  High             160-189 mg/dL  Very High        >189 mg/dL            PHYSICAL EXAM  Left lower extremity: Leg remains flexed and externally rotated.  Painful to palpation.  Painful with any attempted movement.       Closed left hip fracture (CMS/HCC)    Peripheral vascular disease (CMS/HCC)    Chronic atrial fibrillation (CMS/HCC)    Acute UTI (urinary tract infection)    HTN (hypertension)    CAD (coronary artery disease)    COPD (chronic obstructive pulmonary disease) (CMS/HCC)    Chronic respiratory disease    Aortic stenosis    S/p left hip fracture      PLAN / DISPOSITION:  Left intertrochanteric hip fracture    Bedrest and pain control.  Per Dr. Nunez, patient remains high risk for surgery.  TAVR procedure is unlikely to change or modify risk.  N.p.o.  Plan for OR today for fixation of left intertrochanteric hip fracture  Hold DVT chemoprophylaxis.    Edmar Aponte MD  08/17/20  07:05

## 2020-08-17 NOTE — PROGRESS NOTES
"Zak Olmstead  [id number]  1940     LOS: 2 days   Patient Care Team:  Ely Berumen APRN as PCP - General (Nurse Practitioner)    Chief Complaint: Aortic stenosis      Subjective: Patient complaining of left leg pain.  Patient somewhat combative possibly slightly disoriented.  Family present with patient    Objective:     Vital Sign Min/Max for last 24 hours  Temp  Min: 96.8 °F (36 °C)  Max: 97.8 °F (36.6 °C)   BP  Min: 111/68  Max: 148/79   Pulse  Min: 85  Max: 118   Resp  Min: 16  Max: 19   SpO2  Min: 92 %  Max: 98 %   No data recorded   No data recorded     Flowsheet Rows      First Filed Value   Admission Height  172.3 cm (67.84\") Documented at 08/15/2020 2154   Admission Weight  71.7 kg (158 lb) Documented at 08/15/2020 2154          Physical Exam: Vital signs stable no shortness of breath.    Wound:    Pulses:     Mediastinal and Chest Tube Drainage:       Results Review:   Results from last 7 days   Lab Units 08/16/20  0127   WBC 10*3/mm3 8.60   HEMOGLOBIN g/dL 8.9*   HEMATOCRIT % 31.7*   PLATELETS 10*3/mm3 204     Results from last 7 days   Lab Units 08/16/20  0127   SODIUM mmol/L 138   POTASSIUM mmol/L 3.8   CHLORIDE mmol/L 103   CO2 mmol/L 26.0   BUN mg/dL 21   CREATININE mg/dL 1.02   GLUCOSE mg/dL 108*   CALCIUM mg/dL 8.3*             Assessment      Closed left hip fracture (CMS/HCC)    Peripheral vascular disease (CMS/HCC)    Chronic atrial fibrillation (CMS/HCC)    Acute UTI (urinary tract infection)    HTN (hypertension)    CAD (coronary artery disease)    COPD (chronic obstructive pulmonary disease) (CMS/HCC)    Chronic respiratory disease    Aortic stenosis    S/p left hip fracture      Aortic stenosis.  Reviewed the case in detail with Dr. Nunez.  Patient is a vasculopath.  Patient has previous stent in his aorta and femorofemoral bypass with limited access from arm.  We both feel that a TAVR is prohibitive in this patient.  I discussed this with the patient as well as his family present.  " They appear to understand.  I will sign off and see back as needed.        Chavo Phillips MD  08/17/20  07:23      Please note that portions of this note were completed with a voice recognition program. Efforts were made to edit the dictations, but words may be mistranscribed

## 2020-08-17 NOTE — ANESTHESIA PROCEDURE NOTES
Arterial Line    Pre-sedation assessment completed: 8/17/2020 3:30 PM    Patient reassessed immediately prior to procedure    Patient location during procedure: pre-op  Start time: 8/17/2020 3:30 PM   Line placed for hemodynamic monitoring.  Performed By   Anesthesiologist: Nery Coombs DO  Preanesthetic Checklist  Completed: patient identified, site marked, surgical consent, pre-op evaluation, timeout performed, IV checked, risks and benefits discussed and monitors and equipment checked  Arterial Line Prep   Sterile Tech: cap, gloves and sterile barriers  Prep: ChloraPrep  Patient monitoring: blood pressure monitoring, continuous pulse oximetry and EKG  Arterial Line Procedure   Laterality:right  Location:  radial artery  Catheter size: 20 G   Guidance: ultrasound guided and palpation technique  Number of attempts: 1  Successful placement: yes  Post Assessment   Dressing Type: line sutured, occlusive dressing applied, secured with tape and wrist guard applied.   Complications no  Circ/Move/Sens Assessment: normal and unchanged.   Patient Tolerance: patient tolerated the procedure well with no apparent complications  Additional Notes  Fentanyl 25 mcg given for hip pain prior to placement of arterial line.  Single pass cannulation with US guidance, well tolerated, no complications noted.

## 2020-08-17 NOTE — ANESTHESIA PROCEDURE NOTES
Peripheral Block      Patient reassessed immediately prior to procedure    Patient location during procedure: pre-op  Reason for block: procedure for pain and at surgeon's request  Performed by  CRNA: Fly Valverde, CRNA  Assisted by: Nery Gonzalez RN  Preanesthetic Checklist  Completed: patient identified, site marked, surgical consent, pre-op evaluation, timeout performed, IV checked, risks and benefits discussed and monitors and equipment checked  Prep:  Sterile barriers:cap, gloves and mask  Prep: ChloraPrep  Patient monitoring: blood pressure monitoring, continuous pulse oximetry and EKG  Procedure  Performed under: local infiltration  Guidance:ultrasound guided  Images:still images obtained, printed/placed on chart    Laterality:left  Block Type:fascia iliaca catheter  Injection Technique:catheter  Needle Type:echogenic  Needle Gauge:18 G  Resistance on Injection: none  Catheter Size:20 G (20g)  Cath Depth at skin: 15 cm    Medications Used: ropivacaine (NAROPIN) 0.5 % injection, 30 mL  Med admintered at 8/17/2020 3:36 PM      Medications  Preservative Free Saline:30ml    Post Assessment  Injection Assessment: negative aspiration for heme, no paresthesia on injection and incremental injection  Patient Tolerance:comfortable throughout block  Complications:no  Additional Notes  Procedure:                 Pt placed in supine position.   The insertion site was prepped in sterile fashion with Chlorapreop and clear plastic drapes.  Analgesia was provided by skin infiltration at insertion site with Lidocaine 1% 3mls.  A B-Chinchilla 18 g , 4 inch echogenic Touhy needle was advance In-plane under ultrasound guidance. The   Anterior superior Iliac crest was initially visualized and the probe was directed slightly medially and slightly towards the umbilicus.  The course of the needle was tracked over the sartorius muscle through the fascia Iliacus and into the anterior portion of the Iliacus muscle.  Major vessels where  identified and avoided as where structures of the peritoneal cavity.  LA injection was made incrementally in 1-5ml amounts spread was visualized superiorly below fascia iliacus.  Injection was completed with negative aspiration of blood and negative intravascular injection.  Injection pressures where normal or minimal resistance.  A 20 g B-Chinchilla wire styleted catheter was then advance thru the needle and very easily placed in a superior or cephalad direction.  The catheter was secured at insertion site with skin afix , mastisol, steristreps.  A CHG tegaderm dressing was placed over the insertion site and the nerve catheter labeled and capped.  Thank You.

## 2020-08-18 LAB
ANION GAP SERPL CALCULATED.3IONS-SCNC: 15 MMOL/L (ref 5–15)
BASOPHILS # BLD AUTO: 0.02 10*3/MM3 (ref 0–0.2)
BASOPHILS NFR BLD AUTO: 0.2 % (ref 0–1.5)
BUN SERPL-MCNC: 21 MG/DL (ref 8–23)
BUN/CREAT SERPL: 24.1 (ref 7–25)
CALCIUM SPEC-SCNC: 7.8 MG/DL (ref 8.6–10.5)
CHLORIDE SERPL-SCNC: 105 MMOL/L (ref 98–107)
CO2 SERPL-SCNC: 17 MMOL/L (ref 22–29)
CREAT SERPL-MCNC: 0.87 MG/DL (ref 0.76–1.27)
DEPRECATED RDW RBC AUTO: 55.4 FL (ref 37–54)
EOSINOPHIL # BLD AUTO: 0 10*3/MM3 (ref 0–0.4)
EOSINOPHIL NFR BLD AUTO: 0 % (ref 0.3–6.2)
ERYTHROCYTE [DISTWIDTH] IN BLOOD BY AUTOMATED COUNT: 21.1 % (ref 12.3–15.4)
GFR SERPL CREATININE-BSD FRML MDRD: 84 ML/MIN/1.73
GLUCOSE BLDC GLUCOMTR-MCNC: 117 MG/DL (ref 70–130)
GLUCOSE BLDC GLUCOMTR-MCNC: 127 MG/DL (ref 70–130)
GLUCOSE SERPL-MCNC: 99 MG/DL (ref 65–99)
HCT VFR BLD AUTO: 29.2 % (ref 37.5–51)
HGB BLD-MCNC: 8.1 G/DL (ref 13–17.7)
IMM GRANULOCYTES # BLD AUTO: 0.08 10*3/MM3 (ref 0–0.05)
IMM GRANULOCYTES NFR BLD AUTO: 0.8 % (ref 0–0.5)
LYMPHOCYTES # BLD AUTO: 0.67 10*3/MM3 (ref 0.7–3.1)
LYMPHOCYTES NFR BLD AUTO: 6.3 % (ref 19.6–45.3)
MCH RBC QN AUTO: 20.6 PG (ref 26.6–33)
MCHC RBC AUTO-ENTMCNC: 27.7 G/DL (ref 31.5–35.7)
MCV RBC AUTO: 74.1 FL (ref 79–97)
MONOCYTES # BLD AUTO: 0.59 10*3/MM3 (ref 0.1–0.9)
MONOCYTES NFR BLD AUTO: 5.6 % (ref 5–12)
NEUTROPHILS NFR BLD AUTO: 87.1 % (ref 42.7–76)
NEUTROPHILS NFR BLD AUTO: 9.27 10*3/MM3 (ref 1.7–7)
NRBC BLD AUTO-RTO: 0 /100 WBC (ref 0–0.2)
PLATELET # BLD AUTO: 269 10*3/MM3 (ref 140–450)
PMV BLD AUTO: 11.3 FL (ref 6–12)
POTASSIUM SERPL-SCNC: 4.6 MMOL/L (ref 3.5–5.2)
RBC # BLD AUTO: 3.94 10*6/MM3 (ref 4.14–5.8)
SODIUM SERPL-SCNC: 137 MMOL/L (ref 136–145)
WBC # BLD AUTO: 10.63 10*3/MM3 (ref 3.4–10.8)

## 2020-08-18 PROCEDURE — 99024 POSTOP FOLLOW-UP VISIT: CPT | Performed by: ORTHOPAEDIC SURGERY

## 2020-08-18 PROCEDURE — 85025 COMPLETE CBC W/AUTO DIFF WBC: CPT | Performed by: INTERNAL MEDICINE

## 2020-08-18 PROCEDURE — 25010000003 CEFAZOLIN IN DEXTROSE 2-4 GM/100ML-% SOLUTION: Performed by: ORTHOPAEDIC SURGERY

## 2020-08-18 PROCEDURE — 80048 BASIC METABOLIC PNL TOTAL CA: CPT | Performed by: ORTHOPAEDIC SURGERY

## 2020-08-18 PROCEDURE — 97165 OT EVAL LOW COMPLEX 30 MIN: CPT

## 2020-08-18 PROCEDURE — 99233 SBSQ HOSP IP/OBS HIGH 50: CPT | Performed by: INTERNAL MEDICINE

## 2020-08-18 PROCEDURE — 97162 PT EVAL MOD COMPLEX 30 MIN: CPT

## 2020-08-18 PROCEDURE — 82962 GLUCOSE BLOOD TEST: CPT

## 2020-08-18 PROCEDURE — 25010000002 CEFTRIAXONE PER 250 MG: Performed by: ORTHOPAEDIC SURGERY

## 2020-08-18 RX ADMIN — ACETAMINOPHEN 1000 MG: 500 TABLET, FILM COATED ORAL at 13:50

## 2020-08-18 RX ADMIN — APIXABAN 2.5 MG: 2.5 TABLET, FILM COATED ORAL at 20:24

## 2020-08-18 RX ADMIN — METOPROLOL TARTRATE 25 MG: 25 TABLET, FILM COATED ORAL at 13:50

## 2020-08-18 RX ADMIN — METHIMAZOLE 5 MG: 5 TABLET ORAL at 08:22

## 2020-08-18 RX ADMIN — ATORVASTATIN CALCIUM 40 MG: 40 TABLET, FILM COATED ORAL at 20:24

## 2020-08-18 RX ADMIN — CEFAZOLIN SODIUM 2 G: 2 INJECTION, SOLUTION INTRAVENOUS at 08:22

## 2020-08-18 RX ADMIN — CEFAZOLIN SODIUM 2 G: 2 INJECTION, SOLUTION INTRAVENOUS at 00:43

## 2020-08-18 RX ADMIN — METOPROLOL TARTRATE 25 MG: 25 TABLET, FILM COATED ORAL at 05:39

## 2020-08-18 RX ADMIN — SODIUM CHLORIDE, PRESERVATIVE FREE 10 ML: 5 INJECTION INTRAVENOUS at 20:24

## 2020-08-18 RX ADMIN — CEFTRIAXONE SODIUM 1 G: 1 INJECTION, POWDER, FOR SOLUTION INTRAMUSCULAR; INTRAVENOUS at 00:28

## 2020-08-18 RX ADMIN — DILTIAZEM HYDROCHLORIDE 240 MG: 240 CAPSULE, COATED, EXTENDED RELEASE ORAL at 08:22

## 2020-08-18 RX ADMIN — OXYCODONE HYDROCHLORIDE 10 MG: 5 TABLET ORAL at 12:00

## 2020-08-18 RX ADMIN — SODIUM CHLORIDE, PRESERVATIVE FREE 10 ML: 5 INJECTION INTRAVENOUS at 08:24

## 2020-08-18 RX ADMIN — NICOTINE 1 PATCH: 21 PATCH, EXTENDED RELEASE TRANSDERMAL at 08:23

## 2020-08-18 RX ADMIN — OXYCODONE HYDROCHLORIDE 10 MG: 5 TABLET ORAL at 17:58

## 2020-08-18 RX ADMIN — METOPROLOL TARTRATE 25 MG: 25 TABLET, FILM COATED ORAL at 22:17

## 2020-08-18 RX ADMIN — SODIUM CHLORIDE, PRESERVATIVE FREE 3 ML: 5 INJECTION INTRAVENOUS at 08:24

## 2020-08-18 RX ADMIN — ACETAMINOPHEN 1000 MG: 500 TABLET, FILM COATED ORAL at 05:39

## 2020-08-18 RX ADMIN — APIXABAN 2.5 MG: 2.5 TABLET, FILM COATED ORAL at 12:00

## 2020-08-18 RX ADMIN — ASPIRIN 81 MG: 81 TABLET, COATED ORAL at 08:22

## 2020-08-18 RX ADMIN — ACETAMINOPHEN 1000 MG: 500 TABLET, FILM COATED ORAL at 22:17

## 2020-08-18 RX ADMIN — FINASTERIDE 5 MG: 5 TABLET, FILM COATED ORAL at 08:22

## 2020-08-18 RX ADMIN — OXYCODONE HYDROCHLORIDE 10 MG: 5 TABLET ORAL at 05:39

## 2020-08-18 NOTE — PROGRESS NOTES
Pulmonary/Critical Care Follow-up     LOS: 3 days   Patient Care Team:  Ely Berumen APRN as PCP - General (Nurse Practitioner)    Chief Complaint / reason : Hip fracture/medical management of multiple issues postoperatively including aortic stenosis, atrial fibrillation, tobacco abuse, glycemic and electrolyte management.      Subjective      History of hospitalization (as of ICU admission postoperatively on 8/17/2020):    This is an 80-year-old gentleman admitted to the hospital the diagnosis of traumatic left hip fracture.  He has multiple underlying medical problems including severe aortic stenosis, chronic atrial fibrillation, and peripheral vascular disease.  He underwent gamma nail repair of his left hip fracture today.  Due to his multiple medical problems and somnolence after surgery, he has been brought to the intensive care unit for close monitoring.  Upon arrival, he is hemodynamically stable.  He is awake although is drifting off to sleep very easily when not stimulated.  He is oriented to person only.  Speech is fluent.    (End of copied text)    Interval History:     Patient reports he feels better this morning than prior to surgery.  No fevers or chills.  No nausea or vomiting.  No chest pain or significant dyspnea.  Family is at his bedside.    History taken from: Patient.    PMH/FH/Social History were reviewed and updated appropriately in the electronic medical record.     Review of Systems:    Review of 14 systems was completed with positives and pertinent negatives noted in the subjective section.  All other systems reviewed and are negative.       Objective     Vital Signs  Temp:  [97.1 °F (36.2 °C)-98.1 °F (36.7 °C)] 97.3 °F (36.3 °C)  Heart Rate:  [] 94  Resp:  [16-20] 18  BP: (104-170)/() 131/77  Arterial Line BP: ()/() 88/51  08/17 0701 - 08/18 0700  In: 614.9 [P.O.:300; I.V.:30]  Out: 350 [Urine:200]  Body mass index is 25.63 kg/m².     IV drips:    ropivacaine  (NAROPIN) 0.2% peripheral nerve cath (moog) Last Rate: 8 mL/hr (08/17/20 2000)      Physical Exam:     Constitutional:   Alert, cooperative, in no acute distress   Head:   Normocephalic, without obvious abnormality, atraumatic   Eyes:           Lids and lashes normal, conjunctivae and sclerae normal.  No icterus, no pallor, corneas clear, PER   ENMT:  Ears appear intact with no abnormalities noted     No oral lesions, no thrush, oral mucosa moist   Neck:  No adenopathy, supple, trachea midline, no thyromegaly, no JVD   Lungs/Resp:    Normal effort, symmetric chest rise, no crepitus, clear to      auscultation bilaterally, no chest wall tenderness                Heart/CV:   Regular rhythm and normal rate, normal S1 and S2, grade 5 out of 6 right upper sternal border crescendo decrescendo murmur.   Abdomen/GI:    Normal bowel sounds, no masses, no organomegaly, soft        non-tender, non-distended   :    Deferred   Extremities/MSK:  No clubbing or cyanosis.  Nicotine staining fingers of right hand.  Status post right below knee amputation.      Pulses:  Pulses palpable and equal bilaterally   Skin:  No bleeding, bruising or rash   Heme/Lymph:  No cervical or supraclavicular adenopathy.   Neurologic:    Psychiatric:    Moves all extremities with no obvious focal motor deficit.  Cranial nerves 2 - 12 grossly intact  Oriented x 3, normal affect.    The above physical exam findings were reviewed and reflect my exam findings as of today's exam.    Jordan Marmolejo MD 08/18/20 16:58     Results Review:     I reviewed the patient's new clinical results.   Results from last 7 days   Lab Units 08/18/20  0525 08/17/20  0824 08/16/20  0127  08/14/20  1420   SODIUM mmol/L 137 139 138   < > 140   POTASSIUM mmol/L 4.6 4.1 3.8   < > 4.0   CHLORIDE mmol/L 105 107 103   < > 104   CO2 mmol/L 17.0* 25.0 26.0   < > 25.0   BUN mg/dL 21 19 21   < > 23   CREATININE mg/dL 0.87 0.76 1.02   < > 0.93   CALCIUM mg/dL 7.8* 8.1* 8.3*   < >  8.7   BILIRUBIN mg/dL  --   --  3.1*  --  3.0*   ALK PHOS U/L  --   --  102  --  109   ALT (SGPT) U/L  --   --  28  --  25   AST (SGOT) U/L  --   --  24  --  30   GLUCOSE mg/dL 99 126* 108*   < > 131*    < > = values in this interval not displayed.     Results from last 7 days   Lab Units 08/18/20  0525 08/17/20  0824 08/16/20  0127   WBC 10*3/mm3 10.63 8.70 8.60   HEMOGLOBIN g/dL 8.1* 8.8* 8.9*   HEMATOCRIT % 29.2* 30.9* 31.7*   PLATELETS 10*3/mm3 269 235 204         Results from last 7 days   Lab Units 08/16/20  0127 08/15/20  0850   MAGNESIUM mg/dL 2.5* 2.2       I reviewed the patient's new imaging including images and reports.  X-ray knee 8/15/2020:  IMPRESSION:  Degenerative and chronic changes left knee. No acute findings  radiographically evident.    X-ray hip 8/14/2020:  IMPRESSION:  Left intertrochanteric fracture.    Medication Review:     acetaminophen 1,000 mg Oral Q8H   apixaban 2.5 mg Oral Q12H   atorvastatin 40 mg Oral Nightly   cefTRIAXone 1 g Intravenous Q24H   dilTIAZem  mg Oral Daily   finasteride 5 mg Oral Daily   methIMAzole 5 mg Oral Daily   metoprolol tartrate 25 mg Oral Q8H   nicotine 1 patch Transdermal Q24H   oxyCODONE 10 mg Oral 4x Daily   sodium chloride 10 mL Intravenous Q12H       ropivacaine (NAROPIN) 0.2% peripheral nerve cath (moog) 8 mL/hr Last Rate: 8 mL/hr (08/17/20 2000)       Assessment/Plan         Closed left hip fracture, s/p repair 8/17/2020    Peripheral vascular disease (CMS/HCC)    Chronic atrial fibrillation (CMS/HCC)    Acute UTI (urinary tract infection)    HTN (hypertension)    CAD (coronary artery disease)    COPD (chronic obstructive pulmonary disease) (CMS/HCC)    Chronic respiratory disease    Aortic stenosis    S/p left hip fracture    80 y.o. with multiple medical problems including severe aortic stenosis who is being considered for TAVR procedure, peripheral vascular disease, prior right below the knee amputation, hypertension, coronary artery disease,  COPD, active tobacco abuse admitted with left hip fracture.  He was admitted to the intensive care unit post hip repair.  He has been evaluated by cardiothoracic surgery/TAVR team and he is not felt to be a candidate for surgery for his valve.    He was being treated for urinary tract infection present on admission however I see no significant evidence for this.  It does not appear that a urine culture was sent but he had 0-2 white blood cells which is inconsistent with UTI.  I will discontinue Rocephin.    Plan:  1.  Start Eliquis low-dose for now.  This can be increased to standard dosing for atrial fibrillation in a day or two.  I discussed the situation with Dr. Aponte who was a bit hesitant but in agreement to resume anticoagulation.  Patient is at risk for stroke given atrial fibrillation and DVT.  2.  Discontinue Rocephin.  3.  Discontinue arterial line.  4.  Okay to telemetry/hospitalist.  5.  Cardiology following.    Jordan Marmolejo MD  08/18/20  16:49      Level of Risk High due to:  illness with threat to life or bodily function including risk of progressive heart failure/cardiovascular collapse secondary to aortic stenosis, risk of DVT/stroke.      *. Please note that portions of this note were completed with [employer] - a voice recognition program.

## 2020-08-18 NOTE — PROGRESS NOTES
Continued Stay Note  [facility]     Patient Name: Zak Olmstead  MRN: [id number]  Today's Date: 8/18/2020    Admit Date: 8/15/2020    Discharge Plan     Row Name 08/18/20 0324       Plan    Plan  home with home health    Patient/Family in Agreement with Plan  yes    Plan Comments  Met with patient at bedside to discuss PT/OT and discharge planning.  He refuses inpatient rehap but is agreeable to home health PT/OT.  Referral made to [facility] for PT/OT at [phone]  Orders faxed to [phone]'  Spoke with wife on phone and updated her.      Final Discharge Disposition Code  06 - home with home health care        Discharge Codes    No documentation.       Expected Discharge Date and Time     Expected Discharge Date Expected Discharge Time    Aug 21, 2020             Emelia Torres RN

## 2020-08-18 NOTE — PROGRESS NOTES
"  SUBJECTIVE  Patient resting comfortably.  Pain well controlled.  No events overnight.    OBJECTIVE  Temp (24hrs), Av.6 °F (36.4 °C), Min:97 °F (36.1 °C), Max:98.3 °F (36.8 °C)    Blood pressure 127/84, pulse 100, temperature 98 °F (36.7 °C), temperature source Axillary, resp. rate 20, height 172.2 cm (67.8\"), weight 76 kg (167 lb 8.8 oz), SpO2 100 %.    Lab Results (last 24 hours)     Procedure Component Value Units Date/Time    CBC & Differential [[id number]] Collected:  20    Specimen:  Blood Updated:  20    Narrative:       The following orders were created for panel order CBC & Differential.  Procedure                               Abnormality         Status                     ---------                               -----------         ------                     CBC Auto Differential[[id number]]        Abnormal            Final result                 Please view results for these tests on the individual orders.    CBC Auto Differential [[id number]]  (Abnormal) Collected:  20    Specimen:  Blood Updated:  20     WBC 10.63 10*3/mm3      RBC 3.94 10*6/mm3      Hemoglobin 8.1 g/dL      Hematocrit 29.2 %      MCV 74.1 fL      MCH 20.6 pg      MCHC 27.7 g/dL      RDW 21.1 %      RDW-SD 55.4 fl      MPV 11.3 fL      Platelets 269 10*3/mm3      Neutrophil % 87.1 %      Lymphocyte % 6.3 %      Monocyte % 5.6 %      Eosinophil % 0.0 %      Basophil % 0.2 %      Immature Grans % 0.8 %      Neutrophils, Absolute 9.27 10*3/mm3      Lymphocytes, Absolute 0.67 10*3/mm3      Monocytes, Absolute 0.59 10*3/mm3      Eosinophils, Absolute 0.00 10*3/mm3      Basophils, Absolute 0.02 10*3/mm3      Immature Grans, Absolute 0.08 10*3/mm3      nRBC 0.0 /100 WBC     Basic Metabolic Panel [[id number]] Collected:  20    Specimen:  Blood Updated:  20    Blood Culture - Blood, Hand, Right [[id number]] Collected:  20    Specimen:  Blood from Hand, Right Updated:  " 08/18/20 0330     Blood Culture No growth at 2 days    Blood Culture - Blood, Arm, Right [[id number]] Collected:  08/16/20 0127    Specimen:  Blood from Arm, Right Updated:  08/18/20 0330     Blood Culture No growth at 2 days    Basic Metabolic Panel [[id number]]  (Abnormal) Collected:  08/17/20 0824    Specimen:  Blood Updated:  08/17/20 0927     Glucose 126 mg/dL      BUN 19 mg/dL      Creatinine 0.76 mg/dL      Sodium 139 mmol/L      Potassium 4.1 mmol/L      Chloride 107 mmol/L      CO2 25.0 mmol/L      Calcium 8.1 mg/dL      eGFR Non African Amer 99 mL/min/1.73      BUN/Creatinine Ratio 25.0     Anion Gap 7.0 mmol/L     Narrative:       GFR Normal >60  Chronic Kidney Disease <60  Kidney Failure <15      CBC (No Diff) [[id number]]  (Abnormal) Collected:  08/17/20 0824    Specimen:  Blood Updated:  08/17/20 0853     WBC 8.70 10*3/mm3      RBC 4.31 10*6/mm3      Hemoglobin 8.8 g/dL      Hematocrit 30.9 %      MCV 71.7 fL      MCH 20.4 pg      MCHC 28.5 g/dL      RDW 21.0 %      RDW-SD 53.1 fl      Platelets 235 10*3/mm3             PHYSICAL EXAM  Left lower extremity: Dressing clean, dry and intact.  Mild pain with logroll of hip.  Sensation intact distally.          Closed left hip fracture, s/p repair 8/17/2020    Peripheral vascular disease (CMS/HCC)    Chronic atrial fibrillation (CMS/[facility])    Acute UTI (urinary tract infection)    HTN (hypertension)    CAD (coronary artery disease)    COPD (chronic obstructive pulmonary disease) (CMS/[facility])    Chronic respiratory disease    Aortic stenosis    S/p left hip fracture      PLAN / DISPOSITION:  1 Day Post-Op nail fixation left intertrochanteric hip fracture    None weight bearing left lower extremity, hip range of motion as tolerated  Pain control  PT/OT for post op mobilization and medical equipment needs   23 hours perioperative antibiotic prophylaxis   SCD's bilateral lower extremities   Aspirin for DVT prophylaxis   Social work for discharge planning.   Dressing to  remain in place for 7 days. May remove on POD#7. If no drainage, may shower on POD#10. No submerging wound in water. If drainage is noted, sterile dressing should be placed and wound checked daily. No showering until wound has remained dry for 72 consecutive hours.   Follow up in 2 weeks for re-assessment.      Edmar Aponte MD  08/18/20  07:07

## 2020-08-18 NOTE — PLAN OF CARE
Problem: Patient Care Overview  Goal: Plan of Care Review  Outcome: Ongoing (interventions implemented as appropriate)   VSS. Afebrile. Disoriented to time. Follows commands and SMALLS. Scheduled pain medicine given and nerve cath in place. Patient refuses to be turned at times. External catheter in place for incontinence. Eliquis restarted. Poor oral intake. Doppler +1 RLE pulses, weak L popliteal pulse. R radial art line removed per MD order. A-fib on the monitor.  Family updated.

## 2020-08-18 NOTE — PROGRESS NOTES
Continued Stay Note  [facility]     Patient Name: Zak Olmstead  MRN: [id number]  Today's Date: 8/18/2020    Admit Date: 8/15/2020    Discharge Plan     Row Name 08/18/20 1215       Plan    Plan Comments  Patient requested [facility]    Row Name 08/18/20 1157       Plan    Plan  home with home health    Patient/Family in Agreement with Plan  yes    Plan Comments  Met with patient at bedside to discuss PT/OT and discharge planning.  He refuses inpatient rehap but is agreeable to home health PT/OT.  Referral made to [facility] for PT/OT at [phone]  Orders faxed to [phone]'  Spoke with wife on phone and updated her.      Final Discharge Disposition Code  06 - home with home health care        Discharge Codes    No documentation.       Expected Discharge Date and Time     Expected Discharge Date Expected Discharge Time    Aug 21, 2020             Emelia Torres RN

## 2020-08-18 NOTE — PROGRESS NOTES
Intensive Care Follow-up     Hospital:  LOS: 2 days   Mr. Zak Olmstead, 80 y.o. male is followed for:   Closed left hip fracture (CMS/HCC)   Status post gamma nail repair today and followed postoperatively for multiple underlying medical problems including severe aortic stenosis, hypertension, and altered mental status.     Subjective   Interval History:  This is an 80-year-old gentleman admitted to the hospital the diagnosis of traumatic left hip fracture.  He has multiple underlying medical problems including severe aortic stenosis, chronic atrial fibrillation, and peripheral vascular disease.  He underwent gamma nail repair of his left hip fracture today.  Due to his multiple medical problems and somnolence after surgery, he has been brought to the intensive care unit for close monitoring.  Upon arrival, he is hemodynamically stable.  He is awake although is drifting off to sleep very easily when not stimulated.  He is oriented to person only.  Speech is fluent.    The patient's past medical, surgical and social history were reviewed and updated in Epic as appropriate.        Objective     Infusions:    lactated ringers 9 mL/hr Last Rate: 9 mL/hr (08/17/20 6195)   ropivacaine (NAROPIN) 0.2% peripheral nerve cath (moog) 8 mL/hr Last Rate: 8 mL/hr (08/17/20 6536)     Medications:    acetaminophen 1,000 mg Oral Q8H   aspirin 81 mg Oral Daily   [START ON 8/18/2020] aspirin 81 mg Oral Q12H   atorvastatin 40 mg Oral Nightly   [START ON 8/18/2020] ceFAZolin 2 g Intravenous Q8H   cefTRIAXone 1 g Intravenous Q24H   dilTIAZem  mg Oral Daily   finasteride 5 mg Oral Daily   methIMAzole 5 mg Oral Daily   metoprolol tartrate 25 mg Oral Q8H   nicotine 1 patch Transdermal Q24H   oxyCODONE 10 mg Oral 4x Daily   sodium chloride 10 mL Intravenous Q12H   sodium chloride 3 mL Intravenous Q12H       Vital Sign Min/Max for last 24 hours  Temp  Min: 96.8 °F (36 °C)  Max: 98.3 °F (36.8 °C)   BP  Min: 105/88  Max: 170/107   Pulse   "Min: 87  Max: 125   Resp  Min: 16  Max: 20   SpO2  Min: 96 %  Max: 100 %   Flow (L/min)  Min: 2  Max: 4       Input/Output for last 24 hour shift  08/16 0701 - 08/17 0700  In: -   Out: 50 [Urine:50]      Objective:  General Appearance:  Uncomfortable, ill-appearing, in no acute distress and not in pain.    Vital signs: (most recent): Blood pressure 112/93, pulse 112, temperature 97.7 °F (36.5 °C), temperature source Axillary, resp. rate 18, height 172.2 cm (67.8\"), weight 44.2 kg (97 lb 7.1 oz), SpO2 93 %.    HEENT: Normal HEENT exam.    Lungs:  Normal effort and normal respiratory rate.  Breath sounds clear to auscultation.  He is not in respiratory distress.  No rales, decreased breath sounds, wheezes or rhonchi.    Heart: Tachycardia.  Irregular rhythm.  S1 normal and S2 normal.  Positive for murmur.    Chest: Symmetric chest wall expansion.   Abdomen: Abdomen is soft.  Bowel sounds are normal.   There is no abdominal tenderness.     Extremities: (Left below the knee amputation.  Leg dressed.  Right radial art line.)  Neurological: (Patient is alert.  He is oriented to person only.  Thing all 4 extremities equally.).    Pupils:  Pupils are equal, round, and reactive to light.  Pupils are equal.   Skin:  Warm and dry.  No rash.             Results from last 7 days   Lab Units 08/17/20  0824 08/16/20  0127 08/14/20  1420   WBC 10*3/mm3 8.70 8.60 7.96   HEMOGLOBIN g/dL 8.8* 8.9* 9.8*   PLATELETS 10*3/mm3 235 204 218     Results from last 7 days   Lab Units 08/17/20  0824 08/16/20  0127 08/15/20  0850   SODIUM mmol/L 139 138 140   POTASSIUM mmol/L 4.1 3.8 4.5   CO2 mmol/L 25.0 26.0 24.9   BUN mg/dL 19 21 21   CREATININE mg/dL 0.76 1.02 0.93   MAGNESIUM mg/dL  --  2.5* 2.2   GLUCOSE mg/dL 126* 108* 111*     Estimated Creatinine Clearance: 74.7 mL/min (by C-G formula based on SCr of 0.76 mg/dL).            I reviewed the patient's results and images.     Assessment/Plan   Impression        Closed left hip fracture, s/p " repair 8/17/2020    Peripheral vascular disease (CMS/[facility])    Chronic atrial fibrillation (CMS/[facility])    Acute UTI (urinary tract infection)    HTN (hypertension)    CAD (coronary artery disease)    COPD (chronic obstructive pulmonary disease) (CMS/[facility])    Chronic respiratory disease    Aortic stenosis    S/p left hip fracture       Plan        Patient will be monitored closely in the intensive care unit postoperatively.  Pain control as needed.  We will mobilize when surgically appropriate.  DVT prophylaxis per orthopedic surgery.  Continue with Rocephin for urinary tract infection.  Follow-up labs and orders been placed for tomorrow.  Reorient as able.  Patient remains at high risk of worsening secondary to multiple underlying medical problems as well as acute confusion.    Plan of care and goals reviewed with mulitdisciplinary/antibiotic stewardship team during rounds.   I discussed the patient's findings and my recommendations with patient and nursing staff     High level of risk due to:  illness with threat to life or bodily function, abrupt change in neurological status, parenteral controlled substances and drugs requiring intensive monitoring for toxicity.      Edmar Ackerman MD, [facility]  Pulmonology and Critical Care Medicine

## 2020-08-18 NOTE — THERAPY EVALUATION
Acute Care - Occupational Therapy Initial Evaluation   [place]     Patient Name: Zak Olmstead  : 1940  MRN: [id number]  Today's Date: 2020             Admit Date: 8/15/2020     No diagnosis found.  Patient Active Problem List   Diagnosis   • Peripheral vascular disease (CMS/[facility])   • Closed left hip fracture, s/p repair 2020   • Chronic atrial fibrillation (CMS/[facility])   • Acute UTI (urinary tract infection)   • HTN (hypertension)   • CAD (coronary artery disease)   • COPD (chronic obstructive pulmonary disease) (CMS/[facility])   • Chronic respiratory disease   • Aortic stenosis   • S/p left hip fracture     Past Medical History:   Diagnosis Date   • AAA (abdominal aortic aneurysm) (CMS/[facility])     s/p repair   • Atrial fibrillation with rapid ventricular response (CMS/HCC) 2018   • BPH (benign prostatic hyperplasia)    • BPH with obstruction/lower urinary tract symptoms 2020   • CAD (coronary artery disease)    • CAD (coronary artery disease) 8/15/2020   • Chronic atrial fibrillation (CMS/[facility])    • Chronic respiratory failure (CMS/[facility])    • COPD (chronic obstructive pulmonary disease) (CMS/HCC)    • Dysuria 2020   • Erectile dysfunction 2020   • Heart murmur    • Hypertension    • Hyperthyroidism    • Mitral annular calcification 2018   • Mononeuritis multiplex    • Nonrheumatic aortic valve stenosis 2018   • NSTEMI (non-ST elevated myocardial infarction) (CMS/HCC)    • PVD (peripheral vascular disease) (CMS/HCC)      Past Surgical History:   Procedure Laterality Date   • ABDOMINAL AORTIC ANEURYSM REPAIR     • APPENDECTOMY     • BACK SURGERY     • BELOW KNEE LEG AMPUTATION Left    • CARDIAC CATHETERIZATION N/A 2018    Procedure: Left Heart Cath;  Surgeon: Derik Vega MD;  Location: [facility] INVASIVE LOCATION;  Service: Cardiovascular   • COLONOSCOPY N/A 6/15/2017    Procedure: COLONOSCOPY  CPTCODE:23169;  Surgeon: Lincoln Bowens III, MD;  Location: [facility]  OR;  Service:    • ENDOSCOPY N/A 6/15/2017    Procedure: ESOPHAGOGASTRODUODENOSCOPY WITH BIOPSY  CPTCODE:09393;  Surgeon: Lincoln Bowens III, MD;  Location: [facility] OR;  Service:    • ESOPHAGEAL DILATATION     • INTERVENTIONAL RADIOLOGY PROCEDURE Left 6/1/2018    Procedure: Abdominal Aortagram with Runoff;  Surgeon: Derik Vega MD;  Location:  [facility] CATH INVASIVE LOCATION;  Service: Cardiovascular          OT ASSESSMENT FLOWSHEET (last 12 hours)      Occupational Therapy Evaluation     Row Name 08/18/20 0828                   OT Evaluation Time/Intention    Subjective Information  complains of;weakness;fatigue;pain  -CL        Document Type  evaluation  -CL        Mode of Treatment  occupational therapy  -CL        Patient Effort  good  -CL        Symptoms Noted During/After Treatment  fatigue  -CL           General Information    Patient Profile Reviewed?  yes  -CL        Prior Level of Function  independent:;all household mobility;transfer;ADL's;dressing;bathing  -CL        Equipment Currently Used at Home  wheelchair;bath bench  -CL        Existing Precautions/Restrictions  (S) fall;oxygen therapy device and L/min;other (see comments) s/p L IM nailing w/ nerve cath, remote L BKA  -CL        Limitations/Impairments  safety/cognitive  -CL        Barriers to Rehab  medically complex  -CL           Relationship/Environment    Lives With  spouse  -CL           Resource/Environmental Concerns    Current Living Arrangements  home/apartment/condo  -CL           Cognitive Assessment/Interventions    Additional Documentation  Cognitive Assessment/Intervention (Group)  -CL           Cognitive Assessment/Intervention- PT/OT    Affect/Mental Status (Cognitive)  confused  -CL        Orientation Status (Cognition)  oriented x 3  -CL        Follows Commands (Cognition)  follows one step commands;over 90% accuracy;repetition of directions required;verbal cues/prompting required  -CL        Cognitive Function (Cognitive)   safety deficit  -CL        Safety Deficit (Cognitive)  mild deficit;awareness of need for assistance;insight into deficits/self awareness;safety precautions awareness  -CL           Bed Mobility Assessment/Treatment    Bed Mobility Assessment/Treatment  supine-sit;sit-supine  -CL        Supine-Sit [place] (Bed Mobility)  maximum assist (25% patient effort);2 person assist;verbal cues  -CL        Sit-Supine [place] (Bed Mobility)  maximum assist (25% patient effort);2 person assist;verbal cues  -CL        Assistive Device (Bed Mobility)  bed rails;draw sheet;head of bed elevated  -CL           Transfer Assessment/Treatment    Comment (Transfers)  Deferred d/t pt c/o significant pain upon sitting EOB  -CL           ADL Assessment/Intervention    BADL Assessment/Intervention  lower body dressing  -CL           Lower Body Dressing Assessment/Training    Lower Body Dressing [place] Level  don;socks;dependent (less than 25% patient effort)  -CL        Lower Body Dressing Position  supine  -CL           General ROM    GENERAL ROM COMMENTS  BUE grossly WFL  -CL           MMT (Manual Muscle Testing)    General MMT Comments  BUE grossly 4-/5  -CL           Motor Assessment/Interventions    Additional Documentation  Balance (Group);Therapeutic Exercise (Group)  -CL           Balance    Balance  static sitting balance;dynamic sitting balance  -CL           Static Sitting Balance    Level of [place] (Unsupported Sitting, Static Balance)  2 person assist;moderate assist, 50 to 74% patient effort  -CL        Sitting Position (Unsupported Sitting, Static Balance)  sitting on edge of bed  -CL           Dynamic Sitting Balance    Level of [place], Reaches Outside Midline (Sitting, Dynamic Balance)  moderate assist, 50 to 74% patient effort;2 person assist  -CL        Sitting Position, Reaches Outside Midline (Sitting, Dynamic Balance)  sitting on edge of bed  -CL           Sensory Assessment/Intervention     Sensory General Assessment  no sensation deficits identified BUE  -CL           Positioning and Restraints    Pre-Treatment Position  in bed  -CL        Post Treatment Position  bed  -CL        In Bed  notified nsg;fowlers;call light within reach;encouraged to call for assist;exit alarm on;with family/caregiver;LLE elevated;side rails up x3  -CL           Pain Assessment    Additional Documentation  Pain Scale: Numbers Pre/Post-Treatment (Group)  -CL           Pain Scale: Numbers Pre/Post-Treatment    Pain Scale: Numbers, Pretreatment  8/10  -CL        Pain Scale: Numbers, Post-Treatment  10/10  -CL        Pain Location - Side  Left  -CL        Pain Location  hip  -CL        Pre/Post Treatment Pain Comment  tolerated  -CL        Pain Intervention(s)  Repositioned;Ambulation/increased activity RN notified  -CL           Wound 08/15/20 2150 coccyx Pressure Injury    Wound - Properties Group Date first assessed: 08/15/20  -JE Time first assessed: 2150  -JE Present on Hospital Admission: Y  -JE Location: coccyx  -JE Primary Wound Type: Pressure inj  -JE Stage, Pressure Injury: Stage 1  -JE       Wound 08/17/20 1732 Left lateral hip Incision    Wound - Properties Group Date first assessed: 08/17/20  -RV Time first assessed: 1732  -RV Side: Left  -RV Orientation: lateral  -RV Location: hip  -RV Primary Wound Type: Incision  -RV       Wound 08/17/20 1815 Left lateral greater trochanter Incision    Wound - Properties Group Date first assessed: 08/17/20  -LB Time first assessed: 1815  -LB Present on Hospital Admission: N  -LB Side: Left  -LB Orientation: lateral  -LB Location: greater trochanter  -LB Primary Wound Type: Incision  -LB Additional Comments: DRESSED WITH MASTISOL, 4X4, 1X8 XEROFORM, 2X2 COVADERM X2 AND 4X4 COVADERM X 3  -LB       Wound 08/17/20 2000 Left lower;posterior arm Skin Tear    Wound - Properties Group Date first assessed: 08/17/20  -TP Time first assessed: 2000  -TP Side: Left  -TP Orientation:  lower;posterior  -TP Location: arm  -TP Primary Wound Type: Skin tear  -TP       Clinical Impression (OT)    OT Diagnosis  Decreased independence in ADLs.   -CL        Patient/Family Goals Statement (OT Eval)  Return to PLOF  -CL        Criteria for Skilled Therapeutic Interventions Met (OT Eval)  yes;treatment indicated  -CL        Rehab Potential (OT Eval)  good, to achieve stated therapy goals  -CL        Therapy Frequency (OT Eval)  daily  -CL        Anticipated Equipment Needs at Discharge (OT)  -- TBA further  -CL        Anticipated Discharge Disposition (OT)  inpatient rehabilitation facility  -CL           Vital Signs    Pre Systolic BP Rehab  108  -CL        Pre Treatment Diastolic BP  76  -CL        Post Systolic BP Rehab  127  -CL        Post Treatment Diastolic BP  82  -CL        Pretreatment Heart Rate (beats/min)  109  -CL        Posttreatment Heart Rate (beats/min)  94  -CL        Pre SpO2 (%)  100  -CL        O2 Delivery Pre Treatment  nasal cannula  -CL        Post SpO2 (%)  96  -CL        O2 Delivery Post Treatment  nasal cannula  -CL        Pre Patient Position  Supine  -CL        Intra Patient Position  Sitting  -CL        Post Patient Position  Supine  -CL           OT Goals    Transfer Goal Selection (OT)  transfer, OT goal 1  -CL        Dressing Goal Selection (OT)  dressing, OT goal 1  -CL        Strength Goal Selection (OT)  strength, OT goal 1  -CL        Additional Documentation  Strength Goal Selection (OT) (Row)  -CL           Transfer Goal 1 (OT)    Activity/Assistive Device (Transfer Goal 1, OT)  sit-to-stand/stand-to-sit;toilet  -CL        [place] Level/Cues Needed (Transfer Goal 1, OT)  moderate assist (50-74% patient effort);2 person assist;verbal cues required  -CL        Time Frame (Transfer Goal 1, OT)  long term goal (LTG);10 days  -CL        Progress/Outcome (Transfer Goal 1, OT)  goal ongoing  -CL           Dressing Goal 1 (OT)    Activity/Assistive Device (Dressing Goal  1, OT)  lower body dressing don/doff L sock w/ AAD  -CL        [place]/Cues Needed (Dressing Goal 1, OT)  moderate assist (50-74% patient effort);verbal cues required  -CL        Time Frame (Dressing Goal 1, OT)  long term goal (LTG);10 days  -CL        Progress/Outcome (Dressing Goal 1, OT)  goal ongoing  -CL           Strength Goal 1 (OT)    Strength Goal 1 (OT)  Pt will tolerate BUE HEP w/ yellow thera-band x15 reps.   -CL        Time Frame (Strength Goal 1, OT)  long term goal (LTG);10 days  -CL        Progress/Outcome (Strength Goal 1, OT)  goal ongoing  -CL           Living Environment    Home Accessibility  tub/shower is not walk in ramp  -CL          User Key  (r) = Recorded By, (t) = Taken By, (c) = Cosigned By    Initials Name Effective Dates    RV Anand Lozano, EMELYN 06/16/16 -     LB Lionel Bush RN 11/16/16 -     CL Varsha Silverio OT 04/03/18 -     Catina Carlton RN 12/04/19 -     JE Alcides Bloom RN 03/30/20 -          Occupational Therapy Education                 Title: PT OT SLP Therapies (In Progress)     Topic: Occupational Therapy (In Progress)     Point: ADL training (In Progress)     Description:   Instruct learner(s) on proper safety adaptation and remediation techniques during self care or transfers.   Instruct in proper use of assistive devices.              Learning Progress Summary           Patient Acceptance, E, NR by CL at 8/18/2020 0828                   Point: Home exercise program (Not Started)     Description:   Instruct learner(s) on appropriate technique for monitoring, assisting and/or progressing therapeutic exercises/activities.              Learner Progress:   Not documented in this visit.          Point: Precautions (In Progress)     Description:   Instruct learner(s) on prescribed precautions during self-care and functional transfers.              Learning Progress Summary           Patient Acceptance, E, NR by CL at 8/18/2020 0828                   Point:  Body mechanics (In Progress)     Description:   Instruct learner(s) on proper positioning and spine alignment during self-care, functional mobility activities and/or exercises.              Learning Progress Summary           Patient Acceptance, E, NR by  at 8/18/2020 0828                               User Key     Initials Effective Dates Name Provider Type Discipline     04/03/18 -  Varsha Silverio OT Occupational Therapist OT                  OT Recommendation and Plan  Outcome Summary/Treatment Plan (OT)  Anticipated Equipment Needs at Discharge (OT): (TBA further)  Anticipated Discharge Disposition (OT): inpatient rehabilitation facility  Therapy Frequency (OT Eval): daily  Plan of Care Review  Plan of Care Reviewed With: patient  Plan of Care Reviewed With: patient  Outcome Summary: OT completed a brief chart review. Pt is Max Ax2 for bed mobility and Dep for ADL performance. Pt limited d/t c/o significant L hip pain, only able to maintain seated position for ~30 seconds. Recommend cont skilled IPOT POC. Recommend pt DC to IP rehab.    Outcome Measures     Row Name 08/18/20 0828             How much help from another is currently needed...    Putting on and taking off regular lower body clothing?  1  -CL      Bathing (including washing, rinsing, and drying)  1  -CL      Toileting (which includes using toilet bed pan or urinal)  1  -CL      Putting on and taking off regular upper body clothing  2  -CL      Taking care of personal grooming (such as brushing teeth)  2  -CL      Eating meals  3  -CL      AM-PAC 6 Clicks Score (OT)  10  -CL         Functional Assessment    Outcome Measure Options  AM-PAC 6 Clicks Daily Activity (OT)  -CL        User Key  (r) = Recorded By, (t) = Taken By, (c) = Cosigned By    Initials Name Provider Type    CL Varsha Silverio OT Occupational Therapist          Time Calculation:   Time Calculation- OT     Row Name 08/18/20 0828             Time Calculation- OT    OT Start Time  0828   -CL      OT Received On  08/18/20  -CL      OT Goal Re-Cert Due Date  08/28/20  -CL        User Key  (r) = Recorded By, (t) = Taken By, (c) = Cosigned By    Initials Name Provider Type    Varsha Arriaga OT Occupational Therapist        Therapy Charges for Today     Code Description Service Date Service Provider Modifiers Qty    50252249302 HC OT EVAL LOW COMPLEXITY 4 8/18/2020 Varsha Silverio OT GO 1               Varsha Silverio OT  8/18/2020

## 2020-08-18 NOTE — PLAN OF CARE
Problem: Patient Care Overview  Goal: Plan of Care Review  Outcome: Ongoing (interventions implemented as appropriate)     Pt arrived to unit from PACU around 2000 last night. Pt was confused and lethargic upon arrival to unit. Pt became more alert as sedation from surgery wore off. Pt passed bedside swallow. Left hip surgical dressings C/D/I. Ropivacaine infusing as ordered & scheduled pain meds given. Pts pain seems to be well controlled. Right arterial line in place - waveform is dampened & line seems positional. VSS. A fib on monitor. Pt has very poor cap refill & PP diminished. RLE PP marked requiring doppler ultrasound. Pt has been incontinent tonight - external catheter in place. No BM. Bed alarm on.

## 2020-08-18 NOTE — PLAN OF CARE
Problem: Patient Care Overview  Goal: Plan of Care Review  Outcome: Ongoing (interventions implemented as appropriate)  Flowsheets (Taken 8/18/2020 1763)  Plan of Care Reviewed With: patient  Outcome Summary: PT initial evaluation completed for pt s/p L hip trochanteric nailing; remote L BKA. Pt presents with genearlized weakness, impaired balance, and decreased functional mobility. Pt required maxA x2 for bed mobility; limited by c/o severe L hip pain. Pt's decreased independence warrants PT skilled care. Recommend D/C to IP rehab facility.

## 2020-08-18 NOTE — PLAN OF CARE
Problem: Patient Care Overview  Goal: Plan of Care Review  Flowsheets (Taken 8/18/2020 0828)  Progress: improving  Plan of Care Reviewed With: patient  Outcome Summary: OT completed a brief chart review. Pt is Max Ax2 for bed mobility and Dep for ADL performance. Pt limited d/t c/o significant L hip pain, only able to maintain seated position for ~30 seconds. Recommend cont skilled IPOT POC. Recommend pt DC to IP rehab.

## 2020-08-18 NOTE — PROGRESS NOTES
[facility]    Acute pain service Inpatient Progress Note    Patient Name: Zak Olmstead  :  1940  MRN:  [id number]        Acute Pain  Service Inpatient Progress Note:    Analgesia:Good  LOC: alert and awake  Resp Status: supplemental oxygen  Cardiac: VS stable  Side Effects:None  Catheter Site:clean, dressing intact and dry  Cath type: peripheral nerve cath(MOOG pump)  Infusion rate: 8ml/hr  Catheter Plan:Catheter to remain Insitu and Continue catheter infusion rate unchanged  Comments: ---------------------------------------------------------------------------------  Physical Therapy Manual Muscle Testing Results:  MMT (Manual Muscle Testing)  General MMT Comments: RLE grossly 3/5; limited formal assessment due to pain (20 1035)]    Physical Therapy - Plan of Care Review - Outcome Summary:  Outcome Summary: PT initial evaluation completed for pt s/p L hip trochanteric nailing; remote L BKA. Pt presents with genearlized weakness, impaired balance, and decreased functional mobility. Pt required maxA x2 for bed mobility; limited by c/o severe L hip pain. Pt's decreased independence warrants PT skilled care. Recommend D/C to IP rehab facility. (20 0841)]    Occupational Therapy - Plan of Care Review - Outcome Summary:  Outcome Summary: OT completed a brief chart review. Pt is Max Ax2 for bed mobility and Dep for ADL performance. Pt limited d/t c/o significant L hip pain, only able to maintain seated position for ~30 seconds. Recommend cont skilled IPOT POC. Recommend pt DC to IP rehab. (20 4334)]  ----------------------------------------------------------------------------------

## 2020-08-18 NOTE — DISCHARGE PLACEMENT REQUEST
"Meir Preciado (80 y.o. Male)     Date of Birth Social Security Number Address Home Phone MRN    1940  [address] [facility] [address] [phone] [id number]    Oriental orthodox Marital Status          None        Admission Date Admission Type Admitting Provider Attending Provider Department, Room/Bed    8/15/20 Urgent Edmar Ackerman MD McIntosh, Matthew M, MD [facility] 2B ICU, N238/1    Discharge Date Discharge Disposition Discharge Destination                       Attending Provider:  Edmar Ackerman MD    Allergies:  No Known Allergies    Isolation:  None   Infection:  None   Code Status:  CPR    Ht:  172.2 cm (67.8\")   Wt:  76 kg (167 lb 8.8 oz)    Admission Cmt:  None   Principal Problem:  Closed left hip fracture, s/p repair 2020 [S72.002A]                 Active Insurance as of 8/15/2020     Primary Coverage     Payor Plan Insurance Group Employer/Plan Group    HUMANA MEDICARE REPLACEMENT HU[employer]NA MEDICARE REPLACEMENT [id number]     Payor Plan Address Payor Plan Phone Number Payor Plan Fax Number Effective Dates    [address] [address] [phone]  2018 - None Entered    [facility] [address]       Subscriber Name Subscriber Birth Date Member ID       MEIR PRECIADO 1940 [id number]                 Emergency Contacts      (Rel.) Home Phone Work Phone Mobile Phone    Mirna Preciado (Spouse) [phone]    Low Preciado (Son) -- -- [phone]    Kassidy Ayon [phone] -- --        [facility] 2B ICU  [address] [facility] [address]  Phone:  [phone]  Fax:   Date: Aug 18, 2020      Ambulatory Referral to Home Health     Patient:  Meir Preciado MRN:  [id number]   [address] [place] [address] :  1940  SSN:    Phone: [phone] Sex:  M      INSURANCE PAYOR PLAN GROUP # SUBSCRIBER ID   Primary:    HUMANA MEDICARE REPLACEMENT [id number] " [id number]      Referring Provider Information:  INEZ FUNES Phone: [phone] Fax:       Referral Information:   # Visits:  1 Referral Type: Home Health [42]   Urgency:  Routine Referral Reason: Specialty Services Required   Start Date: Aug 18, 2020 End Date:  To be determined by Insurer   Diagnosis: Closed fracture of left hip, initial encounter (CMS/[facility]) (S72.002A [ICD-10-CM] 820.8 [ICD-9-CM])      Refer to Dept:   Refer to Provider:   Refer to Facility:       Face to Face Visit Date: 8/18/2020  Follow-up provider for Plan of Care? I treated the patient in an acute care facility and will not continue treatment after discharge.  Follow-up provider: JOSE MADRIGAL [[id number]]  Reason/Clinical Findings: left hip fracture  Describe mobility limitations that make leaving home difficult: impaired functional mobility, gait, balance and endurance  Nursing/Therapeutic Services Requested: Physical Therapy  Nursing/Therapeutic Services Requested: Occupational Therapy  PT orders: Therapeutic exercise  PT orders: Gait Training  PT orders: Transfer training  Weight Bearing Status: As Tolerated  Occupational orders: Activities of daily living  Occupational orders: Strengthening  Frequency: 1 Week 1     This document serves as a request of services and does not constitute Insurance authorization or approval of services.  To determine eligibility, please contact the members Insurance carrier to verify and review coverage.     If you have medical questions regarding this request for services. Please contact [address] ICU at [phone] during normal business hours.       Verbal Order Mode: Verbal with readback   Authorizing Provider: Inez Funes MD  Authorizing Provider's NPI: [id number]     Order Entered By: Emelia Torres RN 8/18/2020  2:34 PM     Electronically signed by:

## 2020-08-18 NOTE — THERAPY EVALUATION
Patient Name: Zak Olmstead  : 1940    MRN: [id number]                              Today's Date: 2020       Admit Date: 8/15/2020    Visit Dx: No diagnosis found.  Patient Active Problem List   Diagnosis   • Peripheral vascular disease (CMS/[facility])   • Closed left hip fracture, s/p repair 2020   • Chronic atrial fibrillation (CMS/[facility])   • Acute UTI (urinary tract infection)   • HTN (hypertension)   • CAD (coronary artery disease)   • COPD (chronic obstructive pulmonary disease) (CMS/[facility])   • Chronic respiratory disease   • Aortic stenosis   • S/p left hip fracture     Past Medical History:   Diagnosis Date   • AAA (abdominal aortic aneurysm) (CMS/HCC)     s/p repair   • Atrial fibrillation with rapid ventricular response (CMS/HCC) 2018   • BPH (benign prostatic hyperplasia)    • BPH with obstruction/lower urinary tract symptoms 2020   • CAD (coronary artery disease)    • CAD (coronary artery disease) 8/15/2020   • Chronic atrial fibrillation (CMS/[facility])    • Chronic respiratory failure (CMS/HCC)    • COPD (chronic obstructive pulmonary disease) (CMS/HCC)    • Dysuria 2020   • Erectile dysfunction 2020   • Heart murmur    • Hypertension    • Hyperthyroidism    • Mitral annular calcification 2018   • Mononeuritis multiplex    • Nonrheumatic aortic valve stenosis 2018   • NSTEMI (non-ST elevated myocardial infarction) (CMS/HCC)    • PVD (peripheral vascular disease) (CMS/HCC)      Past Surgical History:   Procedure Laterality Date   • ABDOMINAL AORTIC ANEURYSM REPAIR     • APPENDECTOMY     • BACK SURGERY     • BELOW KNEE LEG AMPUTATION Left    • CARDIAC CATHETERIZATION N/A 2018    Procedure: Left Heart Cath;  Surgeon: Derik Vega MD;  Location: [facility] CATH INVASIVE LOCATION;  Service: Cardiovascular   • COLONOSCOPY N/A 6/15/2017    Procedure: COLONOSCOPY  CPTCODE:67165;  Surgeon: Lincoln Bowens III, MD;  Location: [facility] OR;  Service:    • ENDOSCOPY N/A  6/15/2017    Procedure: ESOPHAGOGASTRODUODENOSCOPY WITH BIOPSY  CPTCODE:65639;  Surgeon: Lincoln Bowens III, MD;  Location:  [facility] OR;  Service:    • ESOPHAGEAL DILATATION     • HIP TROCHANTERIC NAILING WITH INTRAMEDULLARY HIP SCREW Left 8/17/2020    Procedure: HIP TROCHANTERIC NAILING WITH INTRAMEDULLARY HIP SCREW, GAMMA NAIL LEFT;  Surgeon: Edmar Aponte MD;  Location:  [facility] OR;  Service: Orthopedics;  Laterality: Left;   • INTERVENTIONAL RADIOLOGY PROCEDURE Left 6/1/2018    Procedure: Abdominal Aortagram with Runoff;  Surgeon: Derik Vega MD;  Location:  [facility] CATH INVASIVE LOCATION;  Service: Cardiovascular     General Information     Row Name 08/18/20 1032          PT Evaluation Time/Intention    Document Type  evaluation  -KG     Mode of Treatment  physical therapy  -KG     Row Name 08/18/20 1032          General Information    Patient Profile Reviewed?  yes  -KG     Prior Level of Function  independent:;all household mobility;transfer;ADL's;dressing;bathing  -KG     Existing Precautions/Restrictions  (S) fall;oxygen therapy device and L/min;other (see comments) s/p L IM nailing with nerve cath; remote L BKA  -KG     Barriers to Rehab  medically complex  -KG     Row Name 08/18/20 1032          Relationship/Environment    Lives With  spouse  -KG     Row Name 08/18/20 1032          Resource/Environmental Concerns    Current Living Arrangements  home/apartment/condo  -KG     Row Name 08/18/20 1032          Home Main Entrance    Number of Stairs, Main Entrance  none ramp  -KG     Row Name 08/18/20 1032          Stairs Within Home, Primary    Number of Stairs, Within Home, Primary  none  -KG     Row Name 08/18/20 1032          Cognitive Assessment/Intervention- PT/OT    Orientation Status (Cognition)  oriented x 3  -KG     Row Name 08/18/20 1032          Safety Issues, Functional Mobility    Safety Issues Affecting Function (Mobility)  insight into deficits/self awareness;safety precaution  awareness;safety precautions follow-through/compliance  -KG     Impairments Affecting Function (Mobility)  balance;coordination;endurance/activity tolerance;pain;postural/trunk control;range of motion (ROM);shortness of breath;strength  -KG       User Key  (r) = Recorded By, (t) = Taken By, (c) = Cosigned By    Initials Name Provider Type    Sherly Connor PT Physical Therapist        Mobility     Row Name 08/18/20 1033          Bed Mobility Assessment/Treatment    Bed Mobility Assessment/Treatment  scooting/bridging;supine-sit;sit-supine  -KG     Scooting/Bridging [place] (Bed Mobility)  maximum assist (25% patient effort);2 person assist;verbal cues  -KG     Supine-Sit [place] (Bed Mobility)  maximum assist (25% patient effort);2 person assist;verbal cues  -KG     Sit-Supine [place] (Bed Mobility)  maximum assist (25% patient effort);2 person assist;verbal cues  -KG     Assistive Device (Bed Mobility)  bed rails;draw sheet;head of bed elevated  -KG     Comment (Bed Mobility)  VC's for sequencing and technique. Pt required increased assistance at trunk and BLEs. Pt required min-modA for static sitting balance at EOB; demonstrated posterior and lateral lean to the R to unweight L hip.   -KG     Row Name 08/18/20 1033          Transfer Assessment/Treatment    Comment (Transfers)  Pt declined any STS transfers due to c/o severe pain in hip with sitting EOB.   -KG     Row Name 08/18/20 1033          Sit-Stand Transfer    Sit-Stand [place] (Transfers)  unable to assess  -KG     Row Name 08/18/20 1033          Gait/Stairs Assessment/Training    [place] Level (Gait)  unable to assess  -KG     Comment (Gait/Stairs)  Ambulation deferred. Not appropriate to assess.   -KG       User Key  (r) = Recorded By, (t) = Taken By, (c) = Cosigned By    Initials Name Provider Type    Sherly Connor PT Physical Therapist        Obj/Interventions     Row Name 08/18/20 1035          General  ROM    GENERAL ROM COMMENTS  RLE WFL; L hip flexion and knee flex/ext limited by pain  -KG     Row Name 08/18/20 1035          MMT (Manual Muscle Testing)    General MMT Comments  RLE grossly 3/5; limited formal assessment due to pain  -KG     Row Name 08/18/20 1035          Therapeutic Exercise    Lower Extremity (Therapeutic Exercise)  LAQ (long arc quad), right  -KG     Exercise Type (Therapeutic Exercise)  AROM (active range of motion)  -KG     Position (Therapeutic Exercise)  seated  -KG     Sets/Reps (Therapeutic Exercise)  1/8  -KG     Row Name 08/18/20 1037 08/18/20 1035       Static Sitting Balance    Level of [place] (Unsupported Sitting, Static Balance)  --  moderate assist, 50 to 74% patient effort;2 person assist  -KG    Sitting Position (Unsupported Sitting, Static Balance)  --  sitting on edge of bed  -KG    Comment (Unsupported Sitting, Static Balance)  progressed to brief periods of [place] x2  -KG  --    Row Name 08/18/20 1035          Dynamic Sitting Balance    Level of [place], Reaches Outside Midline (Sitting, Dynamic Balance)  moderate assist, 50 to 74% patient effort;2 person assist  -KG     Sitting Position, Reaches Outside Midline (Sitting, Dynamic Balance)  sitting on edge of bed  -KG     Row Name 08/18/20 1035          Sensory Assessment/Intervention    Sensory General Assessment  no sensation deficits identified  -KG     Row Name 08/18/20 1037          Light Touch Sensation Assessment    Left Lower Extremity: Light Touch Sensation Assessment  mild impairment, 75% or more correct responses  -KG     Right Lower Extremity: Light Touch Sensation Assessment  intact  -KG       User Key  (r) = Recorded By, (t) = Taken By, (c) = Cosigned By    Initials Name Provider Type    KG Sherly Duncan N, PT Physical Therapist        Goals/Plan     Row Name 08/18/20 1039          Bed Mobility Goal 1 (PT)    Activity/Assistive Device (Bed Mobility Goal 1, PT)  sit to supine;supine to sit  -KG      [place] Level/Cues Needed (Bed Mobility Goal 1, PT)  minimum assist (75% or more patient effort)  -KG     Time Frame (Bed Mobility Goal 1, PT)  2 weeks  -KG     Progress/Outcomes (Bed Mobility Goal 1, PT)  goal ongoing  -KG     [place] Name 08/18/20 1039          Transfer Goal 1 (PT)    Activity/Assistive Device (Transfer Goal 1, PT)  sit-to-stand/stand-to-sit;bed-to-chair/chair-to-bed;walker, rolling  -KG     [place] Level/Cues Needed (Transfer Goal 1, PT)  moderate assist (50-74% patient effort)  -KG     Time Frame (Transfer Goal 1, PT)  2 weeks  -KG     Progress/Outcome (Transfer Goal 1, PT)  goal ongoing  -KG       User Key  (r) = Recorded By, (t) = Taken By, (c) = Cosigned By    Initials Name Provider Type    JUSTUS Sherly Duncan, PT Physical Therapist        Clinical Impression     Row Name 08/18/20 1038          Pain Assessment    Additional Documentation  Pain Scale: Numbers Pre/Post-Treatment (Group)  -KG     Row Name 08/18/20 1038          Pain Scale: Numbers Pre/Post-Treatment    Pain Scale: Numbers, Pretreatment  8/10  -KG     Pain Scale: Numbers, Post-Treatment  8/10  -KG     Pain Location - Side  Left  -KG     Pain Location  hip  -KG     Pain Intervention(s)  Repositioned;Ambulation/increased activity  -KG     Row Name 08/18/20 1038          Physical Therapy Clinical Impression    Patient/Family Goals Statement (PT Clinical Impression)  return to PLOF  -KG     Criteria for Skilled Interventions Met (PT Clinical Impression)  yes;treatment indicated  -KG     Rehab Potential (PT Clinical Summary)  fair, will monitor progress closely  -KG     Row Name 08/18/20 1038          Vital Signs    Pre Systolic BP Rehab  108  -KG     Pre Treatment Diastolic BP  76  -KG     Post Systolic BP Rehab  127  -KG     Post Treatment Diastolic BP  82  -KG     Pretreatment Heart Rate (beats/min)  96  -KG     Posttreatment Heart Rate (beats/min)  106  -KG     Pre SpO2 (%)  95  -KG     O2 Delivery Pre Treatment   supplemental O2  -KG     Post SpO2 (%)  94  -KG     O2 Delivery Post Treatment  supplemental O2  -KG     Pre Patient Position  Supine  -KG     Intra Patient Position  Sitting  -KG     Post Patient Position  Supine  -KG     Row Name 08/18/20 1038          Positioning and Restraints    Pre-Treatment Position  in bed  -KG     Post Treatment Position  bed  -KG     In Bed  notified nsg;supine;call light within reach;encouraged to call for assist;exit alarm on;with family/caregiver;side rails up x3;RUE elevated;LUE elevated;LLE elevated  -KG       User Key  (r) = Recorded By, (t) = Taken By, (c) = Cosigned By    Initials Name Provider Type    Sherly Connor PT Physical Therapist        Outcome Measures     Row Name 08/18/20 1039          How much help from another person do you currently need...    Turning from your back to your side while in flat bed without using bedrails?  2  -KG     Moving from lying on back to sitting on the side of a flat bed without bedrails?  2  -KG     Moving to and from a bed to a chair (including a wheelchair)?  1  -KG     Standing up from a chair using your arms (e.g., wheelchair, bedside chair)?  1  -KG     Climbing 3-5 steps with a railing?  1  -KG     To walk in hospital room?  1  -KG     AM-PAC 6 Clicks Score (PT)  8  -KG     Row Name 08/18/20 1039          Functional Assessment    Outcome Measure Options  AM-PAC 6 Clicks Basic Mobility (PT)  -KG       User Key  (r) = Recorded By, (t) = Taken By, (c) = Cosigned By    Initials Name Provider Type    Sherly Connor PT Physical Therapist        Physical Therapy Education                 Title: PT OT SLP Therapies (In Progress)     Topic: Physical Therapy (In Progress)     Point: Mobility training (In Progress)     Description:   Instruct learner(s) on safety and technique for assisting patient out of bed, chair or wheelchair.  Instruct in the proper use of assistive devices, such as walker, crutches, cane or brace.               Patient Friendly Description:   It's important to get you on your feet again, but we need to do so in a way that is safe for you. Falling has serious consequences, and your personal safety is the most important thing of all.        When it's time to get out of bed, one of us or a family member will sit next to you on the bed to give you support.     If your doctor or nurse tells you to use a walker, crutches, a cane, or a brace, be sure you use it every time you get out of bed, even if you think you don't need it.    Learning Progress Summary           Patient Acceptance, E, NR by KG at 8/18/2020 0840                   Point: Home exercise program (Not Started)     Description:   Instruct learner(s) on appropriate technique for monitoring, assisting and/or progressing patient with therapeutic exercises and activities.              Learner Progress:   Not documented in this visit.          Point: Body mechanics (In Progress)     Description:   Instruct learner(s) on proper positioning and spine alignment for patient and/or caregiver during mobility tasks and/or exercises.              Learning Progress Summary           Patient Acceptance, E, NR by KG at 8/18/2020 0840                   Point: Precautions (In Progress)     Description:   Instruct learner(s) on prescribed precautions during mobility and gait tasks              Learning Progress Summary           Patient Acceptance, E, NR by KG at 8/18/2020 0840                               User Key     Initials Effective Dates Name Provider Type Discipline    KG 05/22/20 -  Sherly Duncan, PT Physical Therapist PT              PT Recommendation and Plan  Planned Therapy Interventions (PT Eval): balance training, bed mobility training, strengthening, transfer training  Outcome Summary/Treatment Plan (PT)  Anticipated Discharge Disposition (PT): inpatient rehabilitation facility  Plan of Care Reviewed With: patient  Outcome Summary: PT initial evaluation  completed for pt s/p L hip trochanteric nailing; remote L BKA. Pt presents with genearlized weakness, impaired balance, and decreased functional mobility. Pt required maxA x2 for bed mobility; limited by c/o severe L hip pain. Pt's decreased independence warrants PT skilled care. Recommend D/C to IP rehab facility.     Time Calculation:   PT Charges     Row Name 08/18/20 0840             Time Calculation    Start Time  0840  -KG      PT Received On  08/18/20  -KG      PT Goal Re-Cert Due Date  08/28/20  -KG        User Key  (r) = Recorded By, (t) = Taken By, (c) = Cosigned By    Initials Name Provider Type    KG Sherly Duncan, PT Physical Therapist        Therapy Charges for Today     Code Description Service Date Service Provider Modifiers Qty    [id number] HC PT EVAL MOD COMPLEXITY 3 8/18/2020 Sherly Duncan, PT GP 1          PT G-Codes  Outcome Measure Options: AM-PAC 6 Clicks Basic Mobility (PT)  AM-PAC 6 Clicks Score (PT): 8  AM-PAC 6 Clicks Score (OT): 10    Soheila Duncan PT  8/18/2020

## 2020-08-18 NOTE — DISCHARGE PLACEMENT REQUEST
"  Case Management - [facility] [phone]  Meir Preciado (80 y.o. Male)     Date of Birth Social Security Number Address Home Phone MRN    1940  [address] [facility] [address] [phone] [id number]    Nondenominational Marital Status          None        Admission Date Admission Type Admitting Provider Attending Provider Department, Room/Bed    8/15/20 Urgent Edmar Ackerman MD McIntosh, Matthew M, MD [facility] 2B ICU, N238/1    Discharge Date Discharge Disposition Discharge Destination                       Attending Provider:  Edmar Ackerman MD    Allergies:  No Known Allergies    Isolation:  None   Infection:  None   Code Status:  CPR    Ht:  172.2 cm (67.8\")   Wt:  76 kg (167 lb 8.8 oz)    Admission Cmt:  None   Principal Problem:  Closed left hip fracture, s/p repair 2020 [S72.002A]                 Active Insurance as of 8/15/2020     Primary Coverage     Payor Plan Insurance Group Employer/Plan Group    HUMANA MEDICARE REPLACEMENT HUMANA MEDICARE REPLACEMENT [id number]     Payor Plan Address Payor Plan Phone Number Payor Plan Fax Number Effective Dates    [address] [address] [phone]  2018 - None Entered    [facility] [id number]       Subscriber Name Subscriber Birth Date Member ID       MEIR PRECIADO 1940 [id number]                 Emergency Contacts      (Rel.) Home Phone Work Phone Mobile Phone    Mirna Preciado (Spouse) [phone]    Low Preciado (Son) -- -- [phone]    Kassidy Ayon [phone] -- --               History & Physical      Aurelio Burroughs MD at 08/15/20 2210              [facility]  HISTORY AND PHYSICAL    Patient Name: Meir Preciado  : 1940  MRN: [id number]  Primary Care Physician: Ely Berumen APRN  Date of admission: 8/15/2020      Subjective   Subjective     Chief Complaint:  Fall     HPI:  Meir Preciado is a 80 y.o. male w/ " a hx of COPD, chronic respiratory failure on 2 liters PRN, chronic atrial fibrillation (BB, dilt and Eliquis), hyperthyroidism, HTN, CAD, PVD, remote left BKA and severe aortic stenosis who was transferred from [facility] for further evaluation/tx of a left hip fracture.  Pt originally presented to the OSH ED w/ c/o a fall. Pt c/o falling off of his porch 1 week ago (8/7/2020). Since falling he has had ongoing left hip pain. XRAYS c/w left hip fracture. Ortho was consulted and recommended operative mgt of the fracture.  Due to pt's extensive medical hx, Cardiology was also consulted for OR clearance. Pt had an ECHO done @ the OSH that was c/w severe aortic stenosis. Both teams felt that w/ the increased risk and pt's hx that pt needed further cardiac evaluation prior to surgical intervention. Pt was transferred to [facility] for further evaluation.   In addition to hip pain, pt c/o dysuria chronically. Pt also c/o chronic shortness of breath w/ PRN oxygen.       Review of Systems   Constitutional: Negative.  Negative for chills, fatigue and fever.   HENT: Negative.  Negative for congestion, postnasal drip, rhinorrhea, sinus pressure, sinus pain, sneezing, sore throat and trouble swallowing.    Eyes: Negative.  Negative for visual disturbance.   Respiratory: Negative.  Negative for cough and shortness of breath.    Cardiovascular: Negative.  Negative for chest pain, palpitations and leg swelling.   Gastrointestinal: Negative.  Negative for abdominal pain, blood in stool, constipation, diarrhea, nausea and vomiting.   Genitourinary: Negative.  Negative for decreased urine volume, difficulty urinating, dysuria, flank pain, frequency, hematuria and urgency.   Musculoskeletal:        Left hip pain after fall.    Skin: Negative.  Negative for color change, pallor, rash and wound.   Neurological: Negative.  Negative for dizziness, tremors, seizures, syncope, facial asymmetry, speech difficulty, weakness,  light-headedness, numbness and headaches.   Hematological: Negative.  Does not bruise/bleed easily.   Psychiatric/Behavioral: Negative.  Negative for confusion.   All other systems reviewed and are negative.       Personal History     Past Medical History:   Diagnosis Date   • AAA (abdominal aortic aneurysm) (CMS/HCC)     s/p repair   • Atrial fibrillation with rapid ventricular response (CMS/HCC) 6/1/2018   • BPH (benign prostatic hyperplasia)    • BPH with obstruction/lower urinary tract symptoms 2/12/2020   • CAD (coronary artery disease)    • CAD (coronary artery disease) 8/15/2020   • Chronic atrial fibrillation (CMS/HCC)    • Chronic respiratory failure (CMS/HCC)    • COPD (chronic obstructive pulmonary disease) (CMS/HCC)    • Dysuria 2/12/2020   • Erectile dysfunction 2/12/2020   • Heart murmur    • Hypertension    • Hyperthyroidism    • Mitral annular calcification 9/24/2018   • Mononeuritis multiplex    • Nonrheumatic aortic valve stenosis 9/24/2018   • NSTEMI (non-ST elevated myocardial infarction) (CMS/HCC) 2018   • PVD (peripheral vascular disease) (CMS/HCC)        Past Surgical History:   Procedure Laterality Date   • ABDOMINAL AORTIC ANEURYSM REPAIR     • APPENDECTOMY     • BACK SURGERY     • BELOW KNEE LEG AMPUTATION Left    • CARDIAC CATHETERIZATION N/A 6/1/2018    Procedure: Left Heart Cath;  Surgeon: Derik Vega MD;  Location: [facility] INVASIVE LOCATION;  Service: Cardiovascular   • COLONOSCOPY N/A 6/15/2017    Procedure: COLONOSCOPY  CPTCODE:09478;  Surgeon: Lincoln Bowens III, MD;  Location: [facility];  Service:    • ENDOSCOPY N/A 6/15/2017    Procedure: ESOPHAGOGASTRODUODENOSCOPY WITH BIOPSY  CPTCODE:56142;  Surgeon: Lincoln Bowens III, MD;  Location: [facility];  Service:    • ESOPHAGEAL DILATATION     • INTERVENTIONAL RADIOLOGY PROCEDURE Left 6/1/2018    Procedure: Abdominal Aortagram with Runoff;  Surgeon: Derik Vega MD;  Location: [facility] INVASIVE LOCATION;  Service:  Cardiovascular       Family History: family history includes Heart attack in his brother; Heart disease in his mother; Heart failure in his father; Stroke in his brother. Otherwise pertinent FHx was reviewed and unremarkable.     Social History:  reports that he has been smoking cigarettes. He has a 30.00 pack-year smoking history. He has never used smokeless tobacco. He reports that he does not drink alcohol or use drugs.  Social History     Social History Narrative   • Not on file       Medications:  Available home medication information reviewed.  Medications Prior to Admission   Medication Sig Dispense Refill Last Dose   • dilTIAZem CD (CARDIZEM CD) 240 MG 24 hr capsule Take 1 capsule by mouth Daily. 30 capsule 5 8/14/2020 at Unknown time   • finasteride (PROSCAR) 5 MG tablet Take 1 tablet by mouth Daily. 90 tablet 3 8/14/2020 at Unknown time   • ipratropium-albuterol (DUO-NEB) 0.5-2.5 mg/3 ml nebulizer Take 3 mL by nebulization Every 4 (Four) Hours As Needed for Wheezing.   Unknown at Unknown time   • methIMAzole (TAPAZOLE) 5 MG tablet Take 5 mg by mouth Daily.   8/14/2020 at 1100   • metoprolol succinate XL (TOPROL-XL) 25 MG 24 hr tablet Take 1 tablet by mouth Daily. 30 tablet 3 8/14/2020 at Unknown time   • naproxen (NAPROSYN) 500 MG tablet Take 500 mg by mouth Daily As Needed for Mild Pain .   Unknown at Unknown time   • nitroglycerin (NITROSTAT) 0.4 MG SL tablet Place 0.4 mg under the tongue Every 5 (Five) Minutes As Needed for Chest Pain. Take no more than 3 doses in 15 minutes.   Unknown at Unknown time   • oxyCODONE (ROXICODONE) 10 MG tablet Take 10 mg by mouth 4 (Four) Times a Day.   8/14/2020 at 1100       No Known Allergies    Objective   Objective     Vital Signs:   Temp:  [97.4 °F (36.3 °C)-98.4 °F (36.9 °C)] 97.8 °F (36.6 °C)  Heart Rate:  [] 98  Resp:  [18-20] 20  BP: (105-135)/(55-91) 127/73        Physical Exam   Constitutional: He is oriented to person, place, and time. He appears  well-developed and well-nourished. No distress.   HENT:   Head: Normocephalic and atraumatic.   Eyes: Pupils are equal, round, and reactive to light. EOM are normal.   Neck: Normal range of motion. Neck supple.   Cardiovascular: Normal rate and intact distal pulses. An irregular rhythm present. Exam reveals no gallop and no friction rub.   Murmur heard.  Pulmonary/Chest: Effort normal. No stridor. No respiratory distress. He has no rales. He exhibits no tenderness.   Diminished breath sounds bilateral bases. Faint expiratory wheeze.    Abdominal: Soft. Bowel sounds are normal. He exhibits no distension. There is no tenderness.   Musculoskeletal:   Acute left hip fracture.   Left BKA.    Neurological: He is alert and oriented to person, place, and time. No cranial nerve deficit.   Skin: Skin is warm and dry. Capillary refill takes more than 3 seconds. No rash noted. He is not diaphoretic. No erythema. No pallor.   Diminished pulses RLE.    Psychiatric: He has a normal mood and affect. His behavior is normal. Judgment and thought content normal.   Nursing note and vitals reviewed.       Results Reviewed:  I have personally reviewed current lab and radiology data.    Results from last 7 days   Lab Units 08/14/20  1420   WBC 10*3/mm3 7.96   HEMOGLOBIN g/dL 9.8*   HEMATOCRIT % 34.6*   PLATELETS 10*3/mm3 218     Results from last 7 days   Lab Units 08/15/20  0850 08/14/20  1420   SODIUM mmol/L 140 140   POTASSIUM mmol/L 4.5 4.0   CHLORIDE mmol/L 106 104   CO2 mmol/L 24.9 25.0   BUN mg/dL 21 23   CREATININE mg/dL 0.93 0.93   GLUCOSE mg/dL 111* 131*   CALCIUM mg/dL 8.1* 8.7   ALT (SGPT) U/L  --  25   AST (SGOT) U/L  --  30   TROPONIN T ng/mL  --  <0.010     Estimated Creatinine Clearance: 64.2 mL/min (by C-G formula based on SCr of 0.93 mg/dL).  Brief Urine Lab Results  (Last result in the past 365 days)      Color   Clarity   Blood   Leuk Est   Nitrite   Protein   CREAT   Urine HCG        08/14/20 8060  Veena  Comment:  Dipstick results may be inaccurate due to color interference.  Clear Negative Trace Positive 30 mg/dL (1+)             Imaging Results (Last 24 Hours)     ** No results found for the last 24 hours. **        Results for orders placed during the hospital encounter of 08/14/20   Adult Transthoracic Echo Complete W/ Cont if Necessary Per Protocol    Narrative · Left ventricular wall thickness is consistent with mild concentric   hypertrophy.  · Left ventricular systolic function is normal, EF 56-60%.  · Left ventricular diastolic dysfunction (grade I a) consistent with   impaired relaxation.  · Left atrial cavity size is mild-to-moderately dilated.  · There is severe calcification of the aortic valve mainly affecting the   non, left and right coronary cusp(s).  · Mild aortic valve regurgitation is present.  · Severe aortic valve stenosis is present.  · Aortic valve mean pressure gradient is 46.0 mmHg.  · Aortic valve maximum pressure gradient is 90.0 mmHg.  · Moderate mitral valve regurgitation is present  · Moderate to severe tricuspid valve regurgitation is present.  · Calculated right ventricular systolic pressure from tricuspid   regurgitation is 77 mmHg.          Assessment/Plan   Assessment & Plan     Active Hospital Problems    Diagnosis POA   • **Closed left hip fracture (CMS/HCC) [S72.002A] Yes   • Chronic atrial fibrillation (CMS/HCC) [I48.20] Yes   • Acute UTI (urinary tract infection) [N39.0] Yes   • HTN (hypertension) [I10] Yes   • CAD (coronary artery disease) [I25.10] Yes   • COPD (chronic obstructive pulmonary disease) (CMS/HCC) [J44.9] Yes   • Chronic respiratory disease [J98.9] Yes   • Peripheral vascular disease (CMS/HCC) [I73.9] Yes       Assessment & Plan    **Left hip fracture  -hip xray obtained @ OSH  -NPO after midnight   -baseline pre-op labs/EKG, type/screen- in am  -last dose of Apixaban was on morning of admission to OSH 8/14/2020  -symptom mgt   -consult Ortho to see in am  (Dr. Aponte)  -CT surgery consulted to see in am (Dr. Cabrera); for pre-op clearance, severe aortic stenosis     **Acute UTI  -blood cultures pending  -UA @ OSH c/w acute UTI; culture ordered  -IV Rocephin started   -bladder scan q 4hrs     **Aortic stenosis  -ECHO done @ OSH; EF 56-60% w/ grade I a diastolic dysfunction and severe aortic stenosis; RVSP 77mmHg  -CT surgery consulted to see in am     **Chronic atrial fibrillation   -routine Apixaban held since morning of 8/14/2020  -EKG pending; rate controlled  -routine Cardizem and Toprol continued     **HTN, CAD, hyperthyroidism  -continue routine tapazole  -continue routine Toprol   -PRN nitro continued    **COPD, chronic respiratory failure   -CXR @ OSH stable   -PRN oxygen; 2 liters at home as needed  -PRN duo nebs continued     **PVD  -remote left BKA  -pulses on RLE weak; NV/doppler checks ordered       DVT prophylaxis:  Mechanical       CODE STATUS:    Code Status and Medical Interventions:   Ordered at: 08/15/20 2229     Level Of Support Discussed With:    Patient     Code Status:    CPR     Medical Interventions (Level of Support Prior to Arrest):    Full       Admission Status:  I believe this patient meets INPATIENT status due to need for treatment of his hip fracture, severe left ear.  I feel patient’s risk for adverse outcomes and need for care warrant INPATIENT evaluation and I predict the patient’s care encounter to likely last beyond 2 midnights.      Electronically signed by ALICIA Elena, 08/15/20, 10:10 PM.    Brief Attending Admission Attestation     I have seen and examined the patient, performing an independent face-to-face diagnostic evaluation with plan of care reviewed and developed with the advanced practice clinician (APC).         Vitals:    08/15/20 2154   BP: 127/73   Pulse: 98   Resp: 20   Temp: 97.8 °F (36.6 °C)   SpO2: 99%-2 L nasal cannula       Attending Physical Exam:  RS-poor effort, decreased air entry at the  bases,  CVS- s1s2 irregular, murmur in all valvular areas  ABD- soft, non tender, not distended, no organomegaly.  EXT-left BKA, right extremity-poorly palpable pulses, cool to touch, poor capillary refill,  NEURO- AAO-3, no focal defecit.      Old records reviewed and summarized in PM hx.    Brief Summary Statement:   80 years old male transferred from [facility]- for evaluation of aortic stenosis/lefthip fracture -secondary to fall (3 days back).  Patient had episode of A. fib-RVR-on 8/15/2020-a.m.-responded to beta-blocker.  Patient is very limited in physical activity secondary to BKA, denies any episodes of chest pain, no complaint of lightheadedness or syncope recently, does complain of chronic dyspnea, no PND or orthopnea symptoms, also complains of chronic cough-with whitish sputum production.  Does complain of pain with any movement of his left hip.    COVID-19-severe test negative on 8/15/2020.    Remainder of detailed HPI is as noted above and has been reviewed and/or edited by me for completeness.    PM HX:  Past Medical History:   Diagnosis Date   • AAA (abdominal aortic aneurysm)-status post repair        • Atrial fibrillation with rapid ventricular response (-CHADS2 VASC=4)  -On Eliquis, metoprolol 50/3 times daily, Cardizem 240 mg p.o. daily, 6/1/2018   • BPH (benign prostatic hyperplasia)      2/12/2020   • CAD (coronary artery disease)-    • COPD (chronic obstructive pulmonary disease)-on 2 L nasal cannula  -Still active smoker    •  Severe aortic stenosis, moderate MR/chronic CHF-as per discharge summary,-echo- on 8/15/2020-EF of 56%, grade 1 diastolic dysfunction, severe left ear, aortic valve pressure gradient 46 mmHg, moderate MR, RVSP 55 mmHg.  -Lasix on hold      • Hypertension  Hyperlipidemia- pravastatin 10 mg p.o. daily      • Hyperthyroidism-methimazole 5 mg p.o. daily       GERD/esophageal dilatation 9/24/2018   • Mononeuritis multiplex/HX left BKA/history of back surgery       • PVD (peripheral vascular disease) (left BKA)-status post femorofemoral bypass             LABS:  EKG A. fib-122 bpm, Q waves in lead III, ,  Cardiac cath- 6/2018- showed chronic total occlusion of mid RCA-with heavy collateral filling, mild disease in left coronary system.-Was recommended dual antiplatelet therapy    Brief Assessment/Plan :  CT surgery evaluation-for severe aortic stenosis.  Cardiology consult- for chronic A. fib,   repeat CBC, anemic with hemoglobin of 10 at the external ED, no complaint of acute bleeding other than bruising secondary to hip fracture, patient's hemoglobin has been dropping from 11-10-9, therefore did not start patient on any anticoagulation.  Orthopedic consult.    See above for further detailed assessment and plan developed with APC which I have reviewed and/or edited for completeness.    Electronically signed by Aurelio Burroughs MD, 08/15/20, 10:32 PM.        Electronically signed by Aurelio Burroughs MD at 08/16/20 0612         Current Facility-Administered Medications   Medication Dose Route Frequency Provider Last Rate Last Dose   • acetaminophen (TYLENOL) tablet 1,000 mg  1,000 mg Oral Q8H Edmar Aponte MD   1,000 mg at 08/18/20 0539   • apixaban (ELIQUIS) tablet 2.5 mg  2.5 mg Oral Q12H Jordan Marmolejo MD   2.5 mg at 08/18/20 1200   • atorvastatin (LIPITOR) tablet 40 mg  40 mg Oral Nightly Edmar Aponte MD   40 mg at 08/17/20 2323   • cefTRIAXone (ROCEPHIN) 1 g/100 mL 0.9% NS (MBP)  1 g Intravenous Q24H Edmar Aponte MD   1 g at 08/18/20 0028   • dilTIAZem CD (CARDIZEM CD) 24 hr capsule 240 mg  240 mg Oral Daily Edmar Aponte MD   240 mg at 08/18/20 0822   • finasteride (PROSCAR) tablet 5 mg  5 mg Oral Daily Edmar Aponte MD   5 mg at 08/18/20 0822   • ipratropium-albuterol (DUO-NEB) nebulizer solution 3 mL  3 mL Nebulization Q4H PRN Edmar Aponte MD       • Magnesium Sulfate 2 gram Bolus, followed by 8 gram infusion (total Mg  dose 10 grams)- Mg less than or equal to 1mg/dL  2 g Intravenous PRN Edmar Aponte MD        Or   • Magnesium Sulfate 2 gram / 50mL Infusion (GIVE X 3 BAGS TO EQUAL 6GM TOTAL DOSE) - Mg 1.1 - 1.5 mg/dl  2 g Intravenous PRN Edmar Aponte MD        Or   • Magnesium Sulfate 4 gram infusion- Mg 1.6-1.9 mg/dL  4 g Intravenous PRN Edmar Aponte MD       • methIMAzole (TAPAZOLE) tablet 5 mg  5 mg Oral Daily Edmar Aponte MD   5 mg at 08/18/20 0822   • metoprolol tartrate (LOPRESSOR) tablet 25 mg  25 mg Oral Q8H Edmar Aponte MD   25 mg at 08/18/20 0539   • nicotine (NICODERM CQ) 21 MG/24HR patch 1 patch  1 patch Transdermal Q24H Edmar Aponte MD   1 patch at 08/18/20 0823   • nitroglycerin (NITROSTAT) SL tablet 0.4 mg  0.4 mg Sublingual Q5 Min PRN Edmar Aponte MD       • ondansetron (ZOFRAN) tablet 4 mg  4 mg Oral Q6H PRN Edmar Aponte MD        Or   • ondansetron (ZOFRAN) injection 4 mg  4 mg Intravenous Q6H PRN Edmar Aponte MD       • oxyCODONE (ROXICODONE) immediate release tablet 10 mg  10 mg Oral 4x Daily Edmar Aponte MD   10 mg at 08/18/20 1200   • oxyCODONE (ROXICODONE) immediate release tablet 10 mg  10 mg Oral Q4H PRN Edmar Aponte MD       • oxyCODONE (ROXICODONE) immediate release tablet 5 mg  5 mg Oral Q4H PRN Edmar Aponte MD       • ropivacaine (NAROPIN) 0.2% peripheral nerve cath (moog)  8 mL/hr Peripheral Nerve Continuous Edmar Aponte MD 8 mL/hr at 08/17/20 2000 8 mL/hr at 08/17/20 2000   • sodium chloride 0.9 % flush 10 mL  10 mL Intravenous Q12H Edmar Aponte MD   10 mL at 08/18/20 0824   • sodium chloride 0.9 % flush 10 mL  10 mL Intravenous PRN Edmar Aponte MD            Physician Progress Notes (last 24 hours) (Notes from 08/17/20 1211 through 08/18/20 1211)      Edmar Aponte MD at 08/18/20 0707            SUBJECTIVE  Patient resting comfortably.  Pain well controlled.  No events  "overnight.    OBJECTIVE  Temp (24hrs), Av.6 °F (36.4 °C), Min:97 °F (36.1 °C), Max:98.3 °F (36.8 °C)    Blood pressure 127/84, pulse 100, temperature 98 °F (36.7 °C), temperature source Axillary, resp. rate 20, height 172.2 cm (67.8\"), weight 76 kg (167 lb 8.8 oz), SpO2 100 %.    Lab Results (last 24 hours)     Procedure Component Value Units Date/Time    CBC & Differential [[id number]] Collected:  20    Specimen:  Blood Updated:  20    Narrative:       The following orders were created for panel order CBC & Differential.  Procedure                               Abnormality         Status                     ---------                               -----------         ------                     CBC Auto Differential[[id number]]        Abnormal            Final result                 Please view results for these tests on the individual orders.    CBC Auto Differential [[id number]]  (Abnormal) Collected:  20    Specimen:  Blood Updated:  20     WBC 10.63 10*3/mm3      RBC 3.94 10*6/mm3      Hemoglobin 8.1 g/dL      Hematocrit 29.2 %      MCV 74.1 fL      MCH 20.6 pg      MCHC 27.7 g/dL      RDW 21.1 %      RDW-SD 55.4 fl      MPV 11.3 fL      Platelets 269 10*3/mm3      Neutrophil % 87.1 %      Lymphocyte % 6.3 %      Monocyte % 5.6 %      Eosinophil % 0.0 %      Basophil % 0.2 %      Immature Grans % 0.8 %      Neutrophils, Absolute 9.27 10*3/mm3      Lymphocytes, Absolute 0.67 10*3/mm3      Monocytes, Absolute 0.59 10*3/mm3      Eosinophils, Absolute 0.00 10*3/mm3      Basophils, Absolute 0.02 10*3/mm3      Immature Grans, Absolute 0.08 10*3/mm3      nRBC 0.0 /100 WBC     Basic Metabolic Panel [[id number]] Collected:  20    Specimen:  Blood Updated:  20    Blood Culture - Blood, Hand, Right [[id number]] Collected:  20 012    Specimen:  Blood from Hand, Right Updated:  20 033     Blood Culture No growth at 2 days    Blood Culture - Blood, " Arm, Right [[id number]] Collected:  08/16/20 0127    Specimen:  Blood from Arm, Right Updated:  08/18/20 0330     Blood Culture No growth at 2 days    Basic Metabolic Panel [[id number]]  (Abnormal) Collected:  08/17/20 0824    Specimen:  Blood Updated:  08/17/20 0927     Glucose 126 mg/dL      BUN 19 mg/dL      Creatinine 0.76 mg/dL      Sodium 139 mmol/L      Potassium 4.1 mmol/L      Chloride 107 mmol/L      CO2 25.0 mmol/L      Calcium 8.1 mg/dL      eGFR Non African Amer 99 mL/min/1.73      BUN/Creatinine Ratio 25.0     Anion Gap 7.0 mmol/L     Narrative:       GFR Normal >60  Chronic Kidney Disease <60  Kidney Failure <15      CBC (No Diff) [[id number]]  (Abnormal) Collected:  08/17/20 0824    Specimen:  Blood Updated:  08/17/20 0853     WBC 8.70 10*3/mm3      RBC 4.31 10*6/mm3      Hemoglobin 8.8 g/dL      Hematocrit 30.9 %      MCV 71.7 fL      MCH 20.4 pg      MCHC 28.5 g/dL      RDW 21.0 %      RDW-SD 53.1 fl      Platelets 235 10*3/mm3             PHYSICAL EXAM  Left lower extremity: Dressing clean, dry and intact.  Mild pain with logroll of hip.  Sensation intact distally.          Closed left hip fracture, s/p repair 8/17/2020    Peripheral vascular disease (CMS/HCC)    Chronic atrial fibrillation (CMS/HCC)    Acute UTI (urinary tract infection)    HTN (hypertension)    CAD (coronary artery disease)    COPD (chronic obstructive pulmonary disease) (CMS/[facility])    Chronic respiratory disease    Aortic stenosis    S/p left hip fracture      PLAN / DISPOSITION:  1 Day Post-Op nail fixation left intertrochanteric hip fracture    None weight bearing left lower extremity, hip range of motion as tolerated  Pain control  PT/OT for post op mobilization and medical equipment needs   23 hours perioperative antibiotic prophylaxis   SCD's bilateral lower extremities   Aspirin for DVT prophylaxis   Social work for discharge planning.   Dressing to remain in place for 7 days. May remove on POD#7. If no drainage, may shower on  POD#10. No submerging wound in water. If drainage is noted, sterile dressing should be placed and wound checked daily. No showering until wound has remained dry for 72 consecutive hours.   Follow up in 2 weeks for re-assessment.      Edmar Aponte MD  08/18/20  07:07           Electronically signed by Edmar Aponte MD at 08/18/20 0708     Edmar Ackerman MD at 08/17/20 2022          Intensive Care Follow-up     Hospital:  LOS: 2 days   Mr. Zak Olmstead, 80 y.o. male is followed for:   Closed left hip fracture (CMS/HCC)   Status post gamma nail repair today and followed postoperatively for multiple underlying medical problems including severe aortic stenosis, hypertension, and altered mental status.  Subjective   Subjective   Interval History:  This is an 80-year-old gentleman admitted to the hospital the diagnosis of traumatic left hip fracture.  He has multiple underlying medical problems including severe aortic stenosis, chronic atrial fibrillation, and peripheral vascular disease.  He underwent gamma nail repair of his left hip fracture today.  Due to his multiple medical problems and somnolence after surgery, he has been brought to the intensive care unit for close monitoring.  Upon arrival, he is hemodynamically stable.  He is awake although is drifting off to sleep very easily when not stimulated.  He is oriented to person only.  Speech is fluent.    The patient's past medical, surgical and social history were reviewed and updated in Epic as appropriate.     Objective   Objective     Infusions:    lactated ringers 9 mL/hr Last Rate: 9 mL/hr (08/17/20 1501)   ropivacaine (NAROPIN) 0.2% peripheral nerve cath (moog) 8 mL/hr Last Rate: 8 mL/hr (08/17/20 1829)     Medications:    acetaminophen 1,000 mg Oral Q8H   aspirin 81 mg Oral Daily   [START ON 8/18/2020] aspirin 81 mg Oral Q12H   atorvastatin 40 mg Oral Nightly   [START ON 8/18/2020] ceFAZolin 2 g Intravenous Q8H   cefTRIAXone 1 g  "Intravenous Q24H   dilTIAZem  mg Oral Daily   finasteride 5 mg Oral Daily   methIMAzole 5 mg Oral Daily   metoprolol tartrate 25 mg Oral Q8H   nicotine 1 patch Transdermal Q24H   oxyCODONE 10 mg Oral 4x Daily   sodium chloride 10 mL Intravenous Q12H   sodium chloride 3 mL Intravenous Q12H       Vital Sign Min/Max for last 24 hours  Temp  Min: 96.8 °F (36 °C)  Max: 98.3 °F (36.8 °C)   BP  Min: 105/88  Max: 170/107   Pulse  Min: 87  Max: 125   Resp  Min: 16  Max: 20   SpO2  Min: 96 %  Max: 100 %   Flow (L/min)  Min: 2  Max: 4       Input/Output for last 24 hour shift  08/16 0701 - 08/17 0700  In: -   Out: 50 [Urine:50]      Objective:  General Appearance:  Uncomfortable, ill-appearing, in no acute distress and not in pain.    Vital signs: (most recent): Blood pressure 112/93, pulse 112, temperature 97.7 °F (36.5 °C), temperature source Axillary, resp. rate 18, height 172.2 cm (67.8\"), weight 44.2 kg (97 lb 7.1 oz), SpO2 93 %.    HEENT: Normal HEENT exam.    Lungs:  Normal effort and normal respiratory rate.  Breath sounds clear to auscultation.  He is not in respiratory distress.  No rales, decreased breath sounds, wheezes or rhonchi.    Heart: Tachycardia.  Irregular rhythm.  S1 normal and S2 normal.  Positive for murmur.    Chest: Symmetric chest wall expansion.   Abdomen: Abdomen is soft.  Bowel sounds are normal.   There is no abdominal tenderness.     Extremities: (Left below the knee amputation.  Leg dressed.  Right radial art line.)  Neurological: (Patient is alert.  He is oriented to person only.  Thing all 4 extremities equally.).    Pupils:  Pupils are equal, round, and reactive to light.  Pupils are equal.   Skin:  Warm and dry.  No rash.             Results from last 7 days   Lab Units 08/17/20  0824 08/16/20  0127 08/14/20  1420   WBC 10*3/mm3 8.70 8.60 7.96   HEMOGLOBIN g/dL 8.8* 8.9* 9.8*   PLATELETS 10*3/mm3 235 204 218     Results from last 7 days   Lab Units 08/17/20  0824 08/16/20  0127 " 08/15/20  0850   SODIUM mmol/L 139 138 140   POTASSIUM mmol/L 4.1 3.8 4.5   CO2 mmol/L 25.0 26.0 24.9   BUN mg/dL 19 21 21   CREATININE mg/dL 0.76 1.02 0.93   MAGNESIUM mg/dL  --  2.5* 2.2   GLUCOSE mg/dL 126* 108* 111*     Estimated Creatinine Clearance: 74.7 mL/min (by C-G formula based on SCr of 0.76 mg/dL).            I reviewed the patient's results and images.     Assessment/Plan   Impression        Closed left hip fracture, s/p repair 8/17/2020    Peripheral vascular disease (CMS/HCC)    Chronic atrial fibrillation (CMS/HCC)    Acute UTI (urinary tract infection)    HTN (hypertension)    CAD (coronary artery disease)    COPD (chronic obstructive pulmonary disease) (CMS/HCC)    Chronic respiratory disease    Aortic stenosis    S/p left hip fracture       Plan        Patient will be monitored closely in the intensive care unit postoperatively.  Pain control as needed.  We will mobilize when surgically appropriate.  DVT prophylaxis per orthopedic surgery.  Continue with Rocephin for urinary tract infection.  Follow-up labs and orders been placed for tomorrow.  Reorient as able.  Patient remains at high risk of worsening secondary to multiple underlying medical problems as well as acute confusion.    Plan of care and goals reviewed with mulitdisciplinary/antibiotic stewardship team during rounds.   I discussed the patient's findings and my recommendations with patient and nursing staff     High level of risk due to:  illness with threat to life or bodily function, abrupt change in neurological status, parenteral controlled substances and drugs requiring intensive monitoring for toxicity.      Edmar Ackerman MD, [facility]  Pulmonology and Critical Care Medicine       Electronically signed by Edmar Ackerman MD at 08/17/20 2030  [facility] 2B ICU  [address] [address]  Phone:  [phone]  Fax:   Date: Aug 18, 2020      Ambulatory Referral to Home Health     Patient:   Zak Olmstead MRN:  [id number]   [address] [facility] [place] [address] :  1940  SSN:    Phone: [phone] Sex:  M      INSURANCE PAYOR PLAN GROUP # SUBSCRIBER ID   Primary:    [employer] MED[employer] REPLACEMENT [id number]      Referring Provider Information:  INEZ FUNES Phone: [phone] Fax:       Referral Information:   # Visits:  1 Referral Type: Home Health [42]   Urgency:  Routine Referral Reason: Specialty Services Required   Start Date: Aug 18, 2020 End Date:  To be determined by Insurer   Diagnosis: Closed fracture of left hip, initial encounter (CMS/[facility]) (S72.002A [ICD-10-CM] 820.8 [ICD-9-CM])  S/p left hip fracture (Z87.81 [ICD-10-CM] V15.51 [ICD-9-CM])      Refer to Dept:   Refer to Provider:   Refer to Facility:       Face to Face Visit Date: 2020  Follow-up provider for Plan of Care? I treated the patient in an acute care facility and will not continue treatment after discharge.  Follow-up provider: JIMENA STARKS [[id number]]  Reason/Clinical Findings: Status post repair of Left hip fracture.  Describe mobility limitations that make leaving home difficult: Impaired funmctional mobility, gait, balance and endurance  Nursing/Therapeutic Services Requested: Physical Therapy  Nursing/Therapeutic Services Requested: Occupational Therapy  PT orders: Therapeutic exercise  PT orders: Gait Training  PT orders: Strengthening  Weight Bearing Status: As Tolerated  Occupational orders: Activities of daily living  Occupational orders: Strengthening  Frequency: 1 Week 1     This document serves as a request of services and does not constitute Insurance authorization or approval of services.  To determine eligibility, please contact the members Insurance carrier to verify and review coverage.     If you have medical questions regarding this request for services. Please contact [address] ICU at [phone] during normal business hours.       Verbal Order  Mode: Verbal with readback   Authorizing Provider: Jordan Marmolejo MD  Authorizing Provider's NPI: [id number]     Order Entered By: Emelia Torres RN 8/18/2020 12:07 PM     Electronically signed by:

## 2020-08-18 NOTE — PROGRESS NOTES
"Clinical Nutrition Note      Patient Name: Zak Olmstead  MRN: [id number]  Admission date: 8/15/2020      Multidisciplinary Rounds    Additional information obtained during MDR:  RN reports pt stable post-op from L hip pinning, MD okay to dc art line and transfer pt to floor room. Needs PT/OT has a L BKA as well.    Current diet: Diet Regular    Oral Nutrition Supplement:     Pertinent medical data reviewed:  No nutrition risk identified on nursing screen; MST score \"0\"    Intervention:  Plan of care and goals reviewed    Monitor:  RD to follow per protocol      Jeanne Nice MS,RD,LD  08/18/20 10:26 AM  Time: 15  mins       "

## 2020-08-19 PROCEDURE — 99233 SBSQ HOSP IP/OBS HIGH 50: CPT | Performed by: HOSPITALIST

## 2020-08-19 PROCEDURE — 97530 THERAPEUTIC ACTIVITIES: CPT | Performed by: OCCUPATIONAL THERAPIST

## 2020-08-19 PROCEDURE — 25010000002 ROPIVACAINE PER 1 MG: Performed by: INTERNAL MEDICINE

## 2020-08-19 PROCEDURE — 97110 THERAPEUTIC EXERCISES: CPT

## 2020-08-19 PROCEDURE — 99024 POSTOP FOLLOW-UP VISIT: CPT | Performed by: ORTHOPAEDIC SURGERY

## 2020-08-19 RX ORDER — ASPIRIN 81 MG/1
81 TABLET ORAL DAILY
Status: DISCONTINUED | OUTPATIENT
Start: 2020-08-19 | End: 2020-08-24 | Stop reason: HOSPADM

## 2020-08-19 RX ORDER — FUROSEMIDE 20 MG/1
10 TABLET ORAL DAILY
COMMUNITY
End: 2020-08-24 | Stop reason: HOSPADM

## 2020-08-19 RX ORDER — DOCUSATE SODIUM 100 MG/1
100 CAPSULE, LIQUID FILLED ORAL 2 TIMES DAILY
Status: DISCONTINUED | OUTPATIENT
Start: 2020-08-19 | End: 2020-08-24 | Stop reason: HOSPADM

## 2020-08-19 RX ORDER — POTASSIUM CHLORIDE 750 MG/1
10 TABLET, FILM COATED, EXTENDED RELEASE ORAL DAILY
Status: ON HOLD | COMMUNITY
End: 2020-08-24 | Stop reason: SDUPTHER

## 2020-08-19 RX ADMIN — APIXABAN 2.5 MG: 2.5 TABLET, FILM COATED ORAL at 08:44

## 2020-08-19 RX ADMIN — OXYCODONE HYDROCHLORIDE 10 MG: 5 TABLET ORAL at 23:15

## 2020-08-19 RX ADMIN — OXYCODONE HYDROCHLORIDE 10 MG: 5 TABLET ORAL at 06:43

## 2020-08-19 RX ADMIN — ATORVASTATIN CALCIUM 40 MG: 40 TABLET, FILM COATED ORAL at 21:48

## 2020-08-19 RX ADMIN — ROPIVACAINE HYDROCHLORIDE 8 ML/HR: 2 INJECTION, SOLUTION EPIDURAL; INFILTRATION at 23:15

## 2020-08-19 RX ADMIN — OXYCODONE HYDROCHLORIDE 10 MG: 5 TABLET ORAL at 00:19

## 2020-08-19 RX ADMIN — POLYETHYLENE GLYCOL 3350 17 G: 17 POWDER, FOR SOLUTION ORAL at 12:15

## 2020-08-19 RX ADMIN — POLYETHYLENE GLYCOL 3350 17 G: 17 POWDER, FOR SOLUTION ORAL at 21:48

## 2020-08-19 RX ADMIN — ACETAMINOPHEN 1000 MG: 500 TABLET, FILM COATED ORAL at 21:48

## 2020-08-19 RX ADMIN — APIXABAN 5 MG: 5 TABLET, FILM COATED ORAL at 21:48

## 2020-08-19 RX ADMIN — METOPROLOL TARTRATE 25 MG: 25 TABLET, FILM COATED ORAL at 14:36

## 2020-08-19 RX ADMIN — METOPROLOL TARTRATE 25 MG: 25 TABLET, FILM COATED ORAL at 06:43

## 2020-08-19 RX ADMIN — DOCUSATE SODIUM 100 MG: 100 CAPSULE, LIQUID FILLED ORAL at 21:48

## 2020-08-19 RX ADMIN — SODIUM CHLORIDE, PRESERVATIVE FREE 10 ML: 5 INJECTION INTRAVENOUS at 21:49

## 2020-08-19 RX ADMIN — METHIMAZOLE 5 MG: 5 TABLET ORAL at 12:14

## 2020-08-19 RX ADMIN — ROPIVACAINE HYDROCHLORIDE 8 ML/HR: 2 INJECTION, SOLUTION EPIDURAL; INFILTRATION at 12:17

## 2020-08-19 RX ADMIN — ACETAMINOPHEN 1000 MG: 500 TABLET, FILM COATED ORAL at 14:35

## 2020-08-19 RX ADMIN — DOCUSATE SODIUM 100 MG: 100 CAPSULE, LIQUID FILLED ORAL at 12:14

## 2020-08-19 RX ADMIN — DILTIAZEM HYDROCHLORIDE 240 MG: 240 CAPSULE, COATED, EXTENDED RELEASE ORAL at 08:44

## 2020-08-19 RX ADMIN — ASPIRIN 81 MG: 81 TABLET, COATED ORAL at 12:14

## 2020-08-19 RX ADMIN — METOPROLOL TARTRATE 25 MG: 25 TABLET, FILM COATED ORAL at 21:48

## 2020-08-19 RX ADMIN — OXYCODONE HYDROCHLORIDE 10 MG: 5 TABLET ORAL at 17:30

## 2020-08-19 RX ADMIN — FINASTERIDE 5 MG: 5 TABLET, FILM COATED ORAL at 08:44

## 2020-08-19 RX ADMIN — ACETAMINOPHEN 1000 MG: 500 TABLET, FILM COATED ORAL at 06:43

## 2020-08-19 RX ADMIN — OXYCODONE HYDROCHLORIDE 5 MG: 5 TABLET ORAL at 14:36

## 2020-08-19 RX ADMIN — NICOTINE 1 PATCH: 21 PATCH, EXTENDED RELEASE TRANSDERMAL at 08:45

## 2020-08-19 RX ADMIN — OXYCODONE HYDROCHLORIDE 10 MG: 5 TABLET ORAL at 12:14

## 2020-08-19 NOTE — PROGRESS NOTES
[facility]    Acute pain service Inpatient Progress Note    Patient Name: Zak Olmstead  :  1940  MRN:  [id number]        Acute Pain  Service Inpatient Progress Note:    Analgesia:Good  LOC: alert and awake  Resp Status: room air  Cardiac: VS stable  Side Effects:None  Catheter Site:clean, dressing intact and dry  Cath type: peripheral nerve cath with ON Q  Infusion rate: 8ml/hr  Catheter Plan:Catheter to remain Insitu and Continue catheter infusion rate unchanged  Comments: ---------------------------------------------------------------------------------  Physical Therapy Manual Muscle Testing Results:  MMT (Manual Muscle Testing)  General MMT Comments: RLE grossly 3/5; limited formal assessment due to pain (20 1035)]    Physical Therapy - Plan of Care Review - Outcome Summary:  Outcome Summary: Pt. limited by L hip pain and weakness.  Pt. performed bed mobility w/ mod-max A x 2 and was able to scoot towards HOB w/ max A, w/ fair buttocks clearance.  Pt. u/a to attempt transfer d/t pain.  Provided educ. re: benefits of mobility and therapy and PT continues to recommend IPR at D/C. (20 0303)]    Occupational Therapy - Plan of Care Review - Outcome Summary:  Outcome Summary: Pt alert, post pain level 10/10 left generalized hip. Pt transitioned supine-to-sit mod assist x2, maintained static sitting balance EOB x 25 minutes. He transitioned sit-to-supine with max assist x2 and rolled R of midline with max assist x2. Long discussion with pt and spouse re: DC plans. Recommend IPR. (20 2972)]  ----------------------------------------------------------------------------------    Catheter to be D/C'd tomorrow 20.

## 2020-08-19 NOTE — PROGRESS NOTES
Continued Stay Note   [facility]     Patient Name: Zak Olmstead  MRN: [id number]  Today's Date: 8/19/2020    Admit Date: 8/15/2020    Discharge Plan     Row Name 08/19/20 1513       Plan    Plan  Pending    Plan Comments  CM received call from [facility]. They have declined patient for HH due to patient's PCP being [facility]. Referral made to [facility], spoke with Della. They have declined patient for HH due to being out of network with patient's insurance and patient states he is unable to afford out of pocket visits. CM spoke with patient at bedside. No family in room. Patient is now agreeable to IP rehab. Patient states he would like a referral to [facility]. Patient states if he is not able to go to [facility] for rehab he would like to return home @ discharge. CM called  [facility], spoke with Dodie, she will review referral in am. Patient will likely need ambulance transport. CM will continue to follow for any discharge planning needs.     Final Discharge Disposition Code  30 - still a patient            Expected Discharge Date and Time     Expected Discharge Date Expected Discharge Time    Aug 19, 2020             Carolin Westfall RN

## 2020-08-19 NOTE — DISCHARGE PLACEMENT REQUEST
"Meir Preciado (80 y.o. Male)     Carolin Westfall RN  P\" [phone]    Date of Birth Social Security Number Address Home Phone MRN    1940  [address] [facility]  [place] [address] [phone] [id number]    [place] Marital Status          None        Admission Date Admission Type Admitting Provider Attending Provider Department, Room/Bed    8/15/20 Urgent Bossman Suarez MD Schnell, Aaron, MD [facility] 3G, S364/1    Discharge Date Discharge Disposition Discharge Destination                       Attending Provider:  Bossman Suarez MD    Allergies:  No Known Allergies    Isolation:  None   Infection:  None   Code Status:  CPR    Ht:  172.2 cm (67.8\")   Wt:  76 kg (167 lb 8.8 oz)    Admission Cmt:  None   Principal Problem:  Closed left hip fracture, s/p repair 2020 [S72.002A]                 Active Insurance as of 8/15/2020     Primary Coverage     Payor Plan Insurance Group Employer/Plan Group    HUMANA MEDICARE REPLACEMENT HUMANA MEDICARE REPLACEMENT [id number]     Payor Plan Address Payor Plan Phone Number Payor Plan Fax Number Effective Dates    [address] [address] [phone]  2018 - None Entered    [facility] [id number]       Subscriber Name Subscriber Birth Date Member ID       MEIR PRECIADO 1940 [id number]                 Emergency Contacts      (Rel.) Home Phone Work Phone Mobile Phone    Mirna Preciado (Spouse) [phone]    Low Preciado (Son) -- -- [phone]    Kassidy Ayon [phone] -- --            Insurance Information                HUMANA MEDICARE REPLACEMENT/HUMANA MEDICARE REPLACEMENT Phone: [phone]    Subscriber: PreciadoMeir Subscriber#: [id number]    Group#: [id number] Precert#: [id number]             History & Physical      Aurelio Burroughs MD at 08/15/20 2210              [facility] Medicine Services  HISTORY AND PHYSICAL    Patient Name: Meir Preciado  : " 1940  MRN: [id number]  Primary Care Physician: Ely Berumen APRN  Date of admission: 8/15/2020      Subjective   Subjective     Chief Complaint:  Fall     HPI:  Zak Olmstead is a 80 y.o. male w/ a hx of COPD, chronic respiratory failure on 2 liters PRN, chronic atrial fibrillation (BB, dilt and Eliquis), hyperthyroidism, HTN, CAD, PVD, remote left BKA and severe aortic stenosis who was transferred from [facility] for further evaluation/tx of a left hip fracture.  Pt originally presented to the OSH ED w/ c/o a fall. Pt c/o falling off of his porch 1 week ago (8/7/2020). Since falling he has had ongoing left hip pain. XRAYS c/w left hip fracture. Ortho was consulted and recommended operative mgt of the fracture.  Due to pt's extensive medical hx, Cardiology was also consulted for OR clearance. Pt had an ECHO done @ the OSH that was c/w severe aortic stenosis. Both teams felt that w/ the increased risk and pt's hx that pt needed further cardiac evaluation prior to surgical intervention. Pt was transferred to [facility] for further evaluation.   In addition to hip pain, pt c/o dysuria chronically. Pt also c/o chronic shortness of breath w/ PRN oxygen.       Review of Systems   Constitutional: Negative.  Negative for chills, fatigue and fever.   HENT: Negative.  Negative for congestion, postnasal drip, rhinorrhea, sinus pressure, sinus pain, sneezing, sore throat and trouble swallowing.    Eyes: Negative.  Negative for visual disturbance.   Respiratory: Negative.  Negative for cough and shortness of breath.    Cardiovascular: Negative.  Negative for chest pain, palpitations and leg swelling.   Gastrointestinal: Negative.  Negative for abdominal pain, blood in stool, constipation, diarrhea, nausea and vomiting.   Genitourinary: Negative.  Negative for decreased urine volume, difficulty urinating, dysuria, flank pain, frequency, hematuria and urgency.   Musculoskeletal:        Left hip pain after fall.     Skin: Negative.  Negative for color change, pallor, rash and wound.   Neurological: Negative.  Negative for dizziness, tremors, seizures, syncope, facial asymmetry, speech difficulty, weakness, light-headedness, numbness and headaches.   Hematological: Negative.  Does not bruise/bleed easily.   Psychiatric/Behavioral: Negative.  Negative for confusion.   All other systems reviewed and are negative.       Personal History     Past Medical History:   Diagnosis Date   • AAA (abdominal aortic aneurysm) (CMS/HCC)     s/p repair   • Atrial fibrillation with rapid ventricular response (CMS/HCC) 6/1/2018   • BPH (benign prostatic hyperplasia)    • BPH with obstruction/lower urinary tract symptoms 2/12/2020   • CAD (coronary artery disease)    • CAD (coronary artery disease) 8/15/2020   • Chronic atrial fibrillation (CMS/HCC)    • Chronic respiratory failure (CMS/HCC)    • COPD (chronic obstructive pulmonary disease) (CMS/HCC)    • Dysuria 2/12/2020   • Erectile dysfunction 2/12/2020   • Heart murmur    • Hypertension    • Hyperthyroidism    • Mitral annular calcification 9/24/2018   • Mononeuritis multiplex    • Nonrheumatic aortic valve stenosis 9/24/2018   • NSTEMI (non-ST elevated myocardial infarction) (CMS/HCC) 2018   • PVD (peripheral vascular disease) (CMS/HCC)        Past Surgical History:   Procedure Laterality Date   • ABDOMINAL AORTIC ANEURYSM REPAIR     • APPENDECTOMY     • BACK SURGERY     • BELOW KNEE LEG AMPUTATION Left    • CARDIAC CATHETERIZATION N/A 6/1/2018    Procedure: Left Heart Cath;  Surgeon: Derik Vega MD;  Location: [facility] INVASIVE LOCATION;  Service: Cardiovascular   • COLONOSCOPY N/A 6/15/2017    Procedure: COLONOSCOPY  CPTCODE:57429;  Surgeon: Lincoln Bowens III, MD;  Location: [facility];  Service:    • ENDOSCOPY N/A 6/15/2017    Procedure: ESOPHAGOGASTRODUODENOSCOPY WITH BIOPSY  CPTCODE:01673;  Surgeon: Lincoln Bowens III, MD;  Location: [facility];  Service:    • ESOPHAGEAL  DILATATION     • INTERVENTIONAL RADIOLOGY PROCEDURE Left 6/1/2018    Procedure: Abdominal Aortagram with Runoff;  Surgeon: Derik Vega MD;  Location: [facility] INVASIVE LOCATION;  Service: Cardiovascular       Family History: family history includes Heart attack in his brother; Heart disease in his mother; Heart failure in his father; Stroke in his brother. Otherwise pertinent FHx was reviewed and unremarkable.     Social History:  reports that he has been smoking cigarettes. He has a 30.00 pack-year smoking history. He has never used smokeless tobacco. He reports that he does not drink alcohol or use drugs.  Social History     Social History Narrative   • Not on file       Medications:  Available home medication information reviewed.  Medications Prior to Admission   Medication Sig Dispense Refill Last Dose   • dilTIAZem CD (CARDIZEM CD) 240 MG 24 hr capsule Take 1 capsule by mouth Daily. 30 capsule 5 8/14/2020 at Unknown time   • finasteride (PROSCAR) 5 MG tablet Take 1 tablet by mouth Daily. 90 tablet 3 8/14/2020 at Unknown time   • ipratropium-albuterol (DUO-NEB) 0.5-2.5 mg/3 ml nebulizer Take 3 mL by nebulization Every 4 (Four) Hours As Needed for Wheezing.   Unknown at Unknown time   • methIMAzole (TAPAZOLE) 5 MG tablet Take 5 mg by mouth Daily.   8/14/2020 at 1100   • metoprolol succinate XL (TOPROL-XL) 25 MG 24 hr tablet Take 1 tablet by mouth Daily. 30 tablet 3 8/14/2020 at Unknown time   • naproxen (NAPROSYN) 500 MG tablet Take 500 mg by mouth Daily As Needed for Mild Pain .   Unknown at Unknown time   • nitroglycerin (NITROSTAT) 0.4 MG SL tablet Place 0.4 mg under the tongue Every 5 (Five) Minutes As Needed for Chest Pain. Take no more than 3 doses in 15 minutes.   Unknown at Unknown time   • oxyCODONE (ROXICODONE) 10 MG tablet Take 10 mg by mouth 4 (Four) Times a Day.   8/14/2020 at 1100       No Known Allergies    Objective   Objective     Vital Signs:   Temp:  [97.4 °F (36.3 °C)-98.4 °F (36.9 °C)]  97.8 °F (36.6 °C)  Heart Rate:  [] 98  Resp:  [18-20] 20  BP: (105-135)/(55-91) 127/73        Physical Exam   Constitutional: He is oriented to person, place, and time. He appears well-developed and well-nourished. No distress.   HENT:   Head: Normocephalic and atraumatic.   Eyes: Pupils are equal, round, and reactive to light. EOM are normal.   Neck: Normal range of motion. Neck supple.   Cardiovascular: Normal rate and intact distal pulses. An irregular rhythm present. Exam reveals no gallop and no friction rub.   Murmur heard.  Pulmonary/Chest: Effort normal. No stridor. No respiratory distress. He has no rales. He exhibits no tenderness.   Diminished breath sounds bilateral bases. Faint expiratory wheeze.    Abdominal: Soft. Bowel sounds are normal. He exhibits no distension. There is no tenderness.   Musculoskeletal:   Acute left hip fracture.   Left BKA.    Neurological: He is alert and oriented to person, place, and time. No cranial nerve deficit.   Skin: Skin is warm and dry. Capillary refill takes more than 3 seconds. No rash noted. He is not diaphoretic. No erythema. No pallor.   Diminished pulses RLE.    Psychiatric: He has a normal mood and affect. His behavior is normal. Judgment and thought content normal.   Nursing note and vitals reviewed.       Results Reviewed:  I have personally reviewed current lab and radiology data.    Results from last 7 days   Lab Units 08/14/20  1420   WBC 10*3/mm3 7.96   HEMOGLOBIN g/dL 9.8*   HEMATOCRIT % 34.6*   PLATELETS 10*3/mm3 218     Results from last 7 days   Lab Units 08/15/20  0850 08/14/20  1420   SODIUM mmol/L 140 140   POTASSIUM mmol/L 4.5 4.0   CHLORIDE mmol/L 106 104   CO2 mmol/L 24.9 25.0   BUN mg/dL 21 23   CREATININE mg/dL 0.93 0.93   GLUCOSE mg/dL 111* 131*   CALCIUM mg/dL 8.1* 8.7   ALT (SGPT) U/L  --  25   AST (SGOT) U/L  --  30   TROPONIN T ng/mL  --  <0.010     Estimated Creatinine Clearance: 64.2 mL/min (by C-G formula based on SCr of 0.93  mg/dL).  Brief Urine Lab Results  (Last result in the past 365 days)      Color   Clarity   Blood   Leuk Est   Nitrite   Protein   CREAT   Urine HCG        08/14/20 1636 Veena  Comment:  Dipstick results may be inaccurate due to color interference.  Clear Negative Trace Positive 30 mg/dL (1+)             Imaging Results (Last 24 Hours)     ** No results found for the last 24 hours. **        Results for orders placed during the hospital encounter of 08/14/20   Adult Transthoracic Echo Complete W/ Cont if Necessary Per Protocol    Narrative · Left ventricular wall thickness is consistent with mild concentric   hypertrophy.  · Left ventricular systolic function is normal, EF 56-60%.  · Left ventricular diastolic dysfunction (grade I a) consistent with   impaired relaxation.  · Left atrial cavity size is mild-to-moderately dilated.  · There is severe calcification of the aortic valve mainly affecting the   non, left and right coronary cusp(s).  · Mild aortic valve regurgitation is present.  · Severe aortic valve stenosis is present.  · Aortic valve mean pressure gradient is 46.0 mmHg.  · Aortic valve maximum pressure gradient is 90.0 mmHg.  · Moderate mitral valve regurgitation is present  · Moderate to severe tricuspid valve regurgitation is present.  · Calculated right ventricular systolic pressure from tricuspid   regurgitation is 77 mmHg.          Assessment/Plan   Assessment & Plan     Active Hospital Problems    Diagnosis POA   • **Closed left hip fracture (CMS/[facility]) [S72.002A] Yes   • Chronic atrial fibrillation (CMS/[facility]) [I48.20] Yes   • Acute UTI (urinary tract infection) [N39.0] Yes   • HTN (hypertension) [I10] Yes   • CAD (coronary artery disease) [I25.10] Yes   • COPD (chronic obstructive pulmonary disease) (CMS/[facility]) [J44.9] Yes   • Chronic respiratory disease [J98.9] Yes   • Peripheral vascular disease (CMS/HCC) [I73.9] Yes       Assessment & Plan    **Left hip fracture  -hip xray obtained @ OSH  -NPO after  midnight   -baseline pre-op labs/EKG, type/screen- in am  -last dose of Apixaban was on morning of admission to OSH 8/14/2020  -symptom mgt   -consult Ortho to see in am (Dr. Aponte)  -CT surgery consulted to see in am (Dr. Cabrera); for pre-op clearance, severe aortic stenosis     **Acute UTI  -blood cultures pending  -UA @ OSH c/w acute UTI; culture ordered  -IV Rocephin started   -bladder scan q 4hrs     **Aortic stenosis  -ECHO done @ OSH; EF 56-60% w/ grade I a diastolic dysfunction and severe aortic stenosis; RVSP 77mmHg  -CT surgery consulted to see in am     **Chronic atrial fibrillation   -routine Apixaban held since morning of 8/14/2020  -EKG pending; rate controlled  -routine Cardizem and Toprol continued     **HTN, CAD, hyperthyroidism  -continue routine tapazole  -continue routine Toprol   -PRN nitro continued    **COPD, chronic respiratory failure   -CXR @ OSH stable   -PRN oxygen; 2 liters at home as needed  -PRN duo nebs continued     **PVD  -remote left BKA  -pulses on RLE weak; NV/doppler checks ordered       DVT prophylaxis:  Mechanical       CODE STATUS:    Code Status and Medical Interventions:   Ordered at: 08/15/20 2229     Level Of Support Discussed With:    Patient     Code Status:    CPR     Medical Interventions (Level of Support Prior to Arrest):    Full       Admission Status:  I believe this patient meets INPATIENT status due to need for treatment of his hip fracture, severe left ear.  I feel patient’s risk for adverse outcomes and need for care warrant INPATIENT evaluation and I predict the patient’s care encounter to likely last beyond 2 midnights.      Electronically signed by ALICIA Elena, 08/15/20, 10:10 PM.    Brief Attending Admission Attestation     I have seen and examined the patient, performing an independent face-to-face diagnostic evaluation with plan of care reviewed and developed with the advanced practice clinician (APC).         Vitals:    08/15/20 2154   BP:  127/73   Pulse: 98   Resp: 20   Temp: 97.8 °F (36.6 °C)   SpO2: 99%-2 L nasal cannula       Attending Physical Exam:  RS-poor effort, decreased air entry at the bases,  CVS- s1s2 irregular, murmur in all valvular areas  ABD- soft, non tender, not distended, no organomegaly.  EXT-left BKA, right extremity-poorly palpable pulses, cool to touch, poor capillary refill,  NEURO- AAO-3, no focal defecit.      Old records reviewed and summarized in PM hx.    Brief Summary Statement:   80 years old male transferred from [facility]- for evaluation of aortic stenosis/lefthip fracture -secondary to fall (3 days back).  Patient had episode of A. fib-RVR-on 8/15/2020-a.m.-responded to beta-blocker.  Patient is very limited in physical activity secondary to BKA, denies any episodes of chest pain, no complaint of lightheadedness or syncope recently, does complain of chronic dyspnea, no PND or orthopnea symptoms, also complains of chronic cough-with whitish sputum production.  Does complain of pain with any movement of his left hip.    COVID-19-severe test negative on 8/15/2020.    Remainder of detailed HPI is as noted above and has been reviewed and/or edited by me for completeness.    PM HX:  Past Medical History:   Diagnosis Date   • AAA (abdominal aortic aneurysm)-status post repair        • Atrial fibrillation with rapid ventricular response (-CHADS2 VASC=4)  -On Eliquis, metoprolol 50/3 times daily, Cardizem 240 mg p.o. daily, 6/1/2018   • BPH (benign prostatic hyperplasia)      2/12/2020   • CAD (coronary artery disease)-    • COPD (chronic obstructive pulmonary disease)-on 2 L nasal cannula  -Still active smoker    •  Severe aortic stenosis, moderate MR/chronic CHF-as per discharge summary,-echo- on 8/15/2020-EF of 56%, grade 1 diastolic dysfunction, severe left ear, aortic valve pressure gradient 46 mmHg, moderate MR, RVSP 55 mmHg.  -Lasix on hold      • Hypertension  Hyperlipidemia- pravastatin 10 mg p.o.  "daily      • Hyperthyroidism-methimazole 5 mg p.o. daily       GERD/esophageal dilatation 2018   • Mononeuritis multiplex/HX left BKA/history of back surgery      • PVD (peripheral vascular disease) (left BKA)-status post femorofemoral bypass             LABS:  EKG A. fib-122 bpm, Q waves in lead III, ,  Cardiac cath- 2018- showed chronic total occlusion of mid RCA-with heavy collateral filling, mild disease in left coronary system.-Was recommended dual antiplatelet therapy    Brief Assessment/Plan :  CT surgery evaluation-for severe aortic stenosis.  Cardiology consult- for chronic A. fib,   repeat CBC, anemic with hemoglobin of 10 at the external ED, no complaint of acute bleeding other than bruising secondary to hip fracture, patient's hemoglobin has been dropping from 11-10-9, therefore did not start patient on any anticoagulation.  Orthopedic consult.    See above for further detailed assessment and plan developed with APC which I have reviewed and/or edited for completeness.    Electronically signed by Aurelio Burroughs MD, 08/15/20, 10:32 PM.        Electronically signed by Aurelio Burroughs MD at 20 0612          Physician Progress Notes (last 24 hours) (Notes from 20 0908 through 20 0908)      Edmar Aponte MD at 20 0703            SUBJECTIVE  Patient doing well.  Pain well controlled.  No events overnight.    OBJECTIVE  Temp (24hrs), Av.5 °F (36.4 °C), Min:97.1 °F (36.2 °C), Max:97.9 °F (36.6 °C)    Blood pressure 123/78, pulse 101, temperature 97.9 °F (36.6 °C), temperature source Oral, resp. rate 18, height 172.2 cm (67.8\"), weight 76 kg (167 lb 8.8 oz), SpO2 98 %.    Lab Results (last 24 hours)     Procedure Component Value Units Date/Time    Blood Culture - Blood, Hand, Right [[id number]] Collected:  20 0127    Specimen:  Blood from Hand, Right Updated:  20 033     Blood Culture No growth at 3 days    Blood Culture - Blood, Arm, Right " [[id number]] Collected:  08/16/20 0127    Specimen:  Blood from Arm, Right Updated:  08/19/20 0330     Blood Culture No growth at 3 days    POC Glucose Once [[id number]]  (Normal) Collected:  08/18/20 1741    Specimen:  Blood Updated:  08/18/20 1752     Glucose 117 mg/dL     POC Glucose Once [[id number]]  (Normal) Collected:  08/18/20 1147    Specimen:  Blood Updated:  08/18/20 1201     Glucose 127 mg/dL     Basic Metabolic Panel [[id number]]  (Abnormal) Collected:  08/18/20 0525    Specimen:  Blood Updated:  08/18/20 0816     Glucose 99 mg/dL      BUN 21 mg/dL      Creatinine 0.87 mg/dL      Sodium 137 mmol/L      Potassium 4.6 mmol/L      Comment: Specimen hemolyzed.  Results may be affected.        Chloride 105 mmol/L      CO2 17.0 mmol/L      Calcium 7.8 mg/dL      eGFR Non African Amer 84 mL/min/1.73      BUN/Creatinine Ratio 24.1     Anion Gap 15.0 mmol/L     Narrative:       GFR Normal >60  Chronic Kidney Disease <60  Kidney Failure <15              PHYSICAL EXAM  Left lower extremity: Dressing clean, dry and intact.  Mild pain with logroll of hip.  Sensation intact distally.          Closed left hip fracture, s/p repair 8/17/2020    Peripheral vascular disease (CMS/HCC)    Chronic atrial fibrillation (CMS/HCC)    Acute UTI (urinary tract infection)    HTN (hypertension)    CAD (coronary artery disease)    COPD (chronic obstructive pulmonary disease) (CMS/[facility])    Chronic respiratory disease    Aortic stenosis    S/p left hip fracture      PLAN / DISPOSITION:  2 Day Post-Op nail fixation left intertrochanteric hip fracture    None weight bearing left lower extremity, hip range of motion as tolerated  Pain control  PT/OT for post op mobilization and medical equipment needs   SCD's bilateral lower extremities   DVT prophylaxis per primary team  Social work for discharge planning.  Okay to discharge from orthopedic standpoint.  Dressing to remain in place for 7 days. May remove on POD#7. If no drainage, may shower on  POD#10. No submerging wound in water. If drainage is noted, sterile dressing should be placed and wound checked daily. No showering until wound has remained dry for 72 consecutive hours.   Follow up in 2 weeks for re-assessment.      Edmar Aponte MD  08/19/20  07:03           Electronically signed by Edmar Aponte MD at 08/19/20 0704     Jordan Marmolejo MD at 08/18/20 3859          Pulmonary/Critical Care Follow-up     LOS: 3 days   Patient Care Team:  Ely Berumen APRN as PCP - General (Nurse Practitioner)    Chief Complaint / reason : Hip fracture/medical management of multiple issues postoperatively including aortic stenosis, atrial fibrillation, tobacco abuse, glycemic and electrolyte management.      Subjective      History of hospitalization (as of ICU admission postoperatively on 8/17/2020):    This is an 80-year-old gentleman admitted to the hospital the diagnosis of traumatic left hip fracture.  He has multiple underlying medical problems including severe aortic stenosis, chronic atrial fibrillation, and peripheral vascular disease.  He underwent gamma nail repair of his left hip fracture today.  Due to his multiple medical problems and somnolence after surgery, he has been brought to the intensive care unit for close monitoring.  Upon arrival, he is hemodynamically stable.  He is awake although is drifting off to sleep very easily when not stimulated.  He is oriented to person only.  Speech is fluent.    (End of copied text)    Interval History:     Patient reports he feels better this morning than prior to surgery.  No fevers or chills.  No nausea or vomiting.  No chest pain or significant dyspnea.  Family is at his bedside.    History taken from: Patient.    PMH/FH/Social History were reviewed and updated appropriately in the electronic medical record.     Review of Systems:    Review of 14 systems was completed with positives and pertinent negatives noted in the subjective section.   All other systems reviewed and are negative.       Objective     Vital Signs  Temp:  [97.1 °F (36.2 °C)-98.1 °F (36.7 °C)] 97.3 °F (36.3 °C)  Heart Rate:  [] 94  Resp:  [16-20] 18  BP: (104-170)/() 131/77  Arterial Line BP: ()/() 88/51  08/17 0701 - 08/18 0700  In: 614.9 [P.O.:300; I.V.:30]  Out: 350 [Urine:200]  Body mass index is 25.63 kg/m².     IV drips:    ropivacaine (NAROPIN) 0.2% peripheral nerve cath (moog) Last Rate: 8 mL/hr (08/17/20 2000)      Physical Exam:     Constitutional:   Alert, cooperative, in no acute distress   Head:   Normocephalic, without obvious abnormality, atraumatic   Eyes:           Lids and lashes normal, conjunctivae and sclerae normal.  No icterus, no pallor, corneas clear, PER   ENMT:  Ears appear intact with no abnormalities noted     No oral lesions, no thrush, oral mucosa moist   Neck:  No adenopathy, supple, trachea midline, no thyromegaly, no JVD   Lungs/Resp:    Normal effort, symmetric chest rise, no crepitus, clear to      auscultation bilaterally, no chest wall tenderness                Heart/CV:   Regular rhythm and normal rate, normal S1 and S2, grade 5 out of 6 right upper sternal border crescendo decrescendo murmur.   Abdomen/GI:    Normal bowel sounds, no masses, no organomegaly, soft        non-tender, non-distended   :    Deferred   Extremities/MSK:  No clubbing or cyanosis.  Nicotine staining fingers of right hand.  Status post right below knee amputation.      Pulses:  Pulses palpable and equal bilaterally   Skin:  No bleeding, bruising or rash   Heme/Lymph:  No cervical or supraclavicular adenopathy.   Neurologic:    Psychiatric:    Moves all extremities with no obvious focal motor deficit.  Cranial nerves 2 - 12 grossly intact  Oriented x 3, normal affect.    The above physical exam findings were reviewed and reflect my exam findings as of today's exam.    Jordan Marmolejo MD 08/18/20 16:58     Results Review:     I reviewed the  patient's new clinical results.   Results from last 7 days   Lab Units 08/18/20  0525 08/17/20  0824 08/16/20  0127  08/14/20  1420   SODIUM mmol/L 137 139 138   < > 140   POTASSIUM mmol/L 4.6 4.1 3.8   < > 4.0   CHLORIDE mmol/L 105 107 103   < > 104   CO2 mmol/L 17.0* 25.0 26.0   < > 25.0   BUN mg/dL 21 19 21   < > 23   CREATININE mg/dL 0.87 0.76 1.02   < > 0.93   CALCIUM mg/dL 7.8* 8.1* 8.3*   < > 8.7   BILIRUBIN mg/dL  --   --  3.1*  --  3.0*   ALK PHOS U/L  --   --  102  --  109   ALT (SGPT) U/L  --   --  28  --  25   AST (SGOT) U/L  --   --  24  --  30   GLUCOSE mg/dL 99 126* 108*   < > 131*    < > = values in this interval not displayed.     Results from last 7 days   Lab Units 08/18/20  0525 08/17/20  0824 08/16/20  0127   WBC 10*3/mm3 10.63 8.70 8.60   HEMOGLOBIN g/dL 8.1* 8.8* 8.9*   HEMATOCRIT % 29.2* 30.9* 31.7*   PLATELETS 10*3/mm3 269 235 204         Results from last 7 days   Lab Units 08/16/20  0127 08/15/20  0850   MAGNESIUM mg/dL 2.5* 2.2       I reviewed the patient's new imaging including images and reports.  X-ray knee 8/15/2020:  IMPRESSION:  Degenerative and chronic changes left knee. No acute findings  radiographically evident.    X-ray hip 8/14/2020:  IMPRESSION:  Left intertrochanteric fracture.    Medication Review:     acetaminophen 1,000 mg Oral Q8H   apixaban 2.5 mg Oral Q12H   atorvastatin 40 mg Oral Nightly   cefTRIAXone 1 g Intravenous Q24H   dilTIAZem  mg Oral Daily   finasteride 5 mg Oral Daily   methIMAzole 5 mg Oral Daily   metoprolol tartrate 25 mg Oral Q8H   nicotine 1 patch Transdermal Q24H   oxyCODONE 10 mg Oral 4x Daily   sodium chloride 10 mL Intravenous Q12H       ropivacaine (NAROPIN) 0.2% peripheral nerve cath (moog) 8 mL/hr Last Rate: 8 mL/hr (08/17/20 2000)       Assessment/Plan         Closed left hip fracture, s/p repair 8/17/2020    Peripheral vascular disease (CMS/HCC)    Chronic atrial fibrillation (CMS/HCC)    Acute UTI (urinary tract infection)    HTN  (hypertension)    CAD (coronary artery disease)    COPD (chronic obstructive pulmonary disease) (CMS/[facility])    Chronic respiratory disease    Aortic stenosis    S/p left hip fracture    80 y.o. with multiple medical problems including severe aortic stenosis who is being considered for TAVR procedure, peripheral vascular disease, prior right below the knee amputation, hypertension, coronary artery disease, COPD, active tobacco abuse admitted with left hip fracture.  He was admitted to the intensive care unit post hip repair.  He has been evaluated by cardiothoracic surgery/TAVR team and he is not felt to be a candidate for surgery for his valve.    He was being treated for urinary tract infection present on admission however I see no significant evidence for this.  It does not appear that a urine culture was sent but he had 0-2 white blood cells which is inconsistent with UTI.  I will discontinue Rocephin.    Plan:  1.  Start Eliquis low-dose for now.  This can be increased to standard dosing for atrial fibrillation in a day or two.  I discussed the situation with Dr. Aponte who was a bit hesitant but in agreement to resume anticoagulation.  Patient is at risk for stroke given atrial fibrillation and DVT.  2.  Discontinue Rocephin.  3.  Discontinue arterial line.  4.  Okay to telemetry/hospitalist.  5.  Cardiology following.    Jordan Marmolejo MD  08/18/20  16:49      Level of Risk High due to:  illness with threat to life or bodily function including risk of progressive heart failure/cardiovascular collapse secondary to aortic stenosis, risk of DVT/stroke.      *. Please note that portions of this note were completed with [employer] - a voice recognition program.     Electronically signed by Jordan Marmolejo MD at 08/18/20 2857          Physical Therapy Notes (all)      Sherly Duncan, PT at 08/18/20 2089  Version 1 of 1         Problem: Patient Care Overview  Goal: Plan of Care Review  Outcome:  Ongoing (interventions implemented as appropriate)  Flowsheets (Taken 2020 0840)  Plan of Care Reviewed With: patient  Outcome Summary: PT initial evaluation completed for pt s/p L hip trochanteric nailing; remote L BKA. Pt presents with genearlized weakness, impaired balance, and decreased functional mobility. Pt required maxA x2 for bed mobility; limited by c/o severe L hip pain. Pt's decreased independence warrants PT skilled care. Recommend D/C to IP rehab facility.       Electronically signed by Sherly Duncan, PT at 20 1042     Sherly Duncan, PT at 20 0840  Version 1 of 1         Patient Name: Zak Olmstead  : 1940    MRN: [id number]                              Today's Date: 2020       Admit Date: 8/15/2020    Visit Dx: No diagnosis found.  Patient Active Problem List   Diagnosis   • Peripheral vascular disease (CMS/[facility])   • Closed left hip fracture, s/p repair 2020   • Chronic atrial fibrillation (CMS/[facility])   • Acute UTI (urinary tract infection)   • HTN (hypertension)   • CAD (coronary artery disease)   • COPD (chronic obstructive pulmonary disease) (CMS/[facility])   • Chronic respiratory disease   • Aortic stenosis   • S/p left hip fracture     Past Medical History:   Diagnosis Date   • AAA (abdominal aortic aneurysm) (CMS/[facility])     s/p repair   • Atrial fibrillation with rapid ventricular response (CMS/[facility]) 2018   • BPH (benign prostatic hyperplasia)    • BPH with obstruction/lower urinary tract symptoms 2020   • CAD (coronary artery disease)    • CAD (coronary artery disease) 8/15/2020   • Chronic atrial fibrillation (CMS/HCC)    • Chronic respiratory failure (CMS/HCC)    • COPD (chronic obstructive pulmonary disease) (CMS/HCC)    • Dysuria 2020   • Erectile dysfunction 2020   • Heart murmur    • Hypertension    • Hyperthyroidism    • Mitral annular calcification 2018   • Mononeuritis multiplex    • Nonrheumatic aortic valve stenosis  9/24/2018   • NSTEMI (non-ST elevated myocardial infarction) (CMS/[facility]) 2018   • PVD (peripheral vascular disease) (CMS/[facility])      Past Surgical History:   Procedure Laterality Date   • ABDOMINAL AORTIC ANEURYSM REPAIR     • APPENDECTOMY     • BACK SURGERY     • BELOW KNEE LEG AMPUTATION Left    • CARDIAC CATHETERIZATION N/A 6/1/2018    Procedure: Left Heart Cath;  Surgeon: Derik Vega MD;  [facility]  [facility];  Service: Cardiovascular   • COLONOSCOPY N/A 6/15/2017    Procedure: COLONOSCOPY  CPTCODE:72591;  Surgeon: Lincoln Bowens III, MD;  Location:  [facility] OR;  Service:    • ENDOSCOPY N/A 6/15/2017    Procedure: ESOPHAGOGASTRODUODENOSCOPY WITH BIOPSY  CPTCODE:77096;  Surgeon: Lincoln Bowens III, MD;  Location:  [facility] OR;  Service:    • ESOPHAGEAL DILATATION     • HIP TROCHANTERIC NAILING WITH INTRAMEDULLARY HIP SCREW Left 8/17/2020    Procedure: HIP TROCHANTERIC NAILING WITH INTRAMEDULLARY HIP SCREW, GAMMA NAIL LEFT;  Surgeon: Edmar Aponte MD;  Location:  [facility];  Service: Orthopedics;  Laterality: Left;   • INTERVENTIONAL RADIOLOGY PROCEDURE Left 6/1/2018    Procedure: Abdominal Aortagram with Runoff;  Surgeon: Derik Vega MD;  [facility]:  [facility];  Service: Cardiovascular     General Information     Row Name 08/18/20 1032          PT Evaluation Time/Intention    Document Type  evaluation  -KG     Mode of Treatment  physical therapy  -KG     Row Name 08/18/20 1032          General Information    Patient Profile Reviewed?  yes  -KG     Prior Level of Function  independent:;all household mobility;transfer;ADL's;dressing;bathing  -KG     Existing Precautions/Restrictions  (S) fall;oxygen therapy device and L/min;other (see comments) s/p L IM nailing with nerve cath; remote L BKA  -KG     Barriers to Rehab  medically complex  -KG     Row Name 08/18/20 1032          Relationship/Environment    Lives With  spouse  -KG     Row Name 08/18/20 1032           Resource/Environmental Concerns    Current Living Arrangements  home/apartment/condo  -KG     Row Name 08/18/20 1032          Home Main Entrance    Number of Stairs, Main Entrance  none ramp  -KG     Row Name 08/18/20 1032          Stairs Within Home, Primary    Number of Stairs, Within Home, Primary  none  -KG     Row Name 08/18/20 1032          Cognitive Assessment/Intervention- PT/OT    Orientation Status (Cognition)  oriented x 3  -KG     Row Name 08/18/20 1032          Safety Issues, Functional Mobility    Safety Issues Affecting Function (Mobility)  insight into deficits/self awareness;safety precaution awareness;safety precautions follow-through/compliance  -KG     Impairments Affecting Function (Mobility)  balance;coordination;endurance/activity tolerance;pain;postural/trunk control;range of motion (ROM);shortness of breath;strength  -KG       User Key  (r) = Recorded By, (t) = Taken By, (c) = Cosigned By    Initials Name Provider Type    KG Sherly Duncan, PT Physical Therapist        Mobility     Row Name 08/18/20 1033          Bed Mobility Assessment/Treatment    Bed Mobility Assessment/Treatment  scooting/bridging;supine-sit;sit-supine  -KG     Scooting/Bridging [place] (Bed Mobility)  maximum assist (25% patient effort);2 person assist;verbal cues  -KG     Supine-Sit [place] (Bed Mobility)  maximum assist (25% patient effort);2 person assist;verbal cues  -KG     Sit-Supine [place] (Bed Mobility)  maximum assist (25% patient effort);2 person assist;verbal cues  -KG     Assistive Device (Bed Mobility)  bed rails;draw sheet;head of bed elevated  -KG     Comment (Bed Mobility)  VC's for sequencing and technique. Pt required increased assistance at trunk and BLEs. Pt required min-modA for static sitting balance at EOB; demonstrated posterior and lateral lean to the R to unweight L hip.   -KG     Row Name 08/18/20 1033          Transfer Assessment/Treatment    Comment (Transfers)  Pt  declined any STS transfers due to c/o severe pain in hip with sitting EOB.   -KG     Row Name 08/18/20 1033          Sit-Stand Transfer    Sit-Stand [place] (Transfers)  unable to assess  -KG     Row Name 08/18/20 1033          Gait/Stairs Assessment/Training    [place] Level (Gait)  unable to assess  -KG     Comment (Gait/Stairs)  Ambulation deferred. Not appropriate to assess.   -KG       User Key  (r) = Recorded By, (t) = Taken By, (c) = Cosigned By    Initials Name Provider Type    KG Sherly Duncan N, PT Physical Therapist        Obj/Interventions     Row Name 08/18/20 1035          General ROM    GENERAL ROM COMMENTS  RLE WFL; L hip flexion and knee flex/ext limited by pain  -KG     Row Name 08/18/20 1035          MMT (Manual Muscle Testing)    General MMT Comments  RLE grossly 3/5; limited formal assessment due to pain  -KG     Row Name 08/18/20 1035          Therapeutic Exercise    Lower Extremity (Therapeutic Exercise)  LAQ (long arc quad), right  -KG     Exercise Type (Therapeutic Exercise)  AROM (active range of motion)  -KG     Position (Therapeutic Exercise)  seated  -KG     Sets/Reps (Therapeutic Exercise)  1/8  -KG     Row Name 08/18/20 1037 08/18/20 1035       Static Sitting Balance    Level of [place] (Unsupported Sitting, Static Balance)  --  moderate assist, 50 to 74% patient effort;2 person assist  -KG    Sitting Position (Unsupported Sitting, Static Balance)  --  sitting on edge of bed  -KG    Comment (Unsupported Sitting, Static Balance)  progressed to brief periods of [place] x2  -KG  --    Row Name 08/18/20 1035          Dynamic Sitting Balance    Level of [place], Reaches Outside Midline (Sitting, Dynamic Balance)  moderate assist, 50 to 74% patient effort;2 person assist  -KG     Sitting Position, Reaches Outside Midline (Sitting, Dynamic Balance)  sitting on edge of bed  -KG     Row Name 08/18/20 1035          Sensory Assessment/Intervention    Sensory General  Assessment  no sensation deficits identified  -KG     Row Name 08/18/20 1037          Light Touch Sensation Assessment    Left Lower Extremity: Light Touch Sensation Assessment  mild impairment, 75% or more correct responses  -KG     Right Lower Extremity: Light Touch Sensation Assessment  intact  -KG       User Key  (r) = Recorded By, (t) = Taken By, (c) = Cosigned By    Initials Name Provider Type    KG Sherly Duncan, PT Physical Therapist        Goals/Plan     Row Name 08/18/20 1039          Bed Mobility Goal 1 (PT)    Activity/Assistive Device (Bed Mobility Goal 1, PT)  sit to supine;supine to sit  -KG     [place] Level/Cues Needed (Bed Mobility Goal 1, PT)  minimum assist (75% or more patient effort)  -KG     Time Frame (Bed Mobility Goal 1, PT)  2 weeks  -KG     Progress/Outcomes (Bed Mobility Goal 1, PT)  goal ongoing  -KG     Row Name 08/18/20 1039          Transfer Goal 1 (PT)    Activity/Assistive Device (Transfer Goal 1, PT)  sit-to-stand/stand-to-sit;bed-to-chair/chair-to-bed;walker, rolling  -KG     [place] Level/Cues Needed (Transfer Goal 1, PT)  moderate assist (50-74% patient effort)  -KG     Time Frame (Transfer Goal 1, PT)  2 weeks  -KG     Progress/Outcome (Transfer Goal 1, PT)  goal ongoing  -KG       User Key  (r) = Recorded By, (t) = Taken By, (c) = Cosigned By    Initials Name Provider Type    KG Sherly Duncan, PT Physical Therapist        Clinical Impression     Row Name 08/18/20 1038          Pain Assessment    Additional Documentation  Pain Scale: Numbers Pre/Post-Treatment (Group)  -KG     Row Name 08/18/20 1038          Pain Scale: Numbers Pre/Post-Treatment    Pain Scale: Numbers, Pretreatment  8/10  -KG     Pain Scale: Numbers, Post-Treatment  8/10  -KG     Pain Location - Side  Left  -KG     Pain Location  hip  -KG     Pain Intervention(s)  Repositioned;Ambulation/increased activity  -KG     Row Name 08/18/20 1038          Physical Therapy Clinical Impression     Patient/Family Goals Statement (PT Clinical Impression)  return to PLOF  -KG     Criteria for Skilled Interventions Met (PT Clinical Impression)  yes;treatment indicated  -KG     Rehab Potential (PT Clinical Summary)  fair, will monitor progress closely  -KG     Row Name 08/18/20 1038          Vital Signs    Pre Systolic BP Rehab  108  -KG     Pre Treatment Diastolic BP  76  -KG     Post Systolic BP Rehab  127  -KG     Post Treatment Diastolic BP  82  -KG     Pretreatment Heart Rate (beats/min)  96  -KG     Posttreatment Heart Rate (beats/min)  106  -KG     Pre SpO2 (%)  95  -KG     O2 Delivery Pre Treatment  supplemental O2  -KG     Post SpO2 (%)  94  -KG     O2 Delivery Post Treatment  supplemental O2  -KG     Pre Patient Position  Supine  -KG     Intra Patient Position  Sitting  -KG     Post Patient Position  Supine  -KG     Row Name 08/18/20 1038          Positioning and Restraints    Pre-Treatment Position  in bed  -KG     Post Treatment Position  bed  -KG     In Bed  notified nsg;supine;call light within reach;encouraged to call for assist;exit alarm on;with family/caregiver;side rails up x3;RUE elevated;LUE elevated;LLE elevated  -KG       User Key  (r) = Recorded By, (t) = Taken By, (c) = Cosigned By    Initials Name Provider Type    KG Sherly Duncan, PT Physical Therapist        Outcome Measures     Row Name 08/18/20 1039          How much help from another person do you currently need...    Turning from your back to your side while in flat bed without using bedrails?  2  -KG     Moving from lying on back to sitting on the side of a flat bed without bedrails?  2  -KG     Moving to and from a bed to a chair (including a wheelchair)?  1  -KG     Standing up from a chair using your arms (e.g., wheelchair, bedside chair)?  1  -KG     Climbing 3-5 steps with a railing?  1  -KG     To walk in hospital room?  1  -KG     AM-PAC 6 Clicks Score (PT)  8  -KG     Row Name 08/18/20 1039          Functional  Assessment    Outcome Measure Options  AM-PAC 6 Clicks Basic Mobility (PT)  -KG       User Key  (r) = Recorded By, (t) = Taken By, (c) = Cosigned By    Initials Name Provider Type    Sherly Connor PT Physical Therapist        Physical Therapy Education                 Title: PT OT SLP Therapies (In Progress)     Topic: Physical Therapy (In Progress)     Point: Mobility training (In Progress)     Description:   Instruct learner(s) on safety and technique for assisting patient out of bed, chair or wheelchair.  Instruct in the proper use of assistive devices, such as walker, crutches, cane or brace.              Patient Friendly Description:   It's important to get you on your feet again, but we need to do so in a way that is safe for you. Falling has serious consequences, and your personal safety is the most important thing of all.        When it's time to get out of bed, one of us or a family member will sit next to you on the bed to give you support.     If your doctor or nurse tells you to use a walker, crutches, a cane, or a brace, be sure you use it every time you get out of bed, even if you think you don't need it.    Learning Progress Summary           Patient Acceptance, E, NR by KG at 8/18/2020 0840                   Point: Home exercise program (Not Started)     Description:   Instruct learner(s) on appropriate technique for monitoring, assisting and/or progressing patient with therapeutic exercises and activities.              Learner Progress:   Not documented in this visit.          Point: Body mechanics (In Progress)     Description:   Instruct learner(s) on proper positioning and spine alignment for patient and/or caregiver during mobility tasks and/or exercises.              Learning Progress Summary           Patient Acceptance, E, NR by KG at 8/18/2020 0840                   Point: Precautions (In Progress)     Description:   Instruct learner(s) on prescribed precautions during mobility  and gait tasks              Learning Progress Summary           Patient Acceptance, E, NR by KG at 8/18/2020 0840                               User Key     Initials Effective Dates Name Provider Type Discipline    KG 05/22/20 -  Sherly Duncan PT Physical Therapist PT              PT Recommendation and Plan  Planned Therapy Interventions (PT Eval): balance training, bed mobility training, strengthening, transfer training  Outcome Summary/Treatment Plan (PT)  Anticipated Discharge Disposition (PT): inpatient rehabilitation facility  Plan of Care Reviewed With: patient  Outcome Summary: PT initial evaluation completed for pt s/p L hip trochanteric nailing; remote L BKA. Pt presents with genearlized weakness, impaired balance, and decreased functional mobility. Pt required maxA x2 for bed mobility; limited by c/o severe L hip pain. Pt's decreased independence warrants PT skilled care. Recommend D/C to IP rehab facility.     Time Calculation:   PT Charges     Row Name 08/18/20 0840             Time Calculation    Start Time  0840  -KG      PT Received On  08/18/20  -KG      PT Goal Re-Cert Due Date  08/28/20  -KG        User Key  (r) = Recorded By, (t) = Taken By, (c) = Cosigned By    Initials Name Provider Type    KG Sherly Duncan, PT Physical Therapist        Therapy Charges for Today     Code Description Service Date Service Provider Modifiers Qty    67702788038 HC PT EVAL MOD COMPLEXITY 3 8/18/2020 Sherly Duncan, PT GP 1          PT G-Codes  Outcome Measure Options: AM-PAC 6 Clicks Basic Mobility (PT)  AM-PAC 6 Clicks Score (PT): 8  AM-PAC 6 Clicks Score (OT): 10    Soheila Duncan PT  8/18/2020         Electronically signed by Sherly Duncan, PT at 08/18/20 1042    [address] [address]  Phone:  [phone]  Fax:   Date: Aug 18, 2020      Ambulatory Referral to Home Health     Patient:  Zak Olmstead MRN:  [id number]      [address] [facility] [place] [address] :  1940  SSN:    Phone: [phone] Sex:  M      INSURANCE PAYOR PLAN GROUP # SUBSCRIBER ID   Primary:    [employer] MED[employer]ARE REPLACEMENT [id number]      Referring Provider Information:  INEZ FUNES Phone: [phone] Fax:       Referral Information:   # Visits:  1 Referral Type: Home Health [42]   Urgency:  Routine Referral Reason: Specialty Services Required   Start Date: Aug 18, 2020 End Date:  To be determined by Insurer   Diagnosis: Closed fracture of left hip, initial encounter (CMS/[facility]) (S72.002A [ICD-10-CM] 820.8 [ICD-9-CM])      Refer to Dept:   Refer to Provider:   Refer to Facility:       Face to Face Visit Date: 2020  Follow-up provider for Plan of Care? I treated the patient in an acute care facility and will not continue treatment after discharge.  Follow-up provider: JOSE MADRIGAL [[id number]]  Reason/Clinical Findings: left hip fracture  Describe mobility limitations that make leaving home difficult: impaired functional mobility, gait, balance and endurance  Nursing/Therapeutic Services Requested: Physical Therapy  Nursing/Therapeutic Services Requested: Occupational Therapy  PT orders: Therapeutic exercise  PT orders: Gait Training  PT orders: Transfer training  Weight Bearing Status: As Tolerated  Occupational orders: Activities of daily living  Occupational orders: Strengthening  Frequency: 1 Week 1     This document serves as a request of services and does not constitute Insurance authorization or approval of services.  To determine eligibility, please contact the members Insurance carrier to verify and review coverage.     If you have medical questions regarding this request for services. Please contact [address] at [phone] during normal business hours.       Verbal Order Mode: Verbal with readback   Authorizing Provider: Inez Funes MD  Authorizing Provider's NPI:  [id number]     Order Entered By: Emelia Torres RN 8/18/2020  2:34 PM     Electronically signed by: Jordan Marmolejo MD 8/18/2020  4:49 PM               Occupational Therapy Notes (last 24 hours) (Notes from 08/18/20 0909 through 08/19/20 0909)    No notes exist for this encounter.

## 2020-08-19 NOTE — PLAN OF CARE
"  Problem: Patient Care Overview  Goal: Plan of Care Review  Outcome: Ongoing (interventions implemented as appropriate)   Pt transferred from ICU last night. A&Ox4. Scheduled oxy given as ordered for constant c/o back pain, only c/o hip pain with movement. VSS. Afib on tele. Skin intervention per protocol, pt refused at times. Voiding per purewick. Congested non-productive cough noted this afternoon. Pt states \"he is leaving tomorrow no matter what.\" Will cont to monitor.   "

## 2020-08-19 NOTE — THERAPY TREATMENT NOTE
Acute Care - Occupational Therapy Treatment Note   [facility]     Patient Name: Zak Olmstead  : 1940  MRN: [id number]  Today's Date: 2020             Admit Date: 8/15/2020       ICD-10-CM ICD-9-CM   1. Closed fracture of left hip, initial encounter (CMS/HCC) S72.002A 820.8   2. S/p left hip fracture Z87.81 V15.51     Patient Active Problem List   Diagnosis   • Peripheral vascular disease (CMS/[facility])   • Closed left hip fracture, s/p repair 2020   • Chronic atrial fibrillation (CMS/HCC)   • Acute UTI (urinary tract infection)   • HTN (hypertension)   • CAD (coronary artery disease)   • COPD (chronic obstructive pulmonary disease) (CMS/HCC)   • Chronic respiratory disease   • Aortic stenosis   • S/p left hip fracture     Past Medical History:   Diagnosis Date   • AAA (abdominal aortic aneurysm) (CMS/HCC)     s/p repair   • Atrial fibrillation with rapid ventricular response (CMS/HCC) 2018   • BPH (benign prostatic hyperplasia)    • BPH with obstruction/lower urinary tract symptoms 2020   • CAD (coronary artery disease)    • CAD (coronary artery disease) 8/15/2020   • Chronic atrial fibrillation (CMS/HCC)    • Chronic respiratory failure (CMS/HCC)    • COPD (chronic obstructive pulmonary disease) (CMS/HCC)    • Dysuria 2020   • Erectile dysfunction 2020   • Heart murmur    • Hypertension    • Hyperthyroidism    • Mitral annular calcification 2018   • Mononeuritis multiplex    • Nonrheumatic aortic valve stenosis 2018   • NSTEMI (non-ST elevated myocardial infarction) (CMS/HCC)    • PVD (peripheral vascular disease) (CMS/HCC)      Past Surgical History:   Procedure Laterality Date   • ABDOMINAL AORTIC ANEURYSM REPAIR     • APPENDECTOMY     • BACK SURGERY     • BELOW KNEE LEG AMPUTATION Left    • CARDIAC CATHETERIZATION N/A 2018    Procedure: Left Heart Cath;  Surgeon: Derik Vega MD;  [facility]  [facility];  Service: Cardiovascular   •  COLONOSCOPY N/A 6/15/2017    Procedure: COLONOSCOPY  CPTCODE:85550;  Surgeon: Lincoln Bowens III, MD;  Location:  [facility] OR;  Service:    • ENDOSCOPY N/A 6/15/2017    Procedure: ESOPHAGOGASTRODUODENOSCOPY WITH BIOPSY  CPTCODE:32505;  Surgeon: Lincoln Bowens III, MD;  Location:  [facility] OR;  Service:    • ESOPHAGEAL DILATATION     • HIP TROCHANTERIC NAILING WITH INTRAMEDULLARY HIP SCREW Left 8/17/2020    Procedure: HIP TROCHANTERIC NAILING WITH INTRAMEDULLARY HIP SCREW, GAMMA NAIL LEFT;  Surgeon: Edmar Aponte MD;  Location:  [facility];  Service: Orthopedics;  Laterality: Left;   • INTERVENTIONAL RADIOLOGY PROCEDURE Left 6/1/2018    Procedure: Abdominal Aortagram with Runoff;  Surgeon: Derik Vega MD;  [facility]:  [facility];  Service: Cardiovascular       Therapy Treatment    Rehabilitation Treatment Summary     Row Name 08/19/20 1306             Treatment Time/Intention    Discipline  occupational therapist  -AR      Document Type  therapy note (daily note)  -AR      Subjective Information  complains of;pain;weakness  -AR      Mode of Treatment  occupational therapy  -AR      Existing Precautions/Restrictions  fall;non-weight bearing;left left fascia iliaca NC,remote L BKA,L hip ROM as tolerates  -AR2      Treatment Considerations/Comments  POD#2 L hip IT nailing, NWB L residual limb  -AR3      Recorded by [AR] Dalila Landon, OT 08/19/20 1427  [AR2] Dalila Landon, OT 08/19/20 1439  [AR3] Dalila Landon, OT 08/19/20 1437      Row Name 08/19/20 1306             Vital Signs    Pre Patient Position  Supine  -AR      Intra Patient Position  Sitting  -AR      Post Patient Position  Supine  -AR      Recorded by [AR] Dalila Landon OT 08/19/20 1437      Row Name 08/19/20 1306             Cognitive Assessment/Intervention- PT/OT    Affect/Mental Status (Cognitive)  WFL  -AR      Orientation Status (Cognition)  oriented x 4  -AR      Follows Commands (Cognition)  follows  one step commands;over 90% accuracy  -AR      Cognitive Function (Cognitive)  safety deficit  -AR      Safety Deficit (Cognitive)  judgment;problem solving;awareness of need for assistance  -AR      Recorded by [AR] Dalila Landon, OT 08/19/20 1437      Row Name 08/19/20 1306             Safety Issues, Functional Mobility    Safety Issues Affecting Function (Mobility)  judgment;problem solving;safety precaution awareness;safety precautions follow-through/compliance;sequencing abilities;awareness of need for assistance  -AR      Impairments Affecting Function (Mobility)  balance;endurance/activity tolerance;range of motion (ROM);strength  -AR      Recorded by [AR] Dalila Landon, OT 08/19/20 1437      Row Name 08/19/20 1306             Mobility Assessment/Intervention    Extremity Weight-bearing Status  left lower extremity  -AR      Left Lower Extremity (Weight-bearing Status)  (S) non weight-bearing (NWB)  -AR      Recorded by [AR] Dalila Landon, OT 08/19/20 1437      Row Name 08/19/20 1306             Bed Mobility Assessment/Treatment    Bed Mobility Assessment/Treatment  scooting/bridging;supine-sit;sit-supine;rolling left;rolling right  -AR      Rolling Right [place] (Bed Mobility)  maximum assist (25% patient effort);2 person assist;verbal cues  -AR      Scooting/Bridging [place] (Bed Mobility)  maximum assist (25% patient effort);2 person assist;verbal cues  -AR      Supine-Sit [place] (Bed Mobility)  moderate assist (50% patient effort);2 person assist;verbal cues  -AR      Sit-Supine [place] (Bed Mobility)  maximum assist (25% patient effort);2 person assist;verbal cues  -AR      Bed Mobility, Safety Issues  decreased use of legs for bridging/pushing  -AR      Assistive Device (Bed Mobility)  bed rails;draw sheet;head of bed elevated  -AR      Comment (Bed Mobility)  Cues to sequence and to maintain NWB LLE. Pt able to perform 3 scoots, able to WB throught BUE to assist.    -AR      Recorded by [AR] Dalila Landon OT 08/19/20 1437      Row Name 08/19/20 1306             Functional Mobility    Functional Mobility- Ind. Level  unable to perform  -AR      Recorded by [AR] Dalila Landon, OT 08/19/20 1437      Row Name 08/19/20 1306             Transfer Assessment/Treatment    Comment (Transfers)  pt declined trial c/o pain and weakness  -AR      Recorded by [AR] Dalila Landon, OT 08/19/20 1437      Row Name 08/19/20 1306             BADL Safety/Performance    Impairments, BADL Safety/Performance  balance;endurance/activity tolerance;pain;range of motion;strength  -AR      Skilled BADL Treatment/Intervention  energy conservation  -AR      Recorded by [AR] Dalila Landon, OT 08/19/20 1437      Row Name 08/19/20 1306             Motor Skills Assessment/Interventions    Additional Documentation  Balance (Group);Balance Interventions (Group);Therapeutic Exercise (Group);Therapeutic Exercise Interventions (Group)  -AR      Recorded by [AR] Dalila Landon, OT 08/19/20 1437      Row Name 08/19/20 1306             Therapeutic Exercise    21166 - OT Therapeutic Exercise Minutes  -- pt declined  -AR      99963 - OT Therapeutic Activity Minutes  28  -AR2      Recorded by [AR] Dalila Landon, OT 08/19/20 1437  [AR2] Dalila Landon, OT 08/19/20 1453      Row Name 08/19/20 1306             Balance    Balance  static sitting balance;static standing balance  -AR      Recorded by [AR] Dalila Landon OT 08/19/20 1437      Row Name 08/19/20 1306             Static Sitting Balance    Level of [place] (Unsupported Sitting, Static Balance)  supervision  -AR      Sitting Position (Unsupported Sitting, Static Balance)  sitting on edge of bed  -AR      Time Able to Maintain Position (Unsupported Sitting, Static Balance)  more than 5 minutes  -AR      Recorded by [AR] Dalila Landon, OT 08/19/20 1437      Row Name 08/19/20 1306             Positioning and  Restraints    Pre-Treatment Position  in bed  -AR      Post Treatment Position  bed  -AR      In Bed  notified nsg;supine;call light within reach;encouraged to call for assist;exit alarm on;with family/caregiver;SCD pump applied  -AR      Recorded by [AR] Dalila Landon, OT 08/19/20 1437      Row Name 08/19/20 1306             Pain Scale: Numbers Pre/Post-Treatment    Pain Scale: Numbers, Pretreatment  8/10  -AR      Pain Scale: Numbers, Post-Treatment  10/10  -AR      Pain Location - Side  Left  -AR      Pain Location  hip  -AR      Pain Intervention(s)  Repositioned notified nurse of pt pain level, declined cold pack   -AR      Recorded by [AR] Dalila Landon, OT 08/19/20 1437      Row Name                Wound 08/15/20 2150 coccyx Pressure Injury    Wound - Properties Group Date first assessed: 08/15/20 [JE] Time first assessed: 2150 [JE] Present on Hospital Admission: Y [JE] Location: coccyx [JE] Primary Wound Type: Pressure inj [JE] Stage, Pressure Injury: Stage 1 [JE] Recorded by:  [JE] Alcides Bloom RN 08/15/20 2327    Row Name                Wound 08/17/20 1732 Left lateral hip Incision    Wound - Properties Group Date first assessed: 08/17/20 [RV] Time first assessed: 1732 [RV] Side: Left [RV] Orientation: lateral [RV] Location: hip [RV] Primary Wound Type: Incision [RV] Recorded by:  [RV] Anand Lozano RN 08/17/20 1732    Row Name                Wound 08/17/20 1815 Left lateral greater trochanter Incision    Wound - Properties Group Date first assessed: 08/17/20 [LB] Time first assessed: 1815 [LB] Present on Hospital Admission: N [LB] Side: Left [LB] Orientation: lateral [LB] Location: greater trochanter [LB] Primary Wound Type: Incision [LB] Additional Comments: DRESSED WITH MASTISOL, 4X4, 1X8 XEROFORM, 2X2 COVADERM X2 AND 4X4 COVADERM X 3 [LB] Recorded by:  [LB] Lionel Bush RN 08/17/20 1816    Row Name                Wound 08/17/20 2000 Left lower;posterior arm Skin Tear    Wound  - Properties Group Date first assessed: 08/17/20 [TP] Time first assessed: 2000 [TP] Side: Left [TP] Orientation: lower;posterior [TP] Location: arm [TP] Primary Wound Type: Skin tear [TP] Recorded by:  [TP] Catina Olsen RN 08/18/20 0253    Row Name                Wound 08/18/20 1900 Right anterior wrist Puncture    Wound - Properties Group Date first assessed: 08/18/20 [TP] Time first assessed: 1900 [TP] Side: Right [TP] Orientation: anterior [TP] Location: wrist [TP] Primary Wound Type: Puncture [TP] Recorded by:  [TP] Catina Olsen RN 08/18/20 2139    Row Name 08/19/20 1306             Plan of Care Review    Plan of Care Reviewed With  patient;spouse  -AR      Progress  improving  -AR      Recorded by [AR] Dalila Landon, OT 08/19/20 1437      Row Name 08/19/20 1306             Outcome Summary/Treatment Plan (OT)    Daily Summary of Progress (OT)  progress towards functional goals is fair  -AR      Anticipated Discharge Disposition (OT)  inpatient rehabilitation facility  -AR      Recorded by [AR] Dalila Landon, OT 08/19/20 1437        User Key  (r) = Recorded By, (t) = Taken By, (c) = Cosigned By    Initials Name Effective Dates Discipline    AR Dalila Landon, OT 06/22/15 -  OT    Anand Khanna RN 06/16/16 -  Nurse    Lionel Park RN 11/16/16 -  Nurse    Catina Carlton, RN 12/04/19 -  Nurse    Alcides Crook RN 03/30/20 -  Nurse        Wound 08/15/20 2150 coccyx Pressure Injury (Active)   Dressing Appearance open to air 8/19/2020  8:44 AM   Closure Open to air 8/19/2020 12:19 AM   Base blanchable;clean;dry;red 8/19/2020  8:44 AM   Periwound intact;redness;blanchable 8/19/2020 12:19 AM   Care, Wound barrier applied 8/19/2020 12:19 AM   Periwound Care, Wound barrier film applied 8/19/2020  8:44 AM       Wound 08/17/20 2599 Left lateral hip Incision (Active)   Dressing Appearance no drainage;intact;dry 8/19/2020  8:44 AM   Closure BRITTA 8/19/2020 12:19 AM   Base dressing in  place, unable to visualize 8/19/2020 12:19 AM   Drainage Amount none 8/19/2020 12:19 AM   Dressing Care, Wound low-adherent 8/18/2020 10:00 PM       Wound 08/17/20 1815 Left lateral greater trochanter Incision (Active)   Dressing Appearance no drainage;intact;dry 8/19/2020  8:44 AM   Closure BRITTA 8/19/2020 12:19 AM   Base dressing in place, unable to visualize 8/19/2020 12:19 AM   Dressing Care, Wound low-adherent 8/18/2020 10:00 PM       Wound 08/17/20 2000 Left lower;posterior arm Skin Tear (Active)   Dressing Appearance no drainage;intact;dry 8/19/2020  8:44 AM   Closure BRITTA 8/19/2020 12:19 AM   Base scab 8/19/2020  8:44 AM   Periwound pink 8/18/2020 10:00 PM   Care, Wound dressing removed 8/18/2020  8:00 PM   Dressing Care, Wound dressing changed 8/19/2020  8:44 AM       Wound 08/18/20 1900 Right anterior wrist Puncture (Active)   Dressing Appearance open to air 8/19/2020  8:44 AM   Periwound dry;intact 8/19/2020 12:19 AM   Dressing Care, Wound dressing removed 8/19/2020  8:44 AM     Rehab Goal Summary     Row Name 08/19/20 1306             Occupational Therapy Goals    Transfer Goal Selection (OT)  transfer, OT goal 1  -AR      Dressing Goal Selection (OT)  dressing, OT goal 1  -AR      Strength Goal Selection (OT)  strength, OT goal 1  -AR         Transfer Goal 1 (OT)    Progress/Outcome (Transfer Goal 1, OT)  goal ongoing  -AR         Dressing Goal 1 (OT)    Progress/Outcome (Dressing Goal 1, OT)  goal ongoing  -AR         Strength Goal 1 (OT)    Progress/Outcome (Strength Goal 1, OT)  goal ongoing  -AR        User Key  (r) = Recorded By, (t) = Taken By, (c) = Cosigned By    Initials Name Provider Type Discipline    Dalila Wheeler OT Occupational Therapist OT        Occupational Therapy Education                 Title: PT OT SLP Therapies (In Progress)     Topic: Occupational Therapy (Done)     Point: ADL training (Done)     Description:   Instruct learner(s) on proper safety adaptation and  remediation techniques during self care or transfers.   Instruct in proper use of assistive devices.              Learning Progress Summary           Patient Acceptance, E, VU,NR by AR at 8/19/2020 1304    Acceptance, E, NR by CL at 8/18/2020 0828   Family Acceptance, E, VU,NR by AR at 8/19/2020 1304                   Point: Home exercise program (Done)     Description:   Instruct learner(s) on appropriate technique for monitoring, assisting and/or progressing therapeutic exercises/activities.              Learning Progress Summary           Patient Acceptance, E, VU,NR by AR at 8/19/2020 1304   Family Acceptance, E, VU,NR by AR at 8/19/2020 1304                   Point: Precautions (Done)     Description:   Instruct learner(s) on prescribed precautions during self-care and functional transfers.              Learning Progress Summary           Patient Acceptance, E, VU,NR by AR at 8/19/2020 1304    Acceptance, E, NR by CL at 8/18/2020 0828   Family Acceptance, E, VU,NR by AR at 8/19/2020 1304                   Point: Body mechanics (Done)     Description:   Instruct learner(s) on proper positioning and spine alignment during self-care, functional mobility activities and/or exercises.              Learning Progress Summary           Patient Acceptance, E, VU,NR by AR at 8/19/2020 1304    Acceptance, E, NR by CL at 8/18/2020 0828   Family Acceptance, E, VU,NR by AR at 8/19/2020 1304                               User Key     Initials Effective Dates Name Provider Type Discipline    AR 06/22/15 -  Dalila Landon OT Occupational Therapist OT    CL 04/03/18 -  Varsha Silverio OT Occupational Therapist OT                OT Recommendation and Plan  Outcome Summary/Treatment Plan (OT)  Daily Summary of Progress (OT): progress towards functional goals is fair  Anticipated Discharge Disposition (OT): inpatient rehabilitation facility  Daily Summary of Progress (OT): progress towards functional goals is fair  Plan of Care  Review  Plan of Care Reviewed With: patient, spouse  Plan of Care Reviewed With: patient, spouse  Outcome Summary: Pt alert, post pain level 10/10 left generalized hip. Pt transitioned supine-to-sit mod assist x2, maintained static sitting balance EOB x 25 minutes. He transitioned sit-to-supine with max assist x2 and rolled R of midline with max assist x2. Long discussion with pt and spouse re: DC plans. Recommend IPR.  Outcome Measures     Row Name 08/19/20 1306 08/18/20 0828          How much help from another is currently needed...    Putting on and taking off regular lower body clothing?  1  -AR  1  -CL     Bathing (including washing, rinsing, and drying)  1  -AR  1  -CL     Toileting (which includes using toilet bed pan or urinal)  1  -AR  1  -CL     Putting on and taking off regular upper body clothing  2  -AR  2  -CL     Taking care of personal grooming (such as brushing teeth)  3  -AR  2  -CL     Eating meals  3  -AR  3  -CL     AM-PAC 6 Clicks Score (OT)  11  -AR  10  -CL        Functional Assessment    Outcome Measure Options  AM-PAC 6 Clicks Daily Activity (OT)  -AR  AM-PAC 6 Clicks Daily Activity (OT)  -CL       User Key  (r) = Recorded By, (t) = Taken By, (c) = Cosigned By    Initials Name Provider Type    Dalila Wheeler OT Occupational Therapist    CL Varsha Silverio, OT Occupational Therapist           Time Calculation:   Time Calculation- OT     Row Name 08/19/20 1306             Time Calculation- OT    OT Start Time  1306  -AR      Total Timed Code Minutes- OT  28 minute(s)  -AR      OT Received On  08/19/20  -AR      OT Goal Re-Cert Due Date  08/28/20  -AR         Timed Charges    86304 - OT Therapeutic Exercise Minutes  -- pt declined  -AR      70500 - OT Therapeutic Activity Minutes  28  -AR        User Key  (r) = Recorded By, (t) = Taken By, (c) = Cosigned By    Initials Name Provider Type    Dalila Wheeler OT Occupational Therapist        Therapy Charges for Today     Code  Description Service Date Service Provider Modifiers Qty    [id number]  OT THERAPEUTIC ACT EA 15 MIN 8/19/2020 Dalila Landon OT GO 2               Dalila Landon OT  8/19/2020

## 2020-08-19 NOTE — PLAN OF CARE
Problem: Patient Care Overview  Goal: Plan of Care Review  Flowsheets  Taken 8/19/2020 1306  Progress: improving  Plan of Care Reviewed With: patient;spouse  Taken 8/19/2020 6238  Outcome Summary: Pt alert, post pain level 10/10 left generalized hip. Pt transitioned supine-to-sit mod assist x2, maintained static sitting balance EOB x 25 minutes. He required max assist x2 perform scooting towards HOB. He transitioned sit-to-supine with max assist x2 and rolled R of midline with max assist x2. Long discussion with pt and spouse re: DC plans. Recommend IPR.

## 2020-08-19 NOTE — THERAPY TREATMENT NOTE
Patient Name: Zak Olmstead  : 1940    MRN: [id number]                              Today's Date: 2020       Admit Date: 8/15/2020    Visit Dx:     ICD-10-CM ICD-9-CM   1. Closed fracture of left hip, initial encounter (CMS/HCC) S72.002A 820.8   2. S/p left hip fracture Z87.81 V15.51     Patient Active Problem List   Diagnosis   • Peripheral vascular disease (CMS/HCC)   • Closed left hip fracture, s/p repair 2020   • Chronic atrial fibrillation (CMS/HCC)   • Acute UTI (urinary tract infection)   • HTN (hypertension)   • CAD (coronary artery disease)   • COPD (chronic obstructive pulmonary disease) (CMS/HCC)   • Chronic respiratory disease   • Aortic stenosis   • S/p left hip fracture     Past Medical History:   Diagnosis Date   • AAA (abdominal aortic aneurysm) (CMS/HCC)     s/p repair   • Atrial fibrillation with rapid ventricular response (CMS/HCC) 2018   • BPH (benign prostatic hyperplasia)    • BPH with obstruction/lower urinary tract symptoms 2020   • CAD (coronary artery disease)    • CAD (coronary artery disease) 8/15/2020   • Chronic atrial fibrillation (CMS/HCC)    • Chronic respiratory failure (CMS/HCC)    • COPD (chronic obstructive pulmonary disease) (CMS/HCC)    • Dysuria 2020   • Erectile dysfunction 2020   • Heart murmur    • Hypertension    • Hyperthyroidism    • Mitral annular calcification 2018   • Mononeuritis multiplex    • Nonrheumatic aortic valve stenosis 2018   • NSTEMI (non-ST elevated myocardial infarction) (CMS/HCC)    • PVD (peripheral vascular disease) (CMS/HCC)      Past Surgical History:   Procedure Laterality Date   • ABDOMINAL AORTIC ANEURYSM REPAIR     • APPENDECTOMY     • BACK SURGERY     • BELOW KNEE LEG AMPUTATION Left    • CARDIAC CATHETERIZATION N/A 2018    Procedure: Left Heart Cath;  Surgeon: Derik Vega MD;  Location: [facility] CATH INVASIVE LOCATION;  Service: Cardiovascular   • COLONOSCOPY N/A 6/15/2017    Procedure:  COLONOSCOPY  CPTCODE:28277;  Surgeon: Lincoln Bowens III, MD;  Location: [facility] OR;  Service:    • ENDOSCOPY N/A 6/15/2017    Procedure: ESOPHAGOGASTRODUODENOSCOPY WITH BIOPSY  CPTCODE:68397;  Surgeon: Lincoln Bowens III, MD;  Location: [facility] OR;  Service:    • ESOPHAGEAL DILATATION     • HIP TROCHANTERIC NAILING WITH INTRAMEDULLARY HIP SCREW Left 8/17/2020    Procedure: HIP TROCHANTERIC NAILING WITH INTRAMEDULLARY HIP SCREW, GAMMA NAIL LEFT;  Surgeon: Edmar Aponte MD;  Location:  [facility] OR;  Service: Orthopedics;  Laterality: Left;   • INTERVENTIONAL RADIOLOGY PROCEDURE Left 6/1/2018    Procedure: Abdominal Aortagram with Runoff;  Surgeon: Derik Vega MD;  Location: [facility] CATH INVASIVE LOCATION;  Service: Cardiovascular     General Information     Row Name 08/19/20 1300          PT Evaluation Time/Intention    Document Type  therapy note (daily note)  -MB     Mode of Treatment  physical therapy  -MB     Row Name 08/19/20 1300          General Information    Patient Profile Reviewed?  yes  -MB     Existing Precautions/Restrictions  fall;non-weight bearing;left L LE fascia iliaca nerve cath; remote L BKA.  May weight-bear for transfers only per Dr. Aponte.    -MB     Barriers to Rehab  medically complex;previous functional deficit  -MB     Row Name 08/19/20 1300          Cognitive Assessment/Intervention- PT/OT    Orientation Status (Cognition)  oriented x 4  -MB     Row Name 08/19/20 1300          Safety Issues, Functional Mobility    Safety Issues Affecting Function (Mobility)  awareness of need for assistance;insight into deficits/self awareness;judgment;problem solving;safety precaution awareness;safety precautions follow-through/compliance;sequencing abilities  -MB     Impairments Affecting Function (Mobility)  balance;endurance/activity tolerance;range of motion (ROM);strength;pain;sensation/sensory awareness  -MB       User Key  (r) = Recorded By, (t) = Taken By, (c) = Cosigned By     Initials Name Provider Type    Janey Delgadillo, PT Physical Therapist        Mobility     Row Name 08/19/20 1300          Bed Mobility Assessment/Treatment    Bed Mobility Assessment/Treatment  scooting/bridging;supine-sit;sit-supine;rolling left;rolling right  -MB     Rolling Right [place] (Bed Mobility)  maximum assist (25% patient effort);2 person assist;verbal cues  -MB     Scooting/Bridging [place] (Bed Mobility)  maximum assist (25% patient effort);2 person assist;verbal cues  -MB     Supine-Sit [place] (Bed Mobility)  moderate assist (50% patient effort);2 person assist;verbal cues  -MB     Sit-Supine [place] (Bed Mobility)  maximum assist (25% patient effort);2 person assist;verbal cues  -MB     Assistive Device (Bed Mobility)  bed rails;draw sheet;head of bed elevated  -MB     Comment (Bed Mobility)  Pt. required assist to manage L LE and trunk/shoulders w/ VCs for sequencing.  Pt. c/o increased L hip pain w/ movement.   -MB     Row Name 08/19/20 1300          Transfer Assessment/Treatment    Comment (Transfers)  Pt. scooted towards HOB w/ max A and able to minimally clear bottom from bed.  Pt. declined STS transfer d/t pain.   -MB     Row Name 08/19/20 1300          Sit-Stand Transfer    Sit-Stand Bryan (Transfers)  unable to assess  -MB     Row Name 08/19/20 1300          Gait/Stairs Assessment/Training    [place]ryan Level (Gait)  unable to assess  -MB     Comment (Gait/Stairs)  Pt. non-ambulatory at home.   -MB     Row Name 08/19/20 1300          Mobility Assessment/Intervention    Extremity Weight-bearing Status  left lower extremity  -MB     Left Lower Extremity (Weight-bearing Status)  non weight-bearing (NWB) May weight bear for transfers only per Dr. Aponte.   -MB       User Key  (r) = Recorded By, (t) = Taken By, (c) = Cosigned By    Initials Name Provider Type    Janey Delgadillo, PT Physical Therapist        Obj/Interventions     Row Name 08/19/20 1300           Therapeutic Exercise    Lower Extremity (Therapeutic Exercise)  gluteal sets;quad sets, bilateral  -MB     Lower Extremity Range of Motion (Therapeutic Exercise)  hip abduction/adduction, left;ankle dorsiflexion/plantar flexion, right  -MB     Exercise Type (Therapeutic Exercise)  AAROM (active assistive range of motion);isometric contraction, static;AROM (active range of motion)  -MB     Comment (Therapeutic Exercise)  Pt. u/a to complete d/t pain.   -MB     Row Name 08/19/20 1300          Static Sitting Balance    Level of [place] (Unsupported Sitting, Static Balance)  supervision  -MB     Sitting Position (Unsupported Sitting, Static Balance)  sitting on edge of bed  -MB     Time Able to Maintain Position (Unsupported Sitting, Static Balance)  more than 5 minutes  -MB     Row Name 08/19/20 1300          Dynamic Sitting Balance    Level of [place], Reaches Outside Midline (Sitting, Dynamic Balance)  contact guard assist  -MB     Sitting Position, Reaches Outside Midline (Sitting, Dynamic Balance)  sitting on edge of bed  -MB     Row Name 08/19/20 1300          Static Standing Balance    Level of [place] (Supported Standing, Static Balance)  unable to perform activity  -MB     Row Name 08/19/20 1300          Light Touch Sensation Assessment    Right Lower Extremity: Light Touch Sensation Assessment  severe impairment, less than 50% correct responses  -MB       User Key  (r) = Recorded By, (t) = Taken By, (c) = Cosigned By    Initials Name Provider Type    Janey Delgadillo, PT Physical Therapist        Goals/Plan    No documentation.       Clinical Impression     Row Name 08/19/20 1300          Pain Scale: Numbers Pre/Post-Treatment    Pain Scale: Numbers, Pretreatment  8/10  -MB     Pain Scale: Numbers, Post-Treatment  10/10  -MB     Pain Location - Side  Left  -MB     Pain Location  hip  -MB     Pain Intervention(s)  Medication (See MAR);Repositioned;Ambulation/increased activity  -MB      Row Name 08/19/20 1300          Plan of Care Review    Plan of Care Reviewed With  patient;spouse  -MB     Progress  improving  -MB     Outcome Summary  Pt. limited by L hip pain and weakness.  Pt. performed bed mobility w/ mod-max A x 2 and was able to scoot towards HOB w/ max A, w/ fair buttocks clearance.  Pt. u/a to attempt transfer d/t pain.  Provided educ. re: benefits of mobility and therapy and PT continues to recommend IPR at D/C.    -MB     Row Name 08/19/20 1300          Positioning and Restraints    Pre-Treatment Position  in bed  -MB     Post Treatment Position  bed  -MB     In Bed  notified nsg;supine;call light within reach;encouraged to call for assist;with family/caregiver;SCD pump applied;exit alarm on  -MB       User Key  (r) = Recorded By, (t) = Taken By, (c) = Cosigned By    Initials Name Provider Type    Janey Delgadillo, PT Physical Therapist        Outcome Measures     Row Name 08/19/20 1300          How much help from another person do you currently need...    Turning from your back to your side while in flat bed without using bedrails?  2  -MB     Moving from lying on back to sitting on the side of a flat bed without bedrails?  2  -MB     Moving to and from a bed to a chair (including a wheelchair)?  1  -MB     Standing up from a chair using your arms (e.g., wheelchair, bedside chair)?  1  -MB     Climbing 3-5 steps with a railing?  1  -MB     To walk in hospital room?  1  -MB     AM-PAC 6 Clicks Score (PT)  8  -MB       User Key  (r) = Recorded By, (t) = Taken By, (c) = Cosigned By    Initials Name Provider Type    Janey Delgadillo, PT Physical Therapist        Physical Therapy Education                 Title: PT OT SLP Therapies (In Progress)     Topic: Physical Therapy (In Progress)     Point: Mobility training (In Progress)     Description:   Instruct learner(s) on safety and technique for assisting patient out of bed, chair or wheelchair.  Instruct in the proper use of  assistive devices, such as walker, crutches, cane or brace.              Patient Friendly Description:   It's important to get you on your feet again, but we need to do so in a way that is safe for you. Falling has serious consequences, and your personal safety is the most important thing of all.        When it's time to get out of bed, one of us or a family member will sit next to you on the bed to give you support.     If your doctor or nurse tells you to use a walker, crutches, a cane, or a brace, be sure you use it every time you get out of bed, even if you think you don't need it.    Learning Progress Summary           Patient Acceptance, E, NR by KG at 8/18/2020 0840                   Point: Home exercise program (Not Started)     Description:   Instruct learner(s) on appropriate technique for monitoring, assisting and/or progressing patient with therapeutic exercises and activities.              Learner Progress:   Not documented in this visit.          Point: Body mechanics (In Progress)     Description:   Instruct learner(s) on proper positioning and spine alignment for patient and/or caregiver during mobility tasks and/or exercises.              Learning Progress Summary           Patient Acceptance, E, NR by KG at 8/18/2020 0840                   Point: Precautions (In Progress)     Description:   Instruct learner(s) on prescribed precautions during mobility and gait tasks              Learning Progress Summary           Patient Acceptance, E, NR by KG at 8/18/2020 0840                               User Key     Initials Effective Dates Name Provider Type Discipline    KG 05/22/20 -  Sherly Duncan, PT Physical Therapist PT              PT Recommendation and Plan     Plan of Care Reviewed With: patient, spouse  Progress: improving  Outcome Summary: Pt. limited by L hip pain and weakness.  Pt. performed bed mobility w/ mod-max A x 2 and was able to scoot towards HOB w/ max A, w/ fair buttocks  clearance.  Pt. u/a to attempt transfer d/t pain.  Provided educ. re: benefits of mobility and therapy and PT continues to recommend IPR at D/C.     Time Calculation:   PT Charges     Row Name 08/19/20 1545             Time Calculation    Start Time  1300  -MB      PT Received On  08/19/20  -MB      PT Goal Re-Cert Due Date  08/28/20  -MB         Time Calculation- PT    Total Timed Code Minutes- PT  35 minute(s)  -MB         Timed Charges    12003 - PT Therapeutic Exercise Minutes  35  -MB        User Key  (r) = Recorded By, (t) = Taken By, (c) = Cosigned By    Initials Name Provider Type    Janey Delgadillo, PT Physical Therapist        Therapy Charges for Today     Code Description Service Date Service Provider Modifiers Qty    20338411253 HC PT THER PROC EA 15 MIN 8/19/2020 Janey Negron, PT GP 2          PT G-Codes  Outcome Measure Options: AM-PAC 6 Clicks Daily Activity (OT)  AM-PAC 6 Clicks Score (PT): 8  AM-PAC 6 Clicks Score (OT): 11    aJney Negron, PT  8/19/2020

## 2020-08-19 NOTE — NURSING NOTE
Spoke with Kiana RN regarding low Arnie of 14 today. Kiana reports no skin issues at this time, skin interventions in place; pt possibly to be discharged tomorrow. Please contact [facility] nurse as needed for concerns.

## 2020-08-19 NOTE — PROGRESS NOTES
"  SUBJECTIVE  Patient doing well.  Pain well controlled.  No events overnight.    OBJECTIVE  Temp (24hrs), Av.5 °F (36.4 °C), Min:97.1 °F (36.2 °C), Max:97.9 °F (36.6 °C)    Blood pressure 123/78, pulse 101, temperature 97.9 °F (36.6 °C), temperature source Oral, resp. rate 18, height 172.2 cm (67.8\"), weight 76 kg (167 lb 8.8 oz), SpO2 98 %.    Lab Results (last 24 hours)     Procedure Component Value Units Date/Time    Blood Culture - Blood, Hand, Right [[id number]] Collected:  20 012    Specimen:  Blood from Hand, Right Updated:  20 033     Blood Culture No growth at 3 days    Blood Culture - Blood, Arm, Right [[id number]] Collected:  20 012    Specimen:  Blood from Arm, Right Updated:  20 0330     Blood Culture No growth at 3 days    POC Glucose Once [[id number]]  (Normal) Collected:  20 1741    Specimen:  Blood Updated:  20 1752     Glucose 117 mg/dL     POC Glucose Once [[id number]]  (Normal) Collected:  20 1147    Specimen:  Blood Updated:  20 1201     Glucose 127 mg/dL     Basic Metabolic Panel [[id number]]  (Abnormal) Collected:  20 0525    Specimen:  Blood Updated:  20 0816     Glucose 99 mg/dL      BUN 21 mg/dL      Creatinine 0.87 mg/dL      Sodium 137 mmol/L      Potassium 4.6 mmol/L      Comment: Specimen hemolyzed.  Results may be affected.        Chloride 105 mmol/L      CO2 17.0 mmol/L      Calcium 7.8 mg/dL      eGFR Non African Amer 84 mL/min/1.73      BUN/Creatinine Ratio 24.1     Anion Gap 15.0 mmol/L     Narrative:       GFR Normal >60  Chronic Kidney Disease <60  Kidney Failure <15              PHYSICAL EXAM  Left lower extremity: Dressing clean, dry and intact.  Mild pain with logroll of hip.  Sensation intact distally.          Closed left hip fracture, s/p repair 2020    Peripheral vascular disease (CMS/HCC)    Chronic atrial fibrillation (CMS/HCC)    Acute UTI (urinary tract infection)    HTN (hypertension)    CAD (coronary " artery disease)    COPD (chronic obstructive pulmonary disease) (CMS/[facility])    Chronic respiratory disease    Aortic stenosis    S/p left hip fracture      PLAN / DISPOSITION:  2 Day Post-Op nail fixation left intertrochanteric hip fracture    None weight bearing left lower extremity, hip range of motion as tolerated  Pain control  PT/OT for post op mobilization and medical equipment needs   SCD's bilateral lower extremities   DVT prophylaxis per primary team  Social work for discharge planning.  Okay to discharge from orthopedic standpoint.  Dressing to remain in place for 7 days. May remove on POD#7. If no drainage, may shower on POD#10. No submerging wound in water. If drainage is noted, sterile dressing should be placed and wound checked daily. No showering until wound has remained dry for 72 consecutive hours.   Follow up in 2 weeks for re-assessment.      Edmar Aponte MD  08/19/20  07:03

## 2020-08-19 NOTE — PLAN OF CARE
Problem: Patient Care Overview  Goal: Plan of Care Review  Outcome: Ongoing (interventions implemented as appropriate)  Flowsheets (Taken 8/19/2020 2836)  Progress: improving  Plan of Care Reviewed With: patient  Outcome Summary: received report from previous shift.  arrived via ICU bed and transferred to floor bed. pt AAO, VSS and in no apparent distress.  pt placed on telemetry, shows sinus arrhythmia, sinus tach.  pt was able to sleep rest >4hrs this shift.  complains of pain but states it is pretty much his baseline.  pt is a left BKA.  will continue to monitor

## 2020-08-19 NOTE — PLAN OF CARE
Problem: Patient Care Overview  Goal: Plan of Care Review  Flowsheets (Taken 8/19/2020 6936)  Outcome Summary: Pt. limited by L hip pain and weakness.  Pt. performed bed mobility w/ mod-max A x 2 and was able to scoot towards HOB w/ max A, w/ fair buttocks clearance.  Pt. u/a to attempt transfer d/t pain.  Provided educ. re: benefits of mobility and therapy and PT continues to recommend IPR at D/C.

## 2020-08-19 NOTE — PROGRESS NOTES
[facility] Medicine Services  PROGRESS NOTE    Patient Name: Zak Olmstead  : 1940  MRN: [id number]    Date of Admission: 8/15/2020  Primary Care Physician: Ely Berumen APRN    Subjective   Subjective     CC:  Hip Fracture - transferred from [place]    HPI:  Patient asking to go home.  He says he does not want rehab.  Has chronic pain per wife.   Downgraded from ICU with plans for home with home health.  Discussed case on MDR today where they said patient asking for home.  Discussed blood thinning with Ortho and Cardiology today.    Review of Systems    Unable to assess    Objective   Objective     Vital Signs:   Temp:  [97.4 °F (36.3 °C)-97.9 °F (36.6 °C)] 97.7 °F (36.5 °C)  Heart Rate:  [] 102  Resp:  [18-20] 18  BP: (113-134)/(58-92) 131/76     Physical Exam:    Constitutional: No acute distress, awake, alert  HENT: NCAT, mucous membranes moist  Respiratory: poor inspiratory effort, no wheezes  Cardiovascular: irreg, tachy, + murmur  Gastrointestinal: Positive bowel sounds, soft, nontender, nondistended  Musculoskeletal:  Left BKA  Psychiatric: Appropriate affect, cooperative  Neurologic: Oriented x 3, strength symmetric in all extremities, Cranial Nerves grossly intact to confrontation, speech clear  Skin: No rashes    Results Reviewed:  Results from last 7 days   Lab Units 20  0820  0127   WBC 10*3/mm3 10.63 8.70 8.60   HEMOGLOBIN g/dL 8.1* 8.8* 8.9*   HEMATOCRIT % 29.2* 30.9* 31.7*   PLATELETS 10*3/mm3 269 235 204   INR   --   --  1.38*     Results from last 7 days   Lab Units 20  0525 20  0824 20  0127  20  1420   SODIUM mmol/L 137 139 138   < > 140   POTASSIUM mmol/L 4.6 4.1 3.8   < > 4.0   CHLORIDE mmol/L 105 107 103   < > 104   CO2 mmol/L 17.0* 25.0 26.0   < > 25.0   BUN mg/dL 21 19 21   < > 23   CREATININE mg/dL 0.87 0.76 1.02   < > 0.93   GLUCOSE mg/dL 99 126* 108*   < > 131*   CALCIUM mg/dL 7.8* 8.1* 8.3*    < > 8.7   ALT (SGPT) U/L  --   --  28  --  25   AST (SGOT) U/L  --   --  24  --  30   TROPONIN T ng/mL  --   --   --   --  <0.010   PROBNP pg/mL  --   --  8,015.0*  --   --     < > = values in this interval not displayed.     Estimated Creatinine Clearance: 72.8 mL/min (by C-G formula based on SCr of 0.87 mg/dL).    Microbiology Results Abnormal     Procedure Component Value - Date/Time    Blood Culture - Blood, Hand, Right [[id number]] Collected:  08/16/20 0127    Lab Status:  Preliminary result Specimen:  Blood from Hand, Right Updated:  08/19/20 0330     Blood Culture No growth at 3 days    Blood Culture - Blood, Arm, Right [[id number]] Collected:  08/16/20 0127    Lab Status:  Preliminary result Specimen:  Blood from Arm, Right Updated:  08/19/20 0330     Blood Culture No growth at 3 days          Imaging Results (Last 24 Hours)     ** No results found for the last 24 hours. **          Results for orders placed during the hospital encounter of 08/14/20   Adult Transthoracic Echo Complete W/ Cont if Necessary Per Protocol    Narrative · Left ventricular wall thickness is consistent with mild concentric   hypertrophy.  · Left ventricular systolic function is normal, EF 56-60%.  · Left ventricular diastolic dysfunction (grade I a) consistent with   impaired relaxation.  · Left atrial cavity size is mild-to-moderately dilated.  · There is severe calcification of the aortic valve mainly affecting the   non, left and right coronary cusp(s).  · Mild aortic valve regurgitation is present.  · Severe aortic valve stenosis is present.  · Aortic valve mean pressure gradient is 46.0 mmHg.  · Aortic valve maximum pressure gradient is 90.0 mmHg.  · Moderate mitral valve regurgitation is present  · Moderate to severe tricuspid valve regurgitation is present.  · Calculated right ventricular systolic pressure from tricuspid   regurgitation is 77 mmHg.          I have reviewed the medications:  Scheduled Meds:  acetaminophen 1,000  mg Oral Q8H   apixaban 5 mg Oral Q12H   aspirin 81 mg Oral Daily   atorvastatin 40 mg Oral Nightly   dilTIAZem  mg Oral Daily   docusate sodium 100 mg Oral BID   finasteride 5 mg Oral Daily   methIMAzole 5 mg Oral Daily   metoprolol tartrate 25 mg Oral Q8H   nicotine 1 patch Transdermal Q24H   oxyCODONE 10 mg Oral 4x Daily   polyethylene glycol 17 g Oral BID   sodium chloride 10 mL Intravenous Q12H     Continuous Infusions:  ropivacaine (NAROPIN) 0.2% peripheral nerve cath (moog) 8 mL/hr Last Rate: 8 mL/hr (08/19/20 1217)     PRN Meds:.ipratropium-albuterol  •  magnesium sulfate **OR** magnesium sulfate **OR** magnesium sulfate  •  nitroglycerin  •  ondansetron **OR** ondansetron  •  oxyCODONE  •  oxyCODONE  •  sodium chloride    Assessment/Plan   Assessment & Plan     Active Hospital Problems    Diagnosis  POA   • **Closed left hip fracture, s/p repair 8/17/2020 [S72.002A]  Yes   • Chronic atrial fibrillation (CMS/HCC) [I48.20]  Yes   • Acute UTI (urinary tract infection) [N39.0]  Yes   • HTN (hypertension) [I10]  Yes   • CAD (coronary artery disease) [I25.10]  Yes   • COPD (chronic obstructive pulmonary disease) (CMS/HCC) [J44.9]  Yes   • Chronic respiratory disease [J98.9]  Yes   • Aortic stenosis [I35.0]  Yes   • S/p left hip fracture [Z87.81]  Not Applicable   • Peripheral vascular disease (CMS/HCC) [I73.9]  Yes      Resolved Hospital Problems   No resolved problems to display.        Brief Hospital Course to date:  Zak Olmstead is a 80 y.o. male w/ a hx of COPD, chronic respiratory failure on 2 liters PRN, chronic atrial fibrillation (BB, dilt and Eliquis), hyperthyroidism, HTN, CAD, PVD, remote left BKA and severe aortic stenosis who was transferred from [facility] for further evaluation/tx of a left hip fracture.    He had to be taken off  for surgery and had Operative Fixation of Left Intertrochanteric Fracture with Cephalomedullary Device with Dr. Aponte    Fall  - multiple risk  factors for fall  - left BKA  - pain medication  - history of Vitamin D def  - Inpatient Rehab recommended  Left Hip Fracture  - s/p repair  Aortic Valve Stenosis  - seen by TAVR team  - not a candidate for TAVR  Vasculopathy  - history of Aortic Graft/Stent  - Fem Fem bypass  - chronic occlusion of RCA  - LAD disease   - lifelong smoker - 15 yrs - 80 yrs old (duration 65 yrs)  - up to 2 ppd (possibly over 110 pack-yrs)  Chronic Pain  - has been on pain medication for 8 yrs or more  - goes to a pain clinic per wife  History of AAA  - s/p repair  Severe Anemia  - INR 1.38  - iron studies  - increased AC today  - monitor for dropping hemoglobin  Hyperthyroidism  - on Methimazole  Tobacco Use Disorder  - nicotine patch  - has been smoking for 65 yrs  - high point 2 ppd  Atrial Fibrillation  - metoprolol / cardizem / anticoagulation  Chronic Respiratory Failure  - due to COPD  - nebs    Monitor blood count with medication changes today after discussing with Dr. Nunez  Discussed medication changes with Dr. Aponte too    Discussed this complex case with Wife and Son, Mirna Ishan and Low Olmstead today  Discussed catheter with acute pain service today    DVT Prophylaxis:  anticoagulation    Disposition: I expect the patient to be discharged to inpatient rehab if patient agreeable, but previously refused    CODE STATUS:   Code Status and Medical Interventions:   Ordered at: 08/15/20 2501     Level Of Support Discussed With:    Patient     Code Status:    CPR     Medical Interventions (Level of Support Prior to Arrest):    Full         Electronically signed by Bossman Suarez MD, 08/19/20, 16:23.

## 2020-08-20 ENCOUNTER — APPOINTMENT (OUTPATIENT)
Dept: GENERAL RADIOLOGY | Facility: HOSPITAL | Age: 80
End: 2020-08-20

## 2020-08-20 LAB
ALBUMIN SERPL-MCNC: 3 G/DL (ref 3.5–5.2)
ALBUMIN/GLOB SERPL: 1.2 G/DL
ALP SERPL-CCNC: 114 U/L (ref 39–117)
ALT SERPL W P-5'-P-CCNC: 7 U/L (ref 1–41)
ANION GAP SERPL CALCULATED.3IONS-SCNC: 9 MMOL/L (ref 5–15)
AST SERPL-CCNC: 13 U/L (ref 1–40)
BASOPHILS # BLD AUTO: 0.04 10*3/MM3 (ref 0–0.2)
BASOPHILS NFR BLD AUTO: 0.4 % (ref 0–1.5)
BILIRUB CONJ SERPL-MCNC: 0.9 MG/DL (ref 0–0.3)
BILIRUB SERPL-MCNC: 1.4 MG/DL (ref 0–1.2)
BUN SERPL-MCNC: 20 MG/DL (ref 8–23)
BUN/CREAT SERPL: 23 (ref 7–25)
CALCIUM SPEC-SCNC: 8 MG/DL (ref 8.6–10.5)
CHLORIDE SERPL-SCNC: 103 MMOL/L (ref 98–107)
CO2 SERPL-SCNC: 25 MMOL/L (ref 22–29)
CREAT SERPL-MCNC: 0.87 MG/DL (ref 0.76–1.27)
D-LACTATE SERPL-SCNC: 0.9 MMOL/L (ref 0.5–2)
DEPRECATED RDW RBC AUTO: 56 FL (ref 37–54)
EOSINOPHIL # BLD AUTO: 0.22 10*3/MM3 (ref 0–0.4)
EOSINOPHIL NFR BLD AUTO: 2.1 % (ref 0.3–6.2)
ERYTHROCYTE [DISTWIDTH] IN BLOOD BY AUTOMATED COUNT: 22 % (ref 12.3–15.4)
FERRITIN SERPL-MCNC: 69.73 NG/ML (ref 30–400)
GFR SERPL CREATININE-BSD FRML MDRD: 84 ML/MIN/1.73
GLOBULIN UR ELPH-MCNC: 2.6 GM/DL
GLUCOSE SERPL-MCNC: 79 MG/DL (ref 65–99)
HCT VFR BLD AUTO: 28.3 % (ref 37.5–51)
HGB BLD-MCNC: 7.9 G/DL (ref 13–17.7)
IMM GRANULOCYTES # BLD AUTO: 0.09 10*3/MM3 (ref 0–0.05)
IMM GRANULOCYTES NFR BLD AUTO: 0.8 % (ref 0–0.5)
INR PPP: 1.67 (ref 0.85–1.16)
IRON 24H UR-MRATE: 27 MCG/DL (ref 59–158)
IRON SATN MFR SERPL: 11 % (ref 20–50)
LDH SERPL-CCNC: 221 U/L (ref 135–225)
LYMPHOCYTES # BLD AUTO: 1.65 10*3/MM3 (ref 0.7–3.1)
LYMPHOCYTES NFR BLD AUTO: 15.5 % (ref 19.6–45.3)
MCH RBC QN AUTO: 20.3 PG (ref 26.6–33)
MCHC RBC AUTO-ENTMCNC: 27.9 G/DL (ref 31.5–35.7)
MCV RBC AUTO: 72.6 FL (ref 79–97)
MONOCYTES # BLD AUTO: 0.96 10*3/MM3 (ref 0.1–0.9)
MONOCYTES NFR BLD AUTO: 9 % (ref 5–12)
NEUTROPHILS NFR BLD AUTO: 7.66 10*3/MM3 (ref 1.7–7)
NEUTROPHILS NFR BLD AUTO: 72.2 % (ref 42.7–76)
NRBC BLD AUTO-RTO: 0 /100 WBC (ref 0–0.2)
NT-PROBNP SERPL-MCNC: ABNORMAL PG/ML (ref 0–1800)
PLATELET # BLD AUTO: 352 10*3/MM3 (ref 140–450)
PMV BLD AUTO: 11 FL (ref 6–12)
POTASSIUM SERPL-SCNC: 4 MMOL/L (ref 3.5–5.2)
PROCALCITONIN SERPL-MCNC: 0.18 NG/ML (ref 0–0.25)
PROT SERPL-MCNC: 5.6 G/DL (ref 6–8.5)
PROTHROMBIN TIME: 19.4 SECONDS (ref 11.5–14)
RBC # BLD AUTO: 3.9 10*6/MM3 (ref 4.14–5.8)
SODIUM SERPL-SCNC: 137 MMOL/L (ref 136–145)
TIBC SERPL-MCNC: 244 MCG/DL (ref 298–536)
TRANSFERRIN SERPL-MCNC: 164 MG/DL (ref 200–360)
TSH SERPL DL<=0.05 MIU/L-ACNC: 1.01 UIU/ML (ref 0.27–4.2)
WBC # BLD AUTO: 10.62 10*3/MM3 (ref 3.4–10.8)

## 2020-08-20 PROCEDURE — 80053 COMPREHEN METABOLIC PANEL: CPT | Performed by: HOSPITALIST

## 2020-08-20 PROCEDURE — 83880 ASSAY OF NATRIURETIC PEPTIDE: CPT | Performed by: HOSPITALIST

## 2020-08-20 PROCEDURE — 99232 SBSQ HOSP IP/OBS MODERATE 35: CPT | Performed by: HOSPITALIST

## 2020-08-20 PROCEDURE — 99024 POSTOP FOLLOW-UP VISIT: CPT | Performed by: ORTHOPAEDIC SURGERY

## 2020-08-20 PROCEDURE — 93010 ELECTROCARDIOGRAM REPORT: CPT | Performed by: INTERNAL MEDICINE

## 2020-08-20 PROCEDURE — 97530 THERAPEUTIC ACTIVITIES: CPT

## 2020-08-20 PROCEDURE — 84443 ASSAY THYROID STIM HORMONE: CPT | Performed by: HOSPITALIST

## 2020-08-20 PROCEDURE — 94799 UNLISTED PULMONARY SVC/PX: CPT

## 2020-08-20 PROCEDURE — 85610 PROTHROMBIN TIME: CPT | Performed by: HOSPITALIST

## 2020-08-20 PROCEDURE — 83615 LACTATE (LD) (LDH) ENZYME: CPT | Performed by: HOSPITALIST

## 2020-08-20 PROCEDURE — 83540 ASSAY OF IRON: CPT | Performed by: HOSPITALIST

## 2020-08-20 PROCEDURE — 25010000002 ROPIVACAINE PER 1 MG: Performed by: INTERNAL MEDICINE

## 2020-08-20 PROCEDURE — 84145 PROCALCITONIN (PCT): CPT | Performed by: HOSPITALIST

## 2020-08-20 PROCEDURE — 84466 ASSAY OF TRANSFERRIN: CPT | Performed by: HOSPITALIST

## 2020-08-20 PROCEDURE — 85025 COMPLETE CBC W/AUTO DIFF WBC: CPT | Performed by: HOSPITALIST

## 2020-08-20 PROCEDURE — 82728 ASSAY OF FERRITIN: CPT | Performed by: HOSPITALIST

## 2020-08-20 PROCEDURE — 83605 ASSAY OF LACTIC ACID: CPT | Performed by: HOSPITALIST

## 2020-08-20 PROCEDURE — 73552 X-RAY EXAM OF FEMUR 2/>: CPT

## 2020-08-20 PROCEDURE — 82248 BILIRUBIN DIRECT: CPT | Performed by: HOSPITALIST

## 2020-08-20 PROCEDURE — 94640 AIRWAY INHALATION TREATMENT: CPT

## 2020-08-20 PROCEDURE — 93005 ELECTROCARDIOGRAM TRACING: CPT | Performed by: HOSPITALIST

## 2020-08-20 PROCEDURE — 97110 THERAPEUTIC EXERCISES: CPT

## 2020-08-20 RX ORDER — LACTULOSE 10 G/15ML
10 SOLUTION ORAL 3 TIMES DAILY
Status: DISCONTINUED | OUTPATIENT
Start: 2020-08-20 | End: 2020-08-24 | Stop reason: HOSPADM

## 2020-08-20 RX ORDER — ASCORBIC ACID 500 MG
500 TABLET ORAL DAILY
Status: DISCONTINUED | OUTPATIENT
Start: 2020-08-20 | End: 2020-08-24 | Stop reason: HOSPADM

## 2020-08-20 RX ORDER — POLYETHYLENE GLYCOL 3350 17 G
2 POWDER IN PACKET (EA) ORAL
Status: DISCONTINUED | OUTPATIENT
Start: 2020-08-20 | End: 2020-08-24 | Stop reason: HOSPADM

## 2020-08-20 RX ORDER — METHIMAZOLE 5 MG/1
5 TABLET ORAL EVERY 8 HOURS SCHEDULED
Status: DISCONTINUED | OUTPATIENT
Start: 2020-08-20 | End: 2020-08-24 | Stop reason: HOSPADM

## 2020-08-20 RX ORDER — FERROUS SULFATE 325(65) MG
325 TABLET ORAL
Status: DISCONTINUED | OUTPATIENT
Start: 2020-08-21 | End: 2020-08-22

## 2020-08-20 RX ORDER — TERAZOSIN 1 MG/1
1 CAPSULE ORAL NIGHTLY
Status: DISCONTINUED | OUTPATIENT
Start: 2020-08-20 | End: 2020-08-21

## 2020-08-20 RX ADMIN — SODIUM CHLORIDE, PRESERVATIVE FREE 10 ML: 5 INJECTION INTRAVENOUS at 21:30

## 2020-08-20 RX ADMIN — METOPROLOL TARTRATE 25 MG: 25 TABLET, FILM COATED ORAL at 05:46

## 2020-08-20 RX ADMIN — SODIUM CHLORIDE 500 ML: 9 INJECTION, SOLUTION INTRAVENOUS at 16:00

## 2020-08-20 RX ADMIN — METOPROLOL TARTRATE 25 MG: 25 TABLET, FILM COATED ORAL at 14:44

## 2020-08-20 RX ADMIN — TERAZOSIN HYDROCHLORIDE 1 MG: 1 CAPSULE ORAL at 21:30

## 2020-08-20 RX ADMIN — APIXABAN 5 MG: 5 TABLET, FILM COATED ORAL at 21:29

## 2020-08-20 RX ADMIN — DOCUSATE SODIUM 100 MG: 100 CAPSULE, LIQUID FILLED ORAL at 08:29

## 2020-08-20 RX ADMIN — LACTULOSE 10 G: 20 SOLUTION ORAL at 10:55

## 2020-08-20 RX ADMIN — OXYCODONE HYDROCHLORIDE AND ACETAMINOPHEN 500 MG: 500 TABLET ORAL at 16:43

## 2020-08-20 RX ADMIN — OXYCODONE HYDROCHLORIDE 10 MG: 5 TABLET ORAL at 03:14

## 2020-08-20 RX ADMIN — OXYCODONE HYDROCHLORIDE 10 MG: 5 TABLET ORAL at 12:32

## 2020-08-20 RX ADMIN — LACTULOSE 10 G: 20 SOLUTION ORAL at 21:24

## 2020-08-20 RX ADMIN — APIXABAN 5 MG: 5 TABLET, FILM COATED ORAL at 08:29

## 2020-08-20 RX ADMIN — SODIUM CHLORIDE 500 ML: 9 INJECTION, SOLUTION INTRAVENOUS at 21:35

## 2020-08-20 RX ADMIN — DILTIAZEM HYDROCHLORIDE 240 MG: 240 CAPSULE, COATED, EXTENDED RELEASE ORAL at 08:29

## 2020-08-20 RX ADMIN — ATORVASTATIN CALCIUM 40 MG: 40 TABLET, FILM COATED ORAL at 21:30

## 2020-08-20 RX ADMIN — IPRATROPIUM BROMIDE AND ALBUTEROL SULFATE 3 ML: 2.5; .5 SOLUTION RESPIRATORY (INHALATION) at 14:24

## 2020-08-20 RX ADMIN — OXYCODONE HYDROCHLORIDE 10 MG: 5 TABLET ORAL at 23:40

## 2020-08-20 RX ADMIN — POLYETHYLENE GLYCOL 3350 17 G: 17 POWDER, FOR SOLUTION ORAL at 21:24

## 2020-08-20 RX ADMIN — ROPIVACAINE HYDROCHLORIDE 8 ML/HR: 2 INJECTION, SOLUTION EPIDURAL; INFILTRATION at 08:43

## 2020-08-20 RX ADMIN — SODIUM CHLORIDE, PRESERVATIVE FREE 10 ML: 5 INJECTION INTRAVENOUS at 08:30

## 2020-08-20 RX ADMIN — DOCUSATE SODIUM 100 MG: 100 CAPSULE, LIQUID FILLED ORAL at 21:24

## 2020-08-20 RX ADMIN — OXYCODONE HYDROCHLORIDE 10 MG: 5 TABLET ORAL at 05:45

## 2020-08-20 RX ADMIN — NICOTINE 1 PATCH: 21 PATCH, EXTENDED RELEASE TRANSDERMAL at 08:29

## 2020-08-20 RX ADMIN — OXYCODONE HYDROCHLORIDE 10 MG: 5 TABLET ORAL at 21:29

## 2020-08-20 RX ADMIN — METHIMAZOLE 5 MG: 5 TABLET ORAL at 21:29

## 2020-08-20 RX ADMIN — OXYCODONE HYDROCHLORIDE 10 MG: 5 TABLET ORAL at 18:06

## 2020-08-20 RX ADMIN — LACTULOSE 10 G: 20 SOLUTION ORAL at 16:43

## 2020-08-20 RX ADMIN — METHIMAZOLE 5 MG: 5 TABLET ORAL at 08:29

## 2020-08-20 RX ADMIN — METOPROLOL TARTRATE 25 MG: 25 TABLET, FILM COATED ORAL at 21:24

## 2020-08-20 RX ADMIN — ASPIRIN 81 MG: 81 TABLET, COATED ORAL at 08:29

## 2020-08-20 RX ADMIN — FINASTERIDE 5 MG: 5 TABLET, FILM COATED ORAL at 08:29

## 2020-08-20 RX ADMIN — OXYCODONE HYDROCHLORIDE 10 MG: 5 TABLET ORAL at 14:45

## 2020-08-20 RX ADMIN — POLYETHYLENE GLYCOL 3350 17 G: 17 POWDER, FOR SOLUTION ORAL at 08:29

## 2020-08-20 NOTE — PLAN OF CARE
Patient had complaints of pain throughout the shift.  Medication utilized and pain reported to MDs assigned to care.  IV bolus given.  UOP dark yellow.  Heart rate remained tachy.  Patient participated with PT.  Sat in the chair for one hour and complained about that for one hour.  Bowel medication given to facilitate BM prior to dc.  Nicotine PRNs ordered to help with patient cravings.  Will continue to monitor

## 2020-08-20 NOTE — PLAN OF CARE
Problem: Patient Care Overview  Goal: Plan of Care Review  Outcome: Ongoing (interventions implemented as appropriate)  Flowsheets (Taken 8/20/2020 7808)  Outcome Summary: Pt demonstrates self limiting factors during mobility.   Supine to sit min A of 2.  VC for NWB LLE and to push with BUE to A with scooting.  Pt fearful of moving LLE, used draw sheet to A with advancing.  Sit pivot to recliner from EOB min A of 2.  Pt decline AD.  Pt reports pain 9/10, although states he is comfortable in the chair.  ther-ex performed as tolerated.  Recommend DC home with assist and home health if pt continues to participate with PT

## 2020-08-20 NOTE — PROGRESS NOTES
[facility] Medicine Services  PROGRESS NOTE    Patient Name: Zak Olmstead  : 1940  MRN: [id number]    Date of Admission: 8/15/2020  Primary Care Physician: Ely Berumen APRN    Subjective   Subjective     CC:  Hip Fracture - transferred from [place]    HPI:  Wife in room today.  Quite tachycardic today.  Reporting HRs in the 140s.  Looks dehydrated and Bolus given today with improved HR.  Dark/concentrated urine reported.  Seems to be having nicotine cravings.  Seen by Ortho today and hip x rayed    Review of Systems    Unable to assess    Objective   Objective     Vital Signs:   Temp:  [97.3 °F (36.3 °C)-98.1 °F (36.7 °C)] 97.7 °F (36.5 °C)  Heart Rate:  [] 93  Resp:  [16-22] 18  BP: (134-151)/() 150/81     Physical Exam:    Constitutional: No acute distress, awake, alert  HENT: NCAT, mucous membranes moist  Respiratory: poor inspiratory effort, no wheezes  Cardiovascular: irreg, tachy, + murmur  Gastrointestinal: Positive bowel sounds, soft, nontender, nondistended  Musculoskeletal:  Left BKA  Psychiatric: Appropriate affect, cooperative  Neurologic: Oriented x 3, strength symmetric in all extremities, Cranial Nerves grossly intact to confrontation, speech clear  Skin: No rashes    Results Reviewed:  Results from last 7 days   Lab Units 20  0520  0520  0820  0127   WBC 10*3/mm3 10.62 10.63 8.70 8.60   HEMOGLOBIN g/dL 7.9* 8.1* 8.8* 8.9*   HEMATOCRIT % 28.3* 29.2* 30.9* 31.7*   PLATELETS 10*3/mm3 352 269 235 204   INR  1.67*  --   --  1.38*   PROCALCITONIN ng/mL 0.18  --   --   --      Results from last 7 days   Lab Units 20  0520  0525 20  0824 20  0127  20  1420   SODIUM mmol/L 137 137 139 138   < > 140   POTASSIUM mmol/L 4.0 4.6 4.1 3.8   < > 4.0   CHLORIDE mmol/L 103 105 107 103   < > 104   CO2 mmol/L 25.0 17.0* 25.0 26.0   < > 25.0   BUN mg/dL 20 21 19 21   < > 23   CREATININE mg/dL 0.87 0.87  0.76 1.02   < > 0.93   GLUCOSE mg/dL 79 99 126* 108*   < > 131*   CALCIUM mg/dL 8.0* 7.8* 8.1* 8.3*   < > 8.7   ALT (SGPT) U/L 7  --   --  28  --  25   AST (SGOT) U/L 13  --   --  24  --  30   TROPONIN T ng/mL  --   --   --   --   --  <0.010   PROBNP pg/mL 11,392.0*  --   --  8,015.0*  --   --     < > = values in this interval not displayed.     Estimated Creatinine Clearance: 72.8 mL/min (by C-G formula based on SCr of 0.87 mg/dL).    Microbiology Results Abnormal     Procedure Component Value - Date/Time    Blood Culture - Blood, Hand, Right [[id number]] Collected:  08/16/20 0127    Lab Status:  Preliminary result Specimen:  Blood from Hand, Right Updated:  08/20/20 0330     Blood Culture No growth at 4 days    Blood Culture - Blood, Arm, Right [[id number]] Collected:  08/16/20 0127    Lab Status:  Preliminary result Specimen:  Blood from Arm, Right Updated:  08/20/20 0330     Blood Culture No growth at 4 days          Imaging Results (Last 24 Hours)     Procedure Component Value Units Date/Time    XR Femur 2 View Left [[id number]] Collected:  08/20/20 0920     Updated:  08/20/20 0924    Narrative:       EXAMINATION: XR FEMUR 2 VW LEFT- 08/20/2020     INDICATION: increase pain at therapy; S72.002A-Fracture of unspecified  part of neck of left femur, initial encounter for closed fracture;  Z87.81-Personal history of (healed) traumatic fracture      COMPARISON: 08/17/2020     FINDINGS: Left femur hardware appears in stable alignment from prior  comparison 08/17/2020 with stable appearance of fracture from that exam  without evidence of progressive malalignment or new fracture.  Specifically no distal fracture of the distal left femur.           Impression:       Stable appearance of left hip nailing and left femoral  hardware from prior comparison 08/17/2020 fluoroscopic images and  overall appearance of intertrochanteric/lesser trochanteric fracture. No  new findings evident.     D:  08/20/2020  E:  08/20/2020                   Results for orders placed during the hospital encounter of 08/14/20   Adult Transthoracic Echo Complete W/ Cont if Necessary Per Protocol    Narrative · Left ventricular wall thickness is consistent with mild concentric   hypertrophy.  · Left ventricular systolic function is normal, EF 56-60%.  · Left ventricular diastolic dysfunction (grade I a) consistent with   impaired relaxation.  · Left atrial cavity size is mild-to-moderately dilated.  · There is severe calcification of the aortic valve mainly affecting the   non, left and right coronary cusp(s).  · Mild aortic valve regurgitation is present.  · Severe aortic valve stenosis is present.  · Aortic valve mean pressure gradient is 46.0 mmHg.  · Aortic valve maximum pressure gradient is 90.0 mmHg.  · Moderate mitral valve regurgitation is present  · Moderate to severe tricuspid valve regurgitation is present.  · Calculated right ventricular systolic pressure from tricuspid   regurgitation is 77 mmHg.          I have reviewed the medications:  Scheduled Meds:    apixaban 5 mg Oral Q12H   aspirin 81 mg Oral Daily   atorvastatin 40 mg Oral Nightly   dilTIAZem  mg Oral Daily   docusate sodium 100 mg Oral BID   [START ON 8/21/2020] ferrous sulfate 325 mg Oral Daily With Breakfast   finasteride 5 mg Oral Daily   lactulose 10 g Oral TID   methIMAzole 5 mg Oral Q8H   metoprolol tartrate 25 mg Oral Q8H   nicotine 1 patch Transdermal Q24H   oxyCODONE 10 mg Oral 4x Daily   polyethylene glycol 17 g Oral BID   sodium chloride 10 mL Intravenous Q12H   terazosin 1 mg Oral Nightly   vitamin C 500 mg Oral Daily     Continuous Infusions:   PRN Meds:.ipratropium-albuterol  •  magnesium sulfate **OR** magnesium sulfate **OR** magnesium sulfate  •  nicotine polacrilex  •  nicotine polacrilex  •  nitroglycerin  •  ondansetron **OR** ondansetron  •  oxyCODONE  •  oxyCODONE  •  sodium chloride    Assessment/Plan   Assessment & Plan     Active Hospital Problems    Diagnosis   POA   • **Closed left hip fracture, s/p repair 8/17/2020 [S72.002A]  Yes   • Chronic atrial fibrillation (CMS/HCC) [I48.20]  Yes   • Acute UTI (urinary tract infection) [N39.0]  Yes   • HTN (hypertension) [I10]  Yes   • CAD (coronary artery disease) [I25.10]  Yes   • COPD (chronic obstructive pulmonary disease) (CMS/[facility]) [J44.9]  Yes   • Chronic respiratory disease [J98.9]  Yes   • Aortic stenosis [I35.0]  Yes   • S/p left hip fracture [Z87.81]  Not Applicable   • Peripheral vascular disease (CMS/[facility]) [I73.9]  Yes      Resolved Hospital Problems   No resolved problems to display.        Brief Hospital Course to date:  Zak Olmstead is a 80 y.o. male w/ a hx of COPD, chronic respiratory failure on 2 liters PRN, chronic atrial fibrillation (BB, dilt and Eliquis), hyperthyroidism, HTN, CAD, PVD, remote left BKA and severe aortic stenosis who was transferred from [facility] for further evaluation/tx of a left hip fracture.    He had to be taken off AC for surgery and had Operative Fixation of Left Intertrochanteric Fracture with Cephalomedullary Device with Dr. Aponte    XR Left Hip today.  Bolused NS today    Fall  - multiple risk factors for fall  - left BKA  - pain medication  - history of Vitamin D def  - Inpatient Rehab recommended  Left Hip Fracture  - s/p repair  Aortic Valve Stenosis  - seen by TAVR team  - not a candidate for TAVR  Vasculopathy  - history of Aortic Graft/Stent  - Fem Fem bypass  - chronic occlusion of RCA  - LAD disease   - lifelong smoker - 15 yrs - 80 yrs old (duration 65 yrs)  - up to 2 ppd (possibly over 110 pack-yrs)  Chronic Pain  - has been on pain medication for 8 yrs or more  - goes to a pain clinic per wife  History of AAA  - s/p repair  Severe Anemia  - INR 1.38  - iron studies  - increased AC yesterday  - monitor for dropping hemoglobin  Hyperthyroidism  - on Methimazole  Tobacco Use Disorder  - nicotine patch  - has been smoking for 65 yrs  - high point 2 ppd  Atrial  Fibrillation  - metoprolol / cardizem / anticoagulation  Chronic Respiratory Failure  - due to COPD  - nebs    Removed pain catheter today  Increase bowel regimen  Increase nicotine supplementation today  Bolus NS  XR Left Femur today      DVT Prophylaxis:  anticoagulation    Disposition: I expect the patient to be discharged home as he is refusing rehab and unclear if we can find a rehab based on feedback from cm today    CODE STATUS:   Code Status and Medical Interventions:   Ordered at: 08/15/20 2229     Level Of Support Discussed With:    Patient     Code Status:    CPR     Medical Interventions (Level of Support Prior to Arrest):    Full         Electronically signed by Bossman Suarez MD, 08/20/20, 18:58.

## 2020-08-20 NOTE — PROGRESS NOTES
"  SUBJECTIVE  Patient resting comfortably.  Pain controlled.  No events overnight.      OBJECTIVE  Temp (24hrs), Av.4 °F (36.3 °C), Min:96.3 °F (35.7 °C), Max:98.1 °F (36.7 °C)    Blood pressure 146/96, pulse 105, temperature 98.1 °F (36.7 °C), temperature source Axillary, resp. rate 18, height 172.2 cm (67.8\"), weight 76 kg (167 lb 8.8 oz), SpO2 100 %.    Lab Results (last 24 hours)     Procedure Component Value Units Date/Time    Scan Slide [[id number]] Collected:  20    Specimen:  Blood Updated:  20    Lactic Acid, Plasma [[id number]]  (Normal) Collected:  20    Specimen:  Blood Updated:  20     Lactate 0.9 mmol/L      Comment: Falsely depressed results may occur on samples drawn from patients receiving N-Acetylcysteine (NAC) or Metamizole.       Comprehensive Metabolic Panel [[id number]]  (Abnormal) Collected:  20    Specimen:  Blood Updated:  20     Glucose 79 mg/dL      BUN 20 mg/dL      Creatinine 0.87 mg/dL      Sodium 137 mmol/L      Potassium 4.0 mmol/L      Chloride 103 mmol/L      CO2 25.0 mmol/L      Calcium 8.0 mg/dL      Total Protein 5.6 g/dL      Albumin 3.00 g/dL      ALT (SGPT) 7 U/L      AST (SGOT) 13 U/L      Alkaline Phosphatase 114 U/L      Total Bilirubin 1.4 mg/dL      eGFR Non African Amer 84 mL/min/1.73      Globulin 2.6 gm/dL      A/G Ratio 1.2 g/dL      BUN/Creatinine Ratio 23.0     Anion Gap 9.0 mmol/L     Narrative:       GFR Normal >60  Chronic Kidney Disease <60  Kidney Failure <15      Bilirubin, Direct [[id number]]  (Abnormal) Collected:  20    Specimen:  Blood Updated:  20     Bilirubin, Direct 0.9 mg/dL     Lactate Dehydrogenase [[id number]]  (Normal) Collected:  20    Specimen:  Blood Updated:  20      U/L     BNP [[id number]]  (Abnormal) Collected:  20    Specimen:  Blood Updated:  20     proBNP 11,392.0 pg/mL     Narrative:       Among " "patients with dyspnea, NT-proBNP is highly sensitive for the detection of acute congestive heart failure. In addition NT-proBNP of <300 pg/ml effectively rules out acute congestive heart failure with 99% negative predictive value.    Results may be falsely decreased if patient taking Biotin.      TSH [[id number]]  (Normal) Collected:  08/20/20 0514    Specimen:  Blood Updated:  08/20/20 0653     TSH 1.010 uIU/mL     Procalcitonin [[id number]]  (Normal) Collected:  08/20/20 0514    Specimen:  Blood Updated:  08/20/20 0653     Procalcitonin 0.18 ng/mL     Narrative:       As a Marker for Sepsis (Non-Neonates):   1. <0.5 ng/mL represents a low risk of severe sepsis and/or septic shock.  1. >2 ng/mL represents a high risk of severe sepsis and/or septic shock.    As a Marker for Lower Respiratory Tract Infections that require antibiotic therapy:  PCT on Admission     Antibiotic Therapy             6-12 Hrs later  > 0.5                Strongly Recommended            >0.25 - <0.5         Recommended  0.1 - 0.25           Discouraged                   Remeasure/reassess PCT  <0.1                 Strongly Discouraged          Remeasure/reassess PCT      As 28 day mortality risk marker: \"Change in Procalcitonin Result\" (> 80 % or <=80 %) if Day 0 (or Day 1) and Day 4 values are available. Refer to http://www.[facility]-pct-calculator.com/   Change in PCT <=80 %   A decrease of PCT levels below or equal to 80 % defines a positive change in PCT test result representing a higher risk for 28-day all-cause mortality of patients diagnosed with severe sepsis or septic shock.  Change in PCT > 80 %   A decrease of PCT levels of more than 80 % defines a negative change in PCT result representing a lower risk for 28-day all-cause mortality of patients diagnosed with severe sepsis or septic shock.                Results may be falsely decreased if patient taking Biotin.     Protime-INR [[id number]]  (Abnormal) Collected:  08/20/20 0514    " Specimen:  Blood Updated:  08/20/20 0643     Protime 19.4 Seconds      INR 1.67    CBC & Differential [[id number]] Collected:  08/20/20 0514    Specimen:  Blood Updated:  08/20/20 0615    Narrative:       The following orders were created for panel order CBC & Differential.  Procedure                               Abnormality         Status                     ---------                               -----------         ------                     CBC Auto Differential[[id number]]                            In process                   Please view results for these tests on the individual orders.    CBC Auto Differential [[id number]] Collected:  08/20/20 0514    Specimen:  Blood Updated:  08/20/20 0615    Ferritin [[id number]] Collected:  08/20/20 0514    Specimen:  Blood Updated:  08/20/20 0608    Iron Profile [[id number]] Collected:  08/20/20 0514    Specimen:  Blood Updated:  08/20/20 0608    Blood Culture - Blood, Hand, Right [[id number]] Collected:  08/16/20 0127    Specimen:  Blood from Hand, Right Updated:  08/20/20 0330     Blood Culture No growth at 4 days    Blood Culture - Blood, Arm, Right [[id number]] Collected:  08/16/20 0127    Specimen:  Blood from Arm, Right Updated:  08/20/20 0330     Blood Culture No growth at 4 days            PHYSICAL EXAM  Left lower extremity: Dressing clean, dry and intact.  Mild pain with logroll of hip.  Sensation intact distally.          Closed left hip fracture, s/p repair 8/17/2020    Peripheral vascular disease (CMS/HCC)    Chronic atrial fibrillation (CMS/HCC)    Acute UTI (urinary tract infection)    HTN (hypertension)    CAD (coronary artery disease)    COPD (chronic obstructive pulmonary disease) (CMS/[facility])    Chronic respiratory disease    Aortic stenosis    S/p left hip fracture      PLAN / DISPOSITION:  3 Day Post-Op nail fixation left intertrochanteric hip fracture    Non weight bearing left lower extremity, hip range of motion as tolerated  Pain control  PT/OT for post  op mobilization and medical equipment needs   SCD's bilateral lower extremities   DVT prophylaxis per primary team  Social work for discharge planning.  Okay to discharge from orthopedic standpoint.  Dressing to remain in place for 7 days. May remove on POD#7. If no drainage, may shower on POD#10. No submerging wound in water. If drainage is noted, sterile dressing should be placed and wound checked daily. No showering until wound has remained dry for 72 consecutive hours.   Follow up in 2 weeks for re-assessment.  Call with questions.      Edmar Aponte MD  08/20/20  07:06

## 2020-08-20 NOTE — THERAPY TREATMENT NOTE
Patient Name: Zak Olmstead  : 1940    MRN: [id number]                              Today's Date: 2020       Admit Date: 8/15/2020    Visit Dx:     ICD-10-CM ICD-9-CM   1. Closed fracture of left hip, initial encounter (CMS/HCC) S72.002A 820.8   2. S/p left hip fracture Z87.81 V15.51     Patient Active Problem List   Diagnosis   • Peripheral vascular disease (CMS/HCC)   • Closed left hip fracture, s/p repair 2020   • Chronic atrial fibrillation (CMS/HCC)   • Acute UTI (urinary tract infection)   • HTN (hypertension)   • CAD (coronary artery disease)   • COPD (chronic obstructive pulmonary disease) (CMS/HCC)   • Chronic respiratory disease   • Aortic stenosis   • S/p left hip fracture     Past Medical History:   Diagnosis Date   • AAA (abdominal aortic aneurysm) (CMS/HCC)     s/p repair   • Atrial fibrillation with rapid ventricular response (CMS/HCC) 2018   • BPH (benign prostatic hyperplasia)    • BPH with obstruction/lower urinary tract symptoms 2020   • CAD (coronary artery disease)    • CAD (coronary artery disease) 8/15/2020   • Chronic atrial fibrillation (CMS/HCC)    • Chronic respiratory failure (CMS/HCC)    • COPD (chronic obstructive pulmonary disease) (CMS/HCC)    • Dysuria 2020   • Erectile dysfunction 2020   • Heart murmur    • Hypertension    • Hyperthyroidism    • Mitral annular calcification 2018   • Mononeuritis multiplex    • Nonrheumatic aortic valve stenosis 2018   • NSTEMI (non-ST elevated myocardial infarction) (CMS/HCC)    • PVD (peripheral vascular disease) (CMS/HCC)      Past Surgical History:   Procedure Laterality Date   • ABDOMINAL AORTIC ANEURYSM REPAIR     • APPENDECTOMY     • BACK SURGERY     • BELOW KNEE LEG AMPUTATION Left    • CARDIAC CATHETERIZATION N/A 2018    Procedure: Left Heart Cath;  Surgeon: Derik Vega MD;  Location: [facility] CATH INVASIVE LOCATION;  Service: Cardiovascular   • COLONOSCOPY N/A 6/15/2017    Procedure:  COLONOSCOPY  CPTCODE:78361;  Surgeon: Lincoln Bowens III, MD;  Location: [facility] OR;  Service:    • ENDOSCOPY N/A 6/15/2017    Procedure: ESOPHAGOGASTRODUODENOSCOPY WITH BIOPSY  CPTCODE:87427;  Surgeon: Lincoln Bowens III, MD;  Location: [facility] OR;  Service:    • ESOPHAGEAL DILATATION     • HIP TROCHANTERIC NAILING WITH INTRAMEDULLARY HIP SCREW Left 8/17/2020    Procedure: HIP TROCHANTERIC NAILING WITH INTRAMEDULLARY HIP SCREW, GAMMA NAIL LEFT;  Surgeon: Edmar Aponte MD;  Location:  [facility] OR;  Service: Orthopedics;  Laterality: Left;   • INTERVENTIONAL RADIOLOGY PROCEDURE Left 6/1/2018    Procedure: Abdominal Aortagram with Runoff;  Surgeon: Derik Vega MD;  Location: [facility] CATH INVASIVE LOCATION;  Service: Cardiovascular     General Information     Row Name 08/20/20 1426          PT Evaluation Time/Intention    Document Type  therapy note (daily note)  -AA     Mode of Treatment  physical therapy  -AA     Row Name 08/20/20 1426          General Information    Patient Profile Reviewed?  yes  -AA     Existing Precautions/Restrictions  fall;non-weight bearing;left L FINC, L BKA, L hip ROM as tolerated  -AA     Row Name 08/20/20 1426          Cognitive Assessment/Intervention- PT/OT    Orientation Status (Cognition)  oriented x 4  -AA     Row Name 08/20/20 1426          Safety Issues, Functional Mobility    Impairments Affecting Function (Mobility)  balance;endurance/activity tolerance;range of motion (ROM);strength  -AA       User Key  (r) = Recorded By, (t) = Taken By, (c) = Cosigned By    Initials Name Provider Type    Nisha Fontanez, PT Physical Therapist        Mobility     Row Name 08/20/20 1426          Bed Mobility Assessment/Treatment    Bed Mobility Assessment/Treatment  scooting/bridging;supine-sit;rolling right  -AA     Rolling Right [place] (Bed Mobility)  minimum assist (75% patient effort);verbal cues  -AA     Scooting/Bridging [place] (Bed Mobility)  minimum assist (75%  patient effort);2 person assist;verbal cues  -AA     Supine-Sit [place] (Bed Mobility)  minimum assist (75% patient effort);2 person assist;verbal cues  -AA     Assistive Device (Bed Mobility)  bed rails;draw sheet;head of bed elevated  -AA     Comment (Bed Mobility)  VC for pushing through BUE to scoot and maintain NWB LLE  -AA     Row Name 08/20/20 1426          Transfer Assessment/Treatment    Comment (Transfers)  sit pivot bed to recliner with VC for proper sequencing and safety; pt denies need for AD  -AA     Row Name 08/20/20 1426          Bed-Chair Transfer    Bed-Chair [place] (Transfers)  minimum assist (75% patient effort);2 person assist;verbal cues  -AA     Row Name 08/20/20 1426          Mobility Assessment/Intervention    Extremity Weight-bearing Status  left lower extremity  -AA     Left Lower Extremity (Weight-bearing Status)  non weight-bearing (NWB)  -AA       User Key  (r) = Recorded By, (t) = Taken By, (c) = Cosigned By    Initials Name Provider Type    Nisha Fontanez PT Physical Therapist        Obj/Interventions     Row Name 08/20/20 1426          Therapeutic Exercise    Lower Extremity (Therapeutic Exercise)  quad sets, left  -AA     Lower Extremity Range of Motion (Therapeutic Exercise)  hip flexion/extension, left;hip abduction/adduction, left;knee flexion/extension, left  -AA     Exercise Type (Therapeutic Exercise)  AROM (active range of motion);AAROM (active assistive range of motion)  -AA     Position (Therapeutic Exercise)  seated  -AA     Sets/Reps (Therapeutic Exercise)  10  -AA     Row Name 08/20/20 1426          Static Sitting Balance    Level of [place] (Unsupported Sitting, Static Balance)  supervision  -AA     Sitting Position (Unsupported Sitting, Static Balance)  sitting in chair;sitting on edge of bed  -AA     Time Able to Maintain Position (Unsupported Sitting, Static Balance)  more than 5 minutes  -AA       User Key  (r) = Recorded By, (t) = Taken By, (c)  = Cosigned By    Initials Name Provider Type    Nisha Fontanez, PT Physical Therapist        Goals/Plan    No documentation.       Clinical Impression     Row Name 08/20/20 1426          Pain Assessment    Additional Documentation  Pain Scale: Numbers Pre/Post-Treatment (Group)  -     Row Name 08/20/20 1426          Pain Scale: Numbers Pre/Post-Treatment    Pain Scale: Numbers, Pretreatment  9/10  -AA     Pain Scale: Numbers, Post-Treatment  9/10  -AA     Pain Location - Side  Left  -AA     Pain Location  hip  -AA     Pain Intervention(s)  Repositioned;Ambulation/increased activity  -AA     Row Name 08/20/20 1426          Plan of Care Review    Plan of Care Reviewed With  patient;spouse  -AA     Progress  improving  -     Row Name 08/20/20 1426          Positioning and Restraints    Pre-Treatment Position  in bed  -AA     Post Treatment Position  chair  -AA     In Chair  notified nsg;sitting;with family/caregiver;waffle cushion;exit alarm on;on mechanical lift sling;call light within reach;encouraged to call for assist  -AA       User Key  (r) = Recorded By, (t) = Taken By, (c) = Cosigned By    Initials Name Provider Type    Nisha Fontanez, PT Physical Therapist        Outcome Measures     Row Name 08/20/20 1426          How much help from another person do you currently need...    Turning from your back to your side while in flat bed without using bedrails?  3  -AA     Moving from lying on back to sitting on the side of a flat bed without bedrails?  3  -AA     Moving to and from a bed to a chair (including a wheelchair)?  3  -AA     Standing up from a chair using your arms (e.g., wheelchair, bedside chair)?  1  -AA     Climbing 3-5 steps with a railing?  1  -AA     To walk in hospital room?  1  -AA     AM-PAC 6 Clicks Score (PT)  12  -AA     Row Name 08/20/20 1426          Functional Assessment    Outcome Measure Options  AM-PAC 6 Clicks Basic Mobility (PT)  -AA       User Key  (r) = Recorded By, (t) =  Taken By, (c) = Cosigned By    Initials Name Provider Type    Nisha Fontanez, PT Physical Therapist        Physical Therapy Education                 Title: PT OT SLP Therapies (In Progress)     Topic: Physical Therapy (In Progress)     Point: Mobility training (In Progress)     Description:   Instruct learner(s) on safety and technique for assisting patient out of bed, chair or wheelchair.  Instruct in the proper use of assistive devices, such as walker, crutches, cane or brace.              Patient Friendly Description:   It's important to get you on your feet again, but we need to do so in a way that is safe for you. Falling has serious consequences, and your personal safety is the most important thing of all.        When it's time to get out of bed, one of us or a family member will sit next to you on the bed to give you support.     If your doctor or nurse tells you to use a walker, crutches, a cane, or a brace, be sure you use it every time you get out of bed, even if you think you don't need it.    Learning Progress Summary           Patient Acceptance, E,D, NR by AA at 8/20/2020 1426    Acceptance, E, NR by KG at 8/18/2020 0840   Family Acceptance, E,D, NR by AA at 8/20/2020 1426                   Point: Home exercise program (Not Started)     Description:   Instruct learner(s) on appropriate technique for monitoring, assisting and/or progressing patient with therapeutic exercises and activities.              Learner Progress:   Not documented in this visit.          Point: Body mechanics (In Progress)     Description:   Instruct learner(s) on proper positioning and spine alignment for patient and/or caregiver during mobility tasks and/or exercises.              Learning Progress Summary           Patient Acceptance, E, NR by KG at 8/18/2020 0840                   Point: Precautions (In Progress)     Description:   Instruct learner(s) on prescribed precautions during mobility and gait tasks               Learning Progress Summary           Patient Acceptance, E,D, NR by AA at 8/20/2020 1426    Acceptance, E, NR by KG at 8/18/2020 0840   Family Acceptance, E,D, NR by AA at 8/20/2020 1426                               User Key     Initials Effective Dates Name Provider Type Discipline    KG 05/22/20 -  Sherly Duncan, PT Physical Therapist PT    KRZYSZTOF 04/02/18 -  Nisha Reese, PT Physical Therapist PT              PT Recommendation and Plan     Outcome Summary/Treatment Plan (PT)  Anticipated Discharge Disposition (PT): home with assist, home with home health  Plan of Care Reviewed With: patient, spouse  Progress: improving  Outcome Summary: Pt demonstrates self limiting factors during mobility.   Supine to sit min A of 2.  VC for NWB LLE and to push with BUE to A with scooting.  Pt fearful of moving LLE, used draw sheet to A with advancing.  Sit pivot to recliner from EOB min A of 2.  Pt decline AD.  Pt reports pain 9/10, although states he is comfortable in the chair.  ther-ex performed as tolerated.  Recommend DC home with assist and home health if pt continues to participate with PT     Time Calculation:   PT Charges     Row Name 08/20/20 1426             Time Calculation    Start Time  1426  -AA      PT Received On  08/20/20  -AA      PT Goal Re-Cert Due Date  08/28/20  -AA         Time Calculation- PT    Total Timed Code Minutes- PT  26 minute(s)  -         Timed Charges    17619 - PT Therapeutic Exercise Minutes  8 -AA      00718 - PT Therapeutic Activity Minutes  18  -AA        User Key  (r) = Recorded By, (t) = Taken By, (c) = Cosigned By    Initials Name Provider Type    AA Nisha Reese, PT Physical Therapist        Therapy Charges for Today     Code Description Service Date Service Provider Modifiers Qty    82294788948 HC PT THER PROC EA 15 MIN 8/20/2020 Nisha Reese, PT GP 1    19315156447 HC PT THERAPEUTIC ACT EA 15 MIN 8/20/2020 Nisha Reese, PT GP 1    50898295608 HC PT THER SUPP EA 15  MIN 8/20/2020 Nisha Reese, PT GP 2          PT G-Codes  Outcome Measure Options: AM-PAC 6 Clicks Basic Mobility (PT)  AM-PAC 6 Clicks Score (PT): 12  AM-PAC 6 Clicks Score (OT): 11 April JATINDER Reese PT  8/20/2020

## 2020-08-20 NOTE — PLAN OF CARE
Problem: Patient Care Overview  Goal: Plan of Care Review  8/20/2020 0443 by Yenni Lam, RN  Outcome: Ongoing (interventions implemented as appropriate)  Flowsheets (Taken 8/20/2020 0443)  Progress: improving  Plan of Care Reviewed With: patient  8/20/2020 0443 by Yenni Lam, RN  Outcome: Ongoing (interventions implemented as appropriate)   Pt rested well this shift.  Sleeping pretty sound at times.  Pain controlled with prn pain pills.  [place] to left hip clean and dry.  Tele afib, vss. Cont to turn every 2 hours.  Coccyx red with scabbed pressure sore to left buttock. Barrier cream applied liberally.    Waffle mattress intact. Cont to monitor

## 2020-08-21 ENCOUNTER — APPOINTMENT (OUTPATIENT)
Dept: GENERAL RADIOLOGY | Facility: HOSPITAL | Age: 80
End: 2020-08-21

## 2020-08-21 LAB
ANION GAP SERPL CALCULATED.3IONS-SCNC: 10 MMOL/L (ref 5–15)
BACTERIA SPEC AEROBE CULT: NORMAL
BACTERIA SPEC AEROBE CULT: NORMAL
BUN SERPL-MCNC: 16 MG/DL (ref 8–23)
BUN/CREAT SERPL: 22.2 (ref 7–25)
CALCIUM SPEC-SCNC: 8 MG/DL (ref 8.6–10.5)
CHLORIDE SERPL-SCNC: 104 MMOL/L (ref 98–107)
CO2 SERPL-SCNC: 24 MMOL/L (ref 22–29)
CREAT SERPL-MCNC: 0.72 MG/DL (ref 0.76–1.27)
DEPRECATED RDW RBC AUTO: 56.2 FL (ref 37–54)
ERYTHROCYTE [DISTWIDTH] IN BLOOD BY AUTOMATED COUNT: 22.1 % (ref 12.3–15.4)
GFR SERPL CREATININE-BSD FRML MDRD: 105 ML/MIN/1.73
GLUCOSE SERPL-MCNC: 90 MG/DL (ref 65–99)
HCT VFR BLD AUTO: 28.2 % (ref 37.5–51)
HGB BLD-MCNC: 7.7 G/DL (ref 13–17.7)
MCH RBC QN AUTO: 20.2 PG (ref 26.6–33)
MCHC RBC AUTO-ENTMCNC: 27.3 G/DL (ref 31.5–35.7)
MCV RBC AUTO: 74 FL (ref 79–97)
PLATELET # BLD AUTO: 327 10*3/MM3 (ref 140–450)
PMV BLD AUTO: 10.5 FL (ref 6–12)
POTASSIUM SERPL-SCNC: 3.9 MMOL/L (ref 3.5–5.2)
RBC # BLD AUTO: 3.81 10*6/MM3 (ref 4.14–5.8)
SODIUM SERPL-SCNC: 138 MMOL/L (ref 136–145)
VIT B12 BLD-MCNC: 1373 PG/ML (ref 211–946)
WBC # BLD AUTO: 8.41 10*3/MM3 (ref 3.4–10.8)

## 2020-08-21 PROCEDURE — 71045 X-RAY EXAM CHEST 1 VIEW: CPT

## 2020-08-21 PROCEDURE — 99232 SBSQ HOSP IP/OBS MODERATE 35: CPT | Performed by: HOSPITALIST

## 2020-08-21 PROCEDURE — 93005 ELECTROCARDIOGRAM TRACING: CPT | Performed by: HOSPITALIST

## 2020-08-21 PROCEDURE — 85027 COMPLETE CBC AUTOMATED: CPT | Performed by: HOSPITALIST

## 2020-08-21 PROCEDURE — 93010 ELECTROCARDIOGRAM REPORT: CPT | Performed by: INTERNAL MEDICINE

## 2020-08-21 PROCEDURE — 82607 VITAMIN B-12: CPT | Performed by: HOSPITALIST

## 2020-08-21 PROCEDURE — 80048 BASIC METABOLIC PNL TOTAL CA: CPT | Performed by: HOSPITALIST

## 2020-08-21 RX ORDER — METOPROLOL TARTRATE 50 MG/1
50 TABLET, FILM COATED ORAL EVERY 12 HOURS SCHEDULED
Status: DISCONTINUED | OUTPATIENT
Start: 2020-08-22 | End: 2020-08-22

## 2020-08-21 RX ORDER — METOPROLOL TARTRATE 5 MG/5ML
2.5 INJECTION INTRAVENOUS
Status: COMPLETED | OUTPATIENT
Start: 2020-08-21 | End: 2020-08-21

## 2020-08-21 RX ORDER — DILTIAZEM HCL IN NACL,ISO-OSM 125 MG/125
5 PLASTIC BAG, INJECTION (ML) INTRAVENOUS
Status: DISCONTINUED | OUTPATIENT
Start: 2020-08-22 | End: 2020-08-21

## 2020-08-21 RX ORDER — DILTIAZEM HCL IN NACL,ISO-OSM 125 MG/125
5-15 PLASTIC BAG, INJECTION (ML) INTRAVENOUS
Status: DISCONTINUED | OUTPATIENT
Start: 2020-08-22 | End: 2020-08-21

## 2020-08-21 RX ORDER — DIGOXIN 0.25 MG/ML
250 INJECTION INTRAMUSCULAR; INTRAVENOUS ONCE
Status: COMPLETED | OUTPATIENT
Start: 2020-08-22 | End: 2020-08-22

## 2020-08-21 RX ADMIN — METOPROLOL TARTRATE 2.5 MG: 5 INJECTION INTRAVENOUS at 21:34

## 2020-08-21 RX ADMIN — METOPROLOL TARTRATE 2.5 MG: 5 INJECTION INTRAVENOUS at 22:04

## 2020-08-21 RX ADMIN — POLYETHYLENE GLYCOL 3350 17 G: 17 POWDER, FOR SOLUTION ORAL at 20:27

## 2020-08-21 RX ADMIN — OXYCODONE HYDROCHLORIDE 10 MG: 5 TABLET ORAL at 11:52

## 2020-08-21 RX ADMIN — SODIUM CHLORIDE, PRESERVATIVE FREE 10 ML: 5 INJECTION INTRAVENOUS at 08:03

## 2020-08-21 RX ADMIN — FERROUS SULFATE TAB 325 MG (65 MG ELEMENTAL FE) 325 MG: 325 (65 FE) TAB at 08:02

## 2020-08-21 RX ADMIN — METHIMAZOLE 5 MG: 5 TABLET ORAL at 15:19

## 2020-08-21 RX ADMIN — ASPIRIN 81 MG: 81 TABLET, COATED ORAL at 08:02

## 2020-08-21 RX ADMIN — OXYCODONE HYDROCHLORIDE 10 MG: 5 TABLET ORAL at 23:12

## 2020-08-21 RX ADMIN — ATORVASTATIN CALCIUM 40 MG: 40 TABLET, FILM COATED ORAL at 20:27

## 2020-08-21 RX ADMIN — METOPROLOL TARTRATE 25 MG: 25 TABLET, FILM COATED ORAL at 15:19

## 2020-08-21 RX ADMIN — POLYETHYLENE GLYCOL 3350 17 G: 17 POWDER, FOR SOLUTION ORAL at 08:02

## 2020-08-21 RX ADMIN — METHIMAZOLE 5 MG: 5 TABLET ORAL at 20:27

## 2020-08-21 RX ADMIN — NICOTINE 1 PATCH: 21 PATCH, EXTENDED RELEASE TRANSDERMAL at 08:02

## 2020-08-21 RX ADMIN — SODIUM CHLORIDE: 9 INJECTION, SOLUTION INTRAVENOUS at 11:52

## 2020-08-21 RX ADMIN — APIXABAN 5 MG: 5 TABLET, FILM COATED ORAL at 08:02

## 2020-08-21 RX ADMIN — DOCUSATE SODIUM 100 MG: 100 CAPSULE, LIQUID FILLED ORAL at 20:27

## 2020-08-21 RX ADMIN — OXYCODONE HYDROCHLORIDE 10 MG: 5 TABLET ORAL at 05:06

## 2020-08-21 RX ADMIN — METOPROLOL TARTRATE 2.5 MG: 5 INJECTION INTRAVENOUS at 22:31

## 2020-08-21 RX ADMIN — DILTIAZEM HYDROCHLORIDE 240 MG: 240 CAPSULE, COATED, EXTENDED RELEASE ORAL at 08:02

## 2020-08-21 RX ADMIN — DOCUSATE SODIUM 100 MG: 100 CAPSULE, LIQUID FILLED ORAL at 08:02

## 2020-08-21 RX ADMIN — LACTULOSE 10 G: 20 SOLUTION ORAL at 20:28

## 2020-08-21 RX ADMIN — OXYCODONE HYDROCHLORIDE AND ACETAMINOPHEN 500 MG: 500 TABLET ORAL at 08:02

## 2020-08-21 RX ADMIN — METOPROLOL TARTRATE 25 MG: 25 TABLET, FILM COATED ORAL at 05:08

## 2020-08-21 RX ADMIN — METOPROLOL TARTRATE 25 MG: 25 TABLET, FILM COATED ORAL at 16:19

## 2020-08-21 RX ADMIN — Medication 5 MG/HR: at 23:26

## 2020-08-21 RX ADMIN — OXYCODONE HYDROCHLORIDE 10 MG: 5 TABLET ORAL at 17:38

## 2020-08-21 RX ADMIN — APIXABAN 5 MG: 5 TABLET, FILM COATED ORAL at 20:27

## 2020-08-21 RX ADMIN — METHIMAZOLE 5 MG: 5 TABLET ORAL at 05:06

## 2020-08-21 RX ADMIN — FINASTERIDE 5 MG: 5 TABLET, FILM COATED ORAL at 08:02

## 2020-08-21 NOTE — PLAN OF CARE
Pt rested well this shift.  Minimal c/o pain.  Pain controlled with po pain meds.  Remains in afib.  Ns bolus given as ordered.  Dressing to left hip remains clean and dry.  Pt removing o2 per nc in his sleep.   Room air sats dropping to low 80s % o2 at 2liters reapplied.  Cont with congested cough.  Vs noted. Pt had large bm this shift.  Pt verbalized increase in comfort after having bm. Cont to monitor

## 2020-08-21 NOTE — PROGRESS NOTES
"                  Clinical Nutrition     Reason for Visit:   LOS    Patient Name: Zak Olmstead  YOB: 1940  MRN: [id number]  Date of Encounter: 08/21/20 11:07  Admission date: 8/15/2020    Nutrition Assessment   Assessment     Admission diagnosis  Recent fall off porch (8/7)  L hip fracture    Additional diagnosis/conditions/procedures this admission  Severe aortic stenosis  Tobacco abuse  Acute UTI    (8/17) s/p L hip nailing    Additional PMH/procedures:  AAA  HTN  Chronic afib  Heart murmur  PVD  NSTEMI  Hyperthyroid  COPD  Chronic respiratory failure on 2L  Mononeuritis multiplex    AAA repair  Appy  Back surgery  L BKA  Esophageal dilation      Reported/Observed/Food/Nutrition Related History:       Patient sleeping at attempted visit. RD notes patient tolerating regular diet, however, per PO documentation, patient consuming <67% of past meals. Supplements order to assist with increasing and encouraged kcal/protein intake.       Anthropometrics     Height: 172.2 cm (67.8\")  Last filed wt: Weight: 76 kg (167 lb 8.8 oz) (08/18/20 0400)  Weight Method: Bed scale    BMI: BMI (Calculated): 25.6  Overweight: 25.0-29.9kg/m2     Ideal Body Weight (IBW) (kg): 70.33  Admission wt: 157 lb 6.4 oz  Method obtained: bed scale weight per charting 8/15    Labs reviewed     Results from last 7 days   Lab Units 08/21/20  0734 08/20/20  0514 08/18/20  0525  08/16/20  0127 08/15/20  0850 08/14/20  1420   GLUCOSE mg/dL 90 79 99   < > 108* 111* 131*   BUN mg/dL 16 20 21   < > 21 21 23   CREATININE mg/dL 0.72* 0.87 0.87   < > 1.02 0.93 0.93   SODIUM mmol/L 138 137 137   < > 138 140 140   CHLORIDE mmol/L 104 103 105   < > 103 106 104   POTASSIUM mmol/L 3.9 4.0 4.6   < > 3.8 4.5 4.0   MAGNESIUM mg/dL  --   --   --   --  2.5* 2.2  --    ALT (SGPT) U/L  --  7  --   --  28  --  25    < > = values in this interval not displayed.     Results from last 7 days   Lab Units 08/20/20  0514 08/16/20  0127 08/14/20  1420   ALBUMIN " g/dL 3.00* 3.30* 3.57   CHOLESTEROL mg/dL  --  111  --    TRIGLYCERIDES mg/dL  --  102  --        Results from last 7 days   Lab Units 08/18/20  1741 08/18/20  1147   GLUCOSE mg/dL 117 127     Lab Results   Lab Value Date/Time    HGBA1C 6.10 (H) 02/22/2020 0125    HGBA1C 6.10 (H) 06/01/2018 0424       Medications reviewed   Pertinent: eliqiuis, colace, lactulose, oxycodone, miralax, hytrin, VIT C      Intake/Output 24 hrs (7:00AM - 6:59 AM)     Intake & Output (last day)       08/20 0701 - 08/21 0700 08/21 0701 - 08/22 0700    P.O. 680 120    I.V. (mL/kg) 569 (7.5)     IV Piggyback 500     Total Intake(mL/kg) 1749 (23) 120 (1.6)    Urine (mL/kg/hr) 350 (0.2) 100 (0.3)    Stool 0     Total Output 350 100    Net +1399 +20          Urine Unmeasured Occurrence  1 x    Stool Unmeasured Occurrence 1 x         Current Nutrition Prescription     PO: Diet Regular  Intake: 36% x 9 meals    Nutrition Diagnosis     8/21  Problem Inadequate oral intake   Etiology Clinical condition   Signs/Symptoms PO Intake (36% x 9 meals)       Nutrition Intervention     1.  Follow treatment progress, Care plan reviewed  2.  Supplement provided - Boost Plus x2/d  3. Catering staff to honor patient food preferences/requests  4. RD to continue to monitor patient oral intake/adequacy    Goal:   General: Nutrition support treatment  PO: Increase intake      Monitoring/Evaluation:   Per protocol, PO intake, Supplement intake, Symptoms, POC/GOC    Will Continue to follow per protocol    Ysabel Gacría, ISHAANN, LD  Time Spent: 25 minutes

## 2020-08-21 NOTE — NURSING NOTE
Daily low Arnie check. Patient's Arnie today is 14. All interventions in place and documented per protocol. LDAs in place. Please notify [facility] for any questions.

## 2020-08-21 NOTE — PROGRESS NOTES
Continued Stay Note  [facility]     Patient Name: Zak Olmstead  MRN: [id number]  Today's Date: 8/21/2020    Admit Date: 8/15/2020    Discharge Plan     Row Name 08/21/20 1518       Plan    Plan  TBD    Patient/Family in Agreement with Plan  yes    Plan Comments  Followed up with Mr. Olmstead and his son, Low, at the bedside for discharge planning.  Spoke with Tari at [facility] and she is starting a prior auth with Mr. Olmstead's [employer] for rehab admission.  Discussed the patient with Tania with [employer] and she states that [facility] may approve acute rehab and it will depend on other factors and co mobidities.  Mr. Olmstead was agreeable to rehab only at [facility].    CM will follow up.    Final Discharge Disposition Code  62 - inpatient rehab facility        Discharge Codes    No documentation.       Expected Discharge Date and Time     Expected Discharge Date Expected Discharge Time    Aug 24, 2020             Sandra Vang RN

## 2020-08-21 NOTE — PROGRESS NOTES
[facility] Medicine Services  PROGRESS NOTE    Patient Name: Zak Olmstead  : 1940  MRN: [id number]    Date of Admission: 8/15/2020  Primary Care Physician: Ely Berumen APRN    Subjective   Subjective     CC:  Hip Fracture - transferred from [place]    HPI:  Variable heart rate, but up again today into the 140s.  A fib RVR.  Very sleepy today.  No family in room    Review of Systems    Unable to assess    Objective   Objective     Vital Signs:   Temp:  [98 °F (36.7 °C)-98.5 °F (36.9 °C)] 98.5 °F (36.9 °C)  Heart Rate:  [] 143  Resp:  [18] 18  BP: (130-151)/() 137/98     Physical Exam:    Constitutional: No acute distress, awake, alert  HENT: NCAT, mucous membranes moist  Respiratory: poor inspiratory effort, no wheezes  Cardiovascular: irreg, tachy, + murmur  Gastrointestinal: Positive bowel sounds, soft, nontender, nondistended  Musculoskeletal:  Left BKA  Psychiatric: Appropriate affect, cooperative  Neurologic: Oriented x 3, strength symmetric in all extremities, Cranial Nerves grossly intact to confrontation, speech clear  Skin: No rashes    Results Reviewed:  Results from last 7 days   Lab Units 20  0734 20  0127   WBC 10*3/mm3 8.41 10.62 10.63   < > 8.60   HEMOGLOBIN g/dL 7.7* 7.9* 8.1*   < > 8.9*   HEMATOCRIT % 28.2* 28.3* 29.2*   < > 31.7*   PLATELETS 10*3/mm3 327 352 269   < > 204   INR   --  1.67*  --   --  1.38*   PROCALCITONIN ng/mL  --  0.18  --   --   --     < > = values in this interval not displayed.     Results from last 7 days   Lab Units 20  0734 20  0514 20  0520  0127   SODIUM mmol/L 138 137 137   < > 138   POTASSIUM mmol/L 3.9 4.0 4.6   < > 3.8   CHLORIDE mmol/L 104 103 105   < > 103   CO2 mmol/L 24.0 25.0 17.0*   < > 26.0   BUN mg/dL 16 20 21   < > 21   CREATININE mg/dL 0.72* 0.87 0.87   < > 1.02   GLUCOSE mg/dL 90 79 99   < > 108*   CALCIUM mg/dL 8.0* 8.0* 7.8*   < > 8.3*     ALT (SGPT) U/L  --  7  --   --  28   AST (SGOT) U/L  --  13  --   --  24   PROBNP pg/mL  --  11,392.0*  --   --  8,015.0*    < > = values in this interval not displayed.     Estimated Creatinine Clearance: 79.2 mL/min (A) (by C-G formula based on SCr of 0.72 mg/dL (L)).    Microbiology Results Abnormal     Procedure Component Value - Date/Time    Blood Culture - Blood, Hand, Right [[id number]] Collected:  08/16/20 0127    Lab Status:  Final result Specimen:  Blood from Hand, Right Updated:  08/21/20 0330     Blood Culture No growth at 5 days    Blood Culture - Blood, Arm, Right [[id number]] Collected:  08/16/20 0127    Lab Status:  Final result Specimen:  Blood from Arm, Right Updated:  08/21/20 0330     Blood Culture No growth at 5 days          Imaging Results (Last 24 Hours)     Procedure Component Value Units Date/Time    XR Femur 2 View Left [[id number]] Collected:  08/20/20 0920     Updated:  08/20/20 2021    Narrative:       EXAMINATION: XR FEMUR 2 VW LEFT- 08/20/2020     INDICATION: increase pain at therapy; S72.002A-Fracture of unspecified  part of neck of left femur, initial encounter for closed fracture;  Z87.81-Personal history of (healed) traumatic fracture      COMPARISON: 08/17/2020     FINDINGS: Left femur hardware appears in stable alignment from prior  comparison 08/17/2020 with stable appearance of fracture from that exam  without evidence of progressive malalignment or new fracture.  Specifically no distal fracture of the distal left femur.           Impression:       Stable appearance of left hip nailing and left femoral  hardware from prior comparison 08/17/2020 fluoroscopic images and  overall appearance of intertrochanteric/lesser trochanteric fracture. No  new findings evident.     D:  08/20/2020  E:  08/20/2020     This report was finalized on 8/20/2020 7:03 PM by Dr. Ciaran Rivera.             Results for orders placed during the hospital encounter of 08/14/20   Adult Transthoracic Echo  Complete W/ Cont if Necessary Per Protocol    Narrative · Left ventricular wall thickness is consistent with mild concentric   hypertrophy.  · Left ventricular systolic function is normal, EF 56-60%.  · Left ventricular diastolic dysfunction (grade I a) consistent with   impaired relaxation.  · Left atrial cavity size is mild-to-moderately dilated.  · There is severe calcification of the aortic valve mainly affecting the   non, left and right coronary cusp(s).  · Mild aortic valve regurgitation is present.  · Severe aortic valve stenosis is present.  · Aortic valve mean pressure gradient is 46.0 mmHg.  · Aortic valve maximum pressure gradient is 90.0 mmHg.  · Moderate mitral valve regurgitation is present  · Moderate to severe tricuspid valve regurgitation is present.  · Calculated right ventricular systolic pressure from tricuspid   regurgitation is 77 mmHg.          I have reviewed the medications:  Scheduled Meds:    apixaban 5 mg Oral Q12H   aspirin 81 mg Oral Daily   atorvastatin 40 mg Oral Nightly   [START ON 8/22/2020] dilTIAZem  mg Oral Daily   docusate sodium 100 mg Oral BID   ferrous sulfate 325 mg Oral Daily With Breakfast   finasteride 5 mg Oral Daily   lactulose 10 g Oral TID   methIMAzole 5 mg Oral Q8H   [START ON 8/22/2020] metoprolol tartrate 50 mg Oral Q12H   nicotine 1 patch Transdermal Q24H   oxyCODONE 10 mg Oral 4x Daily   polyethylene glycol 17 g Oral BID   sodium chloride 10 mL Intravenous Q12H   vitamin C 500 mg Oral Daily     Continuous Infusions:   PRN Meds:.ipratropium-albuterol  •  magnesium sulfate **OR** magnesium sulfate **OR** magnesium sulfate  •  nicotine polacrilex  •  nicotine polacrilex  •  nitroglycerin  •  ondansetron **OR** ondansetron  •  oxyCODONE  •  oxyCODONE  •  sodium chloride    Assessment/Plan   Assessment & Plan     Active Hospital Problems    Diagnosis  POA   • **Closed left hip fracture, s/p repair 8/17/2020 [S72.002A]  Yes   • Chronic atrial fibrillation  (CMS/[facility]) [I48.20]  Yes   • Acute UTI (urinary tract infection) [N39.0]  Yes   • HTN (hypertension) [I10]  Yes   • CAD (coronary artery disease) [I25.10]  Yes   • COPD (chronic obstructive pulmonary disease) (CMS/[facility]) [J44.9]  Yes   • Chronic respiratory disease [J98.9]  Yes   • Aortic stenosis [I35.0]  Yes   • S/p left hip fracture [Z87.81]  Not Applicable   • Peripheral vascular disease (CMS/[facility]) [I73.9]  Yes      Resolved Hospital Problems   No resolved problems to display.        Brief Hospital Course to date:  Zak Olmstead is a 80 y.o. male w/ a hx of COPD, chronic respiratory failure on 2 liters PRN, chronic atrial fibrillation (BB, dilt and Eliquis), hyperthyroidism, HTN, CAD, PVD, remote left BKA and severe aortic stenosis who was transferred from [facility] for further evaluation/tx of a left hip fracture.    He had to be taken off AC for surgery and had Operative Fixation of Left Intertrochanteric Fracture with Cephalomedullary Device with Dr. Aponte    XR Left Hip yesterday.  Bolused NS yesterday.  Bolus again today.   EKG today.  Adjust Metoprolol and Cardizem CD to 300 mg    Fall  - multiple risk factors for fall  - left BKA  - pain medication  - history of Vitamin D def  - Inpatient Rehab recommended  Left Hip Fracture  - s/p repair  Aortic Valve Stenosis  - seen by TAVR team  - not a candidate for TAVR  Vasculopathy  - history of Aortic Graft/Stent  - Fem Fem bypass  - chronic occlusion of RCA  - LAD disease   - lifelong smoker - 15 yrs - 80 yrs old (duration 65 yrs)  - up to 2 ppd (possibly over 110 pack-yrs)  Chronic Pain  - has been on pain medication for 8 yrs or more  - goes to a pain clinic per wife  History of AAA  - s/p repair  Severe Anemia  - INR 1.38  - iron studies  - increased AC yesterday  - monitor for dropping hemoglobin  Hyperthyroidism  - on Methimazole  Tobacco Use Disorder  - nicotine patch  - has been smoking for 65 yrs  - [place] 2 ppd  Atrial Fibrillation  -  metoprolol / cardizem / anticoagulation  Chronic Respiratory Failure  - due to COPD  - nebs    EKG - a fib RVR  Adjust BB and Cardizem today  IV fluids    DVT Prophylaxis:  anticoagulation    Disposition: I expect the patient to be discharged home as he is refusing rehab and unclear if we can find a rehab based on feedback from cm today    CODE STATUS:   Code Status and Medical Interventions:   Ordered at: 08/15/20 1955     Level Of Support Discussed With:    Patient     Code Status:    CPR     Medical Interventions (Level of Support Prior to Arrest):    Full         Electronically signed by Bossman Suarez MD, 08/21/20, 17:13.

## 2020-08-22 PROBLEM — D63.8 ANEMIA, CHRONIC DISEASE: Status: ACTIVE | Noted: 2020-08-22

## 2020-08-22 PROBLEM — I50.33 ACUTE ON CHRONIC DIASTOLIC CHF (CONGESTIVE HEART FAILURE) (HCC): Status: ACTIVE | Noted: 2020-08-22

## 2020-08-22 LAB
ABO GROUP BLD: NORMAL
BASOPHILS # BLD AUTO: 0.04 10*3/MM3 (ref 0–0.2)
BASOPHILS NFR BLD AUTO: 0.5 % (ref 0–1.5)
BLD GP AB SCN SERPL QL: NEGATIVE
DEPRECATED RDW RBC AUTO: 58.6 FL (ref 37–54)
EOSINOPHIL # BLD AUTO: 0.15 10*3/MM3 (ref 0–0.4)
EOSINOPHIL NFR BLD AUTO: 1.8 % (ref 0.3–6.2)
ERYTHROCYTE [DISTWIDTH] IN BLOOD BY AUTOMATED COUNT: 22.2 % (ref 12.3–15.4)
HCT VFR BLD AUTO: 29.6 % (ref 37.5–51)
HGB BLD-MCNC: 8.1 G/DL (ref 13–17.7)
IMM GRANULOCYTES # BLD AUTO: 0.06 10*3/MM3 (ref 0–0.05)
IMM GRANULOCYTES NFR BLD AUTO: 0.7 % (ref 0–0.5)
LYMPHOCYTES # BLD AUTO: 1.42 10*3/MM3 (ref 0.7–3.1)
LYMPHOCYTES NFR BLD AUTO: 16.8 % (ref 19.6–45.3)
MCH RBC QN AUTO: 20.9 PG (ref 26.6–33)
MCHC RBC AUTO-ENTMCNC: 27.4 G/DL (ref 31.5–35.7)
MCV RBC AUTO: 76.3 FL (ref 79–97)
MONOCYTES # BLD AUTO: 0.77 10*3/MM3 (ref 0.1–0.9)
MONOCYTES NFR BLD AUTO: 9.1 % (ref 5–12)
NEUTROPHILS NFR BLD AUTO: 6.02 10*3/MM3 (ref 1.7–7)
NEUTROPHILS NFR BLD AUTO: 71.1 % (ref 42.7–76)
NRBC BLD AUTO-RTO: 0 /100 WBC (ref 0–0.2)
NT-PROBNP SERPL-MCNC: ABNORMAL PG/ML (ref 0–1800)
PLATELET # BLD AUTO: 348 10*3/MM3 (ref 140–450)
PMV BLD AUTO: 10 FL (ref 6–12)
RBC # BLD AUTO: 3.88 10*6/MM3 (ref 4.14–5.8)
RH BLD: POSITIVE
T&S EXPIRATION DATE: NORMAL
WBC # BLD AUTO: 8.46 10*3/MM3 (ref 3.4–10.8)

## 2020-08-22 PROCEDURE — 25010000002 FUROSEMIDE PER 20 MG: Performed by: INTERNAL MEDICINE

## 2020-08-22 PROCEDURE — 86850 RBC ANTIBODY SCREEN: CPT | Performed by: INTERNAL MEDICINE

## 2020-08-22 PROCEDURE — 25010000002 DIGOXIN PER 500 MCG: Performed by: INTERNAL MEDICINE

## 2020-08-22 PROCEDURE — 99233 SBSQ HOSP IP/OBS HIGH 50: CPT | Performed by: NURSE PRACTITIONER

## 2020-08-22 PROCEDURE — 86900 BLOOD TYPING SEROLOGIC ABO: CPT | Performed by: INTERNAL MEDICINE

## 2020-08-22 PROCEDURE — 85025 COMPLETE CBC W/AUTO DIFF WBC: CPT | Performed by: INTERNAL MEDICINE

## 2020-08-22 PROCEDURE — 86901 BLOOD TYPING SEROLOGIC RH(D): CPT | Performed by: INTERNAL MEDICINE

## 2020-08-22 PROCEDURE — 83880 ASSAY OF NATRIURETIC PEPTIDE: CPT | Performed by: INTERNAL MEDICINE

## 2020-08-22 PROCEDURE — 25010000002 NA FERRIC GLUC CPLX PER 12.5 MG: Performed by: NURSE PRACTITIONER

## 2020-08-22 PROCEDURE — 99232 SBSQ HOSP IP/OBS MODERATE 35: CPT | Performed by: INTERNAL MEDICINE

## 2020-08-22 RX ORDER — DILTIAZEM HCL IN NACL,ISO-OSM 125 MG/125
2.5-15 PLASTIC BAG, INJECTION (ML) INTRAVENOUS
Status: DISCONTINUED | OUTPATIENT
Start: 2020-08-22 | End: 2020-08-24 | Stop reason: HOSPADM

## 2020-08-22 RX ORDER — FUROSEMIDE 10 MG/ML
40 INJECTION INTRAMUSCULAR; INTRAVENOUS ONCE
Status: COMPLETED | OUTPATIENT
Start: 2020-08-22 | End: 2020-08-22

## 2020-08-22 RX ORDER — FUROSEMIDE 20 MG/1
20 TABLET ORAL ONCE
Status: DISCONTINUED | OUTPATIENT
Start: 2020-08-22 | End: 2020-08-22

## 2020-08-22 RX ORDER — METOPROLOL TARTRATE 100 MG/1
100 TABLET ORAL EVERY 12 HOURS SCHEDULED
Status: DISCONTINUED | OUTPATIENT
Start: 2020-08-22 | End: 2020-08-24 | Stop reason: HOSPADM

## 2020-08-22 RX ORDER — MORPHINE SULFATE 2 MG/ML
1 INJECTION, SOLUTION INTRAMUSCULAR; INTRAVENOUS EVERY 4 HOURS PRN
Status: DISCONTINUED | OUTPATIENT
Start: 2020-08-22 | End: 2020-08-24 | Stop reason: HOSPADM

## 2020-08-22 RX ORDER — DIGOXIN 0.25 MG/ML
250 INJECTION INTRAMUSCULAR; INTRAVENOUS ONCE
Status: COMPLETED | OUTPATIENT
Start: 2020-08-22 | End: 2020-08-22

## 2020-08-22 RX ADMIN — DIGOXIN 250 MCG: 0.25 INJECTION INTRAMUSCULAR; INTRAVENOUS at 00:10

## 2020-08-22 RX ADMIN — Medication 2.5 MG/HR: at 02:30

## 2020-08-22 RX ADMIN — SODIUM CHLORIDE 125 MG: 900 INJECTION, SOLUTION INTRAVENOUS at 14:05

## 2020-08-22 RX ADMIN — FUROSEMIDE 40 MG: 10 INJECTION, SOLUTION INTRAMUSCULAR; INTRAVENOUS at 11:36

## 2020-08-22 RX ADMIN — APIXABAN 5 MG: 5 TABLET, FILM COATED ORAL at 11:36

## 2020-08-22 RX ADMIN — METHIMAZOLE 5 MG: 5 TABLET ORAL at 20:24

## 2020-08-22 RX ADMIN — OXYCODONE HYDROCHLORIDE 10 MG: 5 TABLET ORAL at 02:40

## 2020-08-22 RX ADMIN — METHIMAZOLE 5 MG: 5 TABLET ORAL at 05:06

## 2020-08-22 RX ADMIN — OXYCODONE HYDROCHLORIDE 10 MG: 5 TABLET ORAL at 18:10

## 2020-08-22 RX ADMIN — LACTULOSE 10 G: 20 SOLUTION ORAL at 20:23

## 2020-08-22 RX ADMIN — APIXABAN 5 MG: 5 TABLET, FILM COATED ORAL at 20:23

## 2020-08-22 RX ADMIN — POLYETHYLENE GLYCOL 3350 17 G: 17 POWDER, FOR SOLUTION ORAL at 08:22

## 2020-08-22 RX ADMIN — OXYCODONE HYDROCHLORIDE 10 MG: 5 TABLET ORAL at 23:26

## 2020-08-22 RX ADMIN — ASPIRIN 81 MG: 81 TABLET, COATED ORAL at 08:22

## 2020-08-22 RX ADMIN — OXYCODONE HYDROCHLORIDE 10 MG: 5 TABLET ORAL at 14:11

## 2020-08-22 RX ADMIN — METOPROLOL TARTRATE 100 MG: 100 TABLET ORAL at 18:10

## 2020-08-22 RX ADMIN — DOCUSATE SODIUM 100 MG: 100 CAPSULE, LIQUID FILLED ORAL at 20:23

## 2020-08-22 RX ADMIN — OXYCODONE HYDROCHLORIDE AND ACETAMINOPHEN 500 MG: 500 TABLET ORAL at 08:22

## 2020-08-22 RX ADMIN — METHIMAZOLE 5 MG: 5 TABLET ORAL at 14:09

## 2020-08-22 RX ADMIN — POLYETHYLENE GLYCOL 3350 17 G: 17 POWDER, FOR SOLUTION ORAL at 20:23

## 2020-08-22 RX ADMIN — DILTIAZEM HYDROCHLORIDE 300 MG: 180 CAPSULE, COATED, EXTENDED RELEASE ORAL at 08:21

## 2020-08-22 RX ADMIN — METOPROLOL TARTRATE 50 MG: 50 TABLET, FILM COATED ORAL at 05:06

## 2020-08-22 RX ADMIN — DIGOXIN 250 MCG: 0.25 INJECTION INTRAMUSCULAR; INTRAVENOUS at 11:36

## 2020-08-22 RX ADMIN — NICOTINE 1 PATCH: 21 PATCH, EXTENDED RELEASE TRANSDERMAL at 08:23

## 2020-08-22 RX ADMIN — Medication 10 MG/HR: at 19:06

## 2020-08-22 RX ADMIN — ATORVASTATIN CALCIUM 40 MG: 40 TABLET, FILM COATED ORAL at 20:23

## 2020-08-22 RX ADMIN — FERROUS SULFATE TAB 325 MG (65 MG ELEMENTAL FE) 325 MG: 325 (65 FE) TAB at 08:22

## 2020-08-22 RX ADMIN — OXYCODONE HYDROCHLORIDE 10 MG: 5 TABLET ORAL at 09:40

## 2020-08-22 RX ADMIN — OXYCODONE HYDROCHLORIDE 10 MG: 5 TABLET ORAL at 05:07

## 2020-08-22 RX ADMIN — FINASTERIDE 5 MG: 5 TABLET, FILM COATED ORAL at 08:22

## 2020-08-22 RX ADMIN — SODIUM CHLORIDE, PRESERVATIVE FREE 10 ML: 5 INJECTION INTRAVENOUS at 08:23

## 2020-08-22 NOTE — NURSING NOTE
Called PETROS Mora about patient HR(uncontrolled).  Orders received: IV Lopressor Q5min for  or greater.

## 2020-08-22 NOTE — PROGRESS NOTES
[facility] at [facility]  IP Progress Note      Chief Complaint/Reason for service: #1 A. fib RVR #2 severe arctic stenosis    Subjective   Subjective: This patient was transferred up here after he fell and broke his hip.  He was found to have severe stenosis with mean gradient 46.  He also has known coronary disease.  His chronic atrial fibrillation.  We are asked see the patient again for his recurrent rapid A. fib.  He was anticoagulated with Eliquis previously.  He has had postop anemia.  The patient denies chest pain.  He does not feel his heart racing.  He denies being short of breath although clinically he is tachypneic.    Past medical, surgical, social and family history reviewed in the patient's electronic medical record.    Objective     Vital Sign Min/Max for last 24 hours  Temp  Min: 97.3 °F (36.3 °C)  Max: 97.6 °F (36.4 °C)   BP  Min: 106/79  Max: 166/106   Pulse  Min: 78  Max: 143   Resp  Min: 16  Max: 20   SpO2  Min: 63 %  Max: 100 %   Flow (L/min)  Min: 2  Max: 2      Intake/Output Summary (Last 24 hours) at 8/22/2020 1113  Last data filed at 8/22/2020 0325  Gross per 24 hour   Intake 240 ml   Output 350 ml   Net -110 ml             Current Facility-Administered Medications:   •  apixaban (ELIQUIS) tablet 5 mg, 5 mg, Oral, Q12H, Khushboo Yan, ALICIA  •  aspirin EC tablet 81 mg, 81 mg, Oral, Daily, Bossman Suarez MD, 81 mg at 08/22/20 0822  •  atorvastatin (LIPITOR) tablet 40 mg, 40 mg, Oral, Nightly, Jordan Marmolejo MD, 40 mg at 08/21/20 2027  •  dilTIAZem (CARDIZEM) 125 mg in 125 mL 0.7% sodium chloride (1 mg/mL) infusion, 2.5-15 mg/hr, Intravenous, Titrated, Elle Tsai PA, Stopped at 08/22/20 0710  •  dilTIAZem CD (CARDIZEM CD) 24 hr capsule 300 mg, 300 mg, Oral, Daily, Bossman Suarez MD, 300 mg at 08/22/20 0821  •  docusate sodium (COLACE) capsule 100 mg, 100 mg, Oral, BID, Bossman Suarez MD, 100 mg at 08/21/20 2027  •  ferrous sulfate tablet 325 mg, 325 mg,  Oral, Daily With Breakfast, Bossman Suarez MD, 325 mg at 08/22/20 0822  •  finasteride (PROSCAR) tablet 5 mg, 5 mg, Oral, Daily, Jordan Marmolejo MD, 5 mg at 08/22/20 0822  •  furosemide (LASIX) tablet 20 mg, 20 mg, Oral, Once, Khushboo Yan APRN  •  ipratropium-albuterol (DUO-NEB) nebulizer solution 3 mL, 3 mL, Nebulization, Q4H PRN, Jordan Marmolejo MD, 3 mL at 08/20/20 1424  •  lactulose (CHRONULAC) 10 GM/15ML solution 10 g, 10 g, Oral, TID, Bossman Suarez MD, 10 g at 08/21/20 2028  •  Magnesium Sulfate 2 gram Bolus, followed by 8 gram infusion (total Mg dose 10 grams)- Mg less than or equal to 1mg/dL, 2 g, Intravenous, PRN **OR** Magnesium Sulfate 2 gram / 50mL Infusion (GIVE X 3 BAGS TO EQUAL 6GM TOTAL DOSE) - Mg 1.1 - 1.5 mg/dl, 2 g, Intravenous, PRN **OR** Magnesium Sulfate 4 gram infusion- Mg 1.6-1.9 mg/dL, 4 g, Intravenous, PRN, Jordan Marmolejo MD  •  methIMAzole (TAPAZOLE) tablet 5 mg, 5 mg, Oral, Q8H, Bossman Suarez MD, 5 mg at 08/22/20 0506  •  metoprolol tartrate (LOPRESSOR) tablet 50 mg, 50 mg, Oral, Q12H, Bossman Suarez MD, 50 mg at 08/22/20 0506  •  nicotine (NICODERM CQ) 21 MG/24HR patch 1 patch, 1 patch, Transdermal, Q24H, Jordan Marmolejo MD, 1 patch at 08/22/20 0823  •  nicotine polacrilex (COMMIT) lozenge 2 mg, 2 mg, Mouth/Throat, Q1H PRN, Bossman Suarez MD  •  nicotine polacrilex (NICORETTE) gum 2 mg, 2 mg, Mouth/Throat, Q1H PRN, Bossman Suarez MD  •  nitroglycerin (NITROSTAT) SL tablet 0.4 mg, 0.4 mg, Sublingual, Q5 Min PRN, Jordan Marmolejo MD  •  ondansetron (ZOFRAN) tablet 4 mg, 4 mg, Oral, Q6H PRN **OR** ondansetron (ZOFRAN) injection 4 mg, 4 mg, Intravenous, Q6H PRN, Jordan Marmolejo MD  •  oxyCODONE (ROXICODONE) immediate release tablet 10 mg, 10 mg, Oral, 4x Daily, Jordan Marmolejo MD, 10 mg at 08/22/20 0507  •  oxyCODONE (ROXICODONE) immediate release tablet 10 mg, 10 mg, Oral, Q4H PRN, Jordan Marmolejo MD, 10 mg at 08/22/20 0940  •  oxyCODONE (ROXICODONE)  immediate release tablet 5 mg, 5 mg, Oral, Q4H PRN, Jordan Marmolejo MD, 5 mg at 08/19/20 1436  •  polyethylene glycol 3350 powder (packet), 17 g, Oral, BID, Bossman Suarez MD, 17 g at 08/22/20 0822  •  sodium chloride 0.9 % bolus 500 mL, 500 mL, Intravenous, Once, Joe Bolom, DO  •  sodium chloride 0.9 % flush 10 mL, 10 mL, Intravenous, Q12H, Jordan Marmolejo MD, 10 mL at 08/22/20 0823  •  sodium chloride 0.9 % flush 10 mL, 10 mL, Intravenous, PRN, Jordan Marmolejo MD  •  vitamin C (ASCORBIC ACID) tablet 500 mg, 500 mg, Oral, Daily, Bossman Suarez MD, 500 mg at 08/22/20 0822    Physical Exam: General disheveled elderly male mild resting tachypnea current heart rate 130 acceptable O2 sat        HEENT: No obvious JVP, nasal O2 is in place, no icterus       Respiratory: Equal bilateral symmetrical expansion with crackles bilaterally       Cardiovascular: Tachycardic and irregular with a grade 5/6 to 6/6 systolic ejection murmur no obvious edema       GI: Positive bowel sounds       Lower Extremities: Status post left BKA       Neuro: Facial expressions are symmetrical       Skin: Warm and dry no edema to palpation       Psych: Pleasant affect    Results Review: His ins and outs he is 1.5 L ahead since admission.  Heart rate is now 129.  Blood pressure is 10 6-1 50.  Hemoglobin is 8.1.  GFR is 105.  Iron is low at 11    Radiology Results:  Imaging Results (Last 72 Hours)     Procedure Component Value Units Date/Time    XR Chest 1 View [[id number]] Collected:  08/21/20 1803     Updated:  08/22/20 0008    Narrative:       CR Chest 1 Vw    INDICATION:   Pulmonary edema. Femur fracture.     COMPARISON:    8/14/2020    FINDINGS:  Single portable AP view(s) of the chest.    Cardiac and mediastinal contours are normal. There is vascular congestion with mild generalized pulmonary edema. No pneumothorax. There is degenerative disease in the shoulders.       Impression:       Mild pulmonary edema with vascular  congestion.    Signer Name: Alexi Hewitt MD   Signed: 8/21/2020 6:03 PM   Workstation Name: [id number]    [facility]    XR Femur 2 View Left [[id number]] Collected:  08/20/20 0920     Updated:  08/20/20 2021    Narrative:       EXAMINATION: XR FEMUR 2 VW LEFT- 08/20/2020     INDICATION: increase pain at therapy; S72.002A-Fracture of unspecified  part of neck of left femur, initial encounter for closed fracture;  Z87.81-Personal history of (healed) traumatic fracture      COMPARISON: 08/17/2020     FINDINGS: Left femur hardware appears in stable alignment from prior  comparison 08/17/2020 with stable appearance of fracture from that exam  without evidence of progressive malalignment or new fracture.  Specifically no distal fracture of the distal left femur.           Impression:       Stable appearance of left hip nailing and left femoral  hardware from prior comparison 08/17/2020 fluoroscopic images and  overall appearance of intertrochanteric/lesser trochanteric fracture. No  new findings evident.     D:  08/20/2020  E:  08/20/2020     This report was finalized on 8/20/2020 7:03 PM by Dr. Ciaran Rivera.             EKG: Atrial fibrillation    ECHO: Severe attic stenosis with preserved LVEF    Tele: A. fib RVR    Assessment   Assessment/Plan: 1 a A. fib RVR-the patient's rate was controlled for a while but now is tachycardic again.  He was on IV Dilts and was placed on oral diltiazem.  I will go ahead and give him a dose of digoxin 0.25.  I reordered the diltiazem drip to be restarted at 5 mg and titrated upward.  Also go ahead and increase his metoprolol.  Resume Eliquis  2 severe aortic stenosis.  The patient appears to be tachypneic and his A. fib RVR is leading to some heart failure.  Had a chest x-ray yesterday which showed a little CHF.  I will give the patient Lasix 40 mg IV now and check a BNP  3 would give IV iron to help increase hemoglobin    Alexi Shaver,  MD  08/22/20  11:13

## 2020-08-22 NOTE — PLAN OF CARE
Reporting nurse states PT's HR has been over 130s. Metprolol 2.5mg IV push x 3 was administered with no results. Diltiazem drip started at 5mg at 2326 which d/c due to rapid BP drop (1655 to 90). Diltiazem restarted at 0230 at 2.5mg/hr at 0230, currently  dripping at 15mg/hr. PT denies pain/discomfort at this time. /89 RR 22 O2 95% on 2L .   Will cont to mx. Call light in reach.

## 2020-08-22 NOTE — PROGRESS NOTES
"    [facility] Medicine Services  PROGRESS NOTE    Patient Name: Zak Olmstead  : 1940  MRN: [id number]    Date of Admission: 8/15/2020  Primary Care Physician: Ely Berumen APRN    Subjective   Subjective     CC:  Hip Fracture - transferred from [place]    HPI:    States he feels weak, wants to go home today. No c/o soa or cp, but visibly having mild increased resp effort on 2lnc. States his heart rate \"hasn't been controlled at home for months\". Started on Lasix outpatient very recently. Denies palpitations.     Review of Systems  Gen- No fevers, chills  CV- No chest pain, palpitations  Resp- No cough, dyspnea   GI- No N/V/D, abd pain       Objective   Objective     Vital Signs:   Temp:  [97.3 °F (36.3 °C)-97.6 °F (36.4 °C)] 97.5 °F (36.4 °C)  Heart Rate:  [] 111  Resp:  [16-20] 18  BP: (106-166)/() 143/92     Physical Exam:    Constitutional: No acute distress, awake, alert, chronically ill appearing, frail   HENT: NCAT, mucous membranes moist  Respiratory: Fine bibasilar rales with tachypnea, no resp distress, 2lnc   Cardiovascular: irreg/ irreg, +  murmurs,palpable pedal pulses bilaterally  Gastrointestinal: Positive bowel sounds, soft, nontender, nondistended  Musculoskeletal: No right ankle edema, left BKA  Psychiatric: Appropriate affect, cooperative  Neurologic: Oriented x 3, strength symmetric in all extremities, Cranial Nerves grossly intact to confrontation, speech clear  Skin: No rashes, left lateral hip dressing CDI       Results Reviewed:  Results from last 7 days   Lab Units 20  0024 20  0734 20  0514  20  0127   WBC 10*3/mm3 8.46 8.41 10.62   < > 8.60   HEMOGLOBIN g/dL 8.1* 7.7* 7.9*   < > 8.9*   HEMATOCRIT % 29.6* 28.2* 28.3*   < > 31.7*   PLATELETS 10*3/mm3 348 327 352   < > 204   INR   --   --  1.67*  --  1.38*   PROCALCITONIN ng/mL  --   --  0.18  --   --     < > = values in this interval not displayed.     Results from last " days   Lab Units 08/21/20  0734 08/20/20  0514 08/18/20  0525  08/16/20  0127   SODIUM mmol/L 138 137 137   < > 138   POTASSIUM mmol/L 3.9 4.0 4.6   < > 3.8   CHLORIDE mmol/L 104 103 105   < > 103   CO2 mmol/L 24.0 25.0 17.0*   < > 26.0   BUN mg/dL 16 20 21   < > 21   CREATININE mg/dL 0.72* 0.87 0.87   < > 1.02   GLUCOSE mg/dL 90 79 99   < > 108*   CALCIUM mg/dL 8.0* 8.0* 7.8*   < > 8.3*   ALT (SGPT) U/L  --  7  --   --  28   AST (SGOT) U/L  --  13  --   --  24   PROBNP pg/mL  --  11,392.0*  --   --  8,015.0*    < > = values in this interval not displayed.     Estimated Creatinine Clearance: 79.2 mL/min (A) (by C-G formula based on SCr of 0.72 mg/dL (L)).    Microbiology Results Abnormal     Procedure Component Value - Date/Time    Blood Culture - Blood, Hand, Right [[id number]] Collected:  08/16/20 0127    Lab Status:  Final result Specimen:  Blood from Hand, Right Updated:  08/21/20 0330     Blood Culture No growth at 5 days    Blood Culture - Blood, Arm, Right [[id number]] Collected:  08/16/20 0127    Lab Status:  Final result Specimen:  Blood from Arm, Right Updated:  08/21/20 0330     Blood Culture No growth at 5 days          Imaging Results (Last 24 Hours)     Procedure Component Value Units Date/Time    XR Chest 1 View [[id number]] Collected:  08/21/20 1803     Updated:  08/22/20 0008    Narrative:       CR Chest 1 Vw    INDICATION:   Pulmonary edema. Femur fracture.     COMPARISON:    8/14/2020    FINDINGS:  Single portable AP view(s) of the chest.    Cardiac and mediastinal contours are normal. There is vascular congestion with mild generalized pulmonary edema. No pneumothorax. There is degenerative disease in the shoulders.       Impression:       Mild pulmonary edema with vascular congestion.    Signer Name: Alexi Hewitt MD   Signed: 8/21/2020 6:03 PM   Workstation Name: [facility]    [facility]          Results for orders placed during the hospital encounter of 08/14/20   Adult  Transthoracic Echo Complete W/ Cont if Necessary Per Protocol    Narrative · Left ventricular wall thickness is consistent with mild concentric   hypertrophy.  · Left ventricular systolic function is normal, EF 56-60%.  · Left ventricular diastolic dysfunction (grade I a) consistent with   impaired relaxation.  · Left atrial cavity size is mild-to-moderately dilated.  · There is severe calcification of the aortic valve mainly affecting the   non, left and right coronary cusp(s).  · Mild aortic valve regurgitation is present.  · Severe aortic valve stenosis is present.  · Aortic valve mean pressure gradient is 46.0 mmHg.  · Aortic valve maximum pressure gradient is 90.0 mmHg.  · Moderate mitral valve regurgitation is present  · Moderate to severe tricuspid valve regurgitation is present.  · Calculated right ventricular systolic pressure from tricuspid   regurgitation is 77 mmHg.          I have reviewed the medications:  Scheduled Meds:    apixaban 5 mg Oral Q12H   aspirin 81 mg Oral Daily   atorvastatin 40 mg Oral Nightly   dilTIAZem  mg Oral Daily   docusate sodium 100 mg Oral BID   ferric gluconate 125 mg Intravenous Once   finasteride 5 mg Oral Daily   lactulose 10 g Oral TID   methIMAzole 5 mg Oral Q8H   metoprolol tartrate 100 mg Oral Q12H   nicotine 1 patch Transdermal Q24H   oxyCODONE 10 mg Oral 4x Daily   polyethylene glycol 17 g Oral BID   sodium chloride 500 mL Intravenous Once   sodium chloride 10 mL Intravenous Q12H   vitamin C 500 mg Oral Daily     Continuous Infusions:    dilTIAZem 2.5-15 mg/hr Last Rate: 5 mg/hr (08/22/20 1136)     PRN Meds:.ipratropium-albuterol  •  magnesium sulfate **OR** magnesium sulfate **OR** magnesium sulfate  •  nicotine polacrilex  •  nicotine polacrilex  •  nitroglycerin  •  ondansetron **OR** ondansetron  •  oxyCODONE  •  oxyCODONE  •  sodium chloride    Assessment/Plan   Assessment & Plan     Active Hospital Problems    Diagnosis  POA   • **Closed left hip fracture,  s/p repair 8/17/2020 [S72.002A]  Yes   • Acute on chronic diastolic CHF (congestive heart failure) (CMS/[facility]) [I50.33]  Unknown   • Anemia, chronic disease [D63.8]  Unknown   • Chronic atrial fibrillation with RVR (CMS/[facility]) [I48.20]  Unknown   • Acute UTI (urinary tract infection) [N39.0]  Yes   • HTN (hypertension) [I10]  Yes   • CAD (coronary artery disease) [I25.10]  Yes   • COPD (chronic obstructive pulmonary disease) (CMS/[facility]) [J44.9]  Yes   • Chronic respiratory disease [J98.9]  Yes   • Aortic stenosis [I35.0]  Yes   • S/p left hip fracture [Z87.81]  Not Applicable   • Peripheral vascular disease (CMS/[facility]) [I73.9]  Yes      Resolved Hospital Problems   No resolved problems to display.        Brief Hospital Course to date:  Zak Olmstead is a 80 y.o. male w/ a hx of COPD, chronic respiratory failure on 2 liters PRN, chronic atrial fibrillation (BB, dilt and Eliquis), hyperthyroidism, HTN, CAD, PVD, remote left BKA and severe aortic stenosis who was transferred from [facility] for further evaluation/tx of a left hip fracture.    He had to be taken off AC for surgery and had Operative Fixation of Left Intertrochanteric Fracture with Cephalomedullary Device with Dr. Aponte    Fall  - multiple risk factors for fall  - left BKA  - pain medication  - history of Vitamin D def  - Inpatient Rehab recommended; awaiting referrals   Left Hip Fracture  - s/p repair  - Ok for DC per ortho, follow up 2 weeks Dr Aponte  Aortic Valve Stenosis  - seen by TAVR team  - not a candidate for TAVR  --consulted cardiology again overnight due to Afib with RVR, AVS, resulting in a/c dhf  --receiving one time dose IV lasix now, confirmed on CXR last night, monitor   Vasculopathy  - history of Aortic Graft/Stent  - Fem Fem bypass  - chronic occlusion of RCA  - LAD disease   - lifelong smoker - 15 yrs - 80 yrs old (duration 65 yrs)  - up to 2 ppd (possibly over 110 pack-yrs)  Chronic Pain  - has been on pain medication for 8  yrs or more  - goes to a pain clinic per wife  History of AAA  - s/p repair  Severe Anemia  - INR 1.67  - iron studies with Fe def anemia, will give IV iron   - Eliquis continued and monitoring   - monitor for dropping hemoglobin/ currently stable at 8.1/ 29.6  Hyperthyroidism  - on Methimazole  Tobacco Use Disorder  - nicotine patch  - has been smoking for 65 yrs  - high point 2 ppd  Atrial Fibrillation  - metoprolol / cardizem / anticoagulation  --AC restarted this morning for stroke prevention with no overt s/s bleeding and H/H stable, monitor, okay with cardiology as well   --cardiology reconsulted this morning for continued RVR overnight with IV dig one dose and increased Dilt and BB oral, started on Dilt drip with improved rate control.   --dilt drip on hold this morning and continued increased/ uncontrolled rate up to 130's  --restarted dilt drip, monitor   Acute/ Chronic Respiratory Failure   A/c DHF  - A/c DHF in setting of Afib RVR and AVS/ COPD  --CXR overnight showed increased pulm vasculature  --diuresis today, monitor closely  --most recent proBNP 11k, cards rechecking now   - nebs  UTI  --s/p 3 days Ceftriaxone     DVT Prophylaxis:  Anticoagulation restarted today     Disposition: I expect the patient to be discharged home vs refusing rehab when stable, TBD     CODE STATUS:   Code Status and Medical Interventions:   Ordered at: 08/15/20 3837     Level Of Support Discussed With:    Patient     Code Status:    CPR     Medical Interventions (Level of Support Prior to Arrest):    Full         Electronically signed by ALICIA Strickland, 08/22/20, 11:45.

## 2020-08-23 PROCEDURE — 99232 SBSQ HOSP IP/OBS MODERATE 35: CPT | Performed by: INTERNAL MEDICINE

## 2020-08-23 PROCEDURE — 25010000002 FUROSEMIDE PER 20 MG: Performed by: INTERNAL MEDICINE

## 2020-08-23 PROCEDURE — 99232 SBSQ HOSP IP/OBS MODERATE 35: CPT | Performed by: NURSE PRACTITIONER

## 2020-08-23 RX ORDER — FUROSEMIDE 10 MG/ML
40 INJECTION INTRAMUSCULAR; INTRAVENOUS EVERY 12 HOURS SCHEDULED
Status: DISCONTINUED | OUTPATIENT
Start: 2020-08-23 | End: 2020-08-24

## 2020-08-23 RX ADMIN — OXYCODONE HYDROCHLORIDE 10 MG: 5 TABLET ORAL at 04:47

## 2020-08-23 RX ADMIN — NICOTINE 1 PATCH: 21 PATCH, EXTENDED RELEASE TRANSDERMAL at 09:36

## 2020-08-23 RX ADMIN — METOPROLOL TARTRATE 100 MG: 100 TABLET ORAL at 04:48

## 2020-08-23 RX ADMIN — DOCUSATE SODIUM 100 MG: 100 CAPSULE, LIQUID FILLED ORAL at 09:28

## 2020-08-23 RX ADMIN — OXYCODONE HYDROCHLORIDE 10 MG: 5 TABLET ORAL at 23:58

## 2020-08-23 RX ADMIN — NICOTINE POLACRILEX 2 MG: 2 GUM, CHEWING BUCCAL at 15:20

## 2020-08-23 RX ADMIN — OXYCODONE HYDROCHLORIDE 10 MG: 5 TABLET ORAL at 17:32

## 2020-08-23 RX ADMIN — DILTIAZEM HYDROCHLORIDE 300 MG: 180 CAPSULE, COATED, EXTENDED RELEASE ORAL at 09:27

## 2020-08-23 RX ADMIN — APIXABAN 5 MG: 5 TABLET, FILM COATED ORAL at 09:28

## 2020-08-23 RX ADMIN — METHIMAZOLE 5 MG: 5 TABLET ORAL at 14:17

## 2020-08-23 RX ADMIN — METHIMAZOLE 5 MG: 5 TABLET ORAL at 04:47

## 2020-08-23 RX ADMIN — ASPIRIN 81 MG: 81 TABLET, COATED ORAL at 09:27

## 2020-08-23 RX ADMIN — APIXABAN 5 MG: 5 TABLET, FILM COATED ORAL at 20:37

## 2020-08-23 RX ADMIN — FUROSEMIDE 40 MG: 10 INJECTION, SOLUTION INTRAMUSCULAR; INTRAVENOUS at 17:32

## 2020-08-23 RX ADMIN — ATORVASTATIN CALCIUM 40 MG: 40 TABLET, FILM COATED ORAL at 20:37

## 2020-08-23 RX ADMIN — OXYCODONE HYDROCHLORIDE 5 MG: 5 TABLET ORAL at 20:37

## 2020-08-23 RX ADMIN — FINASTERIDE 5 MG: 5 TABLET, FILM COATED ORAL at 09:28

## 2020-08-23 RX ADMIN — OXYCODONE HYDROCHLORIDE 10 MG: 5 TABLET ORAL at 12:23

## 2020-08-23 RX ADMIN — METOPROLOL TARTRATE 100 MG: 100 TABLET ORAL at 17:32

## 2020-08-23 RX ADMIN — METHIMAZOLE 5 MG: 5 TABLET ORAL at 20:37

## 2020-08-23 RX ADMIN — FUROSEMIDE 40 MG: 10 INJECTION, SOLUTION INTRAMUSCULAR; INTRAVENOUS at 10:00

## 2020-08-23 RX ADMIN — OXYCODONE HYDROCHLORIDE AND ACETAMINOPHEN 500 MG: 500 TABLET ORAL at 09:28

## 2020-08-23 NOTE — PLAN OF CARE
PT remains on Caredizem drip. Rate has changed at 5mg/hr at 1900. Current VS: /82 HR 80 RR 18 O2 100% on 2L. PT denies pain/discomfort at this time. BP/HR closely monitored.

## 2020-08-23 NOTE — PLAN OF CARE
"  Problem: Patient Care Overview  Goal: Plan of Care Review  Outcome: Ongoing (interventions implemented as appropriate)  Flowsheets  Taken 8/23/2020 8667  Progress: improving  Outcome Summary: Pt VSS. Diltiazem drip maintained at 2.5mg. HR maintained between 75-95, SBPs in the 140s. Patient stated some trouble breathing. IV lasix ordered q12 and it helped. Pt refused most turns. Barrier cream applied to bottom. Bottom red, blanchable (but very slow blanchable). Pt dressings on left hip CDI. Pt very anxious to go home. States he would \"get better if he were home and not here.\" Will continue to monitor.  Taken 8/23/2020 0800  Plan of Care Reviewed With: patient     "

## 2020-08-23 NOTE — PLAN OF CARE
Problem: Patient Care Overview  Goal: Plan of Care Review  Flowsheets (Taken 8/22/2020 2034)  Progress: no change  Plan of Care Reviewed With: patient  Outcome Summary: Diltiazem drip turned off this am at shift change d/t drop in BP, 300mg PO cardizem given shortly but HR kept going up 120s-140s, couple hrs after administration. Pt became tachypneic. Cardizem drip restarted around 1130 at 5mg along with administration of lasix, digoxin and eliquis. Pt remained on 5mg throughout day, increased to 10mg this evening, once HR went into low 100s. Diuresing well, BP remained 140-150s. Large loose BM today. Very fatigued, slept most of the afternoon, complains of generalized pain, scheduled oxycodone given around the clock.

## 2020-08-23 NOTE — PROGRESS NOTES
[facility] at [facility]  IP Progress Note      Chief Complaint/Reason for service: #1 A. fib RVR #2 heart failure secondary to A. fib RVR and aortic stenosis #3 severe arctic stenosis    Subjective   Subjective: The patient told the nurse he feels smothering so a breathing treatment is been ordered.  The patient states he does not use oxygen all the time at home.  He only rarely wears it when he gets short of breath.    Past medical, surgical, social and family history reviewed in the patient's electronic medical record.    Objective     Vital Sign Min/Max for last 24 hours  Temp  Min: 97.2 °F (36.2 °C)  Max: 97.6 °F (36.4 °C)   BP  Min: 121/81  Max: 160/92   Pulse  Min: 68  Max: 148   Resp  Min: 16  Max: 18   SpO2  Min: 93 %  Max: 100 %   Flow (L/min)  Min: 2  Max: 2      Intake/Output Summary (Last 24 hours) at 8/23/2020 0929  Last data filed at 8/23/2020 0615  Gross per 24 hour   Intake 120 ml   Output 2355 ml   Net -2235 ml             Current Facility-Administered Medications:   •  apixaban (ELIQUIS) tablet 5 mg, 5 mg, Oral, Q12H, Alexi Shaver MD, 5 mg at 08/23/20 0928  •  aspirin EC tablet 81 mg, 81 mg, Oral, Daily, Bossman Suarez MD, 81 mg at 08/23/20 0927  •  atorvastatin (LIPITOR) tablet 40 mg, 40 mg, Oral, Nightly, Jordan Marmolejo MD, 40 mg at 08/22/20 2023  •  dilTIAZem (CARDIZEM) 125 mg in 125 mL 0.7% sodium chloride (1 mg/mL) infusion, 2.5-15 mg/hr, Intravenous, Titrated, Elle Tasi PA, Last Rate: 2.5 mL/hr at 08/23/20 0451, 2.5 mg/hr at 08/23/20 0451  •  dilTIAZem CD (CARDIZEM CD) 24 hr capsule 300 mg, 300 mg, Oral, Daily, Bossman Suarez MD, 300 mg at 08/23/20 0927  •  docusate sodium (COLACE) capsule 100 mg, 100 mg, Oral, BID, Bossman Suarez MD, 100 mg at 08/23/20 0928  •  finasteride (PROSCAR) tablet 5 mg, 5 mg, Oral, Daily, Jordan Marmolejo MD, 5 mg at 08/23/20 0928  •  ipratropium-albuterol (DUO-NEB) nebulizer solution 3 mL, 3 mL, Nebulization, Q4H  PRN, Jordan Marmolejo MD, 3 mL at 08/20/20 1424  •  lactulose (CHRONULAC) 10 GM/15ML solution 10 g, 10 g, Oral, TID, Bossman Suarez MD, 10 g at 08/22/20 2023  •  Magnesium Sulfate 2 gram Bolus, followed by 8 gram infusion (total Mg dose 10 grams)- Mg less than or equal to 1mg/dL, 2 g, Intravenous, PRN **OR** Magnesium Sulfate 2 gram / 50mL Infusion (GIVE X 3 BAGS TO EQUAL 6GM TOTAL DOSE) - Mg 1.1 - 1.5 mg/dl, 2 g, Intravenous, PRN **OR** Magnesium Sulfate 4 gram infusion- Mg 1.6-1.9 mg/dL, 4 g, Intravenous, PRN, Jordan Marmolejo MD  •  methIMAzole (TAPAZOLE) tablet 5 mg, 5 mg, Oral, Q8H, Bossman Suarez MD, 5 mg at 08/23/20 0447  •  metoprolol tartrate (LOPRESSOR) tablet 100 mg, 100 mg, Oral, Q12H, Alexi Shaver MD, 100 mg at 08/23/20 0448  •  morphine injection 1 mg, 1 mg, Intravenous, Q4H PRN, Bossman Suarez MD  •  nicotine (NICODERM CQ) 21 MG/24HR patch 1 patch, 1 patch, Transdermal, Q24H, Jordan Marmolejo MD, 1 patch at 08/22/20 0823  •  nicotine polacrilex (COMMIT) lozenge 2 mg, 2 mg, Mouth/Throat, Q1H PRN, Bossman Suarez MD  •  nicotine polacrilex (NICORETTE) gum 2 mg, 2 mg, Mouth/Throat, Q1H PRN, Bossman Suarez MD  •  nitroglycerin (NITROSTAT) SL tablet 0.4 mg, 0.4 mg, Sublingual, Q5 Min PRN, Jordan Marmolejo MD  •  ondansetron (ZOFRAN) tablet 4 mg, 4 mg, Oral, Q6H PRN **OR** ondansetron (ZOFRAN) injection 4 mg, 4 mg, Intravenous, Q6H PRN, Jordan Marmolejo MD  •  oxyCODONE (ROXICODONE) immediate release tablet 10 mg, 10 mg, Oral, 4x Daily, Jordan Marmolejo MD, 10 mg at 08/23/20 0447  •  oxyCODONE (ROXICODONE) immediate release tablet 10 mg, 10 mg, Oral, Q4H PRN, Jordan Marmolejo MD, 10 mg at 08/22/20 1411  •  oxyCODONE (ROXICODONE) immediate release tablet 5 mg, 5 mg, Oral, Q4H PRN, Jordan Marmolejo MD, 5 mg at 08/19/20 1436  •  polyethylene glycol 3350 powder (packet), 17 g, Oral, BID, Bossman Suarez MD, 17 g at 08/23/20 0992  •  sodium chloride 0.9 % bolus 500 mL, 500 mL,  Intravenous, Once, Joe Bloom, DO  •  sodium chloride 0.9 % flush 10 mL, 10 mL, Intravenous, Q12H, Jordan Marmolejo MD, 10 mL at 08/22/20 0823  •  sodium chloride 0.9 % flush 10 mL, 10 mL, Intravenous, PRN, Jordan Marmolejo MD  •  vitamin C (ASCORBIC ACID) tablet 500 mg, 500 mg, Oral, Daily, Bossman Suarez MD, 500 mg at 08/23/20 0928    Physical Exam: General well-developed elderly male who has some mild resting tachypnea.  O2 sat 95%        HEENT: No JVP, no icterus       Respiratory: Equal bilateral symmetrical expansion and bilateral rails       Cardiovascular: Irregular rate and rhythm with a grade 5-6 over systolic ejection murmur no significant lower extremity edema       GI: Positive bowel sounds       Lower Extremities: Status post previous left AKA       Neuro: Facial expressions are symmetrical moves all 4 extremities       Skin: Warm and dry       Psych: Pleasant affect    Results Review: The BNP was 15,000 yesterday up from 11,000 previously.  Overnight output exceeds intake by 2.2 L.  The heart rate is now in the 70s to 80s and the blood pressure is good    Radiology Results:  Imaging Results (Last 72 Hours)     Procedure Component Value Units Date/Time    XR Chest 1 View [[id number]] Collected:  08/21/20 1803     Updated:  08/22/20 0008    Narrative:       CR Chest 1 Vw    INDICATION:   Pulmonary edema. Femur fracture.     COMPARISON:    8/14/2020    FINDINGS:  Single portable AP view(s) of the chest.    Cardiac and mediastinal contours are normal. There is vascular congestion with mild generalized pulmonary edema. No pneumothorax. There is degenerative disease in the shoulders.       Impression:       Mild pulmonary edema with vascular congestion.    Signer Name: Alexi Hewitt MD   Signed: 8/21/2020 6:03 PM   Workstation Name: [facility]    [facility]    XR Femur 2 View Left [[id number]] Collected:  08/20/20 0920     Updated:  08/20/20 2021    Narrative:        EXAMINATION: XR FEMUR 2 VW LEFT- 08/20/2020     INDICATION: increase pain at therapy; S72.002A-Fracture of unspecified  part of neck of left femur, initial encounter for closed fracture;  Z87.81-Personal history of (healed) traumatic fracture      COMPARISON: 08/17/2020     FINDINGS: Left femur hardware appears in stable alignment from prior  comparison 08/17/2020 with stable appearance of fracture from that exam  without evidence of progressive malalignment or new fracture.  Specifically no distal fracture of the distal left femur.           Impression:       Stable appearance of left hip nailing and left femoral  hardware from prior comparison 08/17/2020 fluoroscopic images and  overall appearance of intertrochanteric/lesser trochanteric fracture. No  new findings evident.     D:  08/20/2020  E:  08/20/2020     This report was finalized on 8/20/2020 7:03 PM by Dr. Ciaran Rivera.             EKG: A. fib RVR    ECHO: Preserved EF with severe arctic stenosis    Tele: A. fib    Assessment   Assessment/Plan: 1 A. fib RVR the patient's rate is better today.  His metoprolol dose was increased yesterday and a diltiazem drip was restarted.  He is currently only on 2.5 mg  2 CHF secondary to A. fib RVR and aortic stenosis-the patient received IV Lasix yesterday and had an excellent diuretic response.  He is complaining some smothering and he still clinically is significantly fluid overloaded.  I will give him Lasix 40 mg IV every 12 hours    Alexi Shaver MD  08/23/20  09:29

## 2020-08-23 NOTE — PROGRESS NOTES
"    [facility] Medicine Services  PROGRESS NOTE    Patient Name: Zak Olmstead  : 1940  MRN: [id number]    Date of Admission: 8/15/2020  Primary Care Physician: Ely Berumen APRN    Subjective   Subjective     CC:  Hip Fracture - transferred from [place]    HPI:    Still feels run down, weak. States his breathing doesn't feel much better today, but he urinated \"all night long\". HR much better controlled last 24 hours.     Review of Systems  Gen- No fevers, chills  CV- No chest pain, palpitations  Resp- + productive cough, no dyspnea   GI- No N/V/D, abd pain       Objective   Objective     Vital Signs:   Temp:  [97.2 °F (36.2 °C)-97.6 °F (36.4 °C)] 97.4 °F (36.3 °C)  Heart Rate:  [] 80  Resp:  [16-18] 16  BP: (123-160)/(61-92) 146/69     Physical Exam:    Constitutional: No acute distress, awake, alert, chronically ill appearing, frail   HENT: NCAT, mucous membranes moist  Respiratory: Fine bibasilar rales, no resp distress, 2lnc   Cardiovascular: irreg/ irreg, +  murmurs,palpable pedal pulses bilaterally  Gastrointestinal: Positive bowel sounds, soft, nontender, nondistended  Musculoskeletal: No right ankle edema, left BKA  Psychiatric: Appropriate affect, cooperative  Neurologic: Oriented x 3, strength symmetric in all extremities, Cranial Nerves grossly intact to confrontation, speech clear  Skin: No rashes, left lateral hip dressing CDI       Results Reviewed:  Results from last 7 days   Lab Units 20  0024 20  0734 20  0514   WBC 10*3/mm3 8.46 8.41 10.62   HEMOGLOBIN g/dL 8.1* 7.7* 7.9*   HEMATOCRIT % 29.6* 28.2* 28.3*   PLATELETS 10*3/mm3 348 327 352   INR   --   --  1.67*   PROCALCITONIN ng/mL  --   --  0.18     Results from last 7 days   Lab Units 20  1211 20  0734 20  0514 20  0525   SODIUM mmol/L  --  138 137 137   POTASSIUM mmol/L  --  3.9 4.0 4.6   CHLORIDE mmol/L  --  104 103 105   CO2 mmol/L  --  24.0 25.0 17.0*   BUN mg/dL "  --  16 20 21   CREATININE mg/dL  --  0.72* 0.87 0.87   GLUCOSE mg/dL  --  90 79 99   CALCIUM mg/dL  --  8.0* 8.0* 7.8*   ALT (SGPT) U/L  --   --  7  --    AST (SGOT) U/L  --   --  13  --    PROBNP pg/mL 15,941.0*  --  11,392.0*  --      Estimated Creatinine Clearance: 79.2 mL/min (A) (by C-G formula based on SCr of 0.72 mg/dL (L)).    Microbiology Results Abnormal     Procedure Component Value - Date/Time    Blood Culture - Blood, Hand, Right [[id number]] Collected:  08/16/20 0127    Lab Status:  Final result Specimen:  Blood from Hand, Right Updated:  08/21/20 0330     Blood Culture No growth at 5 days    Blood Culture - Blood, Arm, Right [[id number]] Collected:  08/16/20 0127    Lab Status:  Final result Specimen:  Blood from Arm, Right Updated:  08/21/20 0330     Blood Culture No growth at 5 days          Imaging Results (Last 24 Hours)     ** No results found for the last 24 hours. **          Results for orders placed during the hospital encounter of 08/14/20   Adult Transthoracic Echo Complete W/ Cont if Necessary Per Protocol    Narrative · Left ventricular wall thickness is consistent with mild concentric   hypertrophy.  · Left ventricular systolic function is normal, EF 56-60%.  · Left ventricular diastolic dysfunction (grade I a) consistent with   impaired relaxation.  · Left atrial cavity size is mild-to-moderately dilated.  · There is severe calcification of the aortic valve mainly affecting the   non, left and right coronary cusp(s).  · Mild aortic valve regurgitation is present.  · Severe aortic valve stenosis is present.  · Aortic valve mean pressure gradient is 46.0 mmHg.  · Aortic valve maximum pressure gradient is 90.0 mmHg.  · Moderate mitral valve regurgitation is present  · Moderate to severe tricuspid valve regurgitation is present.  · Calculated right ventricular systolic pressure from tricuspid   regurgitation is 77 mmHg.          I have reviewed the medications:  Scheduled Meds:    apixaban 5  mg Oral Q12H   aspirin 81 mg Oral Daily   atorvastatin 40 mg Oral Nightly   dilTIAZem  mg Oral Daily   docusate sodium 100 mg Oral BID   finasteride 5 mg Oral Daily   furosemide 40 mg Intravenous Q12H   lactulose 10 g Oral TID   methIMAzole 5 mg Oral Q8H   metoprolol tartrate 100 mg Oral Q12H   nicotine 1 patch Transdermal Q24H   oxyCODONE 10 mg Oral 4x Daily   polyethylene glycol 17 g Oral BID   sodium chloride 500 mL Intravenous Once   sodium chloride 10 mL Intravenous Q12H   vitamin C 500 mg Oral Daily     Continuous Infusions:    dilTIAZem 2.5-15 mg/hr Last Rate: 2.5 mg/hr (08/23/20 0451)     PRN Meds:.ipratropium-albuterol  •  magnesium sulfate **OR** magnesium sulfate **OR** magnesium sulfate  •  Morphine  •  nicotine polacrilex  •  nicotine polacrilex  •  nitroglycerin  •  ondansetron **OR** ondansetron  •  oxyCODONE  •  oxyCODONE  •  sodium chloride    Assessment/Plan   Assessment & Plan     Active Hospital Problems    Diagnosis  POA   • **Closed left hip fracture, s/p repair 8/17/2020 [S72.002A]  Yes   • Acute on chronic diastolic CHF (congestive heart failure) (CMS/[facility]) [I50.33]  Unknown   • Anemia, chronic disease [D63.8]  Unknown   • Chronic atrial fibrillation with RVR (CMS/[facility]) [I48.20]  Unknown   • Acute UTI (urinary tract infection) [N39.0]  Yes   • HTN (hypertension) [I10]  Yes   • CAD (coronary artery disease) [I25.10]  Yes   • COPD (chronic obstructive pulmonary disease) (CMS/[facility]) [J44.9]  Yes   • Chronic respiratory disease [J98.9]  Yes   • Aortic stenosis [I35.0]  Yes   • S/p left hip fracture [Z87.81]  Not Applicable   • Peripheral vascular disease (CMS/HCC) [I73.9]  Yes      Resolved Hospital Problems   No resolved problems to display.        Brief Hospital Course to date:  Zak Olmstead is a 80 y.o. male w/ a hx of COPD, chronic respiratory failure on 2 liters PRN, chronic atrial fibrillation (BB, dilt and Eliquis), hyperthyroidism, HTN, CAD, PVD, remote left BKA and severe aortic  stenosis who was transferred from [facility] for further evaluation/tx of a left hip fracture.    He had to be taken off AC for surgery and had Operative Fixation of Left Intertrochanteric Fracture with Cephalomedullary Device with Dr. Aponte    Fall  - multiple risk factors for fall  - left BKA  - pain medication  - history of Vitamin D def  - Inpatient Rehab recommended; awaiting referrals   Left Hip Fracture  - s/p repair  - Ok for DC per ortho, follow up 2 weeks Dr Aponte  Aortic Valve Stenosis  - seen by TAVR team  - not a candidate for TAVR  --consulted cardiology again due to Afib with RVR, AVS, resulting in a/c dhf  --diuresed well > 2 liters overnight. Cards continuing IV lasix today   Vasculopathy  - history of Aortic Graft/Stent  - Fem Fem bypass  - chronic occlusion of RCA  - LAD disease   - lifelong smoker - 15 yrs - 80 yrs old (duration 65 yrs)  - up to 2 ppd (possibly over 110 pack-yrs)  Chronic Pain  - has been on pain medication for 8 yrs or more  - goes to a pain clinic per wife  History of AAA  - s/p repair  Severe Anemia  - INR 1.67  - iron studies with Fe def anemia, will give IV iron   - Eliquis continued and monitoring   - monitor for dropping hemoglobin/ currently stable at 8.1/ 29.6  Hyperthyroidism  - on Methimazole   Tobacco Use Disorder  - nicotine patch  - has been smoking for 65 yrs  - high point 2 ppd  Atrial Fibrillation  - metoprolol / cardizem / anticoagulation  --AC restarted this morning for stroke prevention with no overt s/s bleeding and H/H stable, monitor, okay with cardiology as well   --cardiology reconsulted this morning for continued RVR overnight with IV dig one dose and increased Dilt and BB oral, started on Dilt drip with improved rate control.   --cont dilt drip   Acute/ Chronic Respiratory Failure   A/c DHF  - A/c DHF in setting of Afib RVR and AVS/ COPD  --CXR showed increased pulm vasculature  --most recent proBNP 11k, now up to 15k  --IV diuresis  continues   - nebs  UTI  --s/p 3 days Ceftriaxone     DVT Prophylaxis:  Eliquis     Disposition: I expect the patient to be discharged home vs refusing rehab when stable, TBD     CODE STATUS:   Code Status and Medical Interventions:   Ordered at: 08/15/20 2227     Level Of Support Discussed With:    Patient     Code Status:    CPR     Medical Interventions (Level of Support Prior to Arrest):    Full         Electronically signed by ALICIA Strickland, 08/23/20, 14:03.

## 2020-08-24 VITALS
DIASTOLIC BLOOD PRESSURE: 85 MMHG | BODY MASS INDEX: 25.39 KG/M2 | HEART RATE: 92 BPM | HEIGHT: 68 IN | RESPIRATION RATE: 16 BRPM | TEMPERATURE: 97.3 F | OXYGEN SATURATION: 92 % | WEIGHT: 167.55 LBS | SYSTOLIC BLOOD PRESSURE: 145 MMHG

## 2020-08-24 LAB
ANION GAP SERPL CALCULATED.3IONS-SCNC: 11 MMOL/L (ref 5–15)
BUN SERPL-MCNC: 14 MG/DL (ref 8–23)
BUN/CREAT SERPL: 13.7 (ref 7–25)
CALCIUM SPEC-SCNC: 8.7 MG/DL (ref 8.6–10.5)
CHLORIDE SERPL-SCNC: 95 MMOL/L (ref 98–107)
CO2 SERPL-SCNC: 36 MMOL/L (ref 22–29)
CREAT SERPL-MCNC: 1.02 MG/DL (ref 0.76–1.27)
DEPRECATED RDW RBC AUTO: 59.2 FL (ref 37–54)
ERYTHROCYTE [DISTWIDTH] IN BLOOD BY AUTOMATED COUNT: 24.2 % (ref 12.3–15.4)
GFR SERPL CREATININE-BSD FRML MDRD: 70 ML/MIN/1.73
GLUCOSE SERPL-MCNC: 152 MG/DL (ref 65–99)
HCT VFR BLD AUTO: 36.1 % (ref 37.5–51)
HGB BLD-MCNC: 10 G/DL (ref 13–17.7)
MCH RBC QN AUTO: 20.6 PG (ref 26.6–33)
MCHC RBC AUTO-ENTMCNC: 27.7 G/DL (ref 31.5–35.7)
MCV RBC AUTO: 74.4 FL (ref 79–97)
PLATELET # BLD AUTO: 463 10*3/MM3 (ref 140–450)
PMV BLD AUTO: 10 FL (ref 6–12)
POTASSIUM SERPL-SCNC: 3.4 MMOL/L (ref 3.5–5.2)
RBC # BLD AUTO: 4.85 10*6/MM3 (ref 4.14–5.8)
SODIUM SERPL-SCNC: 142 MMOL/L (ref 136–145)
WBC # BLD AUTO: 8.36 10*3/MM3 (ref 3.4–10.8)

## 2020-08-24 PROCEDURE — 80048 BASIC METABOLIC PNL TOTAL CA: CPT | Performed by: INTERNAL MEDICINE

## 2020-08-24 PROCEDURE — 97535 SELF CARE MNGMENT TRAINING: CPT

## 2020-08-24 PROCEDURE — 25010000002 FUROSEMIDE PER 20 MG: Performed by: INTERNAL MEDICINE

## 2020-08-24 PROCEDURE — 85027 COMPLETE CBC AUTOMATED: CPT | Performed by: PHYSICIAN ASSISTANT

## 2020-08-24 PROCEDURE — 99239 HOSP IP/OBS DSCHRG MGMT >30: CPT | Performed by: PHYSICIAN ASSISTANT

## 2020-08-24 PROCEDURE — 97110 THERAPEUTIC EXERCISES: CPT

## 2020-08-24 PROCEDURE — 97530 THERAPEUTIC ACTIVITIES: CPT

## 2020-08-24 PROCEDURE — 99233 SBSQ HOSP IP/OBS HIGH 50: CPT | Performed by: INTERNAL MEDICINE

## 2020-08-24 RX ORDER — OXYCODONE HYDROCHLORIDE 10 MG/1
10 TABLET ORAL EVERY 4 HOURS PRN
Start: 2020-08-24

## 2020-08-24 RX ORDER — POTASSIUM CHLORIDE 750 MG/1
10 CAPSULE, EXTENDED RELEASE ORAL DAILY
Status: DISCONTINUED | OUTPATIENT
Start: 2020-08-24 | End: 2020-08-24 | Stop reason: HOSPADM

## 2020-08-24 RX ORDER — FUROSEMIDE 40 MG/1
40 TABLET ORAL 2 TIMES DAILY
Qty: 10 TABLET | Refills: 0 | Status: SHIPPED | OUTPATIENT
Start: 2020-08-24

## 2020-08-24 RX ORDER — PSEUDOEPHEDRINE HCL 30 MG
100 TABLET ORAL 2 TIMES DAILY
Qty: 10 EACH | Refills: 0 | Status: SHIPPED | OUTPATIENT
Start: 2020-08-24

## 2020-08-24 RX ORDER — LACTULOSE 10 G/15ML
10 SOLUTION ORAL 3 TIMES DAILY
Qty: 236 ML | Refills: 0 | Status: SHIPPED | OUTPATIENT
Start: 2020-08-24

## 2020-08-24 RX ORDER — METOPROLOL TARTRATE 100 MG/1
100 TABLET ORAL EVERY 12 HOURS SCHEDULED
Qty: 10 TABLET | Refills: 0 | Status: SHIPPED | OUTPATIENT
Start: 2020-08-24

## 2020-08-24 RX ORDER — POTASSIUM CHLORIDE 1.5 G/1.77G
40 POWDER, FOR SOLUTION ORAL AS NEEDED
Status: DISCONTINUED | OUTPATIENT
Start: 2020-08-24 | End: 2020-08-24 | Stop reason: HOSPADM

## 2020-08-24 RX ORDER — POTASSIUM CHLORIDE 750 MG/1
40 CAPSULE, EXTENDED RELEASE ORAL AS NEEDED
Status: DISCONTINUED | OUTPATIENT
Start: 2020-08-24 | End: 2020-08-24 | Stop reason: HOSPADM

## 2020-08-24 RX ORDER — DILTIAZEM HYDROCHLORIDE 360 MG/1
360 CAPSULE, EXTENDED RELEASE ORAL DAILY
Qty: 5 CAPSULE | Refills: 0 | Status: SHIPPED | OUTPATIENT
Start: 2020-08-24

## 2020-08-24 RX ORDER — FUROSEMIDE 40 MG/1
40 TABLET ORAL
Status: DISCONTINUED | OUTPATIENT
Start: 2020-08-24 | End: 2020-08-24 | Stop reason: HOSPADM

## 2020-08-24 RX ORDER — POLYETHYLENE GLYCOL 3350 17 G/17G
17 POWDER, FOR SOLUTION ORAL 2 TIMES DAILY
Qty: 10 EACH | Refills: 0 | Status: SHIPPED | OUTPATIENT
Start: 2020-08-24

## 2020-08-24 RX ORDER — POTASSIUM CHLORIDE 1500 MG/1
20 TABLET, FILM COATED, EXTENDED RELEASE ORAL 2 TIMES DAILY
Qty: 10 TABLET | Refills: 0 | Status: SHIPPED | OUTPATIENT
Start: 2020-08-24

## 2020-08-24 RX ADMIN — FINASTERIDE 5 MG: 5 TABLET, FILM COATED ORAL at 09:32

## 2020-08-24 RX ADMIN — SODIUM CHLORIDE, PRESERVATIVE FREE 10 ML: 5 INJECTION INTRAVENOUS at 09:34

## 2020-08-24 RX ADMIN — DOCUSATE SODIUM 100 MG: 100 CAPSULE, LIQUID FILLED ORAL at 09:32

## 2020-08-24 RX ADMIN — METOPROLOL TARTRATE 100 MG: 100 TABLET ORAL at 05:52

## 2020-08-24 RX ADMIN — APIXABAN 5 MG: 5 TABLET, FILM COATED ORAL at 09:32

## 2020-08-24 RX ADMIN — FUROSEMIDE 40 MG: 10 INJECTION, SOLUTION INTRAMUSCULAR; INTRAVENOUS at 05:52

## 2020-08-24 RX ADMIN — OXYCODONE HYDROCHLORIDE 10 MG: 5 TABLET ORAL at 05:52

## 2020-08-24 RX ADMIN — DILTIAZEM HYDROCHLORIDE 300 MG: 180 CAPSULE, COATED, EXTENDED RELEASE ORAL at 09:32

## 2020-08-24 RX ADMIN — ASPIRIN 81 MG: 81 TABLET, COATED ORAL at 09:31

## 2020-08-24 RX ADMIN — OXYCODONE HYDROCHLORIDE 10 MG: 5 TABLET ORAL at 16:35

## 2020-08-24 RX ADMIN — Medication 2.5 MG/HR: at 11:12

## 2020-08-24 RX ADMIN — METHIMAZOLE 5 MG: 5 TABLET ORAL at 05:51

## 2020-08-24 RX ADMIN — OXYCODONE HYDROCHLORIDE AND ACETAMINOPHEN 500 MG: 500 TABLET ORAL at 09:32

## 2020-08-24 NOTE — PROGRESS NOTES
"    [facility] Medicine Services  PROGRESS NOTE    Patient Name: Zak Olmstead  : 1940  MRN: [id number]    Date of Admission: 8/15/2020  Primary Care Physician: Ely Berumen, ALICIA    Subjective     CC: f/u L hip fracture, afib RVR, respiratory failure secondary to volume overload     HPI:    Laying in bed. Says he is leaving today. We discussed his continued need to remain in the hospital but he is adamant he is leaving and will sign himself out if needed. I discussed his care over the phone with Mr. Olmstead's daughter, Kassidy. She was tearful and very concerned about him coming home but states that Mr. Olmstead's wife would do whatever he asked of her, even understanding the risks.     Review of Systems  Gen- No fevers, chills  CV- No chest pain, palpitations  Resp- No cough, (+) productive cough, hypoxia   GI- No N/V/D, abd pain    Objective     Vital Signs:   Temp:  [97.4 °F (36.3 °C)-97.6 °F (36.4 °C)] 97.4 °F (36.3 °C)  Heart Rate:  [78-92] 87  Resp:  [16] 16  BP: (131-152)/(62-73) 135/73     Physical Exam:  Constitutional: Thin, frail, elderly male. Awake, alert and conversant. Periods of apnea noted when sleeping at start of exam   HENT: NCAT, mucous membranes moist  Respiratory: Bibasilar rales without wheezes or rhonchi. Sat dropped to 87% on room air during exam    Cardiovascular: (+) KARIS, no r/g   Gastrointestinal: Positive bowel sounds, soft, nontender, nondistended  Musculoskeletal: No bilateral ankle edema, s/p L BKA   Psychiatric: Appropriate affect, cooperative  Neurologic: Oriented to self, location and time. Patient refused to complete situational orientation assessment - \"you know why I'm here.\" Speech clear  Skin: No rashes    Results Reviewed:  Results from last 7 days   Lab Units 20  0743 20  0024 20  0734 20  0514   WBC 10*3/mm3 8.36 8.46 8.41 10.62   HEMOGLOBIN g/dL 10.0* 8.1* 7.7* 7.9*   HEMATOCRIT % 36.1* 29.6* 28.2* 28.3*   PLATELETS " 10*3/mm3 463* 348 327 352   INR   --   --   --  1.67*   PROCALCITONIN ng/mL  --   --   --  0.18     Results from last 7 days   Lab Units 08/24/20  0743 08/22/20  1211 08/21/20  0734 08/20/20  0514   SODIUM mmol/L 142  --  138 137   POTASSIUM mmol/L 3.4*  --  3.9 4.0   CHLORIDE mmol/L 95*  --  104 103   CO2 mmol/L 36.0*  --  24.0 25.0   BUN mg/dL 14  --  16 20   CREATININE mg/dL 1.02  --  0.72* 0.87   GLUCOSE mg/dL 152*  --  90 79   CALCIUM mg/dL 8.7  --  8.0* 8.0*   ALT (SGPT) U/L  --   --   --  7   AST (SGOT) U/L  --   --   --  13   PROBNP pg/mL  --  15,941.0*  --  11,392.0*     Estimated Creatinine Clearance: 62.1 mL/min (by C-G formula based on SCr of 1.02 mg/dL).    Microbiology Results Abnormal     Procedure Component Value - Date/Time    Blood Culture - Blood, Hand, Right [[id number]] Collected:  08/16/20 0127    Lab Status:  Final result Specimen:  Blood from Hand, Right Updated:  08/21/20 0330     Blood Culture No growth at 5 days    Blood Culture - Blood, Arm, Right [[id number]] Collected:  08/16/20 0127    Lab Status:  Final result Specimen:  Blood from Arm, Right Updated:  08/21/20 0330     Blood Culture No growth at 5 days        Imaging Results (Last 24 Hours)     ** No results found for the last 24 hours. **        Results for orders placed during the hospital encounter of 08/14/20   Adult Transthoracic Echo Complete W/ Cont if Necessary Per Protocol    Narrative · Left ventricular wall thickness is consistent with mild concentric   hypertrophy.  · Left ventricular systolic function is normal, EF 56-60%.  · Left ventricular diastolic dysfunction (grade I a) consistent with   impaired relaxation.  · Left atrial cavity size is mild-to-moderately dilated.  · There is severe calcification of the aortic valve mainly affecting the   non, left and right coronary cusp(s).  · Mild aortic valve regurgitation is present.  · Severe aortic valve stenosis is present.  · Aortic valve mean pressure gradient is 46.0  mmHg.  · Aortic valve maximum pressure gradient is 90.0 mmHg.  · Moderate mitral valve regurgitation is present  · Moderate to severe tricuspid valve regurgitation is present.  · Calculated right ventricular systolic pressure from tricuspid   regurgitation is 77 mmHg.        I have reviewed the medications:  Scheduled Meds:    apixaban 5 mg Oral Q12H   aspirin 81 mg Oral Daily   atorvastatin 40 mg Oral Nightly   dilTIAZem  mg Oral Daily   docusate sodium 100 mg Oral BID   finasteride 5 mg Oral Daily   furosemide 40 mg Intravenous Q12H   lactulose 10 g Oral TID   methIMAzole 5 mg Oral Q8H   metoprolol tartrate 100 mg Oral Q12H   nicotine 1 patch Transdermal Q24H   oxyCODONE 10 mg Oral 4x Daily   polyethylene glycol 17 g Oral BID   sodium chloride 10 mL Intravenous Q12H   vitamin C 500 mg Oral Daily     Continuous Infusions:    dilTIAZem 2.5-15 mg/hr Last Rate: 2.5 mg/hr (08/24/20 1112)     PRN Meds:.ipratropium-albuterol  •  magnesium sulfate **OR** magnesium sulfate **OR** magnesium sulfate  •  Morphine  •  nicotine polacrilex  •  nicotine polacrilex  •  nitroglycerin  •  ondansetron **OR** ondansetron  •  oxyCODONE  •  oxyCODONE  •  potassium chloride  •  potassium chloride  •  sodium chloride    Assessment/Plan   Assessment & Plan     Active Hospital Problems    Diagnosis  POA   • **Closed left hip fracture, s/p repair 8/17/2020 [S72.002A]  Yes   • Acute on chronic diastolic CHF (congestive heart failure) (CMS/[facility]) [I50.33]  Yes   • Anemia, chronic disease [D63.8]  Yes   • Chronic atrial fibrillation with RVR (CMS/[facility]) [I48.20]  Yes   • Acute UTI (urinary tract infection) [N39.0]  Yes   • HTN (hypertension) [I10]  Yes   • CAD (coronary artery disease) [I25.10]  Yes   • COPD (chronic obstructive pulmonary disease) (CMS/[facility]) [J44.9]  Yes   • Chronic respiratory disease [J98.9]  Yes   • Aortic stenosis [I35.0]  Yes   • S/p left hip fracture [Z87.81]  Not Applicable   • PAD (peripheral artery disease) (CMS/[facility])  [I73.9]  Yes      Resolved Hospital Problems   No resolved problems to display.     Brief Hospital Course to date:  Zak Olmstead is an 80 y.o. male w/ a hx of COPD, chronic respiratory failure on 2 liters PRN, chronic atrial fibrillation (Eliquis), hyperthyroidism, HTN, CAD, PVD, remote left BKA and severe aortic stenosis. He was transferred to [facility] from [facility] on 8/15/20 for further evaluation of a left hip fracture. Anticoagulation was held for surgical repair and has since been resumed.     Fall  Closed left hip fracture  - Per daughter, patient has been more or less bedbound for a few years. Still does sit on side of bed or get into a wheelchair at times.   - s/p L hip trochanteric nailing with intramedullary screw placement by Dr. Aponte 8/17/20  - PT/OT following - patient stating this is only making his pain worse  - Therapy recommending rehab - referral to [place] has been placed but patient now refusing to go   - PRN pain control  - Follow up with Dr. Aponte in 2 weeks     Aortic Valve Stenosis  - Evaluated by CT surgery / TAVR team. Not a candidate for TAVR    Paroxysmal atrial fibrillation with RVR  - Appreciate cardiology assistance - remains on Metoprolol 100mg PO BID, Cardizem CD 300mg PO daily + Cardizem gtt with HR low 100's today   - Eliquis held for surgery, resumed on 8/22   - Developed volume overload due to RVR    Acute on chronic respiratory failure   Acute on chronic diastolic CHF   - Appreciate cardiology assistance   - Cardiology managing diuresis, still requiring 2L NC while awake intermittently     Vasculopathy   - History of aortic graft/stent, fem-fem bypass, chronic occlusion of RCA and LAD disease  - Lifelong smoker from age [age] years old (65 years with up to PPD, so possibly 110+ pack year history)     Chronic pain  - Per wife, follows with a pain clinic  - PEYMAN reviewed. He is on his home regimen of Oxycodone HCl 10mg Q6H + PRNs for surgical  pain     History of AAA  - s/p repair    Severe Anemia  - s/p IV iron. Hgb stable in 8-10 range (higher due to diuresis)    Hyperthyroidism  - Continue home methimazole     Tobacco Use Disorder  - NRT (patch + gum PRN)   - Nicotine replacement increases risk of wound issues    DVT Prophylaxis: Eliquis   Disposition: I expect the patient to be discharged home in 2-3 days. Unless he decides to leave AMA today.     More than 50% of time spent counseling on current illness and plan of care. Case discussed with: patient, patient's daughter, Kassidy, bedside RN and attending provider, Dr. Cantrell   Total time spent face to face with the patient was 18 minutes.  Total time of the encounter was 35 minutes.    CODE STATUS:   Code Status and Medical Interventions:   Ordered at: 08/15/20 7038     Level Of Support Discussed With:    Patient     Code Status:    CPR     Medical Interventions (Level of Support Prior to Arrest):    Full     Electronically signed by Natalie Anders PA-C, 08/24/20, 11:39.

## 2020-08-24 NOTE — DISCHARGE SUMMARY
[facility] Hospital Medicine Services  DISCHARGE SUMMARY    Patient Name: Zak Olmstead  : 1940  MRN: [id number]    Date of Admission: 8/15/2020  9:42 PM  Date of Discharge: 2020  Primary Care Physician: Ely Berumen APRN    Consults     Date and Time Order Name Status Description    2020 2338 Inpatient Cardiology Consult      2020 0030 Inpatient Cardiothoracic Surgery Consult Completed     8/15/2020 2320 Inpatient Cardiology Consult Completed     8/15/2020 2229 Inpatient Orthopedic Surgery Consult      8/15/2020 1104 Inpatient Cardiology Consult Completed     2020 1608 Ortho (on-call MD unless specified) Completed         Hospital Course     Presenting Problem:   S/p left hip fracture [Z87.81]    Active Hospital Problems    Diagnosis  POA   • **Closed left hip fracture, s/p repair 2020 [S72.002A]  Yes   • Acute on chronic diastolic CHF (congestive heart failure) (CMS/[facility]) [I50.33]  Yes   • Anemia, chronic disease [D63.8]  Yes   • Chronic atrial fibrillation with RVR (CMS/[facility]) [I48.20]  Yes   • Acute UTI (urinary tract infection) [N39.0]  Yes   • HTN (hypertension) [I10]  Yes   • CAD (coronary artery disease) [I25.10]  Yes   • COPD (chronic obstructive pulmonary disease) (CMS/[facility]) [J44.9]  Yes   • Chronic respiratory disease [J98.9]  Yes   • Aortic stenosis [I35.0]  Yes   • S/p left hip fracture [Z87.81]  Not Applicable   • PAD (peripheral artery disease) (CMS/[facility]) [I73.9]  Yes      Resolved Hospital Problems   No resolved problems to display.      Hospital Course:  Brief Hospital Course to date:  Zak Olmstead is an 80 y.o. male w/ a hx of COPD, chronic respiratory failure on 2 liters PRN, chronic atrial fibrillation (Eliquis), hyperthyroidism, HTN, CAD, PVD, remote left BKA and severe aortic stenosis. He was transferred to [facility] from [facility] on 8/15/20 for further evaluation of a left hip fracture. Anticoagulation was held for  surgical repair and has since been resumed.      Fall  Closed left hip fracture  - Per daughter, patient has been more or less bedbound for a few years. Still does sit on side of bed or get into a wheelchair at times.   - s/p L hip trochanteric nailing with intramedullary screw placement by Dr. Aponte 8/17/20  - PT/OT following - patient stating this is only making his pain worse  - Therapy recommending rehab - referral to [facility] has been placed but patient now refusing to go   - PRN pain control  - Follow up with Dr. Aponte in 2 weeks      Aortic Valve Stenosis  - Evaluated by CT surgery / TAVR team. Not a candidate for TAVR  - Home with hospice      Paroxysmal atrial fibrillation with RVR  - Appreciate cardiology assistance - home on Metoprolol 100mg PO BID, Cardizem CD 360mg PO daily   - Eliquis held for surgery, resumed on 8/22 and dose increased to 5mg PO BID  - Developed volume overload due to RVR     Acute on chronic respiratory failure   Acute on chronic diastolic CHF   - Appreciate cardiology assistance   - Cardiology managed diuresis, home on Lasix 40mg O BID  - Still requiring 2L NC while awake intermittently      Vasculopathy   - History of aortic graft/stent, fem-fem bypass, chronic occlusion of RCA and LAD disease  - Lifelong smoker from age [age] years old (65 years with up to PPD, so possibly 110+ pack year history)      Chronic pain  - Per wife, follows with a pain clinic  - PEYMAN reviewed. He is on his home regimen of Oxycodone HCl 10mg Q6H + PRNs for surgical pain      History of AAA  - s/p repair     Severe Anemia  - s/p IV iron. Hgb stable in 8-10 range (higher due to diuresis)     Hyperthyroidism  - Continue home methimazole      Tobacco Use Disorder  - NRT (patch + gum PRN)   - Nicotine replacement increases risk of wound issues    Patient refused rehab placement. Given critical aortic valve stenosis, home hospice was recommended. Patient and his family are agreeable. He will discharge home  "on 8/24/20     Day of Discharge     HPI:   Laying in bed. He is adamant that he is leaving today and stated that he would sign himself out if needed. Patient and family understand the risks of going home. He wishes to DC home - home hospice liason met with family this afternoon and someone will see him at home this evening as well.     Review of Systems  Gen- No fevers, chills  CV- No chest pain, palpitations  Resp- No cough, (+_ dyspnea and productive cough  GI- No N/V/D, abd pain    Vital Signs:   Temp:  [97.3 °F (36.3 °C)-97.6 °F (36.4 °C)] 97.3 °F (36.3 °C)  Heart Rate:  [78-92] 85  Resp:  [16] 16  BP: (131-152)/(62-85) 145/85     Physical Exam:  Constitutional: Thin, frail, elderly male. Awake, alert and conversant. Periods of apnea noted when sleeping at start of exam   HENT: NCAT, mucous membranes moist  Respiratory: Bibasilar rales without wheezes or rhonchi. Sat dropped to 87% on room air during exam    Cardiovascular: (+) KARIS, no r/g   Gastrointestinal: Positive bowel sounds, soft, nontender, nondistended  Musculoskeletal: No bilateral ankle edema, s/p L BKA   Psychiatric: Appropriate affect, cooperative  Neurologic: Oriented to self, location and time. Patient refused to complete situational orientation assessment - \"you know why I'm here.\" Speech clear  Skin: No rashes    Pertinent  and/or Most Recent Results     Results from last 7 days   Lab Units 08/24/20  0743 08/22/20  0024 08/21/20  0734 08/20/20  0514 08/18/20  0525   WBC 10*3/mm3 8.36 8.46 8.41 10.62 10.63   HEMOGLOBIN g/dL 10.0* 8.1* 7.7* 7.9* 8.1*   HEMATOCRIT % 36.1* 29.6* 28.2* 28.3* 29.2*   PLATELETS 10*3/mm3 463* 348 327 352 269   SODIUM mmol/L 142  --  138 137 137   POTASSIUM mmol/L 3.4*  --  3.9 4.0 4.6   CHLORIDE mmol/L 95*  --  104 103 105   CO2 mmol/L 36.0*  --  24.0 25.0 17.0*   BUN mg/dL 14  --  16 20 21   CREATININE mg/dL 1.02  --  0.72* 0.87 0.87   GLUCOSE mg/dL 152*  --  90 79 99   CALCIUM mg/dL 8.7  --  8.0* 8.0* 7.8*     Results " from last 7 days   Lab Units 08/20/20  0514   BILIRUBIN mg/dL 1.4*   ALK PHOS U/L 114   ALT (SGPT) U/L 7   AST (SGOT) U/L 13   PROTIME Seconds 19.4*   INR  1.67*     Results from last 7 days   Lab Units 08/22/20  1211 08/20/20  0514   TSH uIU/mL  --  1.010   PROBNP pg/mL 15,941.0* 11,392.0*   PROCALCITONIN ng/mL  --  0.18   LACTATE mmol/L  --  0.9     Microbiology Results Abnormal     Procedure Component Value - Date/Time    Blood Culture - Blood, Hand, Right [[id number]] Collected:  08/16/20 0127    Lab Status:  Final result Specimen:  Blood from Hand, Right Updated:  08/21/20 0330     Blood Culture No growth at 5 days    Blood Culture - Blood, Arm, Right [[id number]] Collected:  08/16/20 0127    Lab Status:  Final result Specimen:  Blood from Arm, Right Updated:  08/21/20 0330     Blood Culture No growth at 5 days        Imaging Results (All)     Procedure Component Value Units Date/Time    XR Chest 1 View [[id number]] Collected:  08/21/20 1803     Updated:  08/22/20 0008    Narrative:       CR Chest 1 Vw    INDICATION:   Pulmonary edema. Femur fracture.     COMPARISON:    8/14/2020    FINDINGS:  Single portable AP view(s) of the chest.    Cardiac and mediastinal contours are normal. There is vascular congestion with mild generalized pulmonary edema. No pneumothorax. There is degenerative disease in the shoulders.       Impression:       Mild pulmonary edema with vascular congestion.    Signer Name: Alexi Hewitt MD   Signed: 8/21/2020 6:03 PM   Workstation Name: [email or web address]    [facility]      XR Femur 2 View Left [[id number]] Collected:  08/20/20 0920     Updated:  08/20/20 2021    Narrative:       EXAMINATION: XR FEMUR 2 VW LEFT- 08/20/2020     INDICATION: increase pain at therapy; S72.002A-Fracture of unspecified  part of neck of left femur, initial encounter for closed fracture;  Z87.81-Personal history of (healed) traumatic fracture      COMPARISON: 08/17/2020     FINDINGS: Left femur  hardware appears in stable alignment from prior  comparison 08/17/2020 with stable appearance of fracture from that exam  without evidence of progressive malalignment or new fracture.  Specifically no distal fracture of the distal left femur.           Impression:       Stable appearance of left hip nailing and left femoral  hardware from prior comparison 08/17/2020 fluoroscopic images and  overall appearance of intertrochanteric/lesser trochanteric fracture. No  new findings evident.     D:  08/20/2020  E:  08/20/2020     This report was finalized on 8/20/2020 7:03 PM by Dr. Ciaran Rivera.       FL C Arm During Surgery [[id number]] Collected:  08/18/20 0716     Updated:  08/18/20 1010    Narrative:       EXAMINATION: FL C ARM DURING SURGERY- 08/17/2020     INDICATION: troch nail, left hip pain     COMPARISON: NONE     FINDINGS: 2 minutes and 2 seconds of fluoroscopy and 10 images used for  left hip nailing. Please see the procedure report for full details.         Impression:       Fluoroscopy for left hip nailing.     D:  08/17/2020  E:  08/18/2020         This report was finalized on 8/18/2020 10:06 AM by Dr. Corin Bermeo MD.           Results for orders placed during the hospital encounter of 08/14/20   Adult Transthoracic Echo Complete W/ Cont if Necessary Per Protocol    Narrative · Left ventricular wall thickness is consistent with mild concentric   hypertrophy.  · Left ventricular systolic function is normal, EF 56-60%.  · Left ventricular diastolic dysfunction (grade I a) consistent with   impaired relaxation.  · Left atrial cavity size is mild-to-moderately dilated.  · There is severe calcification of the aortic valve mainly affecting the   non, left and right coronary cusp(s).  · Mild aortic valve regurgitation is present.  · Severe aortic valve stenosis is present.  · Aortic valve mean pressure gradient is 46.0 mmHg.  · Aortic valve maximum pressure gradient is 90.0 mmHg.  · Moderate mitral valve  regurgitation is present  · Moderate to severe tricuspid valve regurgitation is present.  · Calculated right ventricular systolic pressure from tricuspid   regurgitation is 77 mmHg.        Discharge Details        Discharge Medications      New Medications      Instructions Start Date   docusate sodium 100 MG capsule   100 mg, Oral, 2 Times Daily      lactulose 10 GM/15ML solution  Commonly known as:  CHRONULAC   10 g, Oral, 3 Times Daily      metoprolol tartrate 100 MG tablet  Commonly known as:  LOPRESSOR   100 mg, Oral, Every 12 Hours Scheduled      polyethylene glycol 17 g packet  Commonly known as:  MIRALAX   17 g, Oral, 2 Times Daily         Changes to Medications      Instructions Start Date   apixaban 2.5 MG tablet tablet  Commonly known as:  ELIQUIS  What changed:  how much to take   5 mg, Oral, Every 12 Hours Scheduled      dilTIAZem  MG 24 hr capsule  Commonly known as:  CARDIZEM CD  What changed:    · medication strength  · how much to take   360 mg, Oral, Daily      furosemide 40 MG tablet  Commonly known as:  LASIX  What changed:    · medication strength  · how much to take  · when to take this   40 mg, Oral, 2 Times Daily      oxyCODONE 10 MG tablet  Commonly known as:  ROXICODONE  What changed:    · when to take this  · reasons to take this   10 mg, Oral, Every 4 Hours PRN      potassium chloride ER 20 MEQ tablet controlled-release ER tablet  Commonly known as:  K-TAB  What changed:    · medication strength  · how much to take  · when to take this   20 mEq, Oral, 2 Times Daily         Continue These Medications      Instructions Start Date   finasteride 5 MG tablet  Commonly known as:  PROSCAR   5 mg, Oral, Daily      ipratropium-albuterol 0.5-2.5 mg/3 ml nebulizer  Commonly known as:  DUO-NEB   3 mL, Nebulization, Every 4 Hours PRN      methIMAzole 10 MG tablet  Commonly known as:  TAPAZOLE   15 mg, Oral, Daily      nitroglycerin 0.4 MG SL tablet  Commonly known as:  NITROSTAT   0.4 mg,  Sublingual, Every 5 Minutes PRN, Take no more than 3 doses in 15 minutes.         Stop These Medications    metoprolol succinate XL 25 MG 24 hr tablet  Commonly known as:  TOPROL-XL     naproxen 500 MG tablet  Commonly known as:  NAPROSYN          No Known Allergies    Discharge Disposition:  Home or Self Care    Diet:  Diet Order   Procedures   • Diet Regular     Activity:  Activity Instructions     Activity as Tolerated          CODE STATUS:    Code Status and Medical Interventions:   Ordered at: 08/15/20 2223     Level Of Support Discussed With:    Patient     Code Status:    CPR     Medical Interventions (Level of Support Prior to Arrest):    Full       Future Appointments   Date Time Provider Department Center   9/3/2020  1:10 PM Edmar Aponte MD [facility]   9/15/2020  1:45 PM Derik Vega MD [facility] None     Additional Instructions for the Follow-ups that You Need to Schedule     Ambulatory Referral to Home Health   As directed      Face to Face Visit Date:  8/18/2020    Follow-up provider for Plan of Care?:  I treated the patient in an acute care facility and will not continue treatment after discharge.    Follow-up provider:  JOSE MADRIGAL [[id number]]    Reason/Clinical Findings:  left hip fracture    Describe mobility limitations that make leaving home difficult:  impaired functional mobility, gait, balance and endurance    Nursing/Therapeutic Services Requested:  Physical Therapy Occupational Therapy    PT orders:  Therapeutic exercise Gait Training Transfer training    Weight Bearing Status:  As Tolerated    Occupational orders:  Activities of daily living Strengthening    Frequency:  1 Week 1         Discharge Follow-up with Specialty: [facility] - Ortho; 2 Weeks   As directed      Specialty:  [facility]    Follow Up:  2 Weeks             Electronically signed by Natalie Anders PA-C, 08/24/20, 3:43 PM.    Time Spent on Discharge:  I spent 60 minutes on this discharge activity  which included: face-to-face encounter with the patient, reviewing the data in the system, coordination of the care with the nursing staff as well as consultants, documentation, and entering orders.

## 2020-08-24 NOTE — DISCHARGE PLACEMENT REQUEST
"Meir Preciado (80 y.o. Male)   Referred 8/24/2020 by Dr. Natalie Anders  PCP-Ely VARGAS Dc Diastolic CHF/severe aortic stenosis  Tested for covid 19 on 8/15/2020 Results neg  PT IS DISCHARGING HOME TODAY    Date of Birth Social Security Number Address Home Phone MRN    1940  [address] [facility] [address] [address] [facility] [address] [phone] [id number]    Restoration[employer] Marital Status          None        Admission Date Admission Type Admitting Provider Attending Provider Department, Room/Bed    8/15/20 Urgent Stephanie Cantrell MD Opii, [place], MD [facility] 3G, S364/1    Discharge Date Discharge Disposition Discharge Destination         Home or Self Care              Attending Provider:  Stephanie Cantrell MD    Allergies:  No Known Allergies    Isolation:  None   Infection:  None   Code Status:  CPR    Ht:  172.2 cm (67.8\")   Wt:  76 kg (167 lb 8.8 oz)    Admission Cmt:  None   Principal Problem:  Closed left hip fracture, s/p repair 8/17/2020 [S72.002A]                 Active Insurance as of 8/15/2020     Primary Coverage     Payor Plan Insurance Group Employer/Plan Group    HUMANA MEDICARE REPLACEMENT HUMANA MEDICARE REPLACEMENT [id number]     Payor Plan Address Payor Plan Phone Number Payor Plan Fax Number Effective Dates    [address] [address] [phone]  1/1/2018 - None Entered    [facility] [id number]       Subscriber Name Subscriber Birth Date Member ID       MEIR PRECIADO 1940 [id number]                 Emergency Contacts      (Rel.) Home Phone Work Phone Mobile Phone    Mirna Preciado (Spouse) [phone]    Low Preciado (Son) -- -- [phone]    Kassidy Ayon [phone] -- --            Insurance Information                HUMANA MEDICARE REPLACEMENT/HUMANA MEDICARE REPLACEMENT Phone: [phone]    Subscriber: IshanMeir Subscriber#: [id number]    Group#: [id number] Precert#: [id number]             History & Physical      Manjeet, " Aurelio LOPEZ MD at 08/15/20 2210              [facility] Medicine Services  HISTORY AND PHYSICAL    Patient Name: Zak Olmstead  : 1940  MRN: [id number]  Primary Care Physician: Ely Berumen APRN  Date of admission: 8/15/2020      Subjective   Subjective     Chief Complaint:  Fall     HPI:  Zak Olmstead is a 80 y.o. male w/ a hx of COPD, chronic respiratory failure on 2 liters PRN, chronic atrial fibrillation (BB, dilt and Eliquis), hyperthyroidism, HTN, CAD, PVD, remote left BKA and severe aortic stenosis who was transferred from [facility] for further evaluation/tx of a left hip fracture.  Pt originally presented to the OSH ED w/ c/o a fall. Pt c/o falling off of his porch 1 week ago (2020). Since falling he has had ongoing left hip pain. XRAYS c/w left hip fracture. Ortho was consulted and recommended operative mgt of the fracture.  Due to pt's extensive medical hx, Cardiology was also consulted for OR clearance. Pt had an ECHO done @ the OSH that was c/w severe aortic stenosis. Both teams felt that w/ the increased risk and pt's hx that pt needed further cardiac evaluation prior to surgical intervention. Pt was transferred to [facility] for further evaluation.   In addition to hip pain, pt c/o dysuria chronically. Pt also c/o chronic shortness of breath w/ PRN oxygen.       Review of Systems   Constitutional: Negative.  Negative for chills, fatigue and fever.   HENT: Negative.  Negative for congestion, postnasal drip, rhinorrhea, sinus pressure, sinus pain, sneezing, sore throat and trouble swallowing.    Eyes: Negative.  Negative for visual disturbance.   Respiratory: Negative.  Negative for cough and shortness of breath.    Cardiovascular: Negative.  Negative for chest pain, palpitations and leg swelling.   Gastrointestinal: Negative.  Negative for abdominal pain, blood in stool, constipation, diarrhea, nausea and vomiting.   Genitourinary: Negative.  Negative for  decreased urine volume, difficulty urinating, dysuria, flank pain, frequency, hematuria and urgency.   Musculoskeletal:        Left hip pain after fall.    Skin: Negative.  Negative for color change, pallor, rash and wound.   Neurological: Negative.  Negative for dizziness, tremors, seizures, syncope, facial asymmetry, speech difficulty, weakness, light-headedness, numbness and headaches.   Hematological: Negative.  Does not bruise/bleed easily.   Psychiatric/Behavioral: Negative.  Negative for confusion.   All other systems reviewed and are negative.       Personal History     Past Medical History:   Diagnosis Date   • AAA (abdominal aortic aneurysm) (CMS/HCC)     s/p repair   • Atrial fibrillation with rapid ventricular response (CMS/HCC) 6/1/2018   • BPH (benign prostatic hyperplasia)    • BPH with obstruction/lower urinary tract symptoms 2/12/2020   • CAD (coronary artery disease)    • CAD (coronary artery disease) 8/15/2020   • Chronic atrial fibrillation (CMS/HCC)    • Chronic respiratory failure (CMS/HCC)    • COPD (chronic obstructive pulmonary disease) (CMS/HCC)    • Dysuria 2/12/2020   • Erectile dysfunction 2/12/2020   • Heart murmur    • Hypertension    • Hyperthyroidism    • Mitral annular calcification 9/24/2018   • Mononeuritis multiplex    • Nonrheumatic aortic valve stenosis 9/24/2018   • NSTEMI (non-ST elevated myocardial infarction) (CMS/HCC) 2018   • PVD (peripheral vascular disease) (CMS/HCC)        Past Surgical History:   Procedure Laterality Date   • ABDOMINAL AORTIC ANEURYSM REPAIR     • APPENDECTOMY     • BACK SURGERY     • BELOW KNEE LEG AMPUTATION Left    • CARDIAC CATHETERIZATION N/A 6/1/2018    Procedure: Left Heart Cath;  Surgeon: Derik Vega MD;  Location: [facility] CATH INVASIVE LOCATION;  Service: Cardiovascular   • COLONOSCOPY N/A 6/15/2017    Procedure: COLONOSCOPY  CPTCODE:41768;  Surgeon: Lincoln Bowens III, MD;  Location: [facility] OR;  Service:    • ENDOSCOPY N/A 6/15/2017       Procedure: ESOPHAGOGASTRODUODENOSCOPY WITH BIOPSY  CPTCODE:42629;  Surgeon: Lincoln Bowens III, MD;  Location:  [facility] OR;  Service:    • ESOPHAGEAL DILATATION     • INTERVENTIONAL RADIOLOGY PROCEDURE Left 6/1/2018    Procedure: Abdominal Aortagram with Runoff;  Surgeon: Derik Vega MD;  Location: [facility] CATH INVASIVE LOCATION;  Service: Cardiovascular       Family History: family history includes Heart attack in his brother; Heart disease in his mother; Heart failure in his father; Stroke in his brother. Otherwise pertinent FHx was reviewed and unremarkable.     Social History:  reports that he has been smoking cigarettes. He has a 30.00 pack-year smoking history. He has never used smokeless tobacco. He reports that he does not drink alcohol or use drugs.  Social History     Social History Narrative   • Not on file       Medications:  Available home medication information reviewed.  Medications Prior to Admission   Medication Sig Dispense Refill Last Dose   • dilTIAZem CD (CARDIZEM CD) 240 MG 24 hr capsule Take 1 capsule by mouth Daily. 30 capsule 5 8/14/2020 at Unknown time   • finasteride (PROSCAR) 5 MG tablet Take 1 tablet by mouth Daily. 90 tablet 3 8/14/2020 at Unknown time   • ipratropium-albuterol (DUO-NEB) 0.5-2.5 mg/3 ml nebulizer Take 3 mL by nebulization Every 4 (Four) Hours As Needed for Wheezing.   Unknown at Unknown time   • methIMAzole (TAPAZOLE) 5 MG tablet Take 5 mg by mouth Daily.   8/14/2020 at 1100   • metoprolol succinate XL (TOPROL-XL) 25 MG 24 hr tablet Take 1 tablet by mouth Daily. 30 tablet 3 8/14/2020 at Unknown time   • naproxen (NAPROSYN) 500 MG tablet Take 500 mg by mouth Daily As Needed for Mild Pain .   Unknown at Unknown time   • nitroglycerin (NITROSTAT) 0.4 MG SL tablet Place 0.4 mg under the tongue Every 5 (Five) Minutes As Needed for Chest Pain. Take no more than 3 doses in 15 minutes.   Unknown at Unknown time   • oxyCODONE (ROXICODONE) 10 MG tablet Take 10 mg by mouth  4 (Four) Times a Day.   8/14/2020 at 1100       No Known Allergies    Objective   Objective     Vital Signs:   Temp:  [97.4 °F (36.3 °C)-98.4 °F (36.9 °C)] 97.8 °F (36.6 °C)  Heart Rate:  [] 98  Resp:  [18-20] 20  BP: (105-135)/(55-91) 127/73        Physical Exam   Constitutional: He is oriented to person, place, and time. He appears well-developed and well-nourished. No distress.   HENT:   Head: Normocephalic and atraumatic.   Eyes: Pupils are equal, round, and reactive to light. EOM are normal.   Neck: Normal range of motion. Neck supple.   Cardiovascular: Normal rate and intact distal pulses. An irregular rhythm present. Exam reveals no gallop and no friction rub.   Murmur heard.  Pulmonary/Chest: Effort normal. No stridor. No respiratory distress. He has no rales. He exhibits no tenderness.   Diminished breath sounds bilateral bases. Faint expiratory wheeze.    Abdominal: Soft. Bowel sounds are normal. He exhibits no distension. There is no tenderness.   Musculoskeletal:   Acute left hip fracture.   Left BKA.    Neurological: He is alert and oriented to person, place, and time. No cranial nerve deficit.   Skin: Skin is warm and dry. Capillary refill takes more than 3 seconds. No rash noted. He is not diaphoretic. No erythema. No pallor.   Diminished pulses RLE.    Psychiatric: He has a normal mood and affect. His behavior is normal. Judgment and thought content normal.   Nursing note and vitals reviewed.       Results Reviewed:  I have personally reviewed current lab and radiology data.    Results from last 7 days   Lab Units 08/14/20  1420   WBC 10*3/mm3 7.96   HEMOGLOBIN g/dL 9.8*   HEMATOCRIT % 34.6*   PLATELETS 10*3/mm3 218     Results from last 7 days   Lab Units 08/15/20  0850 08/14/20  1420   SODIUM mmol/L 140 140   POTASSIUM mmol/L 4.5 4.0   CHLORIDE mmol/L 106 104   CO2 mmol/L 24.9 25.0   BUN mg/dL 21 23   CREATININE mg/dL 0.93 0.93   GLUCOSE mg/dL 111* 131*   CALCIUM mg/dL 8.1* 8.7   ALT (SGPT)  U/L  --  25   AST (SGOT) U/L  --  30   TROPONIN T ng/mL  --  <0.010     Estimated Creatinine Clearance: 64.2 mL/min (by C-G formula based on SCr of 0.93 mg/dL).  Brief Urine Lab Results  (Last result in the past 365 days)      Color   Clarity   Blood   Leuk Est   Nitrite   Protein   CREAT   Urine HCG        08/14/20 1636 Veena  Comment:  Dipstick results may be inaccurate due to color interference.  Clear Negative Trace Positive 30 mg/dL (1+)             Imaging Results (Last 24 Hours)     ** No results found for the last 24 hours. **        Results for orders placed during the hospital encounter of 08/14/20   Adult Transthoracic Echo Complete W/ Cont if Necessary Per Protocol    Narrative · Left ventricular wall thickness is consistent with mild concentric   hypertrophy.  · Left ventricular systolic function is normal, EF 56-60%.  · Left ventricular diastolic dysfunction (grade I a) consistent with   impaired relaxation.  · Left atrial cavity size is mild-to-moderately dilated.  · There is severe calcification of the aortic valve mainly affecting the   non, left and right coronary cusp(s).  · Mild aortic valve regurgitation is present.  · Severe aortic valve stenosis is present.  · Aortic valve mean pressure gradient is 46.0 mmHg.  · Aortic valve maximum pressure gradient is 90.0 mmHg.  · Moderate mitral valve regurgitation is present  · Moderate to severe tricuspid valve regurgitation is present.  · Calculated right ventricular systolic pressure from tricuspid   regurgitation is 77 mmHg.          Assessment/Plan   Assessment & Plan     Active Hospital Problems    Diagnosis POA   • **Closed left hip fracture (CMS/[facility]) [S72.002A] Yes   • Chronic atrial fibrillation (CMS/[facility]) [I48.20] Yes   • Acute UTI (urinary tract infection) [N39.0] Yes   • HTN (hypertension) [I10] Yes   • CAD (coronary artery disease) [I25.10] Yes   • COPD (chronic obstructive pulmonary disease) (CMS/[facility]) [J44.9] Yes   • Chronic respiratory  disease [J98.9] Yes   • Peripheral vascular disease (CMS/HCC) [I73.9] Yes       Assessment & Plan    **Left hip fracture  -hip xray obtained @ OSH  -NPO after midnight   -baseline pre-op labs/EKG, type/screen- in am  -last dose of Apixaban was on morning of admission to OSH 8/14/2020  -symptom mgt   -consult Ortho to see in am (Dr. Aponte)  -CT surgery consulted to see in am (Dr. Cabrera); for pre-op clearance, severe aortic stenosis     **Acute UTI  -blood cultures pending  -UA @ OSH c/w acute UTI; culture ordered  -IV Rocephin started   -bladder scan q 4hrs     **Aortic stenosis  -ECHO done @ OSH; EF 56-60% w/ grade I a diastolic dysfunction and severe aortic stenosis; RVSP 77mmHg  -CT surgery consulted to see in am     **Chronic atrial fibrillation   -routine Apixaban held since morning of 8/14/2020  -EKG pending; rate controlled  -routine Cardizem and Toprol continued     **HTN, CAD, hyperthyroidism  -continue routine tapazole  -continue routine Toprol   -PRN nitro continued    **COPD, chronic respiratory failure   -CXR @ OSH stable   -PRN oxygen; 2 liters at home as needed  -PRN duo nebs continued     **PVD  -remote left BKA  -pulses on RLE weak; NV/doppler checks ordered       DVT prophylaxis:  Mechanical       CODE STATUS:    Code Status and Medical Interventions:   Ordered at: 08/15/20 7447     Level Of Support Discussed With:    Patient     Code Status:    CPR     Medical Interventions (Level of Support Prior to Arrest):    Full       Admission Status:  I believe this patient meets INPATIENT status due to need for treatment of his hip fracture, severe left ear.  I feel patient’s risk for adverse outcomes and need for care warrant INPATIENT evaluation and I predict the patient’s care encounter to likely last beyond 2 midnights.      Electronically signed by ALICIA Elena, 08/15/20, 10:10 PM.    Brief Attending Admission Attestation     I have seen and examined the patient, performing an independent  face-to-face diagnostic evaluation with plan of care reviewed and developed with the advanced practice clinician (APC).         Vitals:    08/15/20 2154   BP: 127/73   Pulse: 98   Resp: 20   Temp: 97.8 °F (36.6 °C)   SpO2: 99%-2 L nasal cannula       Attending Physical Exam:  RS-poor effort, decreased air entry at the bases,  CVS- s1s2 irregular, murmur in all valvular areas  ABD- soft, non tender, not distended, no organomegaly.  EXT-left BKA, right extremity-poorly palpable pulses, cool to touch, poor capillary refill,  NEURO- AAO-3, no focal defecit.      Old records reviewed and summarized in PM hx.    Brief Summary Statement:   80 years old male transferred from [facility]- for evaluation of aortic stenosis/lefthip fracture -secondary to fall (3 days back).  Patient had episode of A. fib-RVR-on 8/15/2020-a.m.-responded to beta-blocker.  Patient is very limited in physical activity secondary to BKA, denies any episodes of chest pain, no complaint of lightheadedness or syncope recently, does complain of chronic dyspnea, no PND or orthopnea symptoms, also complains of chronic cough-with whitish sputum production.  Does complain of pain with any movement of his left hip.    COVID-19-severe test negative on 8/15/2020.    Remainder of detailed HPI is as noted above and has been reviewed and/or edited by me for completeness.    PM HX:  Past Medical History:   Diagnosis Date   • AAA (abdominal aortic aneurysm)-status post repair        • Atrial fibrillation with rapid ventricular response (-CHADS2 VASC=4)  -On Eliquis, metoprolol 50/3 times daily, Cardizem 240 mg p.o. daily, 6/1/2018   • BPH (benign prostatic hyperplasia)      2/12/2020   • CAD (coronary artery disease)-    • COPD (chronic obstructive pulmonary disease)-on 2 L nasal cannula  -Still active smoker    •  Severe aortic stenosis, moderate MR/chronic CHF-as per discharge summary,-echo- on 8/15/2020-EF of 56%, grade 1 diastolic dysfunction, severe  left ear, aortic valve pressure gradient 46 mmHg, moderate MR, RVSP 55 mmHg.  -Lasix on hold      • Hypertension  Hyperlipidemia- pravastatin 10 mg p.o. daily      • Hyperthyroidism-methimazole 5 mg p.o. daily       GERD/esophageal dilatation 9/24/2018   • Mononeuritis multiplex/HX left BKA/history of back surgery      • PVD (peripheral vascular disease) (left BKA)-status post femorofemoral bypass             LABS:  EKG A. fib-122 bpm, Q waves in lead III, ,  Cardiac cath- 6/2018- showed chronic total occlusion of mid RCA-with heavy collateral filling, mild disease in left coronary system.-Was recommended dual antiplatelet therapy    Brief Assessment/Plan :  CT surgery evaluation-for severe aortic stenosis.  Cardiology consult- for chronic A. fib,   repeat CBC, anemic with hemoglobin of 10 at the external ED, no complaint of acute bleeding other than bruising secondary to hip fracture, patient's hemoglobin has been dropping from 11-10-9, therefore did not start patient on any anticoagulation.  Orthopedic consult.    See above for further detailed assessment and plan developed with APC which I have reviewed and/or edited for completeness.    Electronically signed by Aurelio Burroughs MD, 08/15/20, 10:32 PM.        Electronically signed by Aurelio Burroughs MD at 08/16/20 0612       Emergency Department Notes    No notes of this type exist for this encounter.           Current Facility-Administered Medications   Medication Dose Route Frequency Provider Last Rate Last Dose   • apixaban (ELIQUIS) tablet 5 mg  5 mg Oral Q12H Alexi Shaver MD   5 mg at 08/24/20 0932   • aspirin EC tablet 81 mg  81 mg Oral Daily Bossman Suarez MD   81 mg at 08/24/20 0931   • atorvastatin (LIPITOR) tablet 40 mg  40 mg Oral Nightly Jordan Marmolejo MD   40 mg at 08/23/20 2037   • dilTIAZem (CARDIZEM) 125 mg in 125 mL 0.7% sodium chloride (1 mg/mL) infusion  2.5-15 mg/hr Intravenous Titrated Elle Tsai PA 2.5 mL/hr at  08/24/20 1112 2.5 mg/hr at 08/24/20 1112   • dilTIAZem CD (CARDIZEM CD) 24 hr capsule 300 mg  300 mg Oral Daily Bossman Suarez MD   300 mg at 08/24/20 0932   • docusate sodium (COLACE) capsule 100 mg  100 mg Oral BID Bossman Suarez MD   100 mg at 08/24/20 0932   • finasteride (PROSCAR) tablet 5 mg  5 mg Oral Daily Jordan Marmolejo MD   5 mg at 08/24/20 0932   • furosemide (LASIX) tablet 40 mg  40 mg Oral BID Juan Nunez MD       • ipratropium-albuterol (DUO-NEB) nebulizer solution 3 mL  3 mL Nebulization Q4H PRN Jordan Marmolejo MD   3 mL at 08/20/20 1424   • lactulose (CHRONULAC) 10 GM/15ML solution 10 g  10 g Oral TID Bossman Suarez MD   10 g at 08/22/20 2023   • Magnesium Sulfate 2 gram Bolus, followed by 8 gram infusion (total Mg dose 10 grams)- Mg less than or equal to 1mg/dL  2 g Intravenous PRN Jordan Marmolejo MD        Or   • Magnesium Sulfate 2 gram / 50mL Infusion (GIVE X 3 BAGS TO EQUAL 6GM TOTAL DOSE) - Mg 1.1 - 1.5 mg/dl  2 g Intravenous PRN Jordan Marmoleoj MD        Or   • Magnesium Sulfate 4 gram infusion- Mg 1.6-1.9 mg/dL  4 g Intravenous PRN Jordan Marmolejo MD       • methIMAzole (TAPAZOLE) tablet 5 mg  5 mg Oral Q8H Bossman Suarez MD   5 mg at 08/24/20 0551   • metoprolol tartrate (LOPRESSOR) tablet 100 mg  100 mg Oral Q12H Alexi Shaver MD   100 mg at 08/24/20 0552   • morphine injection 1 mg  1 mg Intravenous Q4H PRN Bossman Suarez MD       • nicotine (NICODERM CQ) 21 MG/24HR patch 1 patch  1 patch Transdermal Q24H Jordan Marmolejo MD   1 patch at 08/23/20 0936   • nicotine polacrilex (COMMIT) lozenge 2 mg  2 mg Mouth/Throat Q1H PRN Bossman Suarez MD       • nicotine polacrilex (NICORETTE) gum 2 mg  2 mg Mouth/Throat Q1H PRN Bossman Suarez MD   2 mg at 08/23/20 1520   • nitroglycerin (NITROSTAT) SL tablet 0.4 mg  0.4 mg Sublingual Q5 Min PRN Jordan Marmolejo MD       • ondansetron (ZOFRAN) tablet 4 mg  4 mg Oral Q6H PRN Jordan Marmolejo MD        Or   •  ondansetron (ZOFRAN) injection 4 mg  4 mg Intravenous Q6H PRN Jordan Marmolejo MD       • oxyCODONE (ROXICODONE) immediate release tablet 10 mg  10 mg Oral 4x Daily Jordan Marmolejo MD   10 mg at 20 0552   • oxyCODONE (ROXICODONE) immediate release tablet 10 mg  10 mg Oral Q4H PRN Jordan Marmolejo MD   10 mg at 20 1411   • oxyCODONE (ROXICODONE) immediate release tablet 5 mg  5 mg Oral Q4H PRN Jordan Marmolejo MD   5 mg at 20   • polyethylene glycol 3350 powder (packet)  17 g Oral BID Bossman Suarez MD   17 g at 20   • potassium chloride (KLOR-CON) packet 40 mEq  40 mEq Oral PRN Natalie Anders PA-C       • potassium chloride (MICRO-K) CR capsule 10 mEq  10 mEq Oral Daily Juan Nunez MD       • potassium chloride (MICRO-K) CR capsule 40 mEq  40 mEq Oral PRN Natalie Anders PA-C       • sodium chloride 0.9 % flush 10 mL  10 mL Intravenous Q12H Jordan Marmolejo MD   10 mL at 20 0934   • sodium chloride 0.9 % flush 10 mL  10 mL Intravenous PRN Jordan Marmolejo MD       • vitamin C (ASCORBIC ACID) tablet 500 mg  500 mg Oral Daily Bossman Suarez MD   500 mg at 20 0932        Physician Progress Notes (last 72 hours) (Notes from 20 1617 through 20 1617)      Natalie Anders PA-C at 20 1139     Attestation signed by Stephanie Cantrell MD at 20 1224    I have reviewed this documentation and agree.                        [facility] Medicine Services  PROGRESS NOTE    Patient Name: Zak Olmstead  : 1940  MRN: [id number]    Date of Admission: 8/15/2020  Primary Care Physician: Ely Berumen APRN    Subjective     CC: f/u L hip fracture, afib RVR, respiratory failure secondary to volume overload     HPI:    Laying in bed. Says he is leaving today. We discussed his continued need to remain in the hospital but he is adamant he is leaving and will sign himself out if needed. I discussed his care  "over the phone with Mr. Olmstead's daughter, Kassidy. She was tearful and very concerned about him coming home but states that Mr. Olmstead's wife would do whatever he asked of her, even understanding the risks.     Review of Systems  Gen- No fevers, chills  CV- No chest pain, palpitations  Resp- No cough, (+) productive cough, hypoxia   GI- No N/V/D, abd pain    Objective     Vital Signs:   Temp:  [97.4 °F (36.3 °C)-97.6 °F (36.4 °C)] 97.4 °F (36.3 °C)  Heart Rate:  [78-92] 87  Resp:  [16] 16  BP: (131-152)/(62-73) 135/73     Physical Exam:  Constitutional: Thin, frail, elderly male. Awake, alert and conversant. Periods of apnea noted when sleeping at start of exam   HENT: NCAT, mucous membranes moist  Respiratory: Bibasilar rales without wheezes or rhonchi. Sat dropped to 87% on room air during exam    Cardiovascular: (+) KARIS, no r/g   Gastrointestinal: Positive bowel sounds, soft, nontender, nondistended  Musculoskeletal: No bilateral ankle edema, s/p L BKA   Psychiatric: Appropriate affect, cooperative  Neurologic: Oriented to self, location and time. Patient refused to complete situational orientation assessment - \"you know why I'm here.\" Speech clear  Skin: No rashes    Results Reviewed:  Results from last 7 days   Lab Units 08/24/20  0743 08/22/20  0024 08/21/20  0734 08/20/20  0514   WBC 10*3/mm3 8.36 8.46 8.41 10.62   HEMOGLOBIN g/dL 10.0* 8.1* 7.7* 7.9*   HEMATOCRIT % 36.1* 29.6* 28.2* 28.3*   PLATELETS 10*3/mm3 463* 348 327 352   INR   --   --   --  1.67*   PROCALCITONIN ng/mL  --   --   --  0.18     Results from last 7 days   Lab Units 08/24/20  0743 08/22/20  1211 08/21/20  0734 08/20/20  0514   SODIUM mmol/L 142  --  138 137   POTASSIUM mmol/L 3.4*  --  3.9 4.0   CHLORIDE mmol/L 95*  --  104 103   CO2 mmol/L 36.0*  --  24.0 25.0   BUN mg/dL 14  --  16 20   CREATININE mg/dL 1.02  --  0.72* 0.87   GLUCOSE mg/dL 152*  --  90 79   CALCIUM mg/dL 8.7  --  8.0* 8.0*   ALT (SGPT) U/L  --   --   --  7   AST (SGOT) " U/L  --   --   --  13   PROBNP pg/mL  --  15,941.0*  --  11,392.0*     Estimated Creatinine Clearance: 62.1 mL/min (by C-G formula based on SCr of 1.02 mg/dL).    Microbiology Results Abnormal     Procedure Component Value - Date/Time    Blood Culture - Blood, Hand, Right [[id number]] Collected:  08/16/20 0127    Lab Status:  Final result Specimen:  Blood from Hand, Right Updated:  08/21/20 0330     Blood Culture No growth at 5 days    Blood Culture - Blood, Arm, Right [[id number]] Collected:  08/16/20 0127    Lab Status:  Final result Specimen:  Blood from Arm, Right Updated:  08/21/20 0330     Blood Culture No growth at 5 days        Imaging Results (Last 24 Hours)     ** No results found for the last 24 hours. **        Results for orders placed during the hospital encounter of 08/14/20   Adult Transthoracic Echo Complete W/ Cont if Necessary Per Protocol    Narrative · Left ventricular wall thickness is consistent with mild concentric   hypertrophy.  · Left ventricular systolic function is normal, EF 56-60%.  · Left ventricular diastolic dysfunction (grade I a) consistent with   impaired relaxation.  · Left atrial cavity size is mild-to-moderately dilated.  · There is severe calcification of the aortic valve mainly affecting the   non, left and right coronary cusp(s).  · Mild aortic valve regurgitation is present.  · Severe aortic valve stenosis is present.  · Aortic valve mean pressure gradient is 46.0 mmHg.  · Aortic valve maximum pressure gradient is 90.0 mmHg.  · Moderate mitral valve regurgitation is present  · Moderate to severe tricuspid valve regurgitation is present.  · Calculated right ventricular systolic pressure from tricuspid   regurgitation is 77 mmHg.        I have reviewed the medications:  Scheduled Meds:    apixaban 5 mg Oral Q12H   aspirin 81 mg Oral Daily   atorvastatin 40 mg Oral Nightly   dilTIAZem  mg Oral Daily   docusate sodium 100 mg Oral BID   finasteride 5 mg Oral Daily      furosemide 40 mg Intravenous Q12H   lactulose 10 g Oral TID   methIMAzole 5 mg Oral Q8H   metoprolol tartrate 100 mg Oral Q12H   nicotine 1 patch Transdermal Q24H   oxyCODONE 10 mg Oral 4x Daily   polyethylene glycol 17 g Oral BID   sodium chloride 10 mL Intravenous Q12H   vitamin C 500 mg Oral Daily     Continuous Infusions:    dilTIAZem 2.5-15 mg/hr Last Rate: 2.5 mg/hr (08/24/20 1112)     PRN Meds:.ipratropium-albuterol  •  magnesium sulfate **OR** magnesium sulfate **OR** magnesium sulfate  •  Morphine  •  nicotine polacrilex  •  nicotine polacrilex  •  nitroglycerin  •  ondansetron **OR** ondansetron  •  oxyCODONE  •  oxyCODONE  •  potassium chloride  •  potassium chloride  •  sodium chloride    Assessment/Plan   Assessment & Plan     Active Hospital Problems    Diagnosis  POA   • **Closed left hip fracture, s/p repair 8/17/2020 [S72.002A]  Yes   • Acute on chronic diastolic CHF (congestive heart failure) (CMS/[facility]) [I50.33]  Yes   • Anemia, chronic disease [D63.8]  Yes   • Chronic atrial fibrillation with RVR (CMS/[facility]) [I48.20]  Yes   • Acute UTI (urinary tract infection) [N39.0]  Yes   • HTN (hypertension) [I10]  Yes   • CAD (coronary artery disease) [I25.10]  Yes   • COPD (chronic obstructive pulmonary disease) (CMS/[facility]) [J44.9]  Yes   • Chronic respiratory disease [J98.9]  Yes   • Aortic stenosis [I35.0]  Yes   • S/p left hip fracture [Z87.81]  Not Applicable   • PAD (peripheral artery disease) (CMS/[facility]) [I73.9]  Yes      Resolved Hospital Problems   No resolved problems to display.     Brief Hospital Course to date:  Zak Olmstead is an 80 y.o. male w/ a hx of COPD, chronic respiratory failure on 2 liters PRN, chronic atrial fibrillation (Eliquis), hyperthyroidism, HTN, CAD, PVD, remote left BKA and severe aortic stenosis. He was transferred to [facility] from [facility] on 8/15/20 for further evaluation of a left hip fracture. Anticoagulation was held for surgical repair and has  since been resumed.     Fall  Closed left hip fracture  - Per daughter, patient has been more or less bedbound for a few years. Still does sit on side of bed or get into a wheelchair at times.   - s/p L hip trochanteric nailing with intramedullary screw placement by Dr. Aponte 8/17/20  - PT/OT following - patient stating this is only making his pain worse  - Therapy recommending rehab - referral to [facility] has been placed but patient now refusing to go   - PRN pain control  - Follow up with Dr. Aponte in 2 weeks     Aortic Valve Stenosis  - Evaluated by CT surgery / TAVR team. Not a candidate for TAVR    Paroxysmal atrial fibrillation with RVR  - Appreciate cardiology assistance - remains on Metoprolol 100mg PO BID, Cardizem CD 300mg PO daily + Cardizem gtt with HR low 100's today   - Eliquis held for surgery, resumed on 8/22   - Developed volume overload due to RVR    Acute on chronic respiratory failure   Acute on chronic diastolic CHF   - Appreciate cardiology assistance   - Cardiology managing diuresis, still requiring 2L NC while awake intermittently     Vasculopathy   - History of aortic graft/stent, fem-fem bypass, chronic occlusion of RCA and LAD disease  - Lifelong smoker from age [age] years old (65 years with up to PPD, so possibly 110+ pack year history)     Chronic pain  - Per wife, follows with a pain clinic  - PEYMAN reviewed. He is on his home regimen of Oxycodone HCl 10mg Q6H + PRNs for surgical pain     History of AAA  - s/p repair    Severe Anemia  - s/p IV iron. Hgb stable in 8-10 range (higher due to diuresis)    Hyperthyroidism  - Continue home methimazole     Tobacco Use Disorder  - NRT (patch + gum PRN)   - Nicotine replacement increases risk of wound issues    DVT Prophylaxis: Eliquis   Disposition: I expect the patient to be discharged home in 2-3 days. Unless he decides to leave AMA today.     More than 50% of time spent counseling on current illness and plan of care. Case discussed with:  patient, patient's daughterKassidy, bedside RN and attending provider, Dr. Cantrell   Total time spent face to face with the patient was 18 minutes.  Total time of the encounter was 35 minutes.    CODE STATUS:   Code Status and Medical Interventions:   Ordered at: 08/15/20 2229     Level Of Support Discussed With:    Patient     Code Status:    CPR     Medical Interventions (Level of Support Prior to Arrest):    Full     Electronically signed by Natalie Anders PA-C, 08/24/20, 11:39.      Electronically signed by Stephanie Cantrell MD at 08/24/20 1224     Juan Nunez MD at 08/24/20 0807          [facility] at [facility]  IP Progress Note   LOS: 9 days   Patient Care Team:  Ely Berumen APRN as PCP - General (Nurse Practitioner)    Chief Complaint: Follow up for Coronary Artery Disease, Diastolic CHF, Heart Valve Disease and Atrial Fibrillation    Subjective   States that he woke up feeling fine and now has hip pain in his left hip.  Breathing has improved.  No chest pain.Objective       Active Hospital Problems    Diagnosis    • **Closed left hip fracture, s/p repair 8/17/2020 [S72.002A]    • Acute on chronic diastolic CHF (congestive heart failure) (CMS/[facility]) [I50.33]         • Chronic atrial fibrillation with RVR (CMS/[facility]) [I48.20]         • CAD (coronary artery disease) [I25.10]           a. [facility] 06/2018:  of mid RCA with collateral filling; mild disease of left system      • Aortic stenosis [I35.0]           a. Echo 8/15/20: EF 56-60%, severe calcification of AV with severe AS; mean gradient 46mmHg, peak systolic velocity 4.2cm/sec,  moderate MR, mod-severe TR, RVSP 77mmHg     • HTN (hypertension) [I10]         • PAD (peripheral artery disease) (CMS/HCC) [I73.9]           a. S/p left BKA  b. Right Fem-fem bypass, IDB  c. Right common iliac arteries totally occluded from ostium to femoral      • Anemia, chronic disease [D63.8]    • Acute UTI (urinary tract infection) [N39.0]    •  "COPD (chronic obstructive pulmonary disease) (CMS/[facility]) [J44.9]    • Chronic respiratory disease [J98.9]    • S/p left hip fracture [Z87.81]          Tele: Atrial Fib with Controlled Rate    Vitals:  /69 (BP Location: Left arm, Patient Position: Lying)   Pulse 89   Temp 97.4 °F (36.3 °C) (Oral)   Resp 16   Ht 172.2 cm (67.8\")   Wt 76 kg (167 lb 8.8 oz)   SpO2 93%   BMI 25.63 kg/m²    Objective Body mass index is 25.63 kg/m².    Intake/Output Summary (Last 24 hours) at 8/24/2020 0807  Last data filed at 8/24/2020 0341  Gross per 24 hour   Intake --   Output 1950 ml   Net -1950 ml       Physical Exam:  General: No apparent distress.  Neck: no JVD.  Chest:No respiratory distress, breath sounds are diminished at bases with fine crackles.  Cardiovascular: Normal S1 and S2, irregular, systolic murmur.  Extremities: No edema.  Left BKA        Results Review:     I reviewed the patient's new clinical results.    Results from last 7 days   Lab Units 08/22/20  0024  08/20/20  0514   WBC 10*3/mm3 8.46   < > 10.62   HEMOGLOBIN g/dL 8.1*   < > 7.9*   HEMATOCRIT % 29.6*   < > 28.3*   PLATELETS 10*3/mm3 348   < > 352   INR   --   --  1.67*    < > = values in this interval not displayed.     Results from last 7 days   Lab Units 08/21/20  0734 08/20/20  0514   SODIUM mmol/L 138 137   POTASSIUM mmol/L 3.9 4.0   CHLORIDE mmol/L 104 103   CO2 mmol/L 24.0 25.0   BUN mg/dL 16 20   CREATININE mg/dL 0.72* 0.87   GLUCOSE mg/dL 90 79   CALCIUM mg/dL 8.0* 8.0*   ALT (SGPT) U/L  --  7   AST (SGOT) U/L  --  13     Estimated Creatinine Clearance: 79.2 mL/min (A) (by C-G formula based on SCr of 0.72 mg/dL (L)).  Lab Results   Component Value Date    HGBA1C 6.10 (H) 02/22/2020         Results from last 7 days   Lab Units 08/22/20  1211 08/20/20  0514   PROBNP pg/mL 15,941.0* 11,392.0*         Scheduled Meds:  apixaban 5 mg Oral Q12H   aspirin 81 mg Oral Daily   atorvastatin 40 mg Oral Nightly   dilTIAZem  mg Oral Daily   docusate " sodium 100 mg Oral BID   finasteride 5 mg Oral Daily   furosemide 40 mg Intravenous Q12H   lactulose 10 g Oral TID   methIMAzole 5 mg Oral Q8H   metoprolol tartrate 100 mg Oral Q12H   nicotine 1 patch Transdermal Q24H   oxyCODONE 10 mg Oral 4x Daily   polyethylene glycol 17 g Oral BID   sodium chloride 500 mL Intravenous Once   sodium chloride 10 mL Intravenous Q12H   vitamin C 500 mg Oral Daily       Continuous Infusions:  dilTIAZem 2.5-15 mg/hr Last Rate: 2.5 mg/hr (20 0735)         Assessment     Assessment:   1. Afib with RVR, adequate rate control now on metoprolol and Cardizem.  Restarted on Eliquis.  2. DHF secondary to rapid afib, good diuresis/improved.  3. Severe AS, not a suitable candidate for open valve replacement or TAVR, medical management for now.  4. CAD, stable  5. PAD  6. Post-Op nail fixation left intertrochanteric hip fracture    Plan:  1. Discontinue IV Lasix, start oral Lasix and potassium supplements.  2. Continue rest of the current medications.  3. Overall cardiac condition is stable, okay to transfer to rehab from cardiology standpoint anytime, he can follow-up with his local primary cardiologist.  4. I will sign off and will be available to see him as needed.    Electronically signed by PETROS Clinton, 20, 8:11 AM.    I have seen and examined the patient, case was discussed with the physician extender, reviewed the above note, necessary changes were made and I agree with the final note.   Juan Nunez MD, [facility], [facility]                            Electronically signed by Juan Nunez MD at 20 1305     Khushboo Yan APRN at 20 1403              [facility] Medicine Services  PROGRESS NOTE    Patient Name: Zak Olmstead  : 1940  MRN: [id number]    Date of Admission: 8/15/2020  Primary Care Physician: Ely Berumen APRN    Subjective   Subjective     CC:  Hip Fracture - transferred from [place]    HPI:    Still feels run  "down, weak. States his breathing doesn't feel much better today, but he urinated \"all night long\". HR much better controlled last 24 hours.     Review of Systems  Gen- No fevers, chills  CV- No chest pain, palpitations  Resp- + productive cough, no dyspnea   GI- No N/V/D, abd pain       Objective   Objective     Vital Signs:   Temp:  [97.2 °F (36.2 °C)-97.6 °F (36.4 °C)] 97.4 °F (36.3 °C)  Heart Rate:  [] 80  Resp:  [16-18] 16  BP: (123-160)/(61-92) 146/69     Physical Exam:    Constitutional: No acute distress, awake, alert, chronically ill appearing, frail   HENT: NCAT, mucous membranes moist  Respiratory: Fine bibasilar rales, no resp distress, 2lnc   Cardiovascular: irreg/ irreg, +  murmurs,palpable pedal pulses bilaterally  Gastrointestinal: Positive bowel sounds, soft, nontender, nondistended  Musculoskeletal: No right ankle edema, left BKA  Psychiatric: Appropriate affect, cooperative  Neurologic: Oriented x 3, strength symmetric in all extremities, Cranial Nerves grossly intact to confrontation, speech clear  Skin: No rashes, left lateral hip dressing CDI       Results Reviewed:  Results from last 7 days   Lab Units 08/22/20  0024 08/21/20  0734 08/20/20  0514   WBC 10*3/mm3 8.46 8.41 10.62   HEMOGLOBIN g/dL 8.1* 7.7* 7.9*   HEMATOCRIT % 29.6* 28.2* 28.3*   PLATELETS 10*3/mm3 348 327 352   INR   --   --  1.67*   PROCALCITONIN ng/mL  --   --  0.18     Results from last 7 days   Lab Units 08/22/20  1211 08/21/20  0734 08/20/20  0514 08/18/20  0525   SODIUM mmol/L  --  138 137 137   POTASSIUM mmol/L  --  3.9 4.0 4.6   CHLORIDE mmol/L  --  104 103 105   CO2 mmol/L  --  24.0 25.0 17.0*   BUN mg/dL  --  16 20 21   CREATININE mg/dL  --  0.72* 0.87 0.87   GLUCOSE mg/dL  --  90 79 99   CALCIUM mg/dL  --  8.0* 8.0* 7.8*   ALT (SGPT) U/L  --   --  7  --    AST (SGOT) U/L  --   --  13  --    PROBNP pg/mL 15,941.0*  --  11,392.0*  --      Estimated Creatinine Clearance: 79.2 mL/min (A) (by C-G formula based on SCr " of 0.72 mg/dL (L)).    Microbiology Results Abnormal     Procedure Component Value - Date/Time    Blood Culture - Blood, Hand, Right [[id number]] Collected:  08/16/20 0127    Lab Status:  Final result Specimen:  Blood from Hand, Right Updated:  08/21/20 0330     Blood Culture No growth at 5 days    Blood Culture - Blood, Arm, Right [[id number]] Collected:  08/16/20 0127    Lab Status:  Final result Specimen:  Blood from Arm, Right Updated:  08/21/20 0330     Blood Culture No growth at 5 days          Imaging Results (Last 24 Hours)     ** No results found for the last 24 hours. **          Results for orders placed during the hospital encounter of 08/14/20   Adult Transthoracic Echo Complete W/ Cont if Necessary Per Protocol    Narrative · Left ventricular wall thickness is consistent with mild concentric   hypertrophy.  · Left ventricular systolic function is normal, EF 56-60%.  · Left ventricular diastolic dysfunction (grade I a) consistent with   impaired relaxation.  · Left atrial cavity size is mild-to-moderately dilated.  · There is severe calcification of the aortic valve mainly affecting the   non, left and right coronary cusp(s).  · Mild aortic valve regurgitation is present.  · Severe aortic valve stenosis is present.  · Aortic valve mean pressure gradient is 46.0 mmHg.  · Aortic valve maximum pressure gradient is 90.0 mmHg.  · Moderate mitral valve regurgitation is present  · Moderate to severe tricuspid valve regurgitation is present.  · Calculated right ventricular systolic pressure from tricuspid   regurgitation is 77 mmHg.          I have reviewed the medications:  Scheduled Meds:    apixaban 5 mg Oral Q12H   aspirin 81 mg Oral Daily   atorvastatin 40 mg Oral Nightly   dilTIAZem  mg Oral Daily   docusate sodium 100 mg Oral BID   finasteride 5 mg Oral Daily   furosemide 40 mg Intravenous Q12H   lactulose 10 g Oral TID   methIMAzole 5 mg Oral Q8H   metoprolol tartrate 100 mg Oral Q12H   nicotine 1  patch Transdermal Q24H   oxyCODONE 10 mg Oral 4x Daily   polyethylene glycol 17 g Oral BID   sodium chloride 500 mL Intravenous Once   sodium chloride 10 mL Intravenous Q12H   vitamin C 500 mg Oral Daily     Continuous Infusions:    dilTIAZem 2.5-15 mg/hr Last Rate: 2.5 mg/hr (08/23/20 0451)     PRN Meds:.ipratropium-albuterol  •  magnesium sulfate **OR** magnesium sulfate **OR** magnesium sulfate  •  Morphine  •  nicotine polacrilex  •  nicotine polacrilex  •  nitroglycerin  •  ondansetron **OR** ondansetron  •  oxyCODONE  •  oxyCODONE  •  sodium chloride    Assessment/Plan   Assessment & Plan     Active Hospital Problems    Diagnosis  POA   • **Closed left hip fracture, s/p repair 8/17/2020 [S72.002A]  Yes   • Acute on chronic diastolic CHF (congestive heart failure) (CMS/[facility]) [I50.33]  Unknown   • Anemia, chronic disease [D63.8]  Unknown   • Chronic atrial fibrillation with RVR (CMS/[facility]) [I48.20]  Unknown   • Acute UTI (urinary tract infection) [N39.0]  Yes   • HTN (hypertension) [I10]  Yes   • CAD (coronary artery disease) [I25.10]  Yes   • COPD (chronic obstructive pulmonary disease) (CMS/[facility]) [J44.9]  Yes   • Chronic respiratory disease [J98.9]  Yes   • Aortic stenosis [I35.0]  Yes   • S/p left hip fracture [Z87.81]  Not Applicable   • Peripheral vascular disease (CMS/[facility]) [I73.9]  Yes      Resolved Hospital Problems   No resolved problems to display.        Brief Hospital Course to date:  Zak Olmstead is a 80 y.o. male w/ a hx of COPD, chronic respiratory failure on 2 liters PRN, chronic atrial fibrillation (BB, dilt and Eliquis), hyperthyroidism, HTN, CAD, PVD, remote left BKA and severe aortic stenosis who was transferred from [facility] for further evaluation/tx of a left hip fracture.    He had to be taken off  for surgery and had Operative Fixation of Left Intertrochanteric Fracture with Cephalomedullary Device with Dr. Aponte    Fall  - multiple risk factors for fall  - left BKA  - pain  medication  - history of Vitamin D def  - Inpatient Rehab recommended; awaiting referrals   Left Hip Fracture  - s/p repair  - Ok for DC per ortho, follow up 2 weeks Dr Aponte  Aortic Valve Stenosis  - seen by TAVR team  - not a candidate for TAVR  --consulted cardiology again due to Afib with RVR, AVS, resulting in a/c dhf  --diuresed well > 2 liters overnight. Cards continuing IV lasix today   Vasculopathy  - history of Aortic Graft/Stent  - Fem Fem bypass  - chronic occlusion of RCA  - LAD disease   - lifelong smoker - 15 yrs - 80 yrs old (duration 65 yrs)  - up to 2 ppd (possibly over 110 pack-yrs)  Chronic Pain  - has been on pain medication for 8 yrs or more  - goes to a pain clinic per wife  History of AAA  - s/p repair  Severe Anemia  - INR 1.67  - iron studies with Fe def anemia, will give IV iron   - Eliquis continued and monitoring   - monitor for dropping hemoglobin/ currently stable at 8.1/ 29.6  Hyperthyroidism  - on Methimazole   Tobacco Use Disorder  - nicotine patch  - has been smoking for 65 yrs  - [place] 2 ppd  Atrial Fibrillation  - metoprolol / cardizem / anticoagulation  --AC restarted this morning for stroke prevention with no overt s/s bleeding and H/H stable, monitor, okay with cardiology as well   --cardiology reconsulted this morning for continued RVR overnight with IV dig one dose and increased Dilt and BB oral, started on Dilt drip with improved rate control.   --cont dilt drip   Acute/ Chronic Respiratory Failure   A/c DHF  - A/c DHF in setting of Afib RVR and AVS/ COPD  --CXR showed increased pulm vasculature  --most recent proBNP 11k, now up to 15k  --IV diuresis continues   - nebs  UTI  --s/p 3 days Ceftriaxone     DVT Prophylaxis:  Eliquis     Disposition: I expect the patient to be discharged home vs refusing rehab when stable, TBD     CODE STATUS:   Code Status and Medical Interventions:   Ordered at: 08/15/20 9311     Level Of Support Discussed With:    Patient     Code  Status:    CPR     Medical Interventions (Level of Support Prior to Arrest):    Full         Electronically signed by ALICIA Strickland, 08/23/20, 14:03.      Electronically signed by Khushboo Yan APRN at 08/23/20 1444     Alexi Shaver MD at 08/23/20 0929          [place] [facility] [place] [facility] at [facility]  IP Progress Note      Chief Complaint/Reason for service: #1 A. fib RVR #2 heart failure secondary to A. fib RVR and aortic stenosis #3 severe arctic stenosis    Subjective   Subjective: The patient told the nurse he feels smothering so a breathing treatment is been ordered.  The patient states he does not use oxygen all the time at home.  He only rarely wears it when he gets short of breath.    Past medical, surgical, social and family history reviewed in the patient's electronic medical record.    Objective     Vital Sign Min/Max for last 24 hours  Temp  Min: 97.2 °F (36.2 °C)  Max: 97.6 °F (36.4 °C)   BP  Min: 121/81  Max: 160/92   Pulse  Min: 68  Max: 148   Resp  Min: 16  Max: 18   SpO2  Min: 93 %  Max: 100 %   Flow (L/min)  Min: 2  Max: 2      Intake/Output Summary (Last 24 hours) at 8/23/2020 0929  Last data filed at 8/23/2020 0615  Gross per 24 hour   Intake 120 ml   Output 2355 ml   Net -2235 ml             Current Facility-Administered Medications:   •  apixaban (ELIQUIS) tablet 5 mg, 5 mg, Oral, Q12H, Alexi Shaver MD, 5 mg at 08/23/20 0928  •  aspirin EC tablet 81 mg, 81 mg, Oral, Daily, Bossman Suarez MD, 81 mg at 08/23/20 0927  •  atorvastatin (LIPITOR) tablet 40 mg, 40 mg, Oral, Nightly, Jordan Marmolejo MD, 40 mg at 08/22/20 2023  •  dilTIAZem (CARDIZEM) 125 mg in 125 mL 0.7% sodium chloride (1 mg/mL) infusion, 2.5-15 mg/hr, Intravenous, Titrated, Elle Tsai PA, Last Rate: 2.5 mL/hr at 08/23/20 0451, 2.5 mg/hr at 08/23/20 0451  •  dilTIAZem CD (CARDIZEM CD) 24 hr capsule 300 mg, 300 mg, Oral, Daily, Bossman Suarez MD, 300 mg at 08/23/20 0960  •  docusate  sodium (COLACE) capsule 100 mg, 100 mg, Oral, BID, Bossman Suarez MD, 100 mg at 08/23/20 0928  •  finasteride (PROSCAR) tablet 5 mg, 5 mg, Oral, Daily, Jordan Marmolejo MD, 5 mg at 08/23/20 0928  •  ipratropium-albuterol (DUO-NEB) nebulizer solution 3 mL, 3 mL, Nebulization, Q4H PRN, Jordan Mramolejo MD, 3 mL at 08/20/20 1424  •  lactulose (CHRONULAC) 10 GM/15ML solution 10 g, 10 g, Oral, TID, Bossman Suarez MD, 10 g at 08/22/20 2023  •  Magnesium Sulfate 2 gram Bolus, followed by 8 gram infusion (total Mg dose 10 grams)- Mg less than or equal to 1mg/dL, 2 g, Intravenous, PRN **OR** Magnesium Sulfate 2 gram / 50mL Infusion (GIVE X 3 BAGS TO EQUAL 6GM TOTAL DOSE) - Mg 1.1 - 1.5 mg/dl, 2 g, Intravenous, PRN **OR** Magnesium Sulfate 4 gram infusion- Mg 1.6-1.9 mg/dL, 4 g, Intravenous, PRN, Jordan Marmolejo MD  •  methIMAzole (TAPAZOLE) tablet 5 mg, 5 mg, Oral, Q8H, Bossman Suarez MD, 5 mg at 08/23/20 0447  •  metoprolol tartrate (LOPRESSOR) tablet 100 mg, 100 mg, Oral, Q12H, Alexi Shaver MD, 100 mg at 08/23/20 0448  •  morphine injection 1 mg, 1 mg, Intravenous, Q4H PRN, Bossman Suarez MD  •  nicotine (NICODERM CQ) 21 MG/24HR patch 1 patch, 1 patch, Transdermal, Q24H, Jordan Marmolejo MD, 1 patch at 08/22/20 0823  •  nicotine polacrilex (COMMIT) lozenge 2 mg, 2 mg, Mouth/Throat, Q1H PRN, Bossman Suarez MD  •  nicotine polacrilex (NICORETTE) gum 2 mg, 2 mg, Mouth/Throat, Q1H PRN, Bossman Suarez MD  •  nitroglycerin (NITROSTAT) SL tablet 0.4 mg, 0.4 mg, Sublingual, Q5 Min PRN, Jordan Marmolejo MD  •  ondansetron (ZOFRAN) tablet 4 mg, 4 mg, Oral, Q6H PRN **OR** ondansetron (ZOFRAN) injection 4 mg, 4 mg, Intravenous, Q6H PRN, Jordan Marmolejo MD  •  oxyCODONE (ROXICODONE) immediate release tablet 10 mg, 10 mg, Oral, 4x Daily, Jordan Marmolejo MD, 10 mg at 08/23/20 0447  •  oxyCODONE (ROXICODONE) immediate release tablet 10 mg, 10 mg, Oral, Q4H PRN, Jordan Marmolejo MD, 10 mg at 08/22/20  1411  •  oxyCODONE (ROXICODONE) immediate release tablet 5 mg, 5 mg, Oral, Q4H PRN, Jordan Marmolejo MD, 5 mg at 08/19/20 1436  •  polyethylene glycol 3350 powder (packet), 17 g, Oral, BID, Bossman Suarez MD, 17 g at 08/23/20 0928  •  sodium chloride 0.9 % bolus 500 mL, 500 mL, Intravenous, Once, Joe Bloom, DO  •  sodium chloride 0.9 % flush 10 mL, 10 mL, Intravenous, Q12H, Jordan Marmolejo MD, 10 mL at 08/22/20 0823  •  sodium chloride 0.9 % flush 10 mL, 10 mL, Intravenous, PRN, Jordan Marmolejo MD  •  vitamin C (ASCORBIC ACID) tablet 500 mg, 500 mg, Oral, Daily, Bossman Suarez MD, 500 mg at 08/23/20 0928    Physical Exam: General well-developed elderly male who has some mild resting tachypnea.  O2 sat 95%        HEENT: No JVP, no icterus       Respiratory: Equal bilateral symmetrical expansion and bilateral rails       Cardiovascular: Irregular rate and rhythm with a grade 5-6 over systolic ejection murmur no significant lower extremity edema       GI: Positive bowel sounds       Lower Extremities: Status post previous left AKA       Neuro: Facial expressions are symmetrical moves all 4 extremities       Skin: Warm and dry       Psych: Pleasant affect    Results Review: The BNP was 15,000 yesterday up from 11,000 previously.  Overnight output exceeds intake by 2.2 L.  The heart rate is now in the 70s to 80s and the blood pressure is good    Radiology Results:  Imaging Results (Last 72 Hours)     Procedure Component Value Units Date/Time    XR Chest 1 View [[id number]] Collected:  08/21/20 1803     Updated:  08/22/20 0008    Narrative:       CR Chest 1 Vw    INDICATION:   Pulmonary edema. Femur fracture.     COMPARISON:    8/14/2020    FINDINGS:  Single portable AP view(s) of the chest.    Cardiac and mediastinal contours are normal. There is vascular congestion with mild generalized pulmonary edema. No pneumothorax. There is degenerative disease in the shoulders.       Impression:       Mild pulmonary  edema with vascular congestion.    Signer Name: Alexi Hewitt MD   Signed: 8/21/2020 6:03 PM   Workstation Name: [email or web address]    [facility]    XR Femur 2 View Left [[id number]] Collected:  08/20/20 0920     Updated:  08/20/20 2021    Narrative:       EXAMINATION: XR FEMUR 2 VW LEFT- 08/20/2020     INDICATION: increase pain at therapy; S72.002A-Fracture of unspecified  part of neck of left femur, initial encounter for closed fracture;  Z87.81-Personal history of (healed) traumatic fracture      COMPARISON: 08/17/2020     FINDINGS: Left femur hardware appears in stable alignment from prior  comparison 08/17/2020 with stable appearance of fracture from that exam  without evidence of progressive malalignment or new fracture.  Specifically no distal fracture of the distal left femur.           Impression:       Stable appearance of left hip nailing and left femoral  hardware from prior comparison 08/17/2020 fluoroscopic images and  overall appearance of intertrochanteric/lesser trochanteric fracture. No  new findings evident.     D:  08/20/2020  E:  08/20/2020     This report was finalized on 8/20/2020 7:03 PM by Dr. Ciaran Rivera.             EKG: A. fib RVR    ECHO: Preserved EF with severe arctic stenosis    Tele: A. fib    Assessment   Assessment/Plan: 1 A. fib RVR the patient's rate is better today.  His metoprolol dose was increased yesterday and a diltiazem drip was restarted.  He is currently only on 2.5 mg  2 CHF secondary to A. fib RVR and aortic stenosis-the patient received IV Lasix yesterday and had an excellent diuretic response.  He is complaining some smothering and he still clinically is significantly fluid overloaded.  I will give him Lasix 40 mg IV every 12 hours    Alexi Shaver MD  08/23/20  09:29      Electronically signed by Alexi Shaver MD at 08/23/20 0940     Khushboo Yan APRN at 08/22/20 1120              [facility]  "Services  PROGRESS NOTE    Patient Name: Zak Olmstead  : 1940  MRN: [id number]    Date of Admission: 8/15/2020  Primary Care Physician: Ely Berumen APRN    Subjective   Subjective     CC:  Hip Fracture - transferred from [place]    HPI:    States he feels weak, wants to go home today. No c/o soa or cp, but visibly having mild increased resp effort on 2lnc. States his heart rate \"hasn't been controlled at home for months\". Started on Lasix outpatient very recently. Denies palpitations.     Review of Systems  Gen- No fevers, chills  CV- No chest pain, palpitations  Resp- No cough, dyspnea   GI- No N/V/D, abd pain       Objective   Objective     Vital Signs:   Temp:  [97.3 °F (36.3 °C)-97.6 °F (36.4 °C)] 97.5 °F (36.4 °C)  Heart Rate:  [] 111  Resp:  [16-20] 18  BP: (106-166)/() 143/92     Physical Exam:    Constitutional: No acute distress, awake, alert, chronically ill appearing, frail   HENT: NCAT, mucous membranes moist  Respiratory: Fine bibasilar rales with tachypnea, no resp distress, 2lnc   Cardiovascular: irreg/ irreg, +  murmurs,palpable pedal pulses bilaterally  Gastrointestinal: Positive bowel sounds, soft, nontender, nondistended  Musculoskeletal: No right ankle edema, left BKA  Psychiatric: Appropriate affect, cooperative  Neurologic: Oriented x 3, strength symmetric in all extremities, Cranial Nerves grossly intact to confrontation, speech clear  Skin: No rashes, left lateral hip dressing CDI       Results Reviewed:  Results from last 7 days   Lab Units 20  0024 20  0734 20  0514  20  0127   WBC 10*3/mm3 8.46 8.41 10.62   < > 8.60   HEMOGLOBIN g/dL 8.1* 7.7* 7.9*   < > 8.9*   HEMATOCRIT % 29.6* 28.2* 28.3*   < > 31.7*   PLATELETS 10*3/mm3 348 327 352   < > 204   INR   --   --  1.67*  --  1.38*   PROCALCITONIN ng/mL  --   --  0.18  --   --     < > = values in this interval not displayed.     Results from last 7 days   Lab Units 20  0734 20  0514 " 08/18/20  0525  08/16/20 0127   SODIUM mmol/L 138 137 137   < > 138   POTASSIUM mmol/L 3.9 4.0 4.6   < > 3.8   CHLORIDE mmol/L 104 103 105   < > 103   CO2 mmol/L 24.0 25.0 17.0*   < > 26.0   BUN mg/dL 16 20 21   < > 21   CREATININE mg/dL 0.72* 0.87 0.87   < > 1.02   GLUCOSE mg/dL 90 79 99   < > 108*   CALCIUM mg/dL 8.0* 8.0* 7.8*   < > 8.3*   ALT (SGPT) U/L  --  7  --   --  28   AST (SGOT) U/L  --  13  --   --  24   PROBNP pg/mL  --  11,392.0*  --   --  8,015.0*    < > = values in this interval not displayed.     Estimated Creatinine Clearance: 79.2 mL/min (A) (by C-G formula based on SCr of 0.72 mg/dL (L)).    Microbiology Results Abnormal     Procedure Component Value - Date/Time    Blood Culture - Blood, Hand, Right [[id number]] Collected:  08/16/20 0127    Lab Status:  Final result Specimen:  Blood from Hand, Right Updated:  08/21/20 0330     Blood Culture No growth at 5 days    Blood Culture - Blood, Arm, Right [[id number]] Collected:  08/16/20 0127    Lab Status:  Final result Specimen:  Blood from Arm, Right Updated:  08/21/20 0330     Blood Culture No growth at 5 days          Imaging Results (Last 24 Hours)     Procedure Component Value Units Date/Time    XR Chest 1 View [[id number]] Collected:  08/21/20 1803     Updated:  08/22/20 0008    Narrative:       CR Chest 1 Vw    INDICATION:   Pulmonary edema. Femur fracture.     COMPARISON:    8/14/2020    FINDINGS:  Single portable AP view(s) of the chest.    Cardiac and mediastinal contours are normal. There is vascular congestion with mild generalized pulmonary edema. No pneumothorax. There is degenerative disease in the shoulders.       Impression:       Mild pulmonary edema with vascular congestion.    Signer Name: Alexi Hewitt MD   Signed: 8/21/2020 6:03 PM   Workstation Name: [email or web address]    [facility]          Results for orders placed during the hospital encounter of 08/14/20   Adult Transthoracic Echo Complete W/ Cont if  Necessary Per Protocol    Narrative · Left ventricular wall thickness is consistent with mild concentric   hypertrophy.  · Left ventricular systolic function is normal, EF 56-60%.  · Left ventricular diastolic dysfunction (grade I a) consistent with   impaired relaxation.  · Left atrial cavity size is mild-to-moderately dilated.  · There is severe calcification of the aortic valve mainly affecting the   non, left and right coronary cusp(s).  · Mild aortic valve regurgitation is present.  · Severe aortic valve stenosis is present.  · Aortic valve mean pressure gradient is 46.0 mmHg.  · Aortic valve maximum pressure gradient is 90.0 mmHg.  · Moderate mitral valve regurgitation is present  · Moderate to severe tricuspid valve regurgitation is present.  · Calculated right ventricular systolic pressure from tricuspid   regurgitation is 77 mmHg.          I have reviewed the medications:  Scheduled Meds:    apixaban 5 mg Oral Q12H   aspirin 81 mg Oral Daily   atorvastatin 40 mg Oral Nightly   dilTIAZem  mg Oral Daily   docusate sodium 100 mg Oral BID   ferric gluconate 125 mg Intravenous Once   finasteride 5 mg Oral Daily   lactulose 10 g Oral TID   methIMAzole 5 mg Oral Q8H   metoprolol tartrate 100 mg Oral Q12H   nicotine 1 patch Transdermal Q24H   oxyCODONE 10 mg Oral 4x Daily   polyethylene glycol 17 g Oral BID   sodium chloride 500 mL Intravenous Once   sodium chloride 10 mL Intravenous Q12H   vitamin C 500 mg Oral Daily     Continuous Infusions:    dilTIAZem 2.5-15 mg/hr Last Rate: 5 mg/hr (08/22/20 1136)     PRN Meds:.ipratropium-albuterol  •  magnesium sulfate **OR** magnesium sulfate **OR** magnesium sulfate  •  nicotine polacrilex  •  nicotine polacrilex  •  nitroglycerin  •  ondansetron **OR** ondansetron  •  oxyCODONE  •  oxyCODONE  •  sodium chloride    Assessment/Plan   Assessment & Plan     Active Hospital Problems    Diagnosis  POA   • **Closed left hip fracture, s/p repair 8/17/2020 [S72.002A]  Yes    • Acute on chronic diastolic CHF (congestive heart failure) (CMS/[facility]) [I50.33]  Unknown   • Anemia, chronic disease [D63.8]  Unknown   • Chronic atrial fibrillation with RVR (CMS/[facility]) [I48.20]  Unknown   • Acute UTI (urinary tract infection) [N39.0]  Yes   • HTN (hypertension) [I10]  Yes   • CAD (coronary artery disease) [I25.10]  Yes   • COPD (chronic obstructive pulmonary disease) (CMS/[facility]) [J44.9]  Yes   • Chronic respiratory disease [J98.9]  Yes   • Aortic stenosis [I35.0]  Yes   • S/p left hip fracture [Z87.81]  Not Applicable   • Peripheral vascular disease (CMS/[facility]) [I73.9]  Yes      Resolved Hospital Problems   No resolved problems to display.        Brief Hospital Course to date:  Zak Olmstead is a 80 y.o. male w/ a hx of COPD, chronic respiratory failure on 2 liters PRN, chronic atrial fibrillation (BB, dilt and Eliquis), hyperthyroidism, HTN, CAD, PVD, remote left BKA and severe aortic stenosis who was transferred from [facility] for further evaluation/tx of a left hip fracture.    He had to be taken off AC for surgery and had Operative Fixation of Left Intertrochanteric Fracture with Cephalomedullary Device with Dr. Aponte    Fall  - multiple risk factors for fall  - left BKA  - pain medication  - history of Vitamin D def  - Inpatient Rehab recommended; awaiting referrals   Left Hip Fracture  - s/p repair  - Ok for DC per ortho, follow up 2 weeks Dr Aponte  Aortic Valve Stenosis  - seen by TAVR team  - not a candidate for TAVR  --consulted cardiology again overnight due to Afib with RVR, AVS, resulting in a/c dhf  --receiving one time dose IV lasix now, confirmed on CXR last night, monitor   Vasculopathy  - history of Aortic Graft/Stent  - Fem Fem bypass  - chronic occlusion of RCA  - LAD disease   - lifelong smoker - 15 yrs - 80 yrs old (duration 65 yrs)  - up to 2 ppd (possibly over 110 pack-yrs)  Chronic Pain  - has been on pain medication for 8 yrs or more  - goes to a pain clinic  per wife  History of AAA  - s/p repair  Severe Anemia  - INR 1.67  - iron studies with Fe def anemia, will give IV iron   - Eliquis continued and monitoring   - monitor for dropping hemoglobin/ currently stable at 8.1/ 29.6  Hyperthyroidism  - on Methimazole  Tobacco Use Disorder  - nicotine patch  - has been smoking for 65 yrs  - high point 2 ppd  Atrial Fibrillation  - metoprolol / cardizem / anticoagulation  --AC restarted this morning for stroke prevention with no overt s/s bleeding and H/H stable, monitor, okay with cardiology as well   --cardiology reconsulted this morning for continued RVR overnight with IV dig one dose and increased Dilt and BB oral, started on Dilt drip with improved rate control.   --dilt drip on hold this morning and continued increased/ uncontrolled rate up to 130's  --restarted dilt drip, monitor   Acute/ Chronic Respiratory Failure   A/c DHF  - A/c DHF in setting of Afib RVR and AVS/ COPD  --CXR overnight showed increased pulm vasculature  --diuresis today, monitor closely  --most recent proBNP 11k, cards rechecking now   - nebs  UTI  --s/p 3 days Ceftriaxone     DVT Prophylaxis:  Anticoagulation restarted today     Disposition: I expect the patient to be discharged home vs refusing rehab when stable, TBD     CODE STATUS:   Code Status and Medical Interventions:   Ordered at: 08/15/20 2229     Level Of Support Discussed With:    Patient     Code Status:    CPR     Medical Interventions (Level of Support Prior to Arrest):    Full         Electronically signed by ALICIA Strickland, 08/22/20, 11:45.      Electronically signed by Khushboo Yan APRN at 08/22/20 1223     Alexi Shaver MD at 08/22/20 1112          [facility]  IP Progress Note      Chief Complaint/Reason for service: #1 A. fib RVR #2 severe arctic stenosis    Subjective   Subjective: This patient was transferred up here after he fell and broke his hip.  He was found to have  severe stenosis with mean gradient 46.  He also has known coronary disease.  His chronic atrial fibrillation.  We are asked see the patient again for his recurrent rapid A. fib.  He was anticoagulated with Eliquis previously.  He has had postop anemia.  The patient denies chest pain.  He does not feel his heart racing.  He denies being short of breath although clinically he is tachypneic.    Past medical, surgical, social and family history reviewed in the patient's electronic medical record.    Objective     Vital Sign Min/Max for last 24 hours  Temp  Min: 97.3 °F (36.3 °C)  Max: 97.6 °F (36.4 °C)   BP  Min: 106/79  Max: 166/106   Pulse  Min: 78  Max: 143   Resp  Min: 16  Max: 20   SpO2  Min: 63 %  Max: 100 %   Flow (L/min)  Min: 2  Max: 2      Intake/Output Summary (Last 24 hours) at 8/22/2020 1113  Last data filed at 8/22/2020 0325  Gross per 24 hour   Intake 240 ml   Output 350 ml   Net -110 ml             Current Facility-Administered Medications:   •  apixaban (ELIQUIS) tablet 5 mg, 5 mg, Oral, Q12H, Khushboo Yan APRN  •  aspirin EC tablet 81 mg, 81 mg, Oral, Daily, Bossman Suarez MD, 81 mg at 08/22/20 0822  •  atorvastatin (LIPITOR) tablet 40 mg, 40 mg, Oral, Nightly, Jordan Marmolejo MD, 40 mg at 08/21/20 2027  •  dilTIAZem (CARDIZEM) 125 mg in 125 mL 0.7% sodium chloride (1 mg/mL) infusion, 2.5-15 mg/hr, Intravenous, Titrated, Elle Tsai PA, Stopped at 08/22/20 0710  •  dilTIAZem CD (CARDIZEM CD) 24 hr capsule 300 mg, 300 mg, Oral, Daily, Bossman Suarez MD, 300 mg at 08/22/20 0821  •  docusate sodium (COLACE) capsule 100 mg, 100 mg, Oral, BID, Bossman Suarez MD, 100 mg at 08/21/20 2027  •  ferrous sulfate tablet 325 mg, 325 mg, Oral, Daily With Breakfast, Bossman Suarez MD, 325 mg at 08/22/20 0822  •  finasteride (PROSCAR) tablet 5 mg, 5 mg, Oral, Daily, Jordan Marmolejo MD, 5 mg at 08/22/20 0822  •  furosemide (LASIX) tablet 20 mg, 20 mg, Oral, Once, Khushboo Yan APRN  •   ipratropium-albuterol (DUO-NEB) nebulizer solution 3 mL, 3 mL, Nebulization, Q4H PRN, Jordan Marmolejo MD, 3 mL at 08/20/20 1424  •  lactulose (CHRONULAC) 10 GM/15ML solution 10 g, 10 g, Oral, TID, Bossman Suarez MD, 10 g at 08/21/20 2028  •  Magnesium Sulfate 2 gram Bolus, followed by 8 gram infusion (total Mg dose 10 grams)- Mg less than or equal to 1mg/dL, 2 g, Intravenous, PRN **OR** Magnesium Sulfate 2 gram / 50mL Infusion (GIVE X 3 BAGS TO EQUAL 6GM TOTAL DOSE) - Mg 1.1 - 1.5 mg/dl, 2 g, Intravenous, PRN **OR** Magnesium Sulfate 4 gram infusion- Mg 1.6-1.9 mg/dL, 4 g, Intravenous, PRN, Jordan Marmolejo MD  •  methIMAzole (TAPAZOLE) tablet 5 mg, 5 mg, Oral, Q8H, Bossman Suarez MD, 5 mg at 08/22/20 0506  •  metoprolol tartrate (LOPRESSOR) tablet 50 mg, 50 mg, Oral, Q12H, Bossman Suarez MD, 50 mg at 08/22/20 0506  •  nicotine (NICODERM CQ) 21 MG/24HR patch 1 patch, 1 patch, Transdermal, Q24H, Jordan Marmolejo MD, 1 patch at 08/22/20 0823  •  nicotine polacrilex (COMMIT) lozenge 2 mg, 2 mg, Mouth/Throat, Q1H PRN, Bossman Suarez MD  •  nicotine polacrilex (NICORETTE) gum 2 mg, 2 mg, Mouth/Throat, Q1H PRN, Bossman Suarez MD  •  nitroglycerin (NITROSTAT) SL tablet 0.4 mg, 0.4 mg, Sublingual, Q5 Min PRN, Jordan Marmolejo MD  •  ondansetron (ZOFRAN) tablet 4 mg, 4 mg, Oral, Q6H PRN **OR** ondansetron (ZOFRAN) injection 4 mg, 4 mg, Intravenous, Q6H PRN, Jordan Marmolejo MD  •  oxyCODONE (ROXICODONE) immediate release tablet 10 mg, 10 mg, Oral, 4x Daily, Jordan Marmolejo MD, 10 mg at 08/22/20 0507  •  oxyCODONE (ROXICODONE) immediate release tablet 10 mg, 10 mg, Oral, Q4H PRN, Jordan Marmolejo MD, 10 mg at 08/22/20 0940  •  oxyCODONE (ROXICODONE) immediate release tablet 5 mg, 5 mg, Oral, Q4H PRN, Jordan Marmolejo MD, 5 mg at 08/19/20 1436  •  polyethylene glycol 3350 powder (packet), 17 g, Oral, BID, Bossman Suarez MD, 17 g at 08/22/20 0822  •  sodium chloride 0.9 % bolus 500 mL, 500 mL,  Intravenous, Once, Joe Bloom, DO  •  sodium chloride 0.9 % flush 10 mL, 10 mL, Intravenous, Q12H, Jordan Marmolejo MD, 10 mL at 08/22/20 0823  •  sodium chloride 0.9 % flush 10 mL, 10 mL, Intravenous, PRN, Jordan Marmolejo MD  •  vitamin C (ASCORBIC ACID) tablet 500 mg, 500 mg, Oral, Daily, Bossman Suarez MD, 500 mg at 08/22/20 0822    Physical Exam: General disheveled elderly male mild resting tachypnea current heart rate 130 acceptable O2 sat        HEENT: No obvious JVP, nasal O2 is in place, no icterus       Respiratory: Equal bilateral symmetrical expansion with crackles bilaterally       Cardiovascular: Tachycardic and irregular with a grade 5/6 to 6/6 systolic ejection murmur no obvious edema       GI: Positive bowel sounds       Lower Extremities: Status post left BKA       Neuro: Facial expressions are symmetrical       Skin: Warm and dry no edema to palpation       Psych: Pleasant affect    Results Review: His ins and outs he is 1.5 L ahead since admission.  Heart rate is now 129.  Blood pressure is 10 6-1 50.  Hemoglobin is 8.1.  GFR is 105.  Iron is low at 11    Radiology Results:  Imaging Results (Last 72 Hours)     Procedure Component Value Units Date/Time    XR Chest 1 View [[id number]] Collected:  08/21/20 1803     Updated:  08/22/20 0008    Narrative:       CR Chest 1 Vw    INDICATION:   Pulmonary edema. Femur fracture.     COMPARISON:    8/14/2020    FINDINGS:  Single portable AP view(s) of the chest.    Cardiac and mediastinal contours are normal. There is vascular congestion with mild generalized pulmonary edema. No pneumothorax. There is degenerative disease in the shoulders.       Impression:       Mild pulmonary edema with vascular congestion.    Signer Name: Alexi Hewitt MD   Signed: 8/21/2020 6:03 PM   Workstation Name: [facility]    [facility]    XR Femur 2 View Left [[id number]] Collected:  08/20/20 0920     Updated:  08/20/20 2021    Narrative:        EXAMINATION: XR FEMUR 2 VW LEFT- 2020     INDICATION: increase pain at therapy; S72.002A-Fracture of unspecified  part of neck of left femur, initial encounter for closed fracture;  Z87.81-Personal history of (healed) traumatic fracture      COMPARISON: 2020     FINDINGS: Left femur hardware appears in stable alignment from prior  comparison 2020 with stable appearance of fracture from that exam  without evidence of progressive malalignment or new fracture.  Specifically no distal fracture of the distal left femur.           Impression:       Stable appearance of left hip nailing and left femoral  hardware from prior comparison 2020 fluoroscopic images and  overall appearance of intertrochanteric/lesser trochanteric fracture. No  new findings evident.     D:  2020  E:  2020     This report was finalized on 2020 7:03 PM by Dr. Ciaran Rivera.             EKG: Atrial fibrillation    ECHO: Severe attic stenosis with preserved LVEF    Tele: A. fib RVR    Assessment   Assessment/Plan: 1 a A. fib RVR-the patient's rate was controlled for a while but now is tachycardic again.  He was on IV Dilts and was placed on oral diltiazem.  I will go ahead and give him a dose of digoxin 0.25.  I reordered the diltiazem drip to be restarted at 5 mg and titrated upward.  Also go ahead and increase his metoprolol.  Resume Eliquis  2 severe aortic stenosis.  The patient appears to be tachypneic and his A. fib RVR is leading to some heart failure.  Had a chest x-ray yesterday which showed a little CHF.  I will give the patient Lasix 40 mg IV now and check a BNP  3 would give IV iron to help increase hemoglobin    Alexi Shaver MD  20  11:13      Electronically signed by Alexi Shaver MD at 20 0967     Bossman Suarez MD at 20 4840              [facility] Medicine Services  PROGRESS NOTE    Patient Name: Zak Olmstead  : 1940  MRN:  [id number]    Date of Admission: 8/15/2020  Primary Care Physician: Ely Berumen APRN    Subjective   Subjective     CC:  Hip Fracture - transferred from [place]    HPI:  Variable heart rate, but up again today into the 140s.  A fib RVR.  Very sleepy today.  No family in room    Review of Systems    Unable to assess    Objective   Objective     Vital Signs:   Temp:  [98 °F (36.7 °C)-98.5 °F (36.9 °C)] 98.5 °F (36.9 °C)  Heart Rate:  [] 143  Resp:  [18] 18  BP: (130-151)/() 137/98     Physical Exam:    Constitutional: No acute distress, awake, alert  HENT: NCAT, mucous membranes moist  Respiratory: poor inspiratory effort, no wheezes  Cardiovascular: irreg, tachy, + murmur  Gastrointestinal: Positive bowel sounds, soft, nontender, nondistended  Musculoskeletal:  Left BKA  Psychiatric: Appropriate affect, cooperative  Neurologic: Oriented x 3, strength symmetric in all extremities, Cranial Nerves grossly intact to confrontation, speech clear  Skin: No rashes    Results Reviewed:  Results from last 7 days   Lab Units 08/21/20  0734 08/20/20  0514 08/18/20 0525 08/16/20  0127   WBC 10*3/mm3 8.41 10.62 10.63   < > 8.60   HEMOGLOBIN g/dL 7.7* 7.9* 8.1*   < > 8.9*   HEMATOCRIT % 28.2* 28.3* 29.2*   < > 31.7*   PLATELETS 10*3/mm3 327 352 269   < > 204   INR   --  1.67*  --   --  1.38*   PROCALCITONIN ng/mL  --  0.18  --   --   --     < > = values in this interval not displayed.     Results from last 7 days   Lab Units 08/21/20  0734 08/20/20  0514 08/18/20  0525 08/16/20  0127   SODIUM mmol/L 138 137 137   < > 138   POTASSIUM mmol/L 3.9 4.0 4.6   < > 3.8   CHLORIDE mmol/L 104 103 105   < > 103   CO2 mmol/L 24.0 25.0 17.0*   < > 26.0   BUN mg/dL 16 20 21   < > 21   CREATININE mg/dL 0.72* 0.87 0.87   < > 1.02   GLUCOSE mg/dL 90 79 99   < > 108*   CALCIUM mg/dL 8.0* 8.0* 7.8*   < > 8.3*   ALT (SGPT) U/L  --  7  --   --  28   AST (SGOT) U/L  --  13  --   --  24   PROBNP pg/mL  --  11,392.0*  --   --  8,015.0*       < > = values in this interval not displayed.     Estimated Creatinine Clearance: 79.2 mL/min (A) (by C-G formula based on SCr of 0.72 mg/dL (L)).    Microbiology Results Abnormal     Procedure Component Value - Date/Time    Blood Culture - Blood, Hand, Right [[id number]] Collected:  08/16/20 0127    Lab Status:  Final result Specimen:  Blood from Hand, Right Updated:  08/21/20 0330     Blood Culture No growth at 5 days    Blood Culture - Blood, Arm, Right [[id number]] Collected:  08/16/20 0127    Lab Status:  Final result Specimen:  Blood from Arm, Right Updated:  08/21/20 0330     Blood Culture No growth at 5 days          Imaging Results (Last 24 Hours)     Procedure Component Value Units Date/Time    XR Femur 2 View Left [[id number]] Collected:  08/20/20 0920     Updated:  08/20/20 2021    Narrative:       EXAMINATION: XR FEMUR 2 VW LEFT- 08/20/2020     INDICATION: increase pain at therapy; S72.002A-Fracture of unspecified  part of neck of left femur, initial encounter for closed fracture;  Z87.81-Personal history of (healed) traumatic fracture      COMPARISON: 08/17/2020     FINDINGS: Left femur hardware appears in stable alignment from prior  comparison 08/17/2020 with stable appearance of fracture from that exam  without evidence of progressive malalignment or new fracture.  Specifically no distal fracture of the distal left femur.           Impression:       Stable appearance of left hip nailing and left femoral  hardware from prior comparison 08/17/2020 fluoroscopic images and  overall appearance of intertrochanteric/lesser trochanteric fracture. No  new findings evident.     D:  08/20/2020  E:  08/20/2020     This report was finalized on 8/20/2020 7:03 PM by Dr. Ciaran Rivera.             Results for orders placed during the hospital encounter of 08/14/20   Adult Transthoracic Echo Complete W/ Cont if Necessary Per Protocol    Narrative · Left ventricular wall thickness is consistent with mild concentric    hypertrophy.  · Left ventricular systolic function is normal, EF 56-60%.  · Left ventricular diastolic dysfunction (grade I a) consistent with   impaired relaxation.  · Left atrial cavity size is mild-to-moderately dilated.  · There is severe calcification of the aortic valve mainly affecting the   non, left and right coronary cusp(s).  · Mild aortic valve regurgitation is present.  · Severe aortic valve stenosis is present.  · Aortic valve mean pressure gradient is 46.0 mmHg.  · Aortic valve maximum pressure gradient is 90.0 mmHg.  · Moderate mitral valve regurgitation is present  · Moderate to severe tricuspid valve regurgitation is present.  · Calculated right ventricular systolic pressure from tricuspid   regurgitation is 77 mmHg.          I have reviewed the medications:  Scheduled Meds:    apixaban 5 mg Oral Q12H   aspirin 81 mg Oral Daily   atorvastatin 40 mg Oral Nightly   [START ON 8/22/2020] dilTIAZem  mg Oral Daily   docusate sodium 100 mg Oral BID   ferrous sulfate 325 mg Oral Daily With Breakfast   finasteride 5 mg Oral Daily   lactulose 10 g Oral TID   methIMAzole 5 mg Oral Q8H   [START ON 8/22/2020] metoprolol tartrate 50 mg Oral Q12H   nicotine 1 patch Transdermal Q24H   oxyCODONE 10 mg Oral 4x Daily   polyethylene glycol 17 g Oral BID   sodium chloride 10 mL Intravenous Q12H   vitamin C 500 mg Oral Daily     Continuous Infusions:   PRN Meds:.ipratropium-albuterol  •  magnesium sulfate **OR** magnesium sulfate **OR** magnesium sulfate  •  nicotine polacrilex  •  nicotine polacrilex  •  nitroglycerin  •  ondansetron **OR** ondansetron  •  oxyCODONE  •  oxyCODONE  •  sodium chloride    Assessment/Plan   Assessment & Plan     Active Hospital Problems    Diagnosis  POA   • **Closed left hip fracture, s/p repair 8/17/2020 [S72.002A]  Yes   • Chronic atrial fibrillation (CMS/HCC) [I48.20]  Yes   • Acute UTI (urinary tract infection) [N39.0]  Yes   • HTN (hypertension) [I10]  Yes   • CAD (coronary  artery disease) [I25.10]  Yes   • COPD (chronic obstructive pulmonary disease) (CMS/[facility]) [J44.9]  Yes   • Chronic respiratory disease [J98.9]  Yes   • Aortic stenosis [I35.0]  Yes   • S/p left hip fracture [Z87.81]  Not Applicable   • Peripheral vascular disease (CMS/[facility]) [I73.9]  Yes      Resolved Hospital Problems   No resolved problems to display.        Brief Hospital Course to date:  Zak Olmstead is a 80 y.o. male w/ a hx of COPD, chronic respiratory failure on 2 liters PRN, chronic atrial fibrillation (BB, dilt and Eliquis), hyperthyroidism, HTN, CAD, PVD, remote left BKA and severe aortic stenosis who was transferred from [facility] for further evaluation/tx of a left hip fracture.    He had to be taken off AC for surgery and had Operative Fixation of Left Intertrochanteric Fracture with Cephalomedullary Device with Dr. Aponte    XR Left Hip yesterday.  Bolused NS yesterday.  Bolus again today.   EKG today.  Adjust Metoprolol and Cardizem CD to 300 mg    Fall  - multiple risk factors for fall  - left BKA  - pain medication  - history of Vitamin D def  - Inpatient Rehab recommended  Left Hip Fracture  - s/p repair  Aortic Valve Stenosis  - seen by TAVR team  - not a candidate for TAVR  Vasculopathy  - history of Aortic Graft/Stent  - Fem Fem bypass  - chronic occlusion of RCA  - LAD disease   - lifelong smoker - 15 yrs - 80 yrs old (duration 65 yrs)  - up to 2 ppd (possibly over 110 pack-yrs)  Chronic Pain  - has been on pain medication for 8 yrs or more  - goes to a pain clinic per wife  History of AAA  - s/p repair  Severe Anemia  - INR 1.38  - iron studies  - increased AC yesterday  - monitor for dropping hemoglobin  Hyperthyroidism  - on Methimazole  Tobacco Use Disorder  - nicotine patch  - has been smoking for 65 yrs  - high point 2 ppd  Atrial Fibrillation  - metoprolol / cardizem / anticoagulation  Chronic Respiratory Failure  - due to COPD  - nebs    EKG - a fib RVR  Adjust BB and  Cardizem today  IV fluids    DVT Prophylaxis:  anticoagulation    Disposition: I expect the patient to be discharged home as he is refusing rehab and unclear if we can find a rehab based on feedback from cm today    CODE STATUS:   Code Status and Medical Interventions:   Ordered at: 08/15/20 2229     Level Of Support Discussed With:    Patient     Code Status:    CPR     Medical Interventions (Level of Support Prior to Arrest):    Full         Electronically signed by Bossman Suarez MD, 08/21/20, 17:13.      Electronically signed by Bossman Suarez MD at 08/21/20 1722       Consult Notes (last 72 hours) (Notes from 08/21/20 1617 through 08/24/20 1617)    No notes of this type exist for this encounter.

## 2020-08-24 NOTE — PLAN OF CARE
Problem: Patient Care Overview  Goal: Plan of Care Review  Flowsheets (Taken 8/24/2020 0811)  Outcome Summary: Pt alert, oriented upon OT arrival. Presented w/ self limitations in regard to participation in therapy, however agreeable post education on purpose/plan but at bed level, deferred any EOB or OOB activity this date. Pt demonstrated face/hand washing w/ spv after set up, cues for initiation and follow through, UBD d/d gown w/ max A to doff, mod A to don with improved self repositioning w/ forward flexion at trunk for posterior access for cloth mgmt. Pt demo'd rolling between L < > R sides x 2 for linens exchange. Pt able to roll to R side w/ mod A x 1, to L side w/ min A x 1, v/c for tech and seq. Pt indicated increase in pain w/ bed mobility. Further pt complete HEP for BUE ROM and strengthening completing multi planar TE w/ yellow band x 10 reps each. Pt O2 sats dropped to 85% w/ NC donned w/ exercise yet recovered w/ rest, PLB. Will progress pt as able while hospitalized.

## 2020-08-24 NOTE — PROGRESS NOTES
"[facility] at [facility]  IP Progress Note   LOS: 9 days   Patient Care Team:  Ely Berumen APRN as PCP - General (Nurse Practitioner)    Chief Complaint: Follow up for Coronary Artery Disease, Diastolic CHF, Heart Valve Disease and Atrial Fibrillation    Subjective   States that he woke up feeling fine and now has hip pain in his left hip.  Breathing has improved.  No chest pain.       Active Hospital Problems    Diagnosis    • **Closed left hip fracture, s/p repair 8/17/2020 [S72.002A]    • Acute on chronic diastolic CHF (congestive heart failure) (CMS/[facility]) [I50.33]         • Chronic atrial fibrillation with RVR (CMS/[facility]) [I48.20]         • CAD (coronary artery disease) [I25.10]           a. C 06/2018:  of mid RCA with collateral filling; mild disease of left system      • Aortic stenosis [I35.0]           a. Echo 8/15/20: EF 56-60%, severe calcification of AV with severe AS; mean gradient 46mmHg, peak systolic velocity 4.2cm/sec,  moderate MR, mod-severe TR, RVSP 77mmHg     • HTN (hypertension) [I10]         • PAD (peripheral artery disease) (CMS/[facility]) [I73.9]           a. S/p left BKA  b. Right Fem-fem bypass, IDB  c. Right common iliac arteries totally occluded from ostium to femoral      • Anemia, chronic disease [D63.8]    • Acute UTI (urinary tract infection) [N39.0]    • COPD (chronic obstructive pulmonary disease) (CMS/[facility]) [J44.9]    • Chronic respiratory disease [J98.9]    • S/p left hip fracture [Z87.81]          Tele: Atrial Fib with Controlled Rate    Vitals:  /69 (BP Location: Left arm, Patient Position: Lying)   Pulse 89   Temp 97.4 °F (36.3 °C) (Oral)   Resp 16   Ht 172.2 cm (67.8\")   Wt 76 kg (167 lb 8.8 oz)   SpO2 93%   BMI 25.63 kg/m²    Body mass index is 25.63 kg/m².    Intake/Output Summary (Last 24 hours) at 8/24/2020 0807  Last data filed at 8/24/2020 0341  Gross per 24 hour   Intake --   Output 1950 ml   Net -1950 ml       Physical " Exam:  General: No apparent distress.  Neck: no JVD.  Chest:No respiratory distress, breath sounds are diminished at bases with fine crackles.  Cardiovascular: Normal S1 and S2, irregular, systolic murmur.  Extremities: No edema.  Left BKA        Results Review:     I reviewed the patient's new clinical results.    Results from last 7 days   Lab Units 08/22/20  0024  08/20/20  0514   WBC 10*3/mm3 8.46   < > 10.62   HEMOGLOBIN g/dL 8.1*   < > 7.9*   HEMATOCRIT % 29.6*   < > 28.3*   PLATELETS 10*3/mm3 348   < > 352   INR   --   --  1.67*    < > = values in this interval not displayed.     Results from last 7 days   Lab Units 08/21/20  0734 08/20/20  0514   SODIUM mmol/L 138 137   POTASSIUM mmol/L 3.9 4.0   CHLORIDE mmol/L 104 103   CO2 mmol/L 24.0 25.0   BUN mg/dL 16 20   CREATININE mg/dL 0.72* 0.87   GLUCOSE mg/dL 90 79   CALCIUM mg/dL 8.0* 8.0*   ALT (SGPT) U/L  --  7   AST (SGOT) U/L  --  13     Estimated Creatinine Clearance: 79.2 mL/min (A) (by C-G formula based on SCr of 0.72 mg/dL (L)).  Lab Results   Component Value Date    HGBA1C 6.10 (H) 02/22/2020         Results from last 7 days   Lab Units 08/22/20  1211 08/20/20  0514   PROBNP pg/mL 15,941.0* 11,392.0*         Scheduled Meds:  apixaban 5 mg Oral Q12H   aspirin 81 mg Oral Daily   atorvastatin 40 mg Oral Nightly   dilTIAZem  mg Oral Daily   docusate sodium 100 mg Oral BID   finasteride 5 mg Oral Daily   furosemide 40 mg Intravenous Q12H   lactulose 10 g Oral TID   methIMAzole 5 mg Oral Q8H   metoprolol tartrate 100 mg Oral Q12H   nicotine 1 patch Transdermal Q24H   oxyCODONE 10 mg Oral 4x Daily   polyethylene glycol 17 g Oral BID   sodium chloride 500 mL Intravenous Once   sodium chloride 10 mL Intravenous Q12H   vitamin C 500 mg Oral Daily       Continuous Infusions:  dilTIAZem 2.5-15 mg/hr Last Rate: 2.5 mg/hr (08/24/20 0735)              Assessment:   1. Afib with RVR, adequate rate control now on metoprolol and Cardizem.  Restarted on  Eliquis.  2. DHF secondary to rapid afib, good diuresis/improved.  3. Severe AS, not a suitable candidate for open valve replacement or TAVR, medical management for now.  4. CAD, stable  5. PAD  6. Post-Op nail fixation left intertrochanteric hip fracture    Plan:  1. Discontinue IV Lasix, start oral Lasix and potassium supplements.  2. Continue rest of the current medications.  3. Overall cardiac condition is stable, okay to transfer to rehab from cardiology standpoint anytime, he can follow-up with his local primary cardiologist.  4. I will sign off and will be available to see him as needed.    Electronically signed by PETROS Clinton, 08/24/20, 8:11 AM.    I have seen and examined the patient, case was discussed with the physician extender, reviewed the above note, necessary changes were made and I agree with the final note.   Juan Nunez MD, FACC, [facility]

## 2020-08-24 NOTE — PLAN OF CARE
Problem: Patient Care Overview  Goal: Plan of Care Review  Outcome: Ongoing (interventions implemented as appropriate)  Flowsheets (Taken 8/24/2020 4919)  Plan of Care Reviewed With: patient  Outcome Summary: received report from previous shift.  pt AAO, VSS and in no apparent distress or discomfort. pt resting in bed  comfortably. connected to monitor and vital signs maintaining parameters. diltiazem drip infusing at 2.5mg per hour.  pt required PO pain medication x2 this shift with good results.  wounds covered with mepilex.  buttocks red but blanchable.  dressings CDI .  able to sleep/rest >4hrs this shift  will continue to monitor

## 2020-08-24 NOTE — THERAPY TREATMENT NOTE
Acute Care - Occupational Therapy Treatment Note   [facility]     Patient Name: Zak Olmstead  : 1940  MRN: [id number]  Today's Date: 2020             Admit Date: 8/15/2020       ICD-10-CM ICD-9-CM   1. Closed fracture of left hip, initial encounter (CMS/HCC) S72.002A 820.8   2. S/p left hip fracture Z87.81 V15.51     Patient Active Problem List   Diagnosis   • PAD (peripheral artery disease) (CMS/HCC)   • Closed left hip fracture, s/p repair 2020   • Chronic atrial fibrillation with RVR (CMS/HCC)   • Acute UTI (urinary tract infection)   • HTN (hypertension)   • CAD (coronary artery disease)   • COPD (chronic obstructive pulmonary disease) (CMS/HCC)   • Chronic respiratory disease   • Aortic stenosis   • S/p left hip fracture   • Acute on chronic diastolic CHF (congestive heart failure) (CMS/HCC)   • Anemia, chronic disease     Past Medical History:   Diagnosis Date   • AAA (abdominal aortic aneurysm) (CMS/HCC)     s/p repair   • Atrial fibrillation with rapid ventricular response (CMS/HCC) 2018   • BPH (benign prostatic hyperplasia)    • BPH with obstruction/lower urinary tract symptoms 2020   • CAD (coronary artery disease)    • Chronic respiratory failure (CMS/HCC)    • COPD (chronic obstructive pulmonary disease) (CMS/HCC)    • Dysuria 2020   • Erectile dysfunction 2020   • Hyperthyroidism    • Mitral annular calcification 2018   • Mononeuritis multiplex    • Nonrheumatic aortic valve stenosis 2018   • NSTEMI (non-ST elevated myocardial infarction) (CMS/HCC)      Past Surgical History:   Procedure Laterality Date   • ABDOMINAL AORTIC ANEURYSM REPAIR     • APPENDECTOMY     • BACK SURGERY     • BELOW KNEE LEG AMPUTATION Left    • CARDIAC CATHETERIZATION N/A 2018    Procedure: Left Heart Cath;  Surgeon: Derik Vega MD;  Location: [facility] CATH INVASIVE LOCATION;  Service: Cardiovascular   • COLONOSCOPY N/A 6/15/2017    Procedure: COLONOSCOPY  CPTCODE:83675;  " Surgeon: Lincoln Bowens III, MD;  Location:  [facility];  Service:    • ENDOSCOPY N/A 6/15/2017    Procedure: ESOPHAGOGASTRODUODENOSCOPY WITH BIOPSY  CPTCODE:48216;  Surgeon: Lincoln Bowens III, MD;  Location:  [facility];  Service:    • ESOPHAGEAL DILATATION     • HIP TROCHANTERIC NAILING WITH INTRAMEDULLARY HIP SCREW Left 8/17/2020    Procedure: HIP TROCHANTERIC NAILING WITH INTRAMEDULLARY HIP SCREW, GAMMA NAIL LEFT;  Surgeon: Edmar Aponte MD;  Location:  [facility];  Service: Orthopedics;  Laterality: Left;   • INTERVENTIONAL RADIOLOGY PROCEDURE Left 6/1/2018    Procedure: Abdominal Aortagram with Runoff;  Surgeon: Derik Vega MD;  Location: [facility] CATH INVASIVE LOCATION;  Service: Cardiovascular       Therapy Treatment    Rehabilitation Treatment Summary     Row Name 08/24/20 0811             Treatment Time/Intention    Discipline  occupational therapist  -JY      Document Type  therapy note (daily note)  -JY      Subjective Information  complains of;weakness;fatigue;pain  -JY      Mode of Treatment  occupational therapy  -JY      Patient/Family Observations  no family present   -JY      Care Plan Review  care plan/treatment goals reviewed  -JY      Therapy Frequency (OT Eval)  daily  -JY      Patient Effort  adequate  -JY      Comment  pt verbalized, \" I don't feel like doing that today\" (if reference to EOB or OOB activity), \" I'm just too weak\", \"It's pointless to get OOB and then get back\".  -JY      Existing Precautions/Restrictions  fall;non-weight bearing;left;other (see comments) remote L ORQUIDEA, L DAKOTA BENSON, [facility]    -JY      Recorded by [EMPERATRIZ] Lupe Peralta, OT 08/24/20 0910      Row Name 08/24/20 0811             Vital Signs    Pre Systolic BP Rehab  145  -JY      Pre Treatment Diastolic BP  69  -JY      Post Systolic BP Rehab  133  -JY      Post Treatment Diastolic BP  72  -JY      Pretreatment Heart Rate (beats/min)  85  -JY      Posttreatment Heart Rate (beats/min)  80  -JY      Pre SpO2 (%)  " 92  -JY      O2 Delivery Pre Treatment  supplemental O2  -JY      Intra SpO2 (%)  85  -JY      O2 Delivery Intra Treatment  supplemental O2  -JY      Post SpO2 (%)  99  -JY      O2 Delivery Post Treatment  supplemental O2  -JY      Pre Patient Position  Supine  -JY      Intra Patient Position  Supine  -JY      Post Patient Position  Supine  -JY      Recorded by [JY] Lupe Peralta, OT 08/24/20 0910      Row Name 08/24/20 0811             Cognitive Assessment/Intervention    Additional Documentation  Cognitive Assessment/Intervention (Group)  -JY      Recorded by [JY] Lupe Peralta, OT 08/24/20 0910      Row Name 08/24/20 0811             Cognitive Assessment/Intervention- PT/OT    Affect/Mental Status (Cognitive)  WFL;flat/blunted affect;confused;other (see comments) situatonal confusion regarding timeline of events   -JY      Orientation Status (Cognition)  oriented x 4  -JY      Follows Commands (Cognition)  follows one step commands;over 90% accuracy;verbal cues/prompting required;repetition of directions required  -JY      Cognitive Function (Cognitive)  safety deficit  -JY      Safety Deficit (Cognitive)  insight into deficits/self awareness;safety precautions awareness;safety precautions follow-through/compliance  -JY      Recorded by [JY] Lupe Peralta, OT 08/24/20 0910      Row Name 08/24/20 0811             Safety Issues, Functional Mobility    Safety Issues Affecting Function (Mobility)  insight into deficits/self awareness;safety precaution awareness;safety precautions follow-through/compliance  -JY      Impairments Affecting Function (Mobility)  balance;endurance/activity tolerance;range of motion (ROM);pain;shortness of breath;strength  -JY      Comment, Safety Issues/Impairments (Mobility)  only able to assess rolling in bed (mobility), pt deferred any EOB or OOB activity   -JY      Recorded by [JY] Lupe Peratla, OT 08/24/20 0910      Row Name 08/24/20 0811             Mobility  Assessment/Intervention    Extremity Weight-bearing Status  left lower extremity  -JY      Left Lower Extremity (Weight-bearing Status)  (S) non weight-bearing (NWB)  -JY      Recorded by [EMPERATRIZ] Lupe Peralta OT 08/24/20 0910      Row Name 08/24/20 0811             Bed Mobility Assessment/Treatment    Bed Mobility Assessment/Treatment  rolling left;rolling right  -JY      Rolling Left [place] (Bed Mobility)  minimum assist (75% patient effort);1 person assist;verbal cues  -JY      Rolling Right [place] (Bed Mobility)  moderate assist (50% patient effort);1 person assist;verbal cues  -JY      Assistive Device (Bed Mobility)  bed rails;head of bed elevated  -JY      Comment (Bed Mobility)  pt required max cues for motivation to complete bed rolling d/t pt's anticiapted pain, however pt able to roll to pt L side with min A, R side w/ mod A. Req'd A to come to sidelying fully and sustain position. Reiterated NWB through LLE and pt demo'd compliance. Utilized bed rolling to change bed linens   -JY      Recorded by [EMPERATRIZ] Lupe Peralta OT 08/24/20 0910      Row Name 08/24/20 0811             Functional Mobility    Functional Mobility- Comment  defer to PT; pt deferred any EOB or OOB activity   -JY      Recorded by [EMPERATRIZ] Lupe Peralta OT 08/24/20 0910      Row Name 08/24/20 0811             Transfer Assessment/Treatment    Comment (Transfers)  pt deferred any EOB or OOB activity this date thus u/a to assess   -JY      Recorded by [EMPERATRIZ] Lupe Peralta OT 08/24/20 0910      Row Name 08/24/20 0811             Bed-Chair Transfer    Bed-Chair [place] (Transfers)  unable to assess  -JY      Recorded by [EMPERATRIZ] Lupe Peralta OT 08/24/20 0910      Row Name 08/24/20 0811             Sit-Stand Transfer    Sit-Stand [place] (Transfers)  unable to assess  -JY      Recorded by [EMPERATRIZ] Lupe Peralta OT 08/24/20 0910      Row Name 08/24/20 0811             ADL Assessment/Intervention    67945 - OT Self Care/Mgmt  Minutes  10  -JY      BADL Assessment/Intervention  grooming;upper body dressing  -JY      Recorded by [JY] Lupe Peralta, OT 08/24/20 0910      Row Name 08/24/20 0811             Upper Body Dressing Assessment/Training    Upper Body Dressing [place] Level  pajama/robe;doff;maximum assist (25% patient effort);verbal cues;don;moderate assist (50% patient effort)  -JY      Upper Body Dressing Position  long sitting;other (see comments) sitting more upright for posterior mgmt   -JY      Comment (Upper Body Dressing)  pt demo'd self limitations i.e. decreased desire to complete d/d gown and requested more A for doffing than required, particiapted more in donning w/ most A req'd for posterior mgmt. PT sat forward w/ forward flexion at trunk to access posterior body   -JY      Recorded by [JY] Lupe Peralta, OT 08/24/20 0910      Row Name 08/24/20 0811             Grooming Assessment/Training    [place] Level (Grooming)  wash face, hands;supervision;verbal cues;set up  -JY      Grooming Position  long sitting  -JY      Comment (Grooming)  cues for initiation and follow through   -JY      Recorded by [JY] Lupe Peralta, OT 08/24/20 0910      Row Name 08/24/20 0811             BADL Safety/Performance    Impairments, BADL Safety/Performance  balance;endurance/activity tolerance;pain;range of motion;strength;shortness of breath  -JY      Skilled BADL Treatment/Intervention  BADL process/adaptation training;energy conservation  -JY      Progress in BADL Status  effort and initiation;other (see comments) pt requires cues for initiation and follow through   -JY      Recorded by [JY] Lupe Peralta, OT 08/24/20 0910      Row Name 08/24/20 0811             Motor Skills Assessment/Interventions    Additional Documentation  Balance (Group);Therapeutic Exercise (Group);Therapeutic Exercise Interventions (Group)  -JY      Recorded by [JY] Lupe Peralta OT 08/24/20 0910      Row Name 08/24/20 0811             Therapeutic  Exercise    03998 - OT Therapeutic Exercise Minutes  10  -JY      39878 - OT Therapeutic Activity Minutes  10  -JY      Recorded by [JY] Lupe Peralta, OT 08/24/20 0910      Row Name 08/24/20 0811             Therapeutic Exercise    Upper Extremity Range of Motion (Therapeutic Exercise)  shoulder flexion/extension, bilateral;elbow flexion/extension, bilateral;forearm supination/pronation, bilateral;other (see comments) B UE protraction/retraction at scapula   -JY      Weight/Resistance (Therapeutic Exercise)  yellow  -JY      Exercise Type (Therapeutic Exercise)  resistive exercises  -JY      Position (Therapeutic Exercise)  other (see comments) long sitting   -JY      Sets/Reps (Therapeutic Exercise)  1/10  -JY      Equipment (Therapeutic Exercise)  resistive bands  -JY      Expected Outcome (Therapeutic Exercise)  facilitate normal movement patterns;improve performance, BADLs;improve performance, transfer skills  -JY      Comment (Therapeutic Exercise)  Reviewed HEP w/ yellow band for resistance focused on multi planar BUE exercises for fxl mobility, strengthening to assist w/ ADLs, t/f, to assist w/ LLE deficits. Completed 1 set x 10 reps   -JY      Recorded by [JY] Lupe Peralta OT 08/24/20 0910      Row Name 08/24/20 0811             Balance    Balance  static sitting balance;static standing balance;dynamic sitting balance;dynamic standing balance  -JY      Recorded by [JPAULINO] Lupe Peralta OT 08/24/20 0910      Row Name 08/24/20 0811             Static Sitting Balance    Level of [place] (Unsupported Sitting, Static Balance)  other (see comments) pt deferred any sitting tasks  -JY      Recorded by [EMPERATRIZ] Lupe Peralta OT 08/24/20 0910      Row Name 08/24/20 0811             Dynamic Sitting Balance    Level of [place], Reaches Outside Midline (Sitting, Dynamic Balance)  other (see comments) pt deferred any sitting tasks  -JY      Recorded by [EMPERATRIZ] Lupe Peralta OT 08/24/20 0910      Row Name  08/24/20 0811             Static Standing Balance    Level of [place] (Supported Standing, Static Balance)  unable to perform activity  -JY      Recorded by [EMPERATRIZ] Lupe Peralta OT 08/24/20 0910      Row Name 08/24/20 0811             Dynamic Standing Balance    Level of [place], Reaches Outside Midline (Standing, Dynamic Balance)  unable to perform activity  -JY      Recorded by [EMPERATRIZ] Lupe Peralta OT 08/24/20 0910      Row Name 08/24/20 0811             Positioning and Restraints    Pre-Treatment Position  in bed  -JY      Post Treatment Position  bed  -JY      In Bed  notified nsg;fowlers;call light within reach;encouraged to call for assist;exit alarm on;side rails up x2;LLE elevated  -JY      Recorded by [EMPERATRIZ] Lupe Peralta OT 08/24/20 0910      Row Name 08/24/20 0811             Pain Assessment    Additional Documentation  Pain Scale: Numbers Pre/Post-Treatment (Group)  -JY      Recorded by [EMPERATRIZ] Lupe Peralta OT 08/24/20 0910      Row Name 08/24/20 0811             Pain Scale: Numbers Pre/Post-Treatment    Pain Scale: Numbers, Pretreatment  8/10  -JY      Pain Scale: Numbers, Post-Treatment  10/10  -JY      Pain Location - Side  Left  -JY      Pain Location - Orientation  lower  -JY      Pain Location  extremity  -JY      Pre/Post Treatment Pain Comment  pt reports B back pain, L hip and knee pain kim w/ bed mobility or light touch to stump    -JY      Pain Intervention(s)  Repositioned;Ambulation/increased activity;Elevated  -JY      Recorded by [EMPERATRIZ] Lupe Peralta OT 08/24/20 0910      Row Name 08/24/20 0811             Sensory Assessment/Intervention    Sensory General Assessment  no sensation deficits identified BUEs  -JY      Recorded by [EMPERATRIZ] Lupe Peralta OT 08/24/20 0910      Row Name                Wound 08/15/20 2150 coccyx Pressure Injury    Wound - Properties Group Date first assessed: 08/15/20 [JE] Time first assessed: 2150 [JE] Present on Hospital Admission: Y [JE] Location:  coccyx [JE] Primary Wound Type: Pressure inj [JE] Stage, Pressure Injury: Stage 1 [JE] Recorded by:  [JE] Alcides Bloom RN 08/15/20 2327    Row Name                Wound 08/17/20 1732 Left lateral hip Incision    Wound - Properties Group Date first assessed: 08/17/20 [RV] Time first assessed: 1732 [RV] Side: Left [RV] Orientation: lateral [RV] Location: hip [RV] Primary Wound Type: Incision [RV] Recorded by:  [RV] Anand Lozano RN 08/17/20 1732    Row Name                Wound 08/17/20 1815 Left lateral greater trochanter Incision    Wound - Properties Group Date first assessed: 08/17/20 [LB] Time first assessed: 1815 [LB] Present on Hospital Admission: N [LB] Side: Left [LB] Orientation: lateral [LB] Location: greater trochanter [LB] Primary Wound Type: Incision [LB] Additional Comments: DRESSED WITH MASTISOL, 4X4, 1X8 XEROFORM, 2X2 COVADERM X2 AND 4X4 COVADERM X 3 [LB] Recorded by:  [LB] Lionel Bush RN 08/17/20 1816    Row Name                Wound 08/17/20 2000 Left lower;posterior arm Skin Tear    Wound - Properties Group Date first assessed: 08/17/20 [TP] Time first assessed: 2000 [TP] Side: Left [TP] Orientation: lower;posterior [TP] Location: arm [TP] Primary Wound Type: Skin tear [TP] Recorded by:  [TP] Catina Olsen RN 08/18/20 0253    Row Name                Wound 08/18/20 1900 Right anterior wrist Puncture    Wound - Properties Group Date first assessed: 08/18/20 [TP] Time first assessed: 1900 [TP] Side: Right [TP] Orientation: anterior [TP] Location: wrist [TP] Primary Wound Type: Puncture [TP] Recorded by:  [TP] Catina Olsen RN 08/18/20 2139    Row Name                Wound 08/22/20 1000 Left gluteal Other (comment)    Wound - Properties Group Date first assessed: 08/22/20 [MW] Time first assessed: 1000 [MW] Side: Left [MW] Location: gluteal [MW] Primary Wound Type: Other [MW], shearing  Recorded by:  [MW] Violeta Royal, RN 08/22/20 1822    Row Name 08/24/20 0811             Plan of  Care Review    Plan of Care Reviewed With  patient  -JY      Progress  no change  -JY      Recorded by [EMPERATRIZ] Lupe Peralta, OT 08/24/20 0910        User Key  (r) = Recorded By, (t) = Taken By, (c) = Cosigned By    Initials Name Effective Dates Discipline    Anand Khanna, RN 06/16/16 -  Nurse    Lionel Park RN 11/16/16 -  Nurse    Violeta Yanez RN 06/16/16 -  Nurse    Catina Carlton RN 12/04/19 -  Nurse    Lupe Brown, OT 01/29/20 -  OT    Alcides Crook RN 03/30/20 -  Nurse        Wound 08/15/20 2150 coccyx Pressure Injury (Active)   Dressing Appearance dry;intact;no drainage 8/23/2020  8:37 PM   Closure Open to air 8/23/2020  8:37 PM   Base blanchable 8/23/2020  8:37 PM   Periwound intact;redness 8/23/2020  8:37 PM   Drainage Amount none 8/23/2020  8:37 PM   Dressing Care, Wound open to air 8/23/2020  8:37 PM   Periwound Care, Wound barrier ointment applied 8/23/2020  8:37 PM       Wound 08/17/20 1732 Left lateral hip Incision (Active)   Dressing Appearance dry;intact;no drainage 8/23/2020  8:37 PM   Closure BRITTA 8/23/2020  8:37 PM   Base dressing in place, unable to visualize 8/23/2020  8:37 PM   Drainage Amount none 8/23/2020  8:37 PM       Wound 08/17/20 1815 Left lateral greater trochanter Incision (Active)   Dressing Appearance intact;dry 8/23/2020  8:37 PM   Closure BRITTA 8/23/2020  8:37 PM   Drainage Amount none 8/23/2020  8:37 PM       Wound 08/17/20 2000 Left lower;posterior arm Skin Tear (Active)   Dressing Appearance dried drainage 8/23/2020  8:37 PM   Closure Adhesive bandage 8/23/2020  8:37 PM   Drainage Amount scant 8/23/2020  8:37 PM       Wound 08/18/20 1900 Right anterior wrist Puncture (Active)   Dressing Appearance open to air 8/23/2020  8:37 PM   Closure None 8/23/2020  8:37 PM   Base scab 8/23/2020  8:37 PM       Wound 08/22/20 1000 Left gluteal Other (comment) (Active)   Dressing Appearance open to air 8/23/2020  8:37 PM   Closure None 8/23/2020  8:37 PM   Base  blanchable;clean;dry 8/23/2020  8:37 PM   Drainage Amount none 8/23/2020  8:37 PM   Dressing Care, Wound open to air 8/23/2020  8:37 PM       Occupational Therapy Education                 Title: PT OT SLP Therapies (In Progress)     Topic: Occupational Therapy (In Progress)     Point: ADL training (In Progress)     Description:   Instruct learner(s) on proper safety adaptation and remediation techniques during self care or transfers.   Instruct in proper use of assistive devices.              Learning Progress Summary           Patient Acceptance, E,D, NR by JY at 8/24/2020 0811    Acceptance, E, VU,NR by AR at 8/19/2020 1304    Acceptance, E, NR by CL at 8/18/2020 0828   Family Acceptance, E, VU,NR by AR at 8/19/2020 1304                   Point: Home exercise program (In Progress)     Description:   Instruct learner(s) on appropriate technique for monitoring, assisting and/or progressing therapeutic exercises/activities.              Learning Progress Summary           Patient Acceptance, E,D, NR by JY at 8/24/2020 0811    Acceptance, E, VU,NR by AR at 8/19/2020 1304   Family Acceptance, E, VU,NR by AR at 8/19/2020 1304                   Point: Precautions (In Progress)     Description:   Instruct learner(s) on prescribed precautions during self-care and functional transfers.              Learning Progress Summary           Patient Acceptance, E,D, NR by JY at 8/24/2020 0811    Acceptance, E, VU,NR by AR at 8/19/2020 1304    Acceptance, E, NR by CL at 8/18/2020 0828   Family Acceptance, E, VU,NR by AR at 8/19/2020 1304                   Point: Body mechanics (In Progress)     Description:   Instruct learner(s) on proper positioning and spine alignment during self-care, functional mobility activities and/or exercises.              Learning Progress Summary           Patient Acceptance, E,D, NR by JY at 8/24/2020 0811    Acceptance, E, VU,NR by AR at 8/19/2020 1304    Acceptance, E, NR by CL at 8/18/2020 0828    Family Acceptance, E, VU,NR by AR at 8/19/2020 0214                               User Key     Initials Effective Dates Name Provider Type Discipline    AR 06/22/15 -  Dalila Landon, OT Occupational Therapist OT    CL 04/03/18 -  Varsha Silverio, OT Occupational Therapist OT    JY 01/29/20 -  Lupe Peralta, OT Occupational Therapist OT                OT Recommendation and Plan  Therapy Frequency (OT Eval): daily  Plan of Care Review  Plan of Care Reviewed With: patient  Plan of Care Reviewed With: patient  Outcome Summary: Pt alert, oriented upon OT arrival. Presented w/ self limitations in regard to participation in therapy, however agreeable post education on purpose/plan but at bed level, deferred any EOB or OOB activity this date. Pt demonstrated face/hand washing w/ spv after set up, cues for initiation and follow through, UBD d/d gown w/ max A to doff, mod A to don with improved self repositioning w/ forward flexion at trunk for posterior access for cloth mgmt. Pt demo'd rolling between L < > R sides x 2 for linens exchange. Pt able to roll to R side w/ mod A x 1, to L side w/ min A x 1, v/c for tech and seq. Pt indicated increase in pain w/ bed mobility. Further pt complete HEP for BUE ROM and strengthening completing multi planar TE w/ yellow band x 10 reps each. Pt O2 sats dropped to 85% w/ NC donned w/ exercise yet recovered w/ rest, PLB. Will progress pt as able while hospitalized.  Outcome Measures     Row Name 08/24/20 0811             How much help from another is currently needed...    Putting on and taking off regular lower body clothing?  1  -JY      Bathing (including washing, rinsing, and drying)  2  -JY      Toileting (which includes using toilet bed pan or urinal)  1  -JY      Putting on and taking off regular upper body clothing  2  -JY      Taking care of personal grooming (such as brushing teeth)  3  -JY      Eating meals  3  -JY      AM-PAC 6 Clicks Score (OT)  12  -JY          Functional Assessment    Outcome Measure Options  AM-PAC 6 Clicks Daily Activity (OT)  -JY        User Key  (r) = Recorded By, (t) = Taken By, (c) = Cosigned By    Initials Name Provider Type    Lupe Brown OT Occupational Therapist           Time Calculation:   Time Calculation- OT     Row Name 08/24/20 0913 08/24/20 0811          Time Calculation- OT    OT Start Time  --  0811  -JY     OT Received On  --  08/24/20  -JY     OT Goal Re-Cert Due Date  --  08/28/20  -JY        Timed Charges    60020 - OT Therapeutic Exercise Minutes  13  -JY  10  -JY     64134 - OT Therapeutic Activity Minutes  15  -JY  10  -JY     46488 - OT Self Care/Mgmt Minutes  --  10  -JY       User Key  (r) = Recorded By, (t) = Taken By, (c) = Cosigned By    Initials Name Provider Type    Lupe Brown OT Occupational Therapist        Therapy Charges for Today     Code Description Service Date Service Provider Modifiers Qty    03795865117 HC OT THER PROC EA 15 MIN 8/24/2020 Lupe Peralta OT GO 1    34958548440 HC OT THERAPEUTIC ACT EA 15 MIN 8/24/2020 Lupe Peralta OT GO 1    91389064578 HC OT SELF CARE/MGMT/TRAIN EA 15 MIN 8/24/2020 Lupe Peralta OT GO 1               uLpe Peralta OT  8/24/2020

## 2020-08-24 NOTE — PROGRESS NOTES
"Continued Stay Note  [facility]     Patient Name: Zak Olmstead  MRN: [id number]  Today's Date: 8/24/2020    Admit Date: 8/15/2020    Discharge Plan     Row Name 08/24/20 1215       Plan    Plan  Home with family assistance    Patient/Family in Agreement with Plan  yes    Plan Comments  Discussed Mr. Olmstead on unit rounds this morning.  He was started on a Cardizem gtt last weekend.  Received call from Dodie at [facility] and the facility has declined Mr. Olmstead due to his refusal to \"work with therapy.\"  Mr. Olmstead states that [facility] is the only rehab facility that he is agreeable to go to in the area.  He wants to go home.  There are only 2 home health agencies in [facility] and both have declined Mr. Olmstead.  [facility]  is out of network and Mr. Olmstead declined the OOP copays.  [facility] declined the patient due to his PCP being [facility].    DC plan is home with family assistance.     will continue to follow.    Final Discharge Disposition Code  01 - home or self-care        Discharge Codes    No documentation.       Expected Discharge Date and Time     Expected Discharge Date Expected Discharge Time    Aug 25, 2020             Sandra Vang RN    "

## 2020-08-24 NOTE — DISCHARGE PLACEMENT REQUEST
"Meir Preciado (80 y.o. Male)   Discharge Summary    Date of Birth Social Security Number Address Home Phone MRN    1940  [address] [facility] [address] [phone] [id number]    [employer]al Marital Status          None        Admission Date Admission Type Admitting Provider Attending Provider Department, Room/Bed    8/15/20 Urgent Stephanie Cantrell MD Opi[place], [place], MD [facility] 3G, S364/1    Discharge Date Discharge Disposition Discharge Destination         Home or Self Care              Attending Provider:  Stephanie Cantrell MD    Allergies:  No Known Allergies    Isolation:  None   Infection:  None   Code Status:  CPR    Ht:  172.2 cm (67.8\")   Wt:  76 kg (167 lb 8.8 oz)    Admission Cmt:  None   Principal Problem:  Closed left hip fracture, s/p repair 8/17/2020 [S72.002A]                 Active Insurance as of 8/15/2020     Primary Coverage     Payor Plan Insurance Group Employer/Plan Group    HUMANA MEDICARE REPLACEMENT HUMANA MEDICARE REPLACEMENT [id number]     Payor Plan Address Payor Plan Phone Number Payor Plan Fax Number Effective Dates    [address] [address] [phone]  1/1/2018 - None Entered    [facility] [id number]       Subscriber Name Subscriber Birth Date Member ID       MEIR PRECIADO 1940 [id number]                 Emergency Contacts      (Rel.) Home Phone Work Phone Mobile Phone    Mirna Preciado (Spouse) [phone]    Low Preciado (Son) -- -- [phone]    Kassidy Ayon [phone] -- --               Discharge Summary      Natalie Anders PA-C at 08/24/20 1540     Attestation signed by Stephanie Cantrell MD at 08/24/20 1550      Attending Cosignature     I supervised care of the patient on day of service with direct care provided by the advanced care provider (APC).    Electronically signed by Stephanie Cantrell MD, 08/24/20, 3:58 PM.                       [facility] Medicine " Services  DISCHARGE SUMMARY    Patient Name: Zak Olmstead  : 1940  MRN: [id number]    Date of Admission: 8/15/2020  9:42 PM  Date of Discharge: 2020  Primary Care Physician: Ely Berumen APRN    Consults     Date and Time Order Name Status Description    2020 2338 Inpatient Cardiology Consult      2020 0030 Inpatient Cardiothoracic Surgery Consult Completed     8/15/2020 2320 Inpatient Cardiology Consult Completed     8/15/2020 2229 Inpatient Orthopedic Surgery Consult      8/15/2020 1104 Inpatient Cardiology Consult Completed     2020 1608 Ortho (on-call MD unless specified) Completed         Hospital Course     Presenting Problem:   S/p left hip fracture [Z87.81]    Active Hospital Problems    Diagnosis  POA   • **Closed left hip fracture, s/p repair 2020 [S72.002A]  Yes   • Acute on chronic diastolic CHF (congestive heart failure) (CMS/[facility]) [I50.33]  Yes   • Anemia, chronic disease [D63.8]  Yes   • Chronic atrial fibrillation with RVR (CMS/[facility]) [I48.20]  Yes   • Acute UTI (urinary tract infection) [N39.0]  Yes   • HTN (hypertension) [I10]  Yes   • CAD (coronary artery disease) [I25.10]  Yes   • COPD (chronic obstructive pulmonary disease) (CMS/[facility]) [J44.9]  Yes   • Chronic respiratory disease [J98.9]  Yes   • Aortic stenosis [I35.0]  Yes   • S/p left hip fracture [Z87.81]  Not Applicable   • PAD (peripheral artery disease) (CMS/[facility]) [I73.9]  Yes      Resolved Hospital Problems   No resolved problems to display.      Hospital Course:  Brief Hospital Course to date:  Zak Olmstead is an 80 y.o. male w/ a hx of COPD, chronic respiratory failure on 2 liters PRN, chronic atrial fibrillation (Eliquis), hyperthyroidism, HTN, CAD, PVD, remote left BKA and severe aortic stenosis. He was transferred to [facility] from [facility] on 8/15/20 for further evaluation of a left hip fracture. Anticoagulation was held for surgical repair and has since been resumed.         Fall  Closed left hip fracture  - Per daughter, patient has been more or less bedbound for a few years. Still does sit on side of bed or get into a wheelchair at times.   - s/p L hip trochanteric nailing with intramedullary screw placement by Dr. Aponte 8/17/20  - PT/OT following - patient stating this is only making his pain worse  - Therapy recommending rehab - referral to [facility] has been placed but patient now refusing to go   - PRN pain control  - Follow up with Dr. Aponte in 2 weeks      Aortic Valve Stenosis  - Evaluated by CT surgery / TAVR team. Not a candidate for TAVR  - Home with hospice      Paroxysmal atrial fibrillation with RVR  - Appreciate cardiology assistance - home on Metoprolol 100mg PO BID, Cardizem CD 360mg PO daily   - Eliquis held for surgery, resumed on 8/22 and dose increased to 5mg PO BID  - Developed volume overload due to RVR     Acute on chronic respiratory failure   Acute on chronic diastolic CHF   - Appreciate cardiology assistance   - Cardiology managed diuresis, home on Lasix 40mg O BID  - Still requiring 2L NC while awake intermittently      Vasculopathy   - History of aortic graft/stent, fem-fem bypass, chronic occlusion of RCA and LAD disease  - Lifelong smoker from age [age] years old (65 years with up to PPD, so possibly 110+ pack year history)      Chronic pain  - Per wife, follows with a pain clinic  - PEYMAN reviewed. He is on his home regimen of Oxycodone HCl 10mg Q6H + PRNs for surgical pain      History of AAA  - s/p repair     Severe Anemia  - s/p IV iron. Hgb stable in 8-10 range (higher due to diuresis)     Hyperthyroidism  - Continue home methimazole      Tobacco Use Disorder  - NRT (patch + gum PRN)   - Nicotine replacement increases risk of wound issues    Patient refused rehab placement. Given critical aortic valve stenosis, home hospice was recommended. Patient and his family are agreeable. He will discharge home on 8/24/20     Day of Discharge     HPI:  "  Laying in bed. He is adamant that he is leaving today and stated that he would sign himself out if needed. Patient and family understand the risks of going home. He wishes to DC home - home hospice liason met with family this afternoon and someone will see him at home this evening as well.     Review of Systems  Gen- No fevers, chills  CV- No chest pain, palpitations  Resp- No cough, (+_ dyspnea and productive cough  GI- No N/V/D, abd pain    Vital Signs:   Temp:  [97.3 °F (36.3 °C)-97.6 °F (36.4 °C)] 97.3 °F (36.3 °C)  Heart Rate:  [78-92] 85  Resp:  [16] 16  BP: (131-152)/(62-85) 145/85     Physical Exam:  Constitutional: Thin, frail, elderly male. Awake, alert and conversant. Periods of apnea noted when sleeping at start of exam   HENT: NCAT, mucous membranes moist  Respiratory: Bibasilar rales without wheezes or rhonchi. Sat dropped to 87% on room air during exam    Cardiovascular: (+) KARIS, no r/g   Gastrointestinal: Positive bowel sounds, soft, nontender, nondistended  Musculoskeletal: No bilateral ankle edema, s/p L BKA   Psychiatric: Appropriate affect, cooperative  Neurologic: Oriented to self, location and time. Patient refused to complete situational orientation assessment - \"you know why I'm here.\" Speech clear  Skin: No rashes    Pertinent  and/or Most Recent Results     Results from last 7 days   Lab Units 08/24/20  0743 08/22/20  0024 08/21/20  0734 08/20/20  0514 08/18/20  0525   WBC 10*3/mm3 8.36 8.46 8.41 10.62 10.63   HEMOGLOBIN g/dL 10.0* 8.1* 7.7* 7.9* 8.1*   HEMATOCRIT % 36.1* 29.6* 28.2* 28.3* 29.2*   PLATELETS 10*3/mm3 463* 348 327 352 269   SODIUM mmol/L 142  --  138 137 137   POTASSIUM mmol/L 3.4*  --  3.9 4.0 4.6   CHLORIDE mmol/L 95*  --  104 103 105   CO2 mmol/L 36.0*  --  24.0 25.0 17.0*   BUN mg/dL 14  --  16 20 21   CREATININE mg/dL 1.02  --  0.72* 0.87 0.87   GLUCOSE mg/dL 152*  --  90 79 99   CALCIUM mg/dL 8.7  --  8.0* 8.0* 7.8*     Results from last 7 days   Lab Units " 08/20/20  0514   BILIRUBIN mg/dL 1.4*   ALK PHOS U/L 114   ALT (SGPT) U/L 7   AST (SGOT) U/L 13   PROTIME Seconds 19.4*   INR  1.67*     Results from last 7 days   Lab Units 08/22/20  1211 08/20/20  0514   TSH uIU/mL  --  1.010   PROBNP pg/mL 15,941.0* 11,392.0*   PROCALCITONIN ng/mL  --  0.18   LACTATE mmol/L  --  0.9     Microbiology Results Abnormal     Procedure Component Value - Date/Time    Blood Culture - Blood, Hand, Right [[id number]] Collected:  08/16/20 0127    Lab Status:  Final result Specimen:  Blood from Hand, Right Updated:  08/21/20 0330     Blood Culture No growth at 5 days    Blood Culture - Blood, Arm, Right [[id number]] Collected:  08/16/20 0127    Lab Status:  Final result Specimen:  Blood from Arm, Right Updated:  08/21/20 0330     Blood Culture No growth at 5 days        Imaging Results (All)     Procedure Component Value Units Date/Time    XR Chest 1 View [[id number]] Collected:  08/21/20 1803     Updated:  08/22/20 0008    Narrative:       CR Chest 1 Vw    INDICATION:   Pulmonary edema. Femur fracture.     COMPARISON:    8/14/2020    FINDINGS:  Single portable AP view(s) of the chest.    Cardiac and mediastinal contours are normal. There is vascular congestion with mild generalized pulmonary edema. No pneumothorax. There is degenerative disease in the shoulders.       Impression:       Mild pulmonary edema with vascular congestion.    Signer Name: Alexi Hewitt MD   Signed: 8/21/2020 6:03 PM   Workstation Name: [email or web address]    [facility]      XR Femur 2 View Left [[id number]] Collected:  08/20/20 0920     Updated:  08/20/20 2021    Narrative:       EXAMINATION: XR FEMUR 2 VW LEFT- 08/20/2020     INDICATION: increase pain at therapy; S72.002A-Fracture of unspecified  part of neck of left femur, initial encounter for closed fracture;  Z87.81-Personal history of (healed) traumatic fracture      COMPARISON: 08/17/2020     FINDINGS: Left femur hardware appears in stable  alignment from prior  comparison 08/17/2020 with stable appearance of fracture from that exam  without evidence of progressive malalignment or new fracture.  Specifically no distal fracture of the distal left femur.           Impression:       Stable appearance of left hip nailing and left femoral  hardware from prior comparison 08/17/2020 fluoroscopic images and  overall appearance of intertrochanteric/lesser trochanteric fracture. No  new findings evident.     D:  08/20/2020  E:  08/20/2020     This report was finalized on 8/20/2020 7:03 PM by Dr. Ciaran Rivera.       FL C Arm During Surgery [[id number]] Collected:  08/18/20 0716     Updated:  08/18/20 1010    Narrative:       EXAMINATION: FL C ARM DURING SURGERY- 08/17/2020     INDICATION: troch nail, left hip pain     COMPARISON: NONE     FINDINGS: 2 minutes and 2 seconds of fluoroscopy and 10 images used for  left hip nailing. Please see the procedure report for full details.         Impression:       Fluoroscopy for left hip nailing.     D:  08/17/2020  E:  08/18/2020         This report was finalized on 8/18/2020 10:06 AM by Dr. Corin Bermeo MD.           Results for orders placed during the hospital encounter of 08/14/20   Adult Transthoracic Echo Complete W/ Cont if Necessary Per Protocol    Narrative · Left ventricular wall thickness is consistent with mild concentric   hypertrophy.  · Left ventricular systolic function is normal, EF 56-60%.  · Left ventricular diastolic dysfunction (grade I a) consistent with   impaired relaxation.  · Left atrial cavity size is mild-to-moderately dilated.  · There is severe calcification of the aortic valve mainly affecting the   non, left and right coronary cusp(s).  · Mild aortic valve regurgitation is present.  · Severe aortic valve stenosis is present.  · Aortic valve mean pressure gradient is 46.0 mmHg.  · Aortic valve maximum pressure gradient is 90.0 mmHg.  · Moderate mitral valve regurgitation is present  ·  Moderate to severe tricuspid valve regurgitation is present.  · Calculated right ventricular systolic pressure from tricuspid   regurgitation is 77 mmHg.        Discharge Details        Discharge Medications      New Medications      Instructions Start Date   docusate sodium 100 MG capsule   100 mg, Oral, 2 Times Daily      lactulose 10 GM/15ML solution  Commonly known as:  CHRONULAC   10 g, Oral, 3 Times Daily      metoprolol tartrate 100 MG tablet  Commonly known as:  LOPRESSOR   100 mg, Oral, Every 12 Hours Scheduled      polyethylene glycol 17 g packet  Commonly known as:  MIRALAX   17 g, Oral, 2 Times Daily         Changes to Medications      Instructions Start Date   apixaban 2.5 MG tablet tablet  Commonly known as:  ELIQUIS  What changed:  how much to take   5 mg, Oral, Every 12 Hours Scheduled      dilTIAZem  MG 24 hr capsule  Commonly known as:  CARDIZEM CD  What changed:    · medication strength  · how much to take   360 mg, Oral, Daily      furosemide 40 MG tablet  Commonly known as:  LASIX  What changed:    · medication strength  · how much to take  · when to take this   40 mg, Oral, 2 Times Daily      oxyCODONE 10 MG tablet  Commonly known as:  ROXICODONE  What changed:    · when to take this  · reasons to take this   10 mg, Oral, Every 4 Hours PRN      potassium chloride ER 20 MEQ tablet controlled-release ER tablet  Commonly known as:  K-TAB  What changed:    · medication strength  · how much to take  · when to take this   20 mEq, Oral, 2 Times Daily         Continue These Medications      Instructions Start Date   finasteride 5 MG tablet  Commonly known as:  PROSCAR   5 mg, Oral, Daily      ipratropium-albuterol 0.5-2.5 mg/3 ml nebulizer  Commonly known as:  DUO-NEB   3 mL, Nebulization, Every 4 Hours PRN      methIMAzole 10 MG tablet  Commonly known as:  TAPAZOLE   15 mg, Oral, Daily      nitroglycerin 0.4 MG SL tablet  Commonly known as:  NITROSTAT   0.4 mg, Sublingual, Every 5 Minutes PRN,  Take no more than 3 doses in 15 minutes.         Stop These Medications    metoprolol succinate XL 25 MG 24 hr tablet  Commonly known as:  TOPROL-XL     naproxen 500 MG tablet  Commonly known as:  NAPROSYN          No Known Allergies    Discharge Disposition:  Home or Self Care    Diet:  Diet Order   Procedures   • Diet Regular     Activity:  Activity Instructions     Activity as Tolerated          CODE STATUS:    Code Status and Medical Interventions:   Ordered at: 08/15/20 2229     Level Of Support Discussed With:    Patient     Code Status:    CPR     Medical Interventions (Level of Support Prior to Arrest):    Full       Future Appointments   Date Time Provider Department Center   9/3/2020  1:10 PM Edmar Aponte MD [facility]   9/15/2020  1:45 PM Derik Vega MD [facility] None     Additional Instructions for the Follow-ups that You Need to Schedule     Ambulatory Referral to Home Health   As directed      Face to Face Visit Date:  8/18/2020    Follow-up provider for Plan of Care?:  I treated the patient in an acute care facility and will not continue treatment after discharge.    Follow-up provider:  JOSE MADRIGAL [[id number]]    Reason/Clinical Findings:  left hip fracture    Describe mobility limitations that make leaving home difficult:  impaired functional mobility, gait, balance and endurance    Nursing/Therapeutic Services Requested:  Physical Therapy Occupational Therapy    PT orders:  Therapeutic exercise Gait Training Transfer training    Weight Bearing Status:  As Tolerated    Occupational orders:  Activities of daily living Strengthening    Frequency:  1 Week 1         Discharge Follow-up with Specialty: [facility] Orth[facility]; 2 Weeks   As directed      Specialty:  [facility]    Follow Up:  2 Weeks             Electronically signed by Natalie Anders PA-C, 08/24/20, 3:43 PM.    Time Spent on Discharge:  I spent 60 minutes on this discharge activity which included: face-to-face  encounter with the patient, reviewing the data in the system, coordination of the care with the nursing staff as well as consultants, documentation, and entering orders.            Electronically signed by Stephanie Cantrell MD at 08/24/20 5242

## 2020-08-25 ENCOUNTER — READMISSION MANAGEMENT (OUTPATIENT)
Dept: CALL CENTER | Facility: HOSPITAL | Age: 80
End: 2020-08-25

## 2020-08-25 NOTE — PROGRESS NOTES
Continued Stay Note  [facility]     Patient Name: Zak Olmstead  MRN: [id number]  Today's Date: 8/24/2020    Admit Date: 8/15/2020    Discharge Plan     Row Name 08/24/20 1956       Plan    Plan  Home with [facility]    Plan Comments  Telephone call received from Dr. Anders stating pt wants to go home today and is interested in hospice services. Visit made to pt, pt's son present. Son called pt's wife on speaker phone. Teaching done on hospice services, goals of care. Pt stated wants to go home, focus on comfort measures and have no further hospitalizations. Pt and family stated do want hospice services at home. Informed pt and family that both [facility] and [facility] services [facility]. Family opted for [facility]. Family stated can transport pt home via private vehicle. Telephone call to Dr. Anders regarding discharge, discussed pt's discharge medications with pt, family and Dr. Anders, pt's wife stated pt has oxycodone in the home, requested for any new medication to be sent to [facility] in [place]. Provided family with hospice 24 hr number to call to initiate hospice when pt arrives home, family verbalized understanding.         Discharge Codes    No documentation.       Expected Discharge Date and Time     Expected Discharge Date Expected Discharge Time    Aug 24, 2020             Lizbeth Lang RN

## 2020-08-25 NOTE — OUTREACH NOTE
Prep Survey      Responses   [facility] facility patient discharged from?  [facility]   Is LACE score < 7 ?  No   Eligibility  Not Eligible   What are the reasons patient is not eligible?  Hospice/Pallative Care   COVID-19 Test Status  Negative   Does the patient have one of the following disease processes/diagnoses(primary or secondary)?  General Surgery   Prep survey completed?  Yes          Lise Adame RN

## 2020-08-27 ENCOUNTER — TELEPHONE (OUTPATIENT)
Dept: ORTHOPEDIC SURGERY | Facility: CLINIC | Age: 80
End: 2020-08-27

## 2020-08-27 NOTE — TELEPHONE ENCOUNTER
KAITLYN FROM HOSPICE CALLED STATED PATIENT HAD BEEN ADMITTING ON AUGUST 24,2020, KAITLYN STATED PATIENT DID NOT WANT TO COME IN TO OFFICE AND WOULD LIKE TO KNOW IF HOSPICE COULD REMOVED STITCHES. KAITLYN CAN BE REACHED [phone].

## 2020-08-28 NOTE — TELEPHONE ENCOUNTER
Lisa called back- I let her know that it would be ok to remove stitches on 08/31/2020. She understood.     Dalila

## 2020-09-09 NOTE — CONSULTS
Consult Note  Zak Olmstead  [id number]  1940    Referring Provider:  Reason for Consultation: Severe aortic stenosis    Patient Care Team:  Ely Berumen APRN as PCP - General (Nurse Practitioner)    Chief complaint: Shortness of breath      History of present illness: The patient is a 80-year-old white male who has severe aortic stenosis with a mean gradient of 46.  The patient fractured his left hip and is been transferred here for evaluation for possible TAVR.  Patient denies a history of rheumatic fever that I can elicit.  Patient does have a history of atrial fibrillation hypertension as well as coronary artery disease.    Review of Systems    The following systems were reviewed and negative;  constitution, eyes, ENT, gastrointestinal, genitourinary, integument, breast, hematologic / lymphatic, neurological, behavioral/psych, endocrine and allergies / immunologic    History  Past Medical History:   Diagnosis Date   • AAA (abdominal aortic aneurysm) (CMS/[facility])     s/p repair   • Atrial fibrillation with rapid ventricular response (CMS/HCC) 6/1/2018   • BPH (benign prostatic hyperplasia)    • BPH with obstruction/lower urinary tract symptoms 2/12/2020   • CAD (coronary artery disease)    • CAD (coronary artery disease) 8/15/2020   • Chronic atrial fibrillation (CMS/HCC)    • Chronic respiratory failure (CMS/HCC)    • COPD (chronic obstructive pulmonary disease) (CMS/HCC)    • Dysuria 2/12/2020   • Erectile dysfunction 2/12/2020   • Heart murmur    • Hypertension    • Hyperthyroidism    • Mitral annular calcification 9/24/2018   • Mononeuritis multiplex    • Nonrheumatic aortic valve stenosis 9/24/2018   • NSTEMI (non-ST elevated myocardial infarction) (CMS/[facility]) 2018   • PVD (peripheral vascular disease) (CMS/HCC)    , Past Surgical History:   Procedure Laterality Date   • ABDOMINAL AORTIC ANEURYSM REPAIR     • APPENDECTOMY     • BACK SURGERY     • BELOW KNEE LEG AMPUTATION Left    • CARDIAC  CATHETERIZATION N/A 6/1/2018    Procedure: Left Heart Cath;  Surgeon: Derik Vega MD;  Location:  [facility];  Service: Cardiovascular   • COLONOSCOPY N/A 6/15/2017    Procedure: COLONOSCOPY  CPTCODE:77224;  Surgeon: Lincoln Bowens III, MD;  Location:  [facility] OR;  Service:    • ENDOSCOPY N/A 6/15/2017    Procedure: ESOPHAGOGASTRODUODENOSCOPY WITH BIOPSY  CPTCODE:24342;  Surgeon: Lincoln Bowens III, MD;  Location:  [facility] OR;  Service:    • ESOPHAGEAL DILATATION     • INTERVENTIONAL RADIOLOGY PROCEDURE Left 6/1/2018    Procedure: Abdominal Aortagram with Runoff;  Surgeon: Derik Vega MD;  Location:  [facility];  Service: Cardiovascular   , Family History   Problem Relation Age of Onset   • Heart disease Mother    • Heart failure Father    • Stroke Brother    • Heart attack Brother    , Social History     Tobacco Use   • Smoking status: Current Every Day Smoker     Packs/day: 0.50     Years: 60.00     Pack years: 30.00     Types: Cigarettes   • Smokeless tobacco: Never Used   Substance Use Topics   • Alcohol use: No   • Drug use: No   , Medications Prior to Admission   Medication Sig Dispense Refill Last Dose   • dilTIAZem CD (CARDIZEM CD) 240 MG 24 hr capsule Take 1 capsule by mouth Daily. 30 capsule 5 8/14/2020 at Unknown time   • finasteride (PROSCAR) 5 MG tablet Take 1 tablet by mouth Daily. 90 tablet 3 8/14/2020 at Unknown time   • ipratropium-albuterol (DUO-NEB) 0.5-2.5 mg/3 ml nebulizer Take 3 mL by nebulization Every 4 (Four) Hours As Needed for Wheezing.   Unknown at Unknown time   • methIMAzole (TAPAZOLE) 5 MG tablet Take 5 mg by mouth Daily.   8/14/2020 at 1100   • metoprolol succinate XL (TOPROL-XL) 25 MG 24 hr tablet Take 1 tablet by mouth Daily. 30 tablet 3 8/14/2020 at Unknown time   • naproxen (NAPROSYN) 500 MG tablet Take 500 mg by mouth Daily As Needed for Mild Pain .   Unknown at Unknown time   • nitroglycerin (NITROSTAT) 0.4 MG SL tablet Place 0.4  "mg under the tongue Every 5 (Five) Minutes As Needed for Chest Pain. Take no more than 3 doses in 15 minutes.   Unknown at Unknown time   • oxyCODONE (ROXICODONE) 10 MG tablet Take 10 mg by mouth 4 (Four) Times a Day.   8/14/2020 at 1100      Scheduled Meds:    cefTRIAXone 1 g Intravenous Q24H   dilTIAZem  mg Oral Daily   finasteride 5 mg Oral Daily   methIMAzole 5 mg Oral Daily   metoprolol tartrate 25 mg Oral Q8H   nicotine 1 patch Transdermal Q24H   oxyCODONE 10 mg Oral 4x Daily   sodium chloride 10 mL Intravenous Q12H      Continuous Infusions:      PRN Meds:  ipratropium-albuterol  •  nitroglycerin  •  ondansetron **OR** ondansetron  •  sodium chloride and Allergies:  Patient has no known allergies.    Objective     Vital Sign Min/Max for last 24 hours  Temp  Min: 97.4 °F (36.3 °C)  Max: 98.1 °F (36.7 °C)   BP  Min: 105/60  Max: 135/76   Pulse  Min: 83  Max: 137   Resp  Min: 18  Max: 20   SpO2  Min: 92 %  Max: 99 %   No data recorded   Weight  Min: 71.7 kg (158 lb)  Max: 71.7 kg (158 lb)     Flowsheet Rows      First Filed Value   Admission Height  172.3 cm (67.84\") Documented at 08/15/2020 2154   Admission Weight  71.7 kg (158 lb) Documented at 08/15/2020 2154          Physical Exam:     General Appearance:    Alert, cooperative, in no acute distress   Head:    Normocephalic, without obvious abnormality, atraumatic   Eyes:            Lids and lashes normal, conjunctivae and sclerae normal, no   icterus, no pallor, corneas clear, PERRLA   Ears:    Ears appear intact with no abnormalities noted   Throat:   No oral lesions, no thrush, oral mucosa moist   Neck:   No adenopathy, supple, trachea midline, no thyromegaly, no   carotid bruit, no JVD   Back:     No kyphosis present, no scoliosis present, no skin lesions,      erythema or scars, no tenderness to percussion or                   palpation,   range of motion normal   Lungs:     Clear to auscultation,respirations regular, even and                  " unlabored    Heart:    Regular rhythm and normal rate, normal S1 and S2, no            murmur, no gallop, no rub, no click   Chest Wall:    No abnormalities observed   Abdomen:     Normal bowel sounds, no masses, no organomegaly, soft        non-tender, non-distended, no guarding, no rebound                tenderness   Rectal:     Deferred   Extremities:  Left leg amputation, left hip fracture   Pulses:   Pulses palpable and equal bilaterally   Skin:   No bleeding, bruising or rash   Lymph nodes:   No palpable adenopathy   Neurologic:   Cranial nerves 2 - 12 grossly intact, sensation intact, DTR       present and equal bilaterally             Assessment/Plan       Closed left hip fracture (CMS/HCC)    Peripheral vascular disease (CMS/HCC)    Chronic atrial fibrillation (CMS/HCC)    Acute UTI (urinary tract infection)    HTN (hypertension)    CAD (coronary artery disease)    COPD (chronic obstructive pulmonary disease) (CMS/[facility])    Chronic respiratory disease    Aortic stenosis    S/p left hip fracture      Patient presents with a left hip fracture at this time but has severe aortic stenosis with mean gradient 46 mmHg.  Certainly at this point the patient needs to be evaluated for TAVR.  He will need a cardiac catheterization as well as the other TAVR work-up.  I agree with consultation for cardiology as well.  Certainly at this point I cannot clear for repair of the hip until the aortic stenosis is addressed.  Discussed that with nursing today.    I discussed the patients findings and my recommendations with patient, nursing staff and consulting provider      Please note that portions of this note were completed with a voice recognition program. Efforts were made to edit the dictations, but words may be mistranscribed    Chavo Phillips MD  08/16/20  07:24               Female

## 2021-02-12 DIAGNOSIS — Z23 IMMUNIZATION DUE: ICD-10-CM

## (undated) DEVICE — DRILL, AO, STERILE

## (undated) DEVICE — SHEATH INTRO SUPERSHEATH SHRT .035 4F 11CM

## (undated) DEVICE — GLIDESHEATH SLENDER STAINLESS STEEL KIT: Brand: GLIDESHEATH SLENDER

## (undated) DEVICE — ADULT DISPOSABLE SINGLE-PATIENT USE PULSE OXIMETER SENSOR: Brand: NONIN

## (undated) DEVICE — GLV SURG SENSICARE PI ORTHO SZ8 LF STRL

## (undated) DEVICE — REAMER SHAFT, MOD.TRINKLE: Brand: BIXCUT

## (undated) DEVICE — DRSNG GZ PETROLTM XEROFORM CURAD 1X8IN STRL

## (undated) DEVICE — DRAPE, RADIAL, STERILE: Brand: MEDLINE

## (undated) DEVICE — CATH 4F INF PIG 145Â° 110 CM: Brand: INFINITI

## (undated) DEVICE — CATH F4 INF JL 5 100CM: Brand: INFINITI

## (undated) DEVICE — SYR LUERLOK 30CC

## (undated) DEVICE — DRSNG WND BORDR/ADHS NONADHR/GZ LF 4X4IN STRL

## (undated) DEVICE — C-ARM: Brand: UNBRANDED

## (undated) DEVICE — RUNWAY RADL W/TOP PAD

## (undated) DEVICE — FRCP BX RADJAW4 NDL 2.8 240CM LG OG BX40

## (undated) DEVICE — CATH F4 INF JR 4 100CM: Brand: INFINITI

## (undated) DEVICE — THE BITE BLOCK MAXI, LATEX FREE STRAP IS USED TO PROTECT THE ENDOSCOPE INSERTION TUBE FROM BEING BITTEN BY THE PATIENT.

## (undated) DEVICE — ST INF PRI SMRTSTE 20DRP 2VLV 24ML 117

## (undated) DEVICE — GLV SURG SIGNATURE TOUCH PF LTX 8 STRL BX/50

## (undated) DEVICE — CONN Y IRR DISP 1P/U

## (undated) DEVICE — LEGGINGS, PAIR, 29X43, STERILE: Brand: MEDLINE

## (undated) DEVICE — SNAR POLYP CAPTIFLX MICRO OVL 13MM 240CM

## (undated) DEVICE — Device

## (undated) DEVICE — CVR HNDL LIGHT RIGID

## (undated) DEVICE — SPK10295 ORTHOPEDIC FRACTURE KIT: Brand: SPK10295 ORTHOPEDIC FRACTURE KIT

## (undated) DEVICE — Device: Brand: DEFENDO AIR/WATER/SUCTION AND BIOPSY VALVE

## (undated) DEVICE — KT MINI ACC MAK COAXL 5F 10CM

## (undated) DEVICE — ANTIBACTERIAL UNDYED BRAIDED (POLYGLACTIN 910), SYNTHETIC ABSORBABLE SUTURE: Brand: COATED VICRYL

## (undated) DEVICE — DRSNG SURESITE WNDW 4X4.5

## (undated) DEVICE — ST EXT IV SMARTSITE 2VLV SP M LL 5ML IV1

## (undated) DEVICE — PK CATH CARD 70

## (undated) DEVICE — SPNG GZ WOVN 4X4IN 12PLY 10/BX STRL

## (undated) DEVICE — GW INQW FIX/CORE PTFE J/3MM .035 260CM

## (undated) DEVICE — CANNULA,OXY,ADULT,SUPER SOFT,W/14'TUB,UC: Brand: MEDLINE INDUSTRIES, INC.

## (undated) DEVICE — TUBING, SUCTION, 1/4" X 20', STRAIGHT: Brand: MEDLINE INDUSTRIES, INC.

## (undated) DEVICE — CATH F4 INF JL 4 100CM: Brand: INFINITI

## (undated) DEVICE — K-WIRE
Type: IMPLANTABLE DEVICE | Site: HIP | Status: NON-FUNCTIONAL
Removed: 2020-08-17

## (undated) DEVICE — CATH F5 INF JR 4 100CM: Brand: INFINITI

## (undated) DEVICE — SINGLE PORT MANIFOLD: Brand: NEPTUNE 2

## (undated) DEVICE — PK MAJ FX HIP 10

## (undated) DEVICE — SUT ETHLN 3/0 FSLX 30IN 1673H

## (undated) DEVICE — GUIDE WIRE, BALL-TIPPED, STERILE

## (undated) DEVICE — THE SINGLE USE ETRAP – POLYP TRAP IS USED FOR SUCTION RETRIEVAL OF ENDOSCOPICALLY REMOVED POLYPS.: Brand: ETRAP

## (undated) DEVICE — Device: Brand: ENDOGATOR

## (undated) DEVICE — CATH F5 INF JL 3.5 100CM: Brand: INFINITI